# Patient Record
Sex: FEMALE | Race: WHITE | Employment: UNEMPLOYED | URBAN - METROPOLITAN AREA
[De-identification: names, ages, dates, MRNs, and addresses within clinical notes are randomized per-mention and may not be internally consistent; named-entity substitution may affect disease eponyms.]

---

## 2017-11-13 PROBLEM — M54.50 CHRONIC MIDLINE LOW BACK PAIN WITHOUT SCIATICA: Status: ACTIVE | Noted: 2017-11-13

## 2017-11-13 PROBLEM — G89.29 CHRONIC MIDLINE LOW BACK PAIN WITHOUT SCIATICA: Status: ACTIVE | Noted: 2017-11-13

## 2018-03-12 ENCOUNTER — HOSPITAL ENCOUNTER (OUTPATIENT)
Age: 28
Discharge: HOME | End: 2018-03-12
Payer: COMMERCIAL

## 2018-03-12 ENCOUNTER — OFFICE VISIT (OUTPATIENT)
Dept: FAMILY MEDICINE CLINIC | Age: 28
End: 2018-03-12
Payer: COMMERCIAL

## 2018-03-12 VITALS
HEIGHT: 69 IN | SYSTOLIC BLOOD PRESSURE: 104 MMHG | HEART RATE: 96 BPM | WEIGHT: 259 LBS | DIASTOLIC BLOOD PRESSURE: 70 MMHG | BODY MASS INDEX: 38.36 KG/M2 | TEMPERATURE: 98.5 F | OXYGEN SATURATION: 97 %

## 2018-03-12 DIAGNOSIS — G47.9 SLEEP DISTURBANCE: ICD-10-CM

## 2018-03-12 DIAGNOSIS — N39.0 URINARY TRACT INFECTION WITHOUT HEMATURIA, SITE UNSPECIFIED: ICD-10-CM

## 2018-03-12 DIAGNOSIS — R45.89 FLAT AFFECT: ICD-10-CM

## 2018-03-12 DIAGNOSIS — E66.09 CLASS 2 OBESITY DUE TO EXCESS CALORIES WITHOUT SERIOUS COMORBIDITY WITH BODY MASS INDEX (BMI) OF 38.0 TO 38.9 IN ADULT: ICD-10-CM

## 2018-03-12 DIAGNOSIS — R19.7 DIARRHEA, UNSPECIFIED TYPE: ICD-10-CM

## 2018-03-12 LAB
BILIRUBIN, POC: NEGATIVE
BLOOD URINE, POC: NORMAL
BUN BLDV-MCNC: 10 MG/DL (ref 6–20)
CLARITY, POC: NORMAL
COLOR, POC: YELLOW
CREAT SERPL-MCNC: 0.8 MG/DL (ref 0.5–1)
GFR AFRICAN AMERICAN: >60
GFR NON-AFRICAN AMERICAN: >60 ML/MIN/1.73
GLUCOSE URINE, POC: NEGATIVE
KETONES, POC: NEGATIVE
LEUKOCYTE EST, POC: NEGATIVE
NITRITE, POC: NEGATIVE
PH, POC: 6
PROTEIN, POC: NEGATIVE
SPECIFIC GRAVITY, POC: 1.02
UROBILINOGEN, POC: 0.2

## 2018-03-12 PROCEDURE — 84520 ASSAY OF UREA NITROGEN: CPT

## 2018-03-12 PROCEDURE — 36415 COLL VENOUS BLD VENIPUNCTURE: CPT

## 2018-03-12 PROCEDURE — 82565 ASSAY OF CREATININE: CPT

## 2018-03-12 PROCEDURE — 81002 URINALYSIS NONAUTO W/O SCOPE: CPT | Performed by: STUDENT IN AN ORGANIZED HEALTH CARE EDUCATION/TRAINING PROGRAM

## 2018-03-12 PROCEDURE — 99212 OFFICE O/P EST SF 10 MIN: CPT | Performed by: STUDENT IN AN ORGANIZED HEALTH CARE EDUCATION/TRAINING PROGRAM

## 2018-03-12 PROCEDURE — 1036F TOBACCO NON-USER: CPT | Performed by: STUDENT IN AN ORGANIZED HEALTH CARE EDUCATION/TRAINING PROGRAM

## 2018-03-12 PROCEDURE — G8417 CALC BMI ABV UP PARAM F/U: HCPCS | Performed by: STUDENT IN AN ORGANIZED HEALTH CARE EDUCATION/TRAINING PROGRAM

## 2018-03-12 PROCEDURE — G8427 DOCREV CUR MEDS BY ELIG CLIN: HCPCS | Performed by: STUDENT IN AN ORGANIZED HEALTH CARE EDUCATION/TRAINING PROGRAM

## 2018-03-12 PROCEDURE — G8484 FLU IMMUNIZE NO ADMIN: HCPCS | Performed by: STUDENT IN AN ORGANIZED HEALTH CARE EDUCATION/TRAINING PROGRAM

## 2018-03-12 PROCEDURE — 99213 OFFICE O/P EST LOW 20 MIN: CPT | Performed by: STUDENT IN AN ORGANIZED HEALTH CARE EDUCATION/TRAINING PROGRAM

## 2018-03-12 ASSESSMENT — PATIENT HEALTH QUESTIONNAIRE - PHQ9
2. FEELING DOWN, DEPRESSED OR HOPELESS: 0
SUM OF ALL RESPONSES TO PHQ QUESTIONS 1-9: 0
1. LITTLE INTEREST OR PLEASURE IN DOING THINGS: 0
SUM OF ALL RESPONSES TO PHQ9 QUESTIONS 1 & 2: 0

## 2018-03-12 NOTE — PROGRESS NOTES
follow-up. Diagnoses and all orders for this visit:    Class 2 obesity due to excess calories without serious comorbidity with body mass index (BMI) of 38.0 to 38.9 in adult  Patient with significant obesity, as well as weight gain over past year. Somewhat general and evasive in her answers about exercise and nutrition. Will refer for dietician to aid in food choices. Encouraged to continue to keep food diary.  -     Amb External Referral To Nutrition Services    Urinary tract infection without hematuria, site unspecified  Symptoms persist, patient's urinalysis was negative at last visit. Will repeat today, will treat if indicated. -     POCT Urinalysis no Micro    Flat affect  Sleep disturbance  All of patient's issues may have psychiatric component, will refer to psychologist for short-term counseling regarding her difficulty with sleep and her multiple medical complaints. -     Kristian Florence, PhD    Diarrhea, unspecified type  Will hold Milk of Magnesia, which may be contributing to diarrhea. Will perform rectal exam at visit in 2 weeks, and will consider referral for colonoscopy based on results. Call or go to ED immediately if symptoms worsen or persist.  Return in about 2 weeks (around 3/26/2018) for Exam following asthma, blood in stool. , or sooner if necessary. All questions answered. Anoop Cruz MD  Family Medicine Resident, PGY-2

## 2018-03-13 ASSESSMENT — ANXIETY QUESTIONNAIRES
5. BEING SO RESTLESS THAT IT IS HARD TO SIT STILL: 0-NOT AT ALL SURE
2. NOT BEING ABLE TO STOP OR CONTROL WORRYING: 0-NOT AT ALL SURE
3. WORRYING TOO MUCH ABOUT DIFFERENT THINGS: 0-NOT AT ALL SURE
4. TROUBLE RELAXING: 0-NOT AT ALL SURE
7. FEELING AFRAID AS IF SOMETHING AWFUL MIGHT HAPPEN: 0-NOT AT ALL SURE
6. BECOMING EASILY ANNOYED OR IRRITABLE: 0-NOT AT ALL SURE
1. FEELING NERVOUS, ANXIOUS, OR ON EDGE: 0-NOT AT ALL SURE
GAD7 TOTAL SCORE: 0

## 2018-03-14 ASSESSMENT — ENCOUNTER SYMPTOMS
DIARRHEA: 0
EYE ITCHING: 0
SHORTNESS OF BREATH: 0
COUGH: 1
EYE REDNESS: 0
NAUSEA: 0
RHINORRHEA: 1
CONSTIPATION: 1
BLOOD IN STOOL: 1
SINUS PRESSURE: 0
SORE THROAT: 1
EYE DISCHARGE: 0
VOMITING: 0
PHOTOPHOBIA: 0

## 2018-03-23 RX ORDER — BACLOFEN 20 MG/1
20 TABLET ORAL 4 TIMES DAILY
Qty: 120 TABLET | Refills: 3 | Status: SHIPPED | OUTPATIENT
Start: 2018-03-23 | End: 2018-07-12 | Stop reason: SDUPTHER

## 2018-03-30 DIAGNOSIS — G80.0 CONGENITAL SPASTIC QUADRIPARESIS (HCC): ICD-10-CM

## 2018-03-30 RX ORDER — GABAPENTIN 100 MG/1
100 CAPSULE ORAL 3 TIMES DAILY
Qty: 360 CAPSULE | Refills: 0 | Status: SHIPPED | OUTPATIENT
Start: 2018-03-30 | End: 2018-07-23 | Stop reason: SDUPTHER

## 2018-05-03 ENCOUNTER — ANESTHESIA EVENT (OUTPATIENT)
Dept: OPERATING ROOM | Age: 28
End: 2018-05-03
Payer: COMMERCIAL

## 2018-05-04 ENCOUNTER — HOSPITAL ENCOUNTER (OUTPATIENT)
Age: 28
Setting detail: OUTPATIENT SURGERY
Discharge: HOME | End: 2018-05-04
Attending: OTOLARYNGOLOGY | Admitting: OTOLARYNGOLOGY
Payer: COMMERCIAL

## 2018-05-04 ENCOUNTER — ANESTHESIA (OUTPATIENT)
Dept: OPERATING ROOM | Age: 28
End: 2018-05-04
Payer: COMMERCIAL

## 2018-05-04 VITALS
BODY MASS INDEX: 37.62 KG/M2 | TEMPERATURE: 96.3 F | HEIGHT: 69 IN | OXYGEN SATURATION: 97 % | SYSTOLIC BLOOD PRESSURE: 106 MMHG | DIASTOLIC BLOOD PRESSURE: 56 MMHG | HEART RATE: 76 BPM | RESPIRATION RATE: 16 BRPM | WEIGHT: 254 LBS

## 2018-05-04 VITALS — SYSTOLIC BLOOD PRESSURE: 105 MMHG | OXYGEN SATURATION: 100 % | DIASTOLIC BLOOD PRESSURE: 50 MMHG

## 2018-05-04 DIAGNOSIS — Z90.89 S/P TONSILLECTOMY: Primary | ICD-10-CM

## 2018-05-04 LAB — HCG(URINE) PREGNANCY TEST: NEGATIVE

## 2018-05-04 PROCEDURE — 3600000012 HC SURGERY LEVEL 2 ADDTL 15MIN: Performed by: OTOLARYNGOLOGY

## 2018-05-04 PROCEDURE — 81025 URINE PREGNANCY TEST: CPT

## 2018-05-04 PROCEDURE — 7100000000 HC PACU RECOVERY - FIRST 15 MIN: Performed by: OTOLARYNGOLOGY

## 2018-05-04 PROCEDURE — 3600000002 HC SURGERY LEVEL 2 BASE: Performed by: OTOLARYNGOLOGY

## 2018-05-04 PROCEDURE — 6360000002 HC RX W HCPCS: Performed by: NURSE ANESTHETIST, CERTIFIED REGISTERED

## 2018-05-04 PROCEDURE — 7100000011 HC PHASE II RECOVERY - ADDTL 15 MIN: Performed by: OTOLARYNGOLOGY

## 2018-05-04 PROCEDURE — 6370000000 HC RX 637 (ALT 250 FOR IP): Performed by: ANESTHESIOLOGY

## 2018-05-04 PROCEDURE — 7100000010 HC PHASE II RECOVERY - FIRST 15 MIN: Performed by: OTOLARYNGOLOGY

## 2018-05-04 PROCEDURE — 6360000002 HC RX W HCPCS: Performed by: ANESTHESIOLOGY

## 2018-05-04 PROCEDURE — 3700000000 HC ANESTHESIA ATTENDED CARE: Performed by: OTOLARYNGOLOGY

## 2018-05-04 PROCEDURE — 7100000001 HC PACU RECOVERY - ADDTL 15 MIN: Performed by: OTOLARYNGOLOGY

## 2018-05-04 PROCEDURE — 2500000003 HC RX 250 WO HCPCS: Performed by: NURSE ANESTHETIST, CERTIFIED REGISTERED

## 2018-05-04 PROCEDURE — 3700000001 HC ADD 15 MINUTES (ANESTHESIA): Performed by: OTOLARYNGOLOGY

## 2018-05-04 PROCEDURE — 2709999900 HC NON-CHARGEABLE SUPPLY: Performed by: OTOLARYNGOLOGY

## 2018-05-04 PROCEDURE — 88304 TISSUE EXAM BY PATHOLOGIST: CPT

## 2018-05-04 PROCEDURE — 2500000003 HC RX 250 WO HCPCS: Performed by: OTOLARYNGOLOGY

## 2018-05-04 PROCEDURE — 2580000003 HC RX 258: Performed by: NURSE ANESTHETIST, CERTIFIED REGISTERED

## 2018-05-04 PROCEDURE — 6370000000 HC RX 637 (ALT 250 FOR IP): Performed by: OTOLARYNGOLOGY

## 2018-05-04 RX ORDER — DEXAMETHASONE SODIUM PHOSPHATE 4 MG/ML
INJECTION, SOLUTION INTRA-ARTICULAR; INTRALESIONAL; INTRAMUSCULAR; INTRAVENOUS; SOFT TISSUE PRN
Status: DISCONTINUED | OUTPATIENT
Start: 2018-05-04 | End: 2018-05-04 | Stop reason: SDUPTHER

## 2018-05-04 RX ORDER — FENTANYL CITRATE 50 UG/ML
INJECTION, SOLUTION INTRAMUSCULAR; INTRAVENOUS PRN
Status: DISCONTINUED | OUTPATIENT
Start: 2018-05-04 | End: 2018-05-04 | Stop reason: SDUPTHER

## 2018-05-04 RX ORDER — SODIUM CHLORIDE 0.9 % (FLUSH) 0.9 %
10 SYRINGE (ML) INJECTION PRN
Status: DISCONTINUED | OUTPATIENT
Start: 2018-05-04 | End: 2018-05-04 | Stop reason: HOSPADM

## 2018-05-04 RX ORDER — LIDOCAINE HYDROCHLORIDE 20 MG/ML
INJECTION, SOLUTION EPIDURAL; INFILTRATION; INTRACAUDAL; PERINEURAL PRN
Status: DISCONTINUED | OUTPATIENT
Start: 2018-05-04 | End: 2018-05-04 | Stop reason: SDUPTHER

## 2018-05-04 RX ORDER — TRAMADOL HYDROCHLORIDE 50 MG/1
50 TABLET ORAL EVERY 6 HOURS PRN
Qty: 28 TABLET | Refills: 0 | Status: SHIPPED | OUTPATIENT
Start: 2018-05-04 | End: 2018-05-11

## 2018-05-04 RX ORDER — GLYCOPYRROLATE 0.2 MG/ML
INJECTION INTRAMUSCULAR; INTRAVENOUS PRN
Status: DISCONTINUED | OUTPATIENT
Start: 2018-05-04 | End: 2018-05-04 | Stop reason: SDUPTHER

## 2018-05-04 RX ORDER — FENTANYL CITRATE 50 UG/ML
25 INJECTION, SOLUTION INTRAMUSCULAR; INTRAVENOUS EVERY 5 MIN PRN
Status: DISCONTINUED | OUTPATIENT
Start: 2018-05-04 | End: 2018-05-04 | Stop reason: HOSPADM

## 2018-05-04 RX ORDER — ROCURONIUM BROMIDE 10 MG/ML
INJECTION, SOLUTION INTRAVENOUS PRN
Status: DISCONTINUED | OUTPATIENT
Start: 2018-05-04 | End: 2018-05-04 | Stop reason: SDUPTHER

## 2018-05-04 RX ORDER — SODIUM CHLORIDE 0.9 % (FLUSH) 0.9 %
10 SYRINGE (ML) INJECTION EVERY 12 HOURS SCHEDULED
Status: DISCONTINUED | OUTPATIENT
Start: 2018-05-04 | End: 2018-05-04 | Stop reason: HOSPADM

## 2018-05-04 RX ORDER — SODIUM CHLORIDE 9 MG/ML
INJECTION, SOLUTION INTRAVENOUS CONTINUOUS PRN
Status: DISCONTINUED | OUTPATIENT
Start: 2018-05-04 | End: 2018-05-04 | Stop reason: SDUPTHER

## 2018-05-04 RX ORDER — TRAMADOL HYDROCHLORIDE 50 MG/1
50 TABLET ORAL ONCE
Status: COMPLETED | OUTPATIENT
Start: 2018-05-04 | End: 2018-05-04

## 2018-05-04 RX ORDER — DIPHENHYDRAMINE HYDROCHLORIDE 50 MG/ML
12.5 INJECTION INTRAMUSCULAR; INTRAVENOUS
Status: DISCONTINUED | OUTPATIENT
Start: 2018-05-04 | End: 2018-05-04 | Stop reason: HOSPADM

## 2018-05-04 RX ORDER — SODIUM CHLORIDE, SODIUM LACTATE, POTASSIUM CHLORIDE, CALCIUM CHLORIDE 600; 310; 30; 20 MG/100ML; MG/100ML; MG/100ML; MG/100ML
INJECTION, SOLUTION INTRAVENOUS CONTINUOUS
Status: DISCONTINUED | OUTPATIENT
Start: 2018-05-04 | End: 2018-05-04 | Stop reason: HOSPADM

## 2018-05-04 RX ORDER — MEPERIDINE HYDROCHLORIDE 25 MG/ML
12.5 INJECTION INTRAMUSCULAR; INTRAVENOUS; SUBCUTANEOUS EVERY 5 MIN PRN
Status: DISCONTINUED | OUTPATIENT
Start: 2018-05-04 | End: 2018-05-04 | Stop reason: HOSPADM

## 2018-05-04 RX ORDER — TANNIC ACID
POWDER (GRAM) MISCELLANEOUS PRN
Status: DISCONTINUED | OUTPATIENT
Start: 2018-05-04 | End: 2018-05-04 | Stop reason: HOSPADM

## 2018-05-04 RX ORDER — MIDAZOLAM HYDROCHLORIDE 1 MG/ML
INJECTION INTRAMUSCULAR; INTRAVENOUS PRN
Status: DISCONTINUED | OUTPATIENT
Start: 2018-05-04 | End: 2018-05-04 | Stop reason: SDUPTHER

## 2018-05-04 RX ORDER — ONDANSETRON 4 MG/1
8 TABLET, ORALLY DISINTEGRATING ORAL EVERY 8 HOURS PRN
Qty: 30 TABLET | Refills: 1 | Status: SHIPPED | OUTPATIENT
Start: 2018-05-04 | End: 2018-08-20

## 2018-05-04 RX ORDER — CLINDAMYCIN PHOSPHATE 900 MG/50ML
900 INJECTION INTRAVENOUS
Status: COMPLETED | OUTPATIENT
Start: 2018-05-04 | End: 2018-05-04

## 2018-05-04 RX ORDER — NEOSTIGMINE METHYLSULFATE 1 MG/ML
INJECTION, SOLUTION INTRAVENOUS PRN
Status: DISCONTINUED | OUTPATIENT
Start: 2018-05-04 | End: 2018-05-04 | Stop reason: SDUPTHER

## 2018-05-04 RX ORDER — ONDANSETRON 2 MG/ML
INJECTION INTRAMUSCULAR; INTRAVENOUS PRN
Status: DISCONTINUED | OUTPATIENT
Start: 2018-05-04 | End: 2018-05-04 | Stop reason: SDUPTHER

## 2018-05-04 RX ORDER — PROPOFOL 10 MG/ML
INJECTION, EMULSION INTRAVENOUS PRN
Status: DISCONTINUED | OUTPATIENT
Start: 2018-05-04 | End: 2018-05-04 | Stop reason: SDUPTHER

## 2018-05-04 RX ORDER — FENTANYL CITRATE 50 UG/ML
50 INJECTION, SOLUTION INTRAMUSCULAR; INTRAVENOUS EVERY 5 MIN PRN
Status: DISCONTINUED | OUTPATIENT
Start: 2018-05-04 | End: 2018-05-04 | Stop reason: HOSPADM

## 2018-05-04 RX ADMIN — ROCURONIUM BROMIDE 10 MG: 10 SOLUTION INTRAVENOUS at 12:37

## 2018-05-04 RX ADMIN — FENTANYL CITRATE 50 MCG: 50 INJECTION, SOLUTION INTRAMUSCULAR; INTRAVENOUS at 13:04

## 2018-05-04 RX ADMIN — HYDROMORPHONE HYDROCHLORIDE 0.5 MG: 1 INJECTION, SOLUTION INTRAMUSCULAR; INTRAVENOUS; SUBCUTANEOUS at 13:27

## 2018-05-04 RX ADMIN — MIDAZOLAM HYDROCHLORIDE 2 MG: 1 INJECTION, SOLUTION INTRAMUSCULAR; INTRAVENOUS at 12:02

## 2018-05-04 RX ADMIN — LIDOCAINE HYDROCHLORIDE 100 MG: 20 INJECTION, SOLUTION EPIDURAL; INFILTRATION; INTRACAUDAL; PERINEURAL at 12:09

## 2018-05-04 RX ADMIN — CLINDAMYCIN PHOSPHATE 900 MG: 900 INJECTION INTRAVENOUS at 12:05

## 2018-05-04 RX ADMIN — ROCURONIUM BROMIDE 35 MG: 10 SOLUTION INTRAVENOUS at 12:09

## 2018-05-04 RX ADMIN — FENTANYL CITRATE 50 MCG: 50 INJECTION, SOLUTION INTRAMUSCULAR; INTRAVENOUS at 12:45

## 2018-05-04 RX ADMIN — Medication 3 MG: at 12:56

## 2018-05-04 RX ADMIN — TRAMADOL HYDROCHLORIDE 50 MG: 50 TABLET, FILM COATED ORAL at 14:19

## 2018-05-04 RX ADMIN — DEXAMETHASONE SODIUM PHOSPHATE 8 MG: 4 INJECTION, SOLUTION INTRA-ARTICULAR; INTRALESIONAL; INTRAMUSCULAR; INTRAVENOUS; SOFT TISSUE at 12:09

## 2018-05-04 RX ADMIN — FENTANYL CITRATE 50 MCG: 50 INJECTION, SOLUTION INTRAMUSCULAR; INTRAVENOUS at 12:09

## 2018-05-04 RX ADMIN — PROPOFOL 200 MG: 10 INJECTION, EMULSION INTRAVENOUS at 12:09

## 2018-05-04 RX ADMIN — ONDANSETRON 4 MG: 2 INJECTION, SOLUTION INTRAMUSCULAR; INTRAVENOUS at 12:56

## 2018-05-04 RX ADMIN — FENTANYL CITRATE 50 MCG: 50 INJECTION, SOLUTION INTRAMUSCULAR; INTRAVENOUS at 12:35

## 2018-05-04 RX ADMIN — Medication 0.4 MG: at 12:56

## 2018-05-04 RX ADMIN — Medication 0.2 MG: at 12:09

## 2018-05-04 RX ADMIN — SODIUM CHLORIDE: 9 INJECTION, SOLUTION INTRAVENOUS at 12:05

## 2018-05-04 RX ADMIN — FENTANYL CITRATE 50 MCG: 50 INJECTION, SOLUTION INTRAMUSCULAR; INTRAVENOUS at 13:12

## 2018-05-04 RX ADMIN — HYDROMORPHONE HYDROCHLORIDE 0.5 MG: 1 INJECTION, SOLUTION INTRAMUSCULAR; INTRAVENOUS; SUBCUTANEOUS at 13:38

## 2018-05-04 ASSESSMENT — PULMONARY FUNCTION TESTS
PIF_VALUE: 19
PIF_VALUE: 3
PIF_VALUE: 20
PIF_VALUE: 20
PIF_VALUE: 3
PIF_VALUE: 0
PIF_VALUE: 19
PIF_VALUE: 10
PIF_VALUE: 19
PIF_VALUE: 5
PIF_VALUE: 1
PIF_VALUE: 20
PIF_VALUE: 20
PIF_VALUE: 17
PIF_VALUE: 17
PIF_VALUE: 19
PIF_VALUE: 20
PIF_VALUE: 20
PIF_VALUE: 19
PIF_VALUE: 0
PIF_VALUE: 19
PIF_VALUE: 18
PIF_VALUE: 18
PIF_VALUE: 19
PIF_VALUE: 18
PIF_VALUE: 10
PIF_VALUE: 16
PIF_VALUE: 19
PIF_VALUE: 16
PIF_VALUE: 20
PIF_VALUE: 19
PIF_VALUE: 19
PIF_VALUE: 18
PIF_VALUE: 0
PIF_VALUE: 20
PIF_VALUE: 10
PIF_VALUE: 20
PIF_VALUE: 1
PIF_VALUE: 10
PIF_VALUE: 16
PIF_VALUE: 17
PIF_VALUE: 19
PIF_VALUE: 19
PIF_VALUE: 25
PIF_VALUE: 19
PIF_VALUE: 0
PIF_VALUE: 19
PIF_VALUE: 17
PIF_VALUE: 19
PIF_VALUE: 18
PIF_VALUE: 21
PIF_VALUE: 17
PIF_VALUE: 22
PIF_VALUE: 16
PIF_VALUE: 18
PIF_VALUE: 10

## 2018-05-04 ASSESSMENT — PAIN SCALES - GENERAL
PAINLEVEL_OUTOF10: 5
PAINLEVEL_OUTOF10: 9
PAINLEVEL_OUTOF10: 8
PAINLEVEL_OUTOF10: 0
PAINLEVEL_OUTOF10: 10
PAINLEVEL_OUTOF10: 9

## 2018-05-04 ASSESSMENT — PAIN DESCRIPTION - DESCRIPTORS
DESCRIPTORS: BURNING
DESCRIPTORS: BURNING

## 2018-05-04 ASSESSMENT — PAIN DESCRIPTION - PAIN TYPE
TYPE: SURGICAL PAIN
TYPE: SURGICAL PAIN

## 2018-05-04 ASSESSMENT — LIFESTYLE VARIABLES: SMOKING_STATUS: 1

## 2018-05-04 ASSESSMENT — PAIN DESCRIPTION - LOCATION
LOCATION: THROAT
LOCATION: THROAT

## 2018-05-04 ASSESSMENT — PAIN DESCRIPTION - FREQUENCY
FREQUENCY: CONTINUOUS
FREQUENCY: CONTINUOUS

## 2018-05-04 ASSESSMENT — PAIN DESCRIPTION - PROGRESSION: CLINICAL_PROGRESSION: GRADUALLY IMPROVING

## 2018-05-04 ASSESSMENT — PAIN - FUNCTIONAL ASSESSMENT: PAIN_FUNCTIONAL_ASSESSMENT: 0-10

## 2018-07-12 RX ORDER — BACLOFEN 20 MG/1
TABLET ORAL
Qty: 120 TABLET | Refills: 0 | Status: SHIPPED | OUTPATIENT
Start: 2018-07-12 | End: 2018-08-09 | Stop reason: SDUPTHER

## 2018-07-23 DIAGNOSIS — G80.0 CONGENITAL SPASTIC QUADRIPARESIS (HCC): ICD-10-CM

## 2018-07-24 RX ORDER — GABAPENTIN 100 MG/1
100 CAPSULE ORAL 3 TIMES DAILY
Qty: 360 CAPSULE | Refills: 0 | Status: SHIPPED | OUTPATIENT
Start: 2018-07-24 | End: 2018-08-20 | Stop reason: SDUPTHER

## 2018-08-13 RX ORDER — BACLOFEN 20 MG/1
TABLET ORAL
Qty: 120 TABLET | Refills: 0 | Status: SHIPPED | OUTPATIENT
Start: 2018-08-13 | End: 2018-08-20 | Stop reason: SDUPTHER

## 2018-08-17 DIAGNOSIS — E03.9 HYPOTHYROIDISM, UNSPECIFIED TYPE: ICD-10-CM

## 2018-08-17 RX ORDER — LEVOTHYROXINE SODIUM 0.07 MG/1
75 TABLET ORAL DAILY
Qty: 90 TABLET | Refills: 1 | Status: SHIPPED | OUTPATIENT
Start: 2018-08-17 | End: 2018-10-05 | Stop reason: SDUPTHER

## 2018-08-20 ENCOUNTER — OFFICE VISIT (OUTPATIENT)
Dept: NEUROLOGY | Age: 28
End: 2018-08-20
Payer: COMMERCIAL

## 2018-08-20 VITALS
WEIGHT: 239 LBS | SYSTOLIC BLOOD PRESSURE: 107 MMHG | BODY MASS INDEX: 35.4 KG/M2 | RESPIRATION RATE: 18 BRPM | OXYGEN SATURATION: 95 % | HEIGHT: 69 IN | DIASTOLIC BLOOD PRESSURE: 71 MMHG | HEART RATE: 84 BPM | TEMPERATURE: 97.2 F

## 2018-08-20 DIAGNOSIS — G80.0 CONGENITAL SPASTIC QUADRIPARESIS (HCC): ICD-10-CM

## 2018-08-20 PROCEDURE — G8417 CALC BMI ABV UP PARAM F/U: HCPCS | Performed by: PSYCHIATRY & NEUROLOGY

## 2018-08-20 PROCEDURE — 99215 OFFICE O/P EST HI 40 MIN: CPT | Performed by: PSYCHIATRY & NEUROLOGY

## 2018-08-20 PROCEDURE — 1036F TOBACCO NON-USER: CPT | Performed by: PSYCHIATRY & NEUROLOGY

## 2018-08-20 PROCEDURE — G8427 DOCREV CUR MEDS BY ELIG CLIN: HCPCS | Performed by: PSYCHIATRY & NEUROLOGY

## 2018-08-20 RX ORDER — GABAPENTIN 300 MG/1
300 CAPSULE ORAL 3 TIMES DAILY
Qty: 270 CAPSULE | Refills: 1 | Status: SHIPPED | OUTPATIENT
Start: 2018-08-20 | End: 2019-02-18 | Stop reason: SDUPTHER

## 2018-08-20 RX ORDER — BACLOFEN 20 MG/1
TABLET ORAL
Qty: 120 TABLET | Refills: 5 | Status: SHIPPED | OUTPATIENT
Start: 2018-08-20 | End: 2018-10-09 | Stop reason: SDUPTHER

## 2018-09-07 ENCOUNTER — OFFICE VISIT (OUTPATIENT)
Dept: FAMILY MEDICINE CLINIC | Age: 28
End: 2018-09-07
Payer: COMMERCIAL

## 2018-09-07 VITALS
HEART RATE: 75 BPM | WEIGHT: 239 LBS | RESPIRATION RATE: 12 BRPM | DIASTOLIC BLOOD PRESSURE: 77 MMHG | HEIGHT: 69 IN | SYSTOLIC BLOOD PRESSURE: 122 MMHG | TEMPERATURE: 98.1 F | BODY MASS INDEX: 35.4 KG/M2 | OXYGEN SATURATION: 96 %

## 2018-09-07 DIAGNOSIS — L25.1 CONTACT DERMATITIS DUE TO DRUGS IN CONTACT WITH SKIN, UNSPECIFIED CONTACT DERMATITIS TYPE: ICD-10-CM

## 2018-09-07 DIAGNOSIS — K62.89 PROCTALGIA: Primary | ICD-10-CM

## 2018-09-07 DIAGNOSIS — K59.00 CONSTIPATION, UNSPECIFIED CONSTIPATION TYPE: ICD-10-CM

## 2018-09-07 PROCEDURE — G8427 DOCREV CUR MEDS BY ELIG CLIN: HCPCS | Performed by: FAMILY MEDICINE

## 2018-09-07 PROCEDURE — 99213 OFFICE O/P EST LOW 20 MIN: CPT | Performed by: FAMILY MEDICINE

## 2018-09-07 PROCEDURE — 1036F TOBACCO NON-USER: CPT | Performed by: FAMILY MEDICINE

## 2018-09-07 PROCEDURE — G8417 CALC BMI ABV UP PARAM F/U: HCPCS | Performed by: FAMILY MEDICINE

## 2018-09-07 PROCEDURE — 99212 OFFICE O/P EST SF 10 MIN: CPT | Performed by: FAMILY MEDICINE

## 2018-09-07 RX ORDER — POLYETHYLENE GLYCOL 3350 17 G/17G
17 POWDER, FOR SOLUTION ORAL DAILY
Qty: 510 G | Refills: 0 | Status: SHIPPED | OUTPATIENT
Start: 2018-09-07 | End: 2018-10-03 | Stop reason: SDUPTHER

## 2018-09-10 ENCOUNTER — TELEPHONE (OUTPATIENT)
Dept: SURGERY | Age: 28
End: 2018-09-10

## 2018-09-10 NOTE — TELEPHONE ENCOUNTER
MA received a call from pt to set up a consult with one of our surgeons as a referral from Dr. Avelino Knapp for [facility]. Pt was formerly a Dr. Yanet Rodgers pt but she wishes to now have a female surgeon, MA offered sooner appointment with other surgeons but pt deferred, MA scheduled pt for 10/3/18 at 3:00 pm with Dr. Wilfrido Whittaker. Pt verbalized understanding of appointment time/date/copay reminder. MA also mailed appointment letter to pt with map/reminder.   Electronically signed by Varun Evans on 9/10/18 at 2:34 PM

## 2018-09-18 ENCOUNTER — OFFICE VISIT (OUTPATIENT)
Dept: FAMILY MEDICINE CLINIC | Age: 28
End: 2018-09-18
Payer: COMMERCIAL

## 2018-09-18 VITALS
SYSTOLIC BLOOD PRESSURE: 116 MMHG | BODY MASS INDEX: 35.99 KG/M2 | TEMPERATURE: 98.8 F | OXYGEN SATURATION: 97 % | HEIGHT: 69 IN | WEIGHT: 243 LBS | DIASTOLIC BLOOD PRESSURE: 72 MMHG | HEART RATE: 93 BPM

## 2018-09-18 DIAGNOSIS — B96.89 ACUTE BACTERIAL SINUSITIS: ICD-10-CM

## 2018-09-18 DIAGNOSIS — L70.0 ACNE VULGARIS: Primary | ICD-10-CM

## 2018-09-18 DIAGNOSIS — J01.90 ACUTE BACTERIAL SINUSITIS: ICD-10-CM

## 2018-09-18 PROCEDURE — G8427 DOCREV CUR MEDS BY ELIG CLIN: HCPCS | Performed by: FAMILY MEDICINE

## 2018-09-18 PROCEDURE — 99212 OFFICE O/P EST SF 10 MIN: CPT | Performed by: FAMILY MEDICINE

## 2018-09-18 PROCEDURE — G8417 CALC BMI ABV UP PARAM F/U: HCPCS | Performed by: FAMILY MEDICINE

## 2018-09-18 PROCEDURE — 99213 OFFICE O/P EST LOW 20 MIN: CPT | Performed by: FAMILY MEDICINE

## 2018-09-18 PROCEDURE — 1036F TOBACCO NON-USER: CPT | Performed by: FAMILY MEDICINE

## 2018-09-18 RX ORDER — DOXYCYCLINE HYCLATE 100 MG/1
100 CAPSULE ORAL 2 TIMES DAILY
Qty: 20 CAPSULE | Refills: 0 | Status: SHIPPED | OUTPATIENT
Start: 2018-09-18 | End: 2018-09-28

## 2018-09-18 RX ORDER — TRETINOIN 0.4 MG/G
GEL TOPICAL NIGHTLY
Qty: 1 TUBE | Refills: 3 | Status: SHIPPED | OUTPATIENT
Start: 2018-09-18 | End: 2018-10-23 | Stop reason: ALTCHOICE

## 2018-09-25 ENCOUNTER — TELEPHONE (OUTPATIENT)
Dept: NEUROLOGY | Age: 28
End: 2018-09-25
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
## 2018-10-03 ENCOUNTER — OFFICE VISIT (OUTPATIENT)
Dept: SURGERY | Age: 28
End: 2018-10-03
Payer: COMMERCIAL

## 2018-10-03 VITALS
OXYGEN SATURATION: 92 % | SYSTOLIC BLOOD PRESSURE: 114 MMHG | HEART RATE: 86 BPM | TEMPERATURE: 98.7 F | BODY MASS INDEX: 36.43 KG/M2 | WEIGHT: 246 LBS | DIASTOLIC BLOOD PRESSURE: 63 MMHG | RESPIRATION RATE: 18 BRPM | HEIGHT: 69 IN

## 2018-10-03 DIAGNOSIS — K62.89 PROCTALGIA: Primary | ICD-10-CM

## 2018-10-03 DIAGNOSIS — K62.5 BRBPR (BRIGHT RED BLOOD PER RECTUM): ICD-10-CM

## 2018-10-03 DIAGNOSIS — K59.00 CONSTIPATION, UNSPECIFIED CONSTIPATION TYPE: ICD-10-CM

## 2018-10-03 PROCEDURE — 99204 OFFICE O/P NEW MOD 45 MIN: CPT | Performed by: SURGERY

## 2018-10-03 PROCEDURE — 99202 OFFICE O/P NEW SF 15 MIN: CPT | Performed by: SURGERY

## 2018-10-03 RX ORDER — POLYETHYLENE GLYCOL 3350 17 G/17G
17 POWDER, FOR SOLUTION ORAL DAILY
Qty: 510 G | Refills: 3 | Status: SHIPPED | OUTPATIENT
Start: 2018-10-03 | End: 2018-10-19 | Stop reason: SDUPTHER

## 2018-10-03 ASSESSMENT — ENCOUNTER SYMPTOMS
EYES NEGATIVE: 1
RESPIRATORY NEGATIVE: 1

## 2018-10-03 NOTE — PATIENT INSTRUCTIONS
Instructions for Clear liquid diet  Definition  A clear liquid diet consists of clear liquids, such as water, broth and plain gelatin, that are easily digested and leave no undigested residue in your intestinal tract. Your doctor may prescribe a clear liquid diet before certain medical procedures or if you have certain digestive problems. Because a clear liquid diet can't provide you with adequate calories and nutrients, it shouldn't be continued for more than a few days. Purpose  A clear liquid diet is often used before tests, procedures or surgeries that require no food in your stomach or intestines, such as before colonoscopy. It may also be recommended as a short-term diet if you have certain digestive problems, such as nausea, vomiting or diarrhea, or after certain types of surgery. Diet details  A clear liquid diet helps maintain adequate hydration, provides some important electrolytes, such as sodium and potassium, and gives some energy at a time when a full diet isn't possible or recommended. The following foods are allowed in a clear liquid diet:    Plain water         Fruit juices without pulp, such as apple juice   Strained lemonade    Clear, fat-free broth (bouillon or consomme)   Clear sodas (NO COLA)   Plain gelatin (NO RED OR PURPLE)  Honey   Ice pops without bits of fruit or fruit pulp   Tea or coffee WITHOUT milk or cream  **NOTHING Red or Purple   **If you CAN see through it you can have it    Any foods not on the above list should be avoided. Also, for certain tests, such as colon exams, your doctor may ask you to avoid liquids or gelatin with red coloring.      A typical menu on the clear liquid diet may look like this:     Breakfast:  1 glass fruit juice  1 cup coffee or tea (without dairy products)  1 cup broth  1 bowl gelatin     Snack:  1 glass fruit juice  1 bowl gelatin     Lunch:  1 glass fruit juice  1 glass water  1 cup broth  1 bowl gelatin     Snack:  1 ice pop (without fruit clear liquid diet helps maintain adequate hydration, provides some important electrolytes, such as sodium and potassium, and gives some energy at a time when a full diet isn't possible or recommended. The following foods are allowed in a clear liquid diet:    Water    Fruit juices without pulp, such as apple juice    Strained lemonade    Clear, fat-free soup broth (Chicken)   Clear sodas (sprite,7 up, ginger ale, mountain dew, ect.)   Gatorade (no red & no purple)    Jello   Honey    Popsicles with no chunks of fruit or dairy   Tea or coffee without milk or cream    **NOTHING RED OR PURPLE**  **IF YOU CAN SEE THROUGH IT YOU CAN HAVE IT**        A TYPICAL MENU ON THE CLEAR LIQUID DIET MAY LOOK LIKE THIS:    Breakfast:  1 glass fruit juice  1 cup coffee or tea (without dairy products)  1 cup soup broth  1 bowl jello    Snack:  1 glass fruit juice  1 bowl jello    Lunch:  1 glass fruit juice  1 glass water  1 cup soup broth  1 bowl jello    Snack:  1 popsicle (without fruit pulp)  1 cup coffee or tea (without dairy products) or a clear soft drink     Dinner:  1 cup juice or water  1 cup soup broth  1 bowl jello  1 cup coffee or tea     Results  Although the clear liquid diet may not be very exciting, it does fulfill its purpose. It's designed to keep your stomach and intestines clear, limit strain to your digestive system, but keep your body hydrated as you prepare for or recover from a medical procedure. Risks  Because a clear liquid diet can't provide you with adequate calories and nutrients, it shouldn't be used for more than a few days. Only use the clear liquid diet as directed by your doctor. If your doctor prescribes a clear liquid diet before a medical test, be sure to follow the diet instructions exactly. If you don't follow the diet exactly, you risk an inaccurate test and may have to reschedule the procedure for another time.      The importance of proper hydration  A colonoscopy prep

## 2018-10-05 ENCOUNTER — TELEPHONE (OUTPATIENT)
Dept: SURGERY | Age: 28
End: 2018-10-05

## 2018-10-05 DIAGNOSIS — E03.9 HYPOTHYROIDISM, UNSPECIFIED TYPE: ICD-10-CM

## 2018-10-05 DIAGNOSIS — Z76.0 MEDICATION REFILL: ICD-10-CM

## 2018-10-05 DIAGNOSIS — J45.20 MILD INTERMITTENT ASTHMA WITHOUT COMPLICATION: ICD-10-CM

## 2018-10-05 DIAGNOSIS — F41.9 ANXIETY: ICD-10-CM

## 2018-10-05 RX ORDER — ESCITALOPRAM OXALATE 20 MG/1
20 TABLET ORAL DAILY
Qty: 30 TABLET | Refills: 3 | Status: SHIPPED | OUTPATIENT
Start: 2018-10-05 | End: 2019-01-14 | Stop reason: SDUPTHER

## 2018-10-05 RX ORDER — ALBUTEROL SULFATE 90 UG/1
2 AEROSOL, METERED RESPIRATORY (INHALATION) EVERY 6 HOURS PRN
Qty: 1 INHALER | Refills: 3 | Status: SHIPPED | OUTPATIENT
Start: 2018-10-05 | End: 2019-01-09

## 2018-10-05 RX ORDER — LEVOTHYROXINE SODIUM 0.07 MG/1
75 TABLET ORAL DAILY
Qty: 90 TABLET | Refills: 1 | Status: SHIPPED | OUTPATIENT
Start: 2018-10-05 | End: 2018-11-30 | Stop reason: SDUPTHER

## 2018-10-05 NOTE — TELEPHONE ENCOUNTER
From: Dominique Phoenix  Sent: 10/5/2018 2:47 PM EDT  Subject: Medication Renewal Request    Vero Peoples would like a refill of the following medications:     albuterol sulfate HFA (PROAIR HFA) 108 (90 Base) MCG/ACT inhaler Amanda Hansen MD]     escitalopram (LEXAPRO) 20 MG tablet Amanda Hansen MD]     levothyroxine (LEVOTHROID) 75 MCG tablet Amanda Hansen MD]    Preferred pharmacy: [facility] [address] [place] [address] [phone] - F [phone]    Comment:      Medication renewals requested in this message routed separately:     polyethylene glycol (MIRALAX) powder Carmenza Hare MD]

## 2018-10-09 RX ORDER — BACLOFEN 20 MG/1
TABLET ORAL
Qty: 120 TABLET | Refills: 5 | Status: SHIPPED | OUTPATIENT
Start: 2018-10-09 | End: 2018-10-09 | Stop reason: SDUPTHER

## 2018-10-16 ENCOUNTER — OFFICE VISIT (OUTPATIENT)
Dept: FAMILY MEDICINE CLINIC | Age: 28
End: 2018-10-16
Payer: COMMERCIAL

## 2018-10-16 VITALS
DIASTOLIC BLOOD PRESSURE: 73 MMHG | TEMPERATURE: 98.6 F | WEIGHT: 251.1 LBS | SYSTOLIC BLOOD PRESSURE: 105 MMHG | OXYGEN SATURATION: 98 % | HEIGHT: 69 IN | RESPIRATION RATE: 20 BRPM | BODY MASS INDEX: 37.19 KG/M2 | HEART RATE: 80 BPM

## 2018-10-16 DIAGNOSIS — B96.89 ACUTE BACTERIAL SINUSITIS: Primary | ICD-10-CM

## 2018-10-16 DIAGNOSIS — R04.2 BLOOD-TINGED SPUTUM: ICD-10-CM

## 2018-10-16 DIAGNOSIS — R09.81 CONGESTION OF NASAL SINUS: ICD-10-CM

## 2018-10-16 DIAGNOSIS — J01.90 ACUTE BACTERIAL SINUSITIS: Primary | ICD-10-CM

## 2018-10-16 PROCEDURE — 1036F TOBACCO NON-USER: CPT | Performed by: FAMILY MEDICINE

## 2018-10-16 PROCEDURE — 99212 OFFICE O/P EST SF 10 MIN: CPT | Performed by: FAMILY MEDICINE

## 2018-10-16 PROCEDURE — G8484 FLU IMMUNIZE NO ADMIN: HCPCS | Performed by: FAMILY MEDICINE

## 2018-10-16 PROCEDURE — G8417 CALC BMI ABV UP PARAM F/U: HCPCS | Performed by: FAMILY MEDICINE

## 2018-10-16 PROCEDURE — G8427 DOCREV CUR MEDS BY ELIG CLIN: HCPCS | Performed by: FAMILY MEDICINE

## 2018-10-16 PROCEDURE — 99213 OFFICE O/P EST LOW 20 MIN: CPT | Performed by: FAMILY MEDICINE

## 2018-10-16 RX ORDER — LEVOFLOXACIN 500 MG/1
500 TABLET, FILM COATED ORAL DAILY
Qty: 10 TABLET | Refills: 0 | Status: SHIPPED | OUTPATIENT
Start: 2018-10-16 | End: 2018-10-26

## 2018-10-16 RX ORDER — LEVOCETIRIZINE DIHYDROCHLORIDE 5 MG/1
5 TABLET, FILM COATED ORAL NIGHTLY
Qty: 30 TABLET | Refills: 5 | Status: SHIPPED | OUTPATIENT
Start: 2018-10-16 | End: 2019-04-08 | Stop reason: SDUPTHER

## 2018-10-16 NOTE — PROGRESS NOTES
Attending Physician Statement    S:   Chief Complaint   Patient presents with    Acne     head congestion x 2 weeks     Patient is a 29year old female here for follow up of sinusitis. Treated for acute bacterial sinusitis with doxycyline. Took full course and does not have any improvement. Gets hot and cold at time. Has sputum productiion. Sinus presure and headaches. Cough that is worse at night. Has not had any relief. Acne is improved since beingon benzyl peroxide and cream.     O: Blood pressure 105/73, pulse 80, temperature 98.6 °F (37 °C), temperature source Oral, resp. rate 20, height 5' 9\" (1.753 m), weight 251 lb 1.6 oz (113.9 kg), SpO2 98 %, not currently breastfeeding. Exam:   Heart - RRR   Lungs - clear   HEENT- bilateral erythema external canals of ears. TM normal. Tonsils are not swollen. Frontal and maxillary sinus tenderness. A: bacterial sinusitis with failure of initial therapy   P:  levaquin 500mg x 10 days - if no improvement will send for CT scan    Follow-up as ordered    Attending Attestation   I have discussed the case, including pertinent history and exam findings with the resident. I agree with the documented assessment and plan.

## 2018-10-16 NOTE — PROGRESS NOTES
[facility] Outpatient Resident Progress Note       S: HPI : Richard Smallwood  29 y.o. who presented for Acne (head congestion x 2 weeks)    Sinusitis: was going on for total 5 weeks now. Given and finished doxy from last visit. Never got better. Sinus pressure with HA. Congestion. Runny nose. Cough with dark yellow, sometimes blood tinged. Gets cold and hot. No fever. Some trouble breathing through the nose. Cough worse at night. Acne: started on creams from last visit. Improving. Happy with results. No issues with skin dryness or flaking. I reviewed the patient's past medications, allergies and past medical history during this visit    Social: Richard Smallwood  reports that she has quit smoking. Her smoking use included Cigarettes. She smoked 0.10 packs per day. She has never used smokeless tobacco. She reports that she does not drink alcohol or use drugs. ROS:  See pertinent ROS as listed in HPI    O: PE: Vitals : Blood pressure 105/73, pulse 80, temperature 98.6 °F (37 °C), temperature source Oral, resp. rate 20, height 5' 9\" (1.753 m), weight 251 lb 1.6 oz (113.9 kg), SpO2 98 %, not currently breastfeeding. CONSTITUTIONAL:  awake, alert, cooperative, non-distressed, appears stated age  [de-identified]: head atraumatic, sinuses tender on palpation, PERRLA, sclera normal; tympanic membrane normal; oral pharynx with moist mucus membranes; tonsils without erythema or exudates  NECK: supple, symmetrical,  bilateral cervical adenopathy   FACE:  fair complexity with papules throughout, some random open comedones; excoriated pustules in the T zone of the face  LUNGS:  No increased work of breathing, good air exchange, clear to auscultation bilaterally, no crackles or wheezing  CARDIOVASCULAR:  RRR, normal S1 and S2, and no murmur noted, no edema of the lower extermities   ABDOMEN:  SNTND, normal bowel sounds  SKIN:  Warm and dry    Last labs:    Last labs reviewed    A / P:   Diagnosis Orders   1.  Acute

## 2018-10-17 ENCOUNTER — HOSPITAL ENCOUNTER (OUTPATIENT)
Dept: GENERAL RADIOLOGY | Age: 28
Discharge: HOME | End: 2018-10-19
Payer: COMMERCIAL

## 2018-10-17 ENCOUNTER — HOSPITAL ENCOUNTER (OUTPATIENT)
Age: 28
Discharge: HOME | End: 2018-10-19
Payer: COMMERCIAL

## 2018-10-17 DIAGNOSIS — R04.2 BLOOD-TINGED SPUTUM: ICD-10-CM

## 2018-10-17 PROCEDURE — 71046 X-RAY EXAM CHEST 2 VIEWS: CPT

## 2018-10-19 DIAGNOSIS — K59.00 CONSTIPATION, UNSPECIFIED CONSTIPATION TYPE: ICD-10-CM

## 2018-10-19 RX ORDER — POLYETHYLENE GLYCOL 3350 17 G/17G
17 POWDER, FOR SOLUTION ORAL DAILY
Qty: 510 G | Refills: 5 | Status: SHIPPED | OUTPATIENT
Start: 2018-10-19 | End: 2019-04-15 | Stop reason: SDUPTHER

## 2018-10-23 NOTE — PROGRESS NOTES
[facility] PRE-ADMISSION TESTING GENERAL INSTRUCTIONS- [facility]-phone number:[phone]    GENERAL INSTRUCTIONS  [x] Antibacterial Soap shower Night before and/or AM of Surgery  [] Rip wipe instruction sheet and wipes given. [] Nothing by mouth after midnight, including gum, candy, mints, or water.   [] You may brush your teeth, gargle, but do NOT swallow water. []Hibiclens shower  the night before and the morning of surgery. Do not use             Hibiclens on your face or head. []No smoking, chewing tobacco, illegal drugs, or alcohol within 24 hours of your surgery. [] Jewelry, valuables or body piercing's should not be brought to the hospital. All body and/or tongue piercing's must be removed prior to arriving to hospital.  ALL hair pins must be removed. [] Do not wear makeup, lotions, powders, deodorant. Nail polish as directed by the nurse. [] Arrange transportation to and from the hospital.  Arrange for someone to be with you for the remainder of the day and for 24 hours after your procedure due to having had anesthesia. [x] Bring insurance card and photo ID.  [] Transfusion Bracelet: Please bring with you to hospital, day of surgery  [] Bring urine specimen day of surgery. Any small container is acceptable. [] Use inhalers the morning of surgery and bring with you to hospital.   []Bring copy of living will or healthcare power of  papers to be placed in your electronic record. [] CPAP/BI-PAP: Please bring your machine if you are to spend the night in the hospital.     ENDOSCOPY INSTRUCTIONS:   [x] Bowel prep instructions reviewed. [x] Nothing by mouth after midnight, including gum, candy, mints, or water. [x] You may brush your teeth, gargle, but do NOT swallow water. [x] Do not wear makeup, lotions, powders, deodorant. Nail polish as directed by the nurse.   [x] Arrange transportation to and from the hospital.  Arrange for someone to be with you for the

## 2018-10-29 ENCOUNTER — HOSPITAL ENCOUNTER (OUTPATIENT)
Age: 28
Setting detail: OUTPATIENT SURGERY
Discharge: HOME | End: 2018-10-29
Attending: SURGERY | Admitting: SURGERY
Payer: COMMERCIAL

## 2018-10-29 ENCOUNTER — ANESTHESIA (OUTPATIENT)
Dept: ENDOSCOPY | Age: 28
End: 2018-10-29
Payer: COMMERCIAL

## 2018-10-29 ENCOUNTER — ANESTHESIA EVENT (OUTPATIENT)
Dept: ENDOSCOPY | Age: 28
End: 2018-10-29
Payer: COMMERCIAL

## 2018-10-29 VITALS
RESPIRATION RATE: 14 BRPM | BODY MASS INDEX: 37.18 KG/M2 | WEIGHT: 251 LBS | HEIGHT: 69 IN | DIASTOLIC BLOOD PRESSURE: 75 MMHG | OXYGEN SATURATION: 98 % | HEART RATE: 89 BPM | TEMPERATURE: 97 F | SYSTOLIC BLOOD PRESSURE: 108 MMHG

## 2018-10-29 VITALS
OXYGEN SATURATION: 95 % | SYSTOLIC BLOOD PRESSURE: 94 MMHG | DIASTOLIC BLOOD PRESSURE: 63 MMHG | RESPIRATION RATE: 11 BRPM

## 2018-10-29 DIAGNOSIS — Z01.812 PRE-OPERATIVE LABORATORY EXAMINATION: Primary | ICD-10-CM

## 2018-10-29 DIAGNOSIS — Z76.0 MEDICATION REFILL: ICD-10-CM

## 2018-10-29 DIAGNOSIS — J45.20 MILD INTERMITTENT ASTHMA WITHOUT COMPLICATION: ICD-10-CM

## 2018-10-29 PROCEDURE — 7100000010 HC PHASE II RECOVERY - FIRST 15 MIN: Performed by: SURGERY

## 2018-10-29 PROCEDURE — 2580000003 HC RX 258: Performed by: SURGERY

## 2018-10-29 PROCEDURE — 2709999900 HC NON-CHARGEABLE SUPPLY: Performed by: SURGERY

## 2018-10-29 PROCEDURE — 3700000000 HC ANESTHESIA ATTENDED CARE: Performed by: SURGERY

## 2018-10-29 PROCEDURE — 3609010300 HC COLONOSCOPY W/BIOPSY SINGLE/MULTIPLE: Performed by: SURGERY

## 2018-10-29 PROCEDURE — 6360000002 HC RX W HCPCS: Performed by: NURSE ANESTHETIST, CERTIFIED REGISTERED

## 2018-10-29 PROCEDURE — 45380 COLONOSCOPY AND BIOPSY: CPT | Performed by: SURGERY

## 2018-10-29 PROCEDURE — 3700000001 HC ADD 15 MINUTES (ANESTHESIA): Performed by: SURGERY

## 2018-10-29 PROCEDURE — 7100000011 HC PHASE II RECOVERY - ADDTL 15 MIN: Performed by: SURGERY

## 2018-10-29 PROCEDURE — 88305 TISSUE EXAM BY PATHOLOGIST: CPT

## 2018-10-29 RX ORDER — MIDAZOLAM HYDROCHLORIDE 1 MG/ML
INJECTION INTRAMUSCULAR; INTRAVENOUS
Status: COMPLETED
Start: 2018-10-29 | End: 2018-10-29

## 2018-10-29 RX ORDER — SODIUM CHLORIDE 9 MG/ML
INJECTION, SOLUTION INTRAVENOUS CONTINUOUS
Status: DISCONTINUED | OUTPATIENT
Start: 2018-10-29 | End: 2018-10-29 | Stop reason: HOSPADM

## 2018-10-29 RX ORDER — PROPOFOL 10 MG/ML
INJECTION, EMULSION INTRAVENOUS PRN
Status: DISCONTINUED | OUTPATIENT
Start: 2018-10-29 | End: 2018-10-29 | Stop reason: SDUPTHER

## 2018-10-29 RX ORDER — MIDAZOLAM HYDROCHLORIDE 1 MG/ML
INJECTION INTRAMUSCULAR; INTRAVENOUS PRN
Status: DISCONTINUED | OUTPATIENT
Start: 2018-10-29 | End: 2018-10-29 | Stop reason: SDUPTHER

## 2018-10-29 RX ORDER — LIDOCAINE 50 MG/G
OINTMENT TOPICAL
Qty: 1 TUBE | Refills: 3 | Status: SHIPPED | OUTPATIENT
Start: 2018-10-29 | End: 2018-10-30 | Stop reason: SDUPTHER

## 2018-10-29 RX ORDER — FENTANYL CITRATE 50 UG/ML
INJECTION, SOLUTION INTRAMUSCULAR; INTRAVENOUS PRN
Status: DISCONTINUED | OUTPATIENT
Start: 2018-10-29 | End: 2018-10-29 | Stop reason: SDUPTHER

## 2018-10-29 RX ADMIN — FENTANYL CITRATE 25 MCG: 50 INJECTION, SOLUTION INTRAMUSCULAR; INTRAVENOUS at 13:24

## 2018-10-29 RX ADMIN — MIDAZOLAM HYDROCHLORIDE 1 MG: 1 INJECTION, SOLUTION INTRAMUSCULAR; INTRAVENOUS at 13:10

## 2018-10-29 RX ADMIN — PROPOFOL 520 MG: 10 INJECTION, EMULSION INTRAVENOUS at 13:16

## 2018-10-29 RX ADMIN — SODIUM CHLORIDE: 9 INJECTION, SOLUTION INTRAVENOUS at 13:10

## 2018-10-29 RX ADMIN — SODIUM CHLORIDE: 9 INJECTION, SOLUTION INTRAVENOUS at 13:05

## 2018-10-29 ASSESSMENT — PAIN SCALES - GENERAL
PAINLEVEL_OUTOF10: 0
PAINLEVEL_OUTOF10: 0

## 2018-10-29 NOTE — OP NOTE
fissure. THE PATIENT TOLERATED THE PROCEDURE WELL    PLAN:  1. Follow up with Too Billy MD in 3-4 weeks if no improvement with rectal medications, sitz baths and stool softeners.      Too Billy MD  Attending   General Surgery, Trauma, Critical Care  10/29/2018  2:07 PM

## 2018-10-29 NOTE — H&P
Plan:   Diagnostic Colonoscopy   Anoscopy   Refill Miralax      I discussed the risks, benefits, and alternatives to colonoscopy with possible biopsy/cauterization/polylpectomy with deep sedation with the patient including the risks of deep sedation (hypotension, hypoxia), bleeding, and perforation (<1%).   The patient understands the above and agrees to proceed.        Physician Signature: Jose Roberto Carrillo MD

## 2018-10-30 ENCOUNTER — TELEPHONE (OUTPATIENT)
Dept: SURGERY | Age: 28
End: 2018-10-30

## 2018-10-30 ENCOUNTER — OFFICE VISIT (OUTPATIENT)
Dept: FAMILY MEDICINE CLINIC | Age: 28
End: 2018-10-30
Payer: COMMERCIAL

## 2018-10-30 VITALS
TEMPERATURE: 98.8 F | HEART RATE: 93 BPM | DIASTOLIC BLOOD PRESSURE: 65 MMHG | SYSTOLIC BLOOD PRESSURE: 105 MMHG | HEIGHT: 69 IN | WEIGHT: 251 LBS | RESPIRATION RATE: 18 BRPM | OXYGEN SATURATION: 96 % | BODY MASS INDEX: 37.18 KG/M2

## 2018-10-30 DIAGNOSIS — B96.89 ACUTE BACTERIAL SINUSITIS: Primary | ICD-10-CM

## 2018-10-30 DIAGNOSIS — L70.0 ACNE VULGARIS: ICD-10-CM

## 2018-10-30 DIAGNOSIS — J01.90 ACUTE BACTERIAL SINUSITIS: Primary | ICD-10-CM

## 2018-10-30 DIAGNOSIS — R00.2 PALPITATIONS: ICD-10-CM

## 2018-10-30 DIAGNOSIS — Z23 NEED FOR VACCINATION: ICD-10-CM

## 2018-10-30 PROCEDURE — G8482 FLU IMMUNIZE ORDER/ADMIN: HCPCS | Performed by: FAMILY MEDICINE

## 2018-10-30 PROCEDURE — G8417 CALC BMI ABV UP PARAM F/U: HCPCS | Performed by: FAMILY MEDICINE

## 2018-10-30 PROCEDURE — G0008 ADMIN INFLUENZA VIRUS VAC: HCPCS

## 2018-10-30 PROCEDURE — 90686 IIV4 VACC NO PRSV 0.5 ML IM: CPT

## 2018-10-30 PROCEDURE — 1036F TOBACCO NON-USER: CPT | Performed by: FAMILY MEDICINE

## 2018-10-30 PROCEDURE — 6360000002 HC RX W HCPCS

## 2018-10-30 PROCEDURE — G8427 DOCREV CUR MEDS BY ELIG CLIN: HCPCS | Performed by: FAMILY MEDICINE

## 2018-10-30 PROCEDURE — 99213 OFFICE O/P EST LOW 20 MIN: CPT | Performed by: FAMILY MEDICINE

## 2018-10-30 PROCEDURE — 99212 OFFICE O/P EST SF 10 MIN: CPT | Performed by: FAMILY MEDICINE

## 2018-10-30 RX ORDER — LIDOCAINE 50 MG/G
OINTMENT TOPICAL
Qty: 1 TUBE | Refills: 3 | Status: SHIPPED | OUTPATIENT
Start: 2018-10-30 | End: 2018-12-14

## 2018-10-30 NOTE — PROGRESS NOTES
Vaccine Information Sheet, \"Influenza - Inactivated\"  given to Cindi Ruben, or parent/legal guardian of  Cindi Ruben and verbalized understanding. Patient responses:    Have you ever had a reaction to a flu vaccine? No  Are you able to eat eggs without adverse effects? Yes  Do you have any current illness? No  Have you ever had Guillian Hazard Syndrome? No    Flu vaccine given per order. Please see immunization tab.

## 2018-11-12 ENCOUNTER — TELEPHONE (OUTPATIENT)
Dept: SURGERY | Age: 28
End: 2018-11-12

## 2018-11-13 NOTE — PROGRESS NOTES
fissure and now new onset fecal incontinence will not start Nitro at a lower dose/plan botox or plan surgery. I explained considering the incontinence and Hx of urinary symptoms she may have pelvic floor dysfuction especially considering her Hx of muscle spasticity. Discussion with patient and Mother >15 minutes counseling regarding continued anal pain and referral to a colorectal surgeon.      All questions answered     Physician Signature: Electronically signed by Courtney Cohen MD

## 2018-11-14 ENCOUNTER — OFFICE VISIT (OUTPATIENT)
Dept: SURGERY | Age: 28
End: 2018-11-14
Payer: COMMERCIAL

## 2018-11-14 VITALS
OXYGEN SATURATION: 97 % | BODY MASS INDEX: 37.18 KG/M2 | WEIGHT: 251 LBS | HEART RATE: 92 BPM | TEMPERATURE: 98 F | RESPIRATION RATE: 14 BRPM | DIASTOLIC BLOOD PRESSURE: 58 MMHG | HEIGHT: 69 IN | SYSTOLIC BLOOD PRESSURE: 91 MMHG

## 2018-11-14 DIAGNOSIS — K62.89 ANAL PAIN: Chronic | ICD-10-CM

## 2018-11-14 DIAGNOSIS — K60.2 ANAL FISSURE: ICD-10-CM

## 2018-11-14 DIAGNOSIS — K62.89 PROCTITIS: Primary | ICD-10-CM

## 2018-11-14 DIAGNOSIS — R32 URINARY INCONTINENCE, UNSPECIFIED TYPE: ICD-10-CM

## 2018-11-14 DIAGNOSIS — R15.9 INCONTINENCE OF FECES, UNSPECIFIED FECAL INCONTINENCE TYPE: ICD-10-CM

## 2018-11-14 PROCEDURE — 99213 OFFICE O/P EST LOW 20 MIN: CPT | Performed by: SURGERY

## 2018-11-14 PROCEDURE — 99212 OFFICE O/P EST SF 10 MIN: CPT | Performed by: SURGERY

## 2018-11-15 DIAGNOSIS — G80.0 CONGENITAL SPASTIC QUADRIPARESIS (HCC): ICD-10-CM

## 2018-11-15 RX ORDER — GABAPENTIN 100 MG/1
CAPSULE ORAL
Qty: 360 CAPSULE | Refills: 0 | Status: SHIPPED | OUTPATIENT
Start: 2018-11-15 | End: 2018-12-14

## 2018-11-30 ENCOUNTER — HOSPITAL ENCOUNTER (OUTPATIENT)
Age: 28
Discharge: HOME | End: 2018-12-02
Payer: COMMERCIAL

## 2018-11-30 ENCOUNTER — OFFICE VISIT (OUTPATIENT)
Dept: FAMILY MEDICINE CLINIC | Age: 28
End: 2018-11-30
Payer: COMMERCIAL

## 2018-11-30 VITALS
SYSTOLIC BLOOD PRESSURE: 104 MMHG | TEMPERATURE: 98.4 F | HEART RATE: 84 BPM | WEIGHT: 251 LBS | DIASTOLIC BLOOD PRESSURE: 69 MMHG | OXYGEN SATURATION: 95 % | HEIGHT: 69 IN | BODY MASS INDEX: 37.18 KG/M2

## 2018-11-30 DIAGNOSIS — E03.9 HYPOTHYROIDISM, UNSPECIFIED TYPE: ICD-10-CM

## 2018-11-30 DIAGNOSIS — L70.0 ACNE VULGARIS: ICD-10-CM

## 2018-11-30 DIAGNOSIS — G43.909 MIGRAINE WITHOUT STATUS MIGRAINOSUS, NOT INTRACTABLE, UNSPECIFIED MIGRAINE TYPE: Primary | ICD-10-CM

## 2018-11-30 LAB — TSH SERPL DL<=0.05 MIU/L-ACNC: 2.64 UIU/ML (ref 0.27–4.2)

## 2018-11-30 PROCEDURE — 36415 COLL VENOUS BLD VENIPUNCTURE: CPT

## 2018-11-30 PROCEDURE — 36415 COLL VENOUS BLD VENIPUNCTURE: CPT | Performed by: FAMILY MEDICINE

## 2018-11-30 PROCEDURE — G8482 FLU IMMUNIZE ORDER/ADMIN: HCPCS | Performed by: FAMILY MEDICINE

## 2018-11-30 PROCEDURE — G8428 CUR MEDS NOT DOCUMENT: HCPCS | Performed by: FAMILY MEDICINE

## 2018-11-30 PROCEDURE — 84443 ASSAY THYROID STIM HORMONE: CPT

## 2018-11-30 PROCEDURE — 99212 OFFICE O/P EST SF 10 MIN: CPT | Performed by: FAMILY MEDICINE

## 2018-11-30 PROCEDURE — 1036F TOBACCO NON-USER: CPT | Performed by: FAMILY MEDICINE

## 2018-11-30 PROCEDURE — G8417 CALC BMI ABV UP PARAM F/U: HCPCS | Performed by: FAMILY MEDICINE

## 2018-11-30 PROCEDURE — 99213 OFFICE O/P EST LOW 20 MIN: CPT | Performed by: FAMILY MEDICINE

## 2018-11-30 RX ORDER — SODIUM CHLORIDE/ALOE VERA
GEL (GRAM) NASAL PRN
COMMUNITY
End: 2020-10-07

## 2018-11-30 RX ORDER — ACETAMINOPHEN, ASPIRIN AND CAFFEINE 250; 250; 65 MG/1; MG/1; MG/1
1 TABLET, FILM COATED ORAL EVERY 6 HOURS PRN
Qty: 90 TABLET | Refills: 3 | COMMUNITY
Start: 2018-11-30 | End: 2018-12-14

## 2018-11-30 RX ORDER — MOMETASONE FUROATE 1 MG/G
CREAM TOPICAL DAILY
COMMUNITY
End: 2019-11-15 | Stop reason: SDUPTHER

## 2018-11-30 RX ORDER — LEVOTHYROXINE SODIUM 0.07 MG/1
75 TABLET ORAL DAILY
Qty: 90 TABLET | Refills: 1 | Status: SHIPPED | OUTPATIENT
Start: 2018-11-30 | End: 2019-05-02 | Stop reason: SDUPTHER

## 2018-11-30 RX ORDER — TOPIRAMATE 25 MG/1
50 TABLET ORAL 2 TIMES DAILY
Qty: 60 TABLET | Refills: 0 | Status: SHIPPED | OUTPATIENT
Start: 2018-11-30 | End: 2018-12-14

## 2018-12-14 ENCOUNTER — OFFICE VISIT (OUTPATIENT)
Dept: FAMILY MEDICINE CLINIC | Age: 28
End: 2018-12-14
Payer: COMMERCIAL

## 2018-12-14 VITALS
RESPIRATION RATE: 18 BRPM | OXYGEN SATURATION: 96 % | WEIGHT: 248.3 LBS | HEIGHT: 69 IN | TEMPERATURE: 98.6 F | BODY MASS INDEX: 36.78 KG/M2 | SYSTOLIC BLOOD PRESSURE: 112 MMHG | DIASTOLIC BLOOD PRESSURE: 73 MMHG | HEART RATE: 86 BPM

## 2018-12-14 DIAGNOSIS — G43.909 MIGRAINE WITHOUT STATUS MIGRAINOSUS, NOT INTRACTABLE, UNSPECIFIED MIGRAINE TYPE: Primary | ICD-10-CM

## 2018-12-14 DIAGNOSIS — L70.0 ACNE VULGARIS: ICD-10-CM

## 2018-12-14 PROCEDURE — G8427 DOCREV CUR MEDS BY ELIG CLIN: HCPCS | Performed by: FAMILY MEDICINE

## 2018-12-14 PROCEDURE — 99212 OFFICE O/P EST SF 10 MIN: CPT | Performed by: FAMILY MEDICINE

## 2018-12-14 PROCEDURE — G8482 FLU IMMUNIZE ORDER/ADMIN: HCPCS | Performed by: FAMILY MEDICINE

## 2018-12-14 PROCEDURE — 99213 OFFICE O/P EST LOW 20 MIN: CPT | Performed by: FAMILY MEDICINE

## 2018-12-14 PROCEDURE — 1036F TOBACCO NON-USER: CPT | Performed by: FAMILY MEDICINE

## 2018-12-14 PROCEDURE — G8417 CALC BMI ABV UP PARAM F/U: HCPCS | Performed by: FAMILY MEDICINE

## 2018-12-14 RX ORDER — DIVALPROEX SODIUM 250 MG/1
250 TABLET, DELAYED RELEASE ORAL 2 TIMES DAILY
Qty: 60 TABLET | Refills: 0 | Status: SHIPPED | OUTPATIENT
Start: 2018-12-14 | End: 2018-12-17 | Stop reason: SDUPTHER
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
## 2018-12-17 DIAGNOSIS — G43.909 MIGRAINE WITHOUT STATUS MIGRAINOSUS, NOT INTRACTABLE, UNSPECIFIED MIGRAINE TYPE: ICD-10-CM

## 2018-12-17 RX ORDER — DIVALPROEX SODIUM 250 MG/1
250 TABLET, DELAYED RELEASE ORAL 2 TIMES DAILY
Qty: 180 TABLET | Refills: 0 | Status: SHIPPED | OUTPATIENT
Start: 2018-12-17 | End: 2019-04-02 | Stop reason: SDUPTHER

## 2018-12-18 ENCOUNTER — OFFICE VISIT (OUTPATIENT)
Dept: FAMILY MEDICINE CLINIC | Age: 28
End: 2018-12-18
Payer: COMMERCIAL

## 2018-12-18 ENCOUNTER — TELEPHONE (OUTPATIENT)
Dept: SURGERY | Age: 28
End: 2018-12-18

## 2018-12-18 VITALS
TEMPERATURE: 98 F | DIASTOLIC BLOOD PRESSURE: 62 MMHG | HEIGHT: 69 IN | WEIGHT: 249 LBS | SYSTOLIC BLOOD PRESSURE: 90 MMHG | RESPIRATION RATE: 16 BRPM | OXYGEN SATURATION: 98 % | HEART RATE: 79 BPM | BODY MASS INDEX: 36.88 KG/M2

## 2018-12-18 DIAGNOSIS — N30.90 CYSTITIS: ICD-10-CM

## 2018-12-18 DIAGNOSIS — M54.32 LEFT SCIATIC NERVE PAIN: ICD-10-CM

## 2018-12-18 DIAGNOSIS — M54.50 LUMBAR SPINE PAIN: Primary | ICD-10-CM

## 2018-12-18 PROCEDURE — G8427 DOCREV CUR MEDS BY ELIG CLIN: HCPCS | Performed by: NURSE PRACTITIONER

## 2018-12-18 PROCEDURE — 4004F PT TOBACCO SCREEN RCVD TLK: CPT | Performed by: NURSE PRACTITIONER

## 2018-12-18 PROCEDURE — 81002 URINALYSIS NONAUTO W/O SCOPE: CPT | Performed by: NURSE PRACTITIONER

## 2018-12-18 PROCEDURE — 99213 OFFICE O/P EST LOW 20 MIN: CPT | Performed by: NURSE PRACTITIONER

## 2018-12-18 PROCEDURE — G8417 CALC BMI ABV UP PARAM F/U: HCPCS | Performed by: NURSE PRACTITIONER

## 2018-12-18 PROCEDURE — G8482 FLU IMMUNIZE ORDER/ADMIN: HCPCS | Performed by: NURSE PRACTITIONER

## 2018-12-18 RX ORDER — NITROFURANTOIN 25; 75 MG/1; MG/1
100 CAPSULE ORAL 2 TIMES DAILY
Qty: 20 CAPSULE | Refills: 0 | Status: SHIPPED | OUTPATIENT
Start: 2018-12-18 | End: 2018-12-28

## 2018-12-18 RX ORDER — IBUPROFEN 800 MG/1
800 TABLET ORAL 2 TIMES DAILY PRN
Qty: 30 TABLET | Refills: 0 | Status: SHIPPED | OUTPATIENT
Start: 2018-12-18 | End: 2019-01-14 | Stop reason: ALTCHOICE

## 2018-12-18 ASSESSMENT — ENCOUNTER SYMPTOMS
COLOR CHANGE: 0
EYE PAIN: 0
EYE ITCHING: 0
DIARRHEA: 0
SHORTNESS OF BREATH: 0
PHOTOPHOBIA: 0
STRIDOR: 0
COUGH: 0
ABDOMINAL PAIN: 0
WHEEZING: 0
VOMITING: 0
BACK PAIN: 1
EYE REDNESS: 0
EYE DISCHARGE: 0
NAUSEA: 0

## 2018-12-18 NOTE — PATIENT INSTRUCTIONS
from front to back. When should you call for help? Call your doctor now or seek immediate medical care if:    · Symptoms such as fever, chills, nausea, or vomiting get worse or appear for the first time.     · You have new pain in your back just below your rib cage. This is called flank pain.     · There is new blood or pus in your urine.     · You have any problems with your antibiotic medicine.    Watch closely for changes in your health, and be sure to contact your doctor if:    · You are not getting better after taking an antibiotic for 2 days.     · Your symptoms go away but then come back. Where can you learn more? Go to https://[employer].MustHaveMenus. org and sign in to your [employer] account. Enter Z055 in the [employer] box to learn more about \"Urinary Tract Infection in Women: Care Instructions. \"     If you do not have an account, please click on the \"Sign Up Now\" link. Current as of: March 21, 2018  Content Version: 11.8  © 6986-5604 Healthwise, Incorporated. Care instructions adapted under license by [facility] ([facility]). If you have questions about a medical condition or this instruction, always ask your healthcare professional. [place] [address] any warranty or liability for your use of this information.

## 2018-12-18 NOTE — PROGRESS NOTES
Leandra Lai is a 29 y.o. female who presents today for   Chief Complaint   Patient presents with    Back Pain     lower back         HPI    Pt presents today with c/o lower back pain that started a few days ago. Pt describes the pain as achy/burning-like with radiation down LLE. Pt denies any recent/past back injuries, denies h/o sciatica. Denies bowel/bladder issues today but does have a h/o recurrent UTIs. Denies Kidney Infections. Rates pain 7/10, pt is afebrile and tolerating PO well      [address]:  Patient's past medical, surgical, social and/or family history reviewed, updated in chart, and are non-contributory (unless otherwise stated). Medications and allergies also reviewed and updated in chart. Review of Systems  Review of Systems   Constitutional: Positive for activity change. Negative for appetite change, chills, diaphoresis, fatigue and fever. Eyes: Negative for photophobia, pain, discharge, redness, itching and visual disturbance. Respiratory: Negative for cough, shortness of breath, wheezing and stridor. Cardiovascular: Negative for chest pain, palpitations and leg swelling. Gastrointestinal: Negative for abdominal pain, diarrhea, nausea and vomiting. Genitourinary: Negative for decreased urine volume, difficulty urinating, dysuria, enuresis, flank pain, frequency, hematuria and urgency. H/o recurrent UTI   Musculoskeletal: Positive for back pain. Negative for gait problem. Skin: Negative for color change, pallor and rash. Neurological: Negative for weakness and numbness. Physical Exam:    VS:  BP 90/62   Pulse 79   Temp 98 °F (36.7 °C) (Oral)   Resp 16   Ht 5' 9\" (1.753 m)   Wt 249 lb (112.9 kg)   SpO2 98%   BMI 36.77 kg/m²   LAST WEIGHT:  Wt Readings from Last 3 Encounters:   12/18/18 249 lb (112.9 kg)   12/14/18 248 lb 4.8 oz (112.6 kg)   11/30/18 251 lb (113.9 kg)     Physical Exam   Constitutional: She appears well-developed and well-nourished.  No

## 2019-01-09 ENCOUNTER — HOSPITAL ENCOUNTER (EMERGENCY)
Age: 29
Discharge: HOME | End: 2019-01-09
Payer: COMMERCIAL

## 2019-01-09 VITALS
HEART RATE: 101 BPM | TEMPERATURE: 96.7 F | OXYGEN SATURATION: 96 % | RESPIRATION RATE: 18 BRPM | DIASTOLIC BLOOD PRESSURE: 81 MMHG | HEIGHT: 69 IN | BODY MASS INDEX: 34.07 KG/M2 | WEIGHT: 230 LBS | SYSTOLIC BLOOD PRESSURE: 149 MMHG

## 2019-01-09 DIAGNOSIS — Z76.0 MEDICATION REFILL: ICD-10-CM

## 2019-01-09 DIAGNOSIS — J20.9 ACUTE BRONCHITIS, UNSPECIFIED ORGANISM: Primary | ICD-10-CM

## 2019-01-09 DIAGNOSIS — J45.20 MILD INTERMITTENT ASTHMA WITHOUT COMPLICATION: ICD-10-CM

## 2019-01-09 LAB
EKG ATRIAL RATE: 81 BPM
EKG P AXIS: 48 DEGREES
EKG P-R INTERVAL: 124 MS
EKG Q-T INTERVAL: 374 MS
EKG QRS DURATION: 84 MS
EKG QTC CALCULATION (BAZETT): 434 MS
EKG R AXIS: 53 DEGREES
EKG T AXIS: 15 DEGREES
EKG VENTRICULAR RATE: 81 BPM

## 2019-01-09 PROCEDURE — 99285 EMERGENCY DEPT VISIT HI MDM: CPT

## 2019-01-09 RX ORDER — BROMPHENIRAMINE MALEATE, PSEUDOEPHEDRINE HYDROCHLORIDE, AND DEXTROMETHORPHAN HYDROBROMIDE 2; 30; 10 MG/5ML; MG/5ML; MG/5ML
5 SYRUP ORAL 4 TIMES DAILY PRN
Qty: 200 ML | Refills: 0 | Status: SHIPPED | OUTPATIENT
Start: 2019-01-09 | End: 2019-03-25

## 2019-01-09 RX ORDER — ALBUTEROL SULFATE 90 UG/1
2 AEROSOL, METERED RESPIRATORY (INHALATION) EVERY 6 HOURS PRN
Qty: 1 INHALER | Refills: 0 | Status: SHIPPED | OUTPATIENT
Start: 2019-01-09 | End: 2019-03-25 | Stop reason: SDUPTHER

## 2019-01-09 RX ORDER — METHYLPREDNISOLONE 4 MG/1
TABLET ORAL
Qty: 21 TABLET | Status: SHIPPED | OUTPATIENT
Start: 2019-01-09 | End: 2019-01-14 | Stop reason: ALTCHOICE

## 2019-01-09 RX ORDER — AZITHROMYCIN 250 MG/1
TABLET, FILM COATED ORAL
Qty: 6 TABLET | Refills: 0 | Status: SHIPPED | OUTPATIENT
Start: 2019-01-09 | End: 2019-01-14

## 2019-01-09 ASSESSMENT — PAIN DESCRIPTION - LOCATION: LOCATION: CHEST

## 2019-01-09 ASSESSMENT — PAIN SCALES - GENERAL: PAINLEVEL_OUTOF10: 10

## 2019-01-09 ASSESSMENT — PAIN DESCRIPTION - PAIN TYPE: TYPE: ACUTE PAIN

## 2019-01-14 ENCOUNTER — TELEPHONE (OUTPATIENT)
Dept: FAMILY MEDICINE CLINIC | Age: 29
End: 2019-01-14

## 2019-01-14 ENCOUNTER — HOSPITAL ENCOUNTER (OUTPATIENT)
Age: 29
Discharge: HOME | End: 2019-01-16
Payer: COMMERCIAL

## 2019-01-14 ENCOUNTER — HOSPITAL ENCOUNTER (OUTPATIENT)
Dept: GENERAL RADIOLOGY | Age: 29
Discharge: HOME | End: 2019-01-16
Payer: COMMERCIAL

## 2019-01-14 ENCOUNTER — OFFICE VISIT (OUTPATIENT)
Dept: FAMILY MEDICINE CLINIC | Age: 29
End: 2019-01-14
Payer: COMMERCIAL

## 2019-01-14 VITALS
WEIGHT: 250 LBS | DIASTOLIC BLOOD PRESSURE: 70 MMHG | SYSTOLIC BLOOD PRESSURE: 98 MMHG | HEART RATE: 74 BPM | TEMPERATURE: 98.4 F | OXYGEN SATURATION: 98 % | RESPIRATION RATE: 14 BRPM | HEIGHT: 69 IN | BODY MASS INDEX: 37.03 KG/M2

## 2019-01-14 DIAGNOSIS — G43.709 CHRONIC MIGRAINE WITHOUT AURA WITHOUT STATUS MIGRAINOSUS, NOT INTRACTABLE: ICD-10-CM

## 2019-01-14 DIAGNOSIS — J45.20 MILD INTERMITTENT ASTHMA WITHOUT COMPLICATION: ICD-10-CM

## 2019-01-14 DIAGNOSIS — Z13.220 SCREENING FOR CHOLESTEROL LEVEL: ICD-10-CM

## 2019-01-14 DIAGNOSIS — R05.9 COUGH: ICD-10-CM

## 2019-01-14 DIAGNOSIS — F41.9 ANXIETY: ICD-10-CM

## 2019-01-14 DIAGNOSIS — Z76.0 MEDICATION REFILL: ICD-10-CM

## 2019-01-14 DIAGNOSIS — R04.2 BLOOD-TINGED SPUTUM: ICD-10-CM

## 2019-01-14 DIAGNOSIS — R05.9 COUGH: Primary | ICD-10-CM

## 2019-01-14 LAB
ALBUMIN SERPL-MCNC: 4.3 G/DL (ref 3.5–5.2)
ALP BLD-CCNC: 68 U/L (ref 35–104)
ALT SERPL-CCNC: 16 U/L (ref 0–32)
ANION GAP SERPL CALCULATED.3IONS-SCNC: 16 MMOL/L (ref 7–16)
AST SERPL-CCNC: 18 U/L (ref 0–31)
BASOPHILS ABSOLUTE: 0.04 E9/L (ref 0–0.2)
BASOPHILS RELATIVE PERCENT: 0.3 % (ref 0–2)
BILIRUB SERPL-MCNC: 0.6 MG/DL (ref 0–1.2)
BUN BLDV-MCNC: 17 MG/DL (ref 6–20)
CALCIUM SERPL-MCNC: 9.1 MG/DL (ref 8.6–10.2)
CHLORIDE BLD-SCNC: 101 MMOL/L (ref 98–107)
CHOLESTEROL, TOTAL: 197 MG/DL (ref 0–199)
CO2: 23 MMOL/L (ref 22–29)
CREAT SERPL-MCNC: 1 MG/DL (ref 0.5–1)
EOSINOPHILS ABSOLUTE: 0.08 E9/L (ref 0.05–0.5)
EOSINOPHILS RELATIVE PERCENT: 0.6 % (ref 0–6)
GFR AFRICAN AMERICAN: >60
GFR NON-AFRICAN AMERICAN: >60 ML/MIN/1.73
GLUCOSE BLD-MCNC: 81 MG/DL (ref 74–99)
HCT VFR BLD CALC: 45.6 % (ref 34–48)
HDLC SERPL-MCNC: 32 MG/DL
HEMOGLOBIN: 14.7 G/DL (ref 11.5–15.5)
IMMATURE GRANULOCYTES #: 0.1 E9/L
IMMATURE GRANULOCYTES %: 0.8 % (ref 0–5)
LDL CHOLESTEROL CALCULATED: 125 MG/DL (ref 0–99)
LYMPHOCYTES ABSOLUTE: 4.45 E9/L (ref 1.5–4)
LYMPHOCYTES RELATIVE PERCENT: 34.4 % (ref 20–42)
MCH RBC QN AUTO: 28.7 PG (ref 26–35)
MCHC RBC AUTO-ENTMCNC: 32.2 % (ref 32–34.5)
MCV RBC AUTO: 88.9 FL (ref 80–99.9)
MONOCYTES ABSOLUTE: 1.24 E9/L (ref 0.1–0.95)
MONOCYTES RELATIVE PERCENT: 9.6 % (ref 2–12)
NEUTROPHILS ABSOLUTE: 7.02 E9/L (ref 1.8–7.3)
NEUTROPHILS RELATIVE PERCENT: 54.3 % (ref 43–80)
PDW BLD-RTO: 13.5 FL (ref 11.5–15)
PLATELET # BLD: 292 E9/L (ref 130–450)
PMV BLD AUTO: 13.4 FL (ref 7–12)
POTASSIUM SERPL-SCNC: 4.8 MMOL/L (ref 3.5–5)
RBC # BLD: 5.13 E12/L (ref 3.5–5.5)
SODIUM BLD-SCNC: 140 MMOL/L (ref 132–146)
TOTAL PROTEIN: 7.5 G/DL (ref 6.4–8.3)
TRIGL SERPL-MCNC: 198 MG/DL (ref 0–149)
VLDLC SERPL CALC-MCNC: 40 MG/DL
WBC # BLD: 12.9 E9/L (ref 4.5–11.5)

## 2019-01-14 PROCEDURE — G8417 CALC BMI ABV UP PARAM F/U: HCPCS | Performed by: FAMILY MEDICINE

## 2019-01-14 PROCEDURE — 85025 COMPLETE CBC W/AUTO DIFF WBC: CPT

## 2019-01-14 PROCEDURE — G8482 FLU IMMUNIZE ORDER/ADMIN: HCPCS | Performed by: FAMILY MEDICINE

## 2019-01-14 PROCEDURE — 99212 OFFICE O/P EST SF 10 MIN: CPT | Performed by: FAMILY MEDICINE

## 2019-01-14 PROCEDURE — G8427 DOCREV CUR MEDS BY ELIG CLIN: HCPCS | Performed by: FAMILY MEDICINE

## 2019-01-14 PROCEDURE — 36415 COLL VENOUS BLD VENIPUNCTURE: CPT | Performed by: FAMILY MEDICINE

## 2019-01-14 PROCEDURE — 80053 COMPREHEN METABOLIC PANEL: CPT

## 2019-01-14 PROCEDURE — 99213 OFFICE O/P EST LOW 20 MIN: CPT | Performed by: FAMILY MEDICINE

## 2019-01-14 PROCEDURE — 1036F TOBACCO NON-USER: CPT | Performed by: FAMILY MEDICINE

## 2019-01-14 PROCEDURE — 36415 COLL VENOUS BLD VENIPUNCTURE: CPT

## 2019-01-14 PROCEDURE — 80061 LIPID PANEL: CPT

## 2019-01-14 PROCEDURE — 71046 X-RAY EXAM CHEST 2 VIEWS: CPT

## 2019-01-14 RX ORDER — BUDESONIDE AND FORMOTEROL FUMARATE DIHYDRATE 80; 4.5 UG/1; UG/1
2 AEROSOL RESPIRATORY (INHALATION) DAILY
Qty: 1 INHALER | Refills: 3 | Status: SHIPPED | OUTPATIENT
Start: 2019-01-14 | End: 2019-03-25 | Stop reason: SDUPTHER

## 2019-01-14 RX ORDER — BUDESONIDE AND FORMOTEROL FUMARATE DIHYDRATE 80; 4.5 UG/1; UG/1
2 AEROSOL RESPIRATORY (INHALATION) DAILY
Qty: 1 INHALER | Refills: 3 | Status: SHIPPED | OUTPATIENT
Start: 2019-01-14 | End: 2019-01-14 | Stop reason: SDUPTHER

## 2019-01-14 RX ORDER — LEVOFLOXACIN 500 MG/1
500 TABLET, FILM COATED ORAL DAILY
Qty: 10 TABLET | Refills: 0 | Status: SHIPPED | OUTPATIENT
Start: 2019-01-14 | End: 2019-01-24

## 2019-01-14 RX ORDER — ESCITALOPRAM OXALATE 20 MG/1
20 TABLET ORAL DAILY
Qty: 30 TABLET | Refills: 3 | Status: SHIPPED | OUTPATIENT
Start: 2019-01-14 | End: 2019-05-02 | Stop reason: SDUPTHER

## 2019-01-17 ENCOUNTER — TELEPHONE (OUTPATIENT)
Dept: FAMILY MEDICINE CLINIC | Age: 29
End: 2019-01-17

## 2019-01-17 DIAGNOSIS — E56.9 VITAMIN DEFICIENCY: Primary | ICD-10-CM

## 2019-02-18 ENCOUNTER — OFFICE VISIT (OUTPATIENT)
Dept: NEUROLOGY | Age: 29
End: 2019-02-18
Payer: COMMERCIAL

## 2019-02-18 VITALS
HEART RATE: 69 BPM | TEMPERATURE: 98.4 F | WEIGHT: 250 LBS | SYSTOLIC BLOOD PRESSURE: 104 MMHG | OXYGEN SATURATION: 97 % | BODY MASS INDEX: 37.03 KG/M2 | DIASTOLIC BLOOD PRESSURE: 63 MMHG | RESPIRATION RATE: 18 BRPM | HEIGHT: 69 IN

## 2019-02-18 DIAGNOSIS — G80.0 CONGENITAL SPASTIC QUADRIPARESIS (HCC): ICD-10-CM

## 2019-02-18 PROBLEM — K62.89 ANAL PAIN: Chronic | Status: RESOLVED | Noted: 2018-11-14 | Resolved: 2019-02-18

## 2019-02-18 PROBLEM — G89.29 CHRONIC MIDLINE LOW BACK PAIN WITHOUT SCIATICA: Status: RESOLVED | Noted: 2017-11-13 | Resolved: 2019-02-18

## 2019-02-18 PROBLEM — M54.50 CHRONIC MIDLINE LOW BACK PAIN WITHOUT SCIATICA: Status: RESOLVED | Noted: 2017-11-13 | Resolved: 2019-02-18

## 2019-02-18 PROCEDURE — G8482 FLU IMMUNIZE ORDER/ADMIN: HCPCS | Performed by: PSYCHIATRY & NEUROLOGY

## 2019-02-18 PROCEDURE — G8417 CALC BMI ABV UP PARAM F/U: HCPCS | Performed by: PSYCHIATRY & NEUROLOGY

## 2019-02-18 PROCEDURE — 99215 OFFICE O/P EST HI 40 MIN: CPT | Performed by: PSYCHIATRY & NEUROLOGY

## 2019-02-18 PROCEDURE — 1036F TOBACCO NON-USER: CPT | Performed by: PSYCHIATRY & NEUROLOGY

## 2019-02-18 PROCEDURE — G8428 CUR MEDS NOT DOCUMENT: HCPCS | Performed by: PSYCHIATRY & NEUROLOGY

## 2019-02-18 RX ORDER — DANTROLENE SODIUM 25 MG/1
25 CAPSULE ORAL NIGHTLY
Qty: 90 CAPSULE | Refills: 3 | Status: SHIPPED | OUTPATIENT
Start: 2019-02-18 | End: 2019-06-17 | Stop reason: SDUPTHER

## 2019-02-18 RX ORDER — BACLOFEN 20 MG/1
20 TABLET ORAL
COMMUNITY
End: 2019-02-18 | Stop reason: SDUPTHER

## 2019-02-18 RX ORDER — POLYETHYLENE GLYCOL 3350 17 G/17G
17 POWDER, FOR SOLUTION ORAL DAILY
COMMUNITY
End: 2019-04-04 | Stop reason: SDUPTHER

## 2019-02-18 RX ORDER — OMEGA-3 FATTY ACIDS/FISH OIL 300-1000MG
1 CAPSULE ORAL PRN
COMMUNITY
End: 2019-03-25 | Stop reason: SDUPTHER

## 2019-02-18 RX ORDER — BACLOFEN 20 MG/1
TABLET ORAL
Qty: 120 TABLET | Refills: 11 | Status: SHIPPED | OUTPATIENT
Start: 2019-02-18 | End: 2019-03-25 | Stop reason: SDUPTHER

## 2019-02-18 RX ORDER — GABAPENTIN 300 MG/1
300 CAPSULE ORAL 3 TIMES DAILY
Qty: 270 CAPSULE | Refills: 1 | Status: SHIPPED | OUTPATIENT
Start: 2019-02-18 | End: 2019-05-02 | Stop reason: SDUPTHER

## 2019-03-07 ENCOUNTER — TELEPHONE (OUTPATIENT)
Dept: FAMILY MEDICINE CLINIC | Age: 29
End: 2019-03-07

## 2019-03-11 NOTE — TELEPHONE ENCOUNTER
Flovent would be a duplicate of the symbicort. Please advise which of the inhalers the pt is taking so I can sign if needed. I am not sure if there was an issue obtaining the symbicort.

## 2019-03-25 ENCOUNTER — TELEPHONE (OUTPATIENT)
Dept: FAMILY MEDICINE CLINIC | Age: 29
End: 2019-03-25

## 2019-03-25 ENCOUNTER — OFFICE VISIT (OUTPATIENT)
Dept: FAMILY MEDICINE CLINIC | Age: 29
End: 2019-03-25
Payer: COMMERCIAL

## 2019-03-25 ENCOUNTER — HOSPITAL ENCOUNTER (OUTPATIENT)
Age: 29
End: 2019-03-25
Payer: COMMERCIAL

## 2019-03-25 ENCOUNTER — HOSPITAL ENCOUNTER (OUTPATIENT)
Dept: GENERAL RADIOLOGY | Age: 29
Discharge: HOME | End: 2019-03-27
Payer: COMMERCIAL

## 2019-03-25 VITALS
SYSTOLIC BLOOD PRESSURE: 96 MMHG | TEMPERATURE: 97.6 F | WEIGHT: 255 LBS | BODY MASS INDEX: 37.77 KG/M2 | HEIGHT: 69 IN | DIASTOLIC BLOOD PRESSURE: 70 MMHG | HEART RATE: 103 BPM | OXYGEN SATURATION: 98 %

## 2019-03-25 DIAGNOSIS — R35.0 URINARY FREQUENCY: ICD-10-CM

## 2019-03-25 DIAGNOSIS — M79.605 LEFT LEG PAIN: ICD-10-CM

## 2019-03-25 DIAGNOSIS — R22.41 MASS OF RIGHT LOWER EXTREMITY: ICD-10-CM

## 2019-03-25 DIAGNOSIS — N39.0 COMPLICATED UTI (URINARY TRACT INFECTION): Primary | ICD-10-CM

## 2019-03-25 DIAGNOSIS — Z76.0 MEDICATION REFILL: ICD-10-CM

## 2019-03-25 DIAGNOSIS — J45.20 MILD INTERMITTENT ASTHMA WITHOUT COMPLICATION: ICD-10-CM

## 2019-03-25 LAB
BILIRUBIN, POC: ABNORMAL
BLOOD URINE, POC: ABNORMAL
CLARITY, POC: CLEAR
COLOR, POC: YELLOW
GLUCOSE URINE, POC: ABNORMAL
KETONES, POC: ABNORMAL
LEUKOCYTE EST, POC: ABNORMAL
NITRITE, POC: ABNORMAL
PH, POC: 6
PROTEIN, POC: 30
SPECIFIC GRAVITY, POC: 1.03
UROBILINOGEN, POC: 0.2

## 2019-03-25 PROCEDURE — 99212 OFFICE O/P EST SF 10 MIN: CPT | Performed by: FAMILY MEDICINE

## 2019-03-25 PROCEDURE — 1036F TOBACCO NON-USER: CPT | Performed by: FAMILY MEDICINE

## 2019-03-25 PROCEDURE — 73590 X-RAY EXAM OF LOWER LEG: CPT

## 2019-03-25 PROCEDURE — 81002 URINALYSIS NONAUTO W/O SCOPE: CPT | Performed by: FAMILY MEDICINE

## 2019-03-25 PROCEDURE — G8417 CALC BMI ABV UP PARAM F/U: HCPCS | Performed by: FAMILY MEDICINE

## 2019-03-25 PROCEDURE — G8482 FLU IMMUNIZE ORDER/ADMIN: HCPCS | Performed by: FAMILY MEDICINE

## 2019-03-25 PROCEDURE — G8427 DOCREV CUR MEDS BY ELIG CLIN: HCPCS | Performed by: FAMILY MEDICINE

## 2019-03-25 PROCEDURE — 99213 OFFICE O/P EST LOW 20 MIN: CPT | Performed by: FAMILY MEDICINE

## 2019-03-25 RX ORDER — FLUTICASONE PROPIONATE 220 UG/1
AEROSOL, METERED RESPIRATORY (INHALATION)
Refills: 3 | COMMUNITY
Start: 2019-01-08 | End: 2019-12-09

## 2019-03-25 RX ORDER — BACLOFEN 20 MG/1
TABLET ORAL
Qty: 120 TABLET | Refills: 11 | Status: SHIPPED | OUTPATIENT
Start: 2019-03-25 | End: 2019-08-08 | Stop reason: SDUPTHER

## 2019-03-25 RX ORDER — ALBUTEROL SULFATE 90 UG/1
2 AEROSOL, METERED RESPIRATORY (INHALATION) EVERY 6 HOURS PRN
Qty: 1 INHALER | Refills: 0 | Status: SHIPPED | OUTPATIENT
Start: 2019-03-25 | End: 2019-05-02 | Stop reason: SDUPTHER

## 2019-03-25 RX ORDER — OMEGA-3 FATTY ACIDS/FISH OIL 300-1000MG
1 CAPSULE ORAL PRN
Qty: 120 CAPSULE | Refills: 0 | Status: SHIPPED | OUTPATIENT
Start: 2019-03-25 | End: 2019-04-02 | Stop reason: SDUPTHER

## 2019-03-25 RX ORDER — BUDESONIDE AND FORMOTEROL FUMARATE DIHYDRATE 80; 4.5 UG/1; UG/1
2 AEROSOL RESPIRATORY (INHALATION) DAILY
Qty: 1 INHALER | Refills: 3 | Status: SHIPPED | OUTPATIENT
Start: 2019-03-25 | End: 2019-08-16 | Stop reason: SDUPTHER

## 2019-03-25 RX ORDER — CIPROFLOXACIN 500 MG/1
500 TABLET, FILM COATED ORAL 2 TIMES DAILY
Qty: 20 TABLET | Refills: 0 | Status: SHIPPED | OUTPATIENT
Start: 2019-03-25 | End: 2019-04-01

## 2019-03-25 RX ORDER — NYSTATIN 100000 U/G
CREAM TOPICAL
Refills: 1 | COMMUNITY
Start: 2019-02-06 | End: 2019-12-09

## 2019-03-25 ASSESSMENT — PATIENT HEALTH QUESTIONNAIRE - PHQ9
SUM OF ALL RESPONSES TO PHQ QUESTIONS 1-9: 0
2. FEELING DOWN, DEPRESSED OR HOPELESS: 0
SUM OF ALL RESPONSES TO PHQ QUESTIONS 1-9: 0
SUM OF ALL RESPONSES TO PHQ9 QUESTIONS 1 & 2: 0
SUM OF ALL RESPONSES TO PHQ QUESTIONS 1-9: 0
1. LITTLE INTEREST OR PLEASURE IN DOING THINGS: 0
1. LITTLE INTEREST OR PLEASURE IN DOING THINGS: 0
SUM OF ALL RESPONSES TO PHQ QUESTIONS 1-9: 0

## 2019-03-25 NOTE — TELEPHONE ENCOUNTER
Pharmacy called again staying that Rx for cipro says to take for 7 days but ordered #20 BID. Change days or change number of pills?   Enma

## 2019-03-30 ENCOUNTER — HOSPITAL ENCOUNTER (OUTPATIENT)
Dept: ULTRASOUND IMAGING | Age: 29
Discharge: HOME | End: 2019-04-01
Payer: COMMERCIAL

## 2019-03-30 DIAGNOSIS — M79.605 LEFT LEG PAIN: ICD-10-CM

## 2019-03-30 PROCEDURE — 76999 ECHO EXAMINATION PROCEDURE: CPT

## 2019-04-02 ENCOUNTER — OFFICE VISIT (OUTPATIENT)
Dept: FAMILY MEDICINE CLINIC | Age: 29
End: 2019-04-02
Payer: COMMERCIAL

## 2019-04-02 VITALS
HEIGHT: 69 IN | TEMPERATURE: 98.1 F | BODY MASS INDEX: 38.21 KG/M2 | OXYGEN SATURATION: 98 % | WEIGHT: 258 LBS | RESPIRATION RATE: 18 BRPM | HEART RATE: 96 BPM | SYSTOLIC BLOOD PRESSURE: 115 MMHG | DIASTOLIC BLOOD PRESSURE: 77 MMHG

## 2019-04-02 DIAGNOSIS — M79.605 LEFT LEG PAIN: Primary | ICD-10-CM

## 2019-04-02 DIAGNOSIS — G43.909 MIGRAINE WITHOUT STATUS MIGRAINOSUS, NOT INTRACTABLE, UNSPECIFIED MIGRAINE TYPE: ICD-10-CM

## 2019-04-02 DIAGNOSIS — N39.0 COMPLICATED UTI (URINARY TRACT INFECTION): ICD-10-CM

## 2019-04-02 DIAGNOSIS — Z76.0 MEDICATION REFILL: ICD-10-CM

## 2019-04-02 PROCEDURE — 99213 OFFICE O/P EST LOW 20 MIN: CPT | Performed by: FAMILY MEDICINE

## 2019-04-02 PROCEDURE — G8427 DOCREV CUR MEDS BY ELIG CLIN: HCPCS | Performed by: FAMILY MEDICINE

## 2019-04-02 PROCEDURE — 99212 OFFICE O/P EST SF 10 MIN: CPT | Performed by: FAMILY MEDICINE

## 2019-04-02 PROCEDURE — G8417 CALC BMI ABV UP PARAM F/U: HCPCS | Performed by: FAMILY MEDICINE

## 2019-04-02 PROCEDURE — 1036F TOBACCO NON-USER: CPT | Performed by: FAMILY MEDICINE

## 2019-04-02 RX ORDER — DIVALPROEX SODIUM 250 MG/1
250 TABLET, DELAYED RELEASE ORAL 2 TIMES DAILY
Qty: 180 TABLET | Refills: 0 | Status: SHIPPED | OUTPATIENT
Start: 2019-04-02 | End: 2019-05-02 | Stop reason: SDUPTHER

## 2019-04-02 RX ORDER — OMEGA-3 FATTY ACIDS/FISH OIL 300-1000MG
1 CAPSULE ORAL PRN
Qty: 120 CAPSULE | Refills: 0 | Status: SHIPPED | OUTPATIENT
Start: 2019-04-02 | End: 2019-04-05 | Stop reason: ALTCHOICE

## 2019-04-02 ASSESSMENT — ENCOUNTER SYMPTOMS
SHORTNESS OF BREATH: 0
CHEST TIGHTNESS: 0
COLOR CHANGE: 0
COUGH: 0

## 2019-04-02 NOTE — PROGRESS NOTES
Tana Davey MD at [facility] OR       Allergies:    Latex; Acetaminophen; Aspirin-acetaminophen-caffeine; Dye [iodides]; Penicillins; Topamax [topiramate]; Tylenol with codeine #3 [acetaminophen-codeine]; Blueberry flavor; Fish-derived products;  Iodine; Lidocaine; and Vicodin [hydrocodone-acetaminophen]    Social History:   Social History     Socioeconomic History    Marital status: Single     Spouse name: Not on file    Number of children: Not on file    Years of education: 15    Highest education level: Not on file   Occupational History    Occupation: [employer]     Employer: [employer]: unable to work due to injury   Social Needs    Financial resource strain: Not on file    Food insecurity:     Worry: Not on file     Inability: Not on file   [employer] needs:     Medical: Not on file     Non-medical: Not on file   Tobacco Use    Smoking status: Former Smoker     Packs/day: 0.10     Types: Cigarettes    Smokeless tobacco: Never Used   Substance and Sexual Activity    Alcohol use: No     Alcohol/week: 0.0 oz    Drug use: No    Sexual activity: Never   Lifestyle    Physical activity:     Days per week: Not on file     Minutes per session: Not on file    Stress: Not on file   Relationships    Social connections:     Talks on phone: Not on file     Gets together: Not on file     Attends Temple service: Not on file     Active member of club or organization: Not on file     Attends meetings of clubs or organizations: Not on file     Relationship status: Not on file    Intimate partner violence:     Fear of current or ex partner: Not on file     Emotionally abused: Not on file     Physically abused: Not on file     Forced sexual activity: Not on file   Other Topics Concern    Not on file   Social History Narrative    Not on file        Family History:       Problem Relation Age of Onset    Early Death Maternal Grandfather     Cancer Maternal Grandfather     Asthma Brother  Heart Disease Maternal Aunt     Cancer Maternal Aunt     Heart Disease Maternal Uncle     Cancer Maternal Uncle     High Cholesterol Father     Diabetes Father     High Cholesterol Mother     Cancer Paternal Aunt     Cancer Paternal Uncle     Cancer Maternal Grandmother     Cancer Paternal Grandmother     Cancer Paternal Grandfather        Review of Systems:   Review of Systems   Constitutional: Negative for activity change, appetite change, chills, diaphoresis, fatigue, fever and unexpected weight change. Respiratory: Negative for cough, chest tightness and shortness of breath. Cardiovascular: Negative for chest pain, palpitations and leg swelling. Musculoskeletal: Positive for arthralgias and myalgias. Skin: Negative for color change, pallor, rash and wound. Physical Exam   Vitals: /77   Pulse 96   Temp 98.1 °F (36.7 °C)   Resp 18   Ht 5' 9\" (1.753 m)   Wt 258 lb (117 kg)   SpO2 98%   BMI 38.10 kg/m²   General Appearance: Well developed, awake, alert, oriented, no acute distress  HEENT: Normocephalic, atraumatic. EOM's intact, EACwithout erythema or swelling, no pallor oricterus. Neck: Supple, symmetrical, trachea midline. No JVD. Chest wall/Lung: Clear to auscultation bilaterally,  respirationsunlabored. No ronchi/wheezing/rales  Heart: Regular rate and rhythm, S1 and S2 normal, no murmur, rub or gallop. Abdomen: Soft, non-tender, bowelsounds normoactive, no masses, no organomegaly  Extremities:  Extremities normal, atraumatic, nocyanosis. No edema. Left gastrocnemius is ttp. No erythema or swelling. Entire left lateral ankle including malleolus is ttp,. FROM of both ankles. Strength and resistance in lower extremities 5/5   Skin: Skin color, texture, turgor normal, no rashes or lesions  Musculokeletal: ROM grossly normal in all joints of extremities, no obvious joint swelling. Lymph nodes: no lymph node enlargement appreciated  Neurologic:   Alert&Oriented. Normal gait and coordination  No focal neurological deficits appreaciated         Psychiatric: has a normal mood and affect. Behavior is normal.       Assessment and Plan       1. Left leg pain  -pain 2/2 to fluid filled pocket deep to subcutaneous tissue. Referral to ortho for possible drainage  - [address] Merline Quinteros MD, Orthopaedics, [address]    2. Migraine without status migrainosus, not intractable, unspecified migraine type  - divalproex (DEPAKOTE) 250 MG DR tablet; Take 1 tablet by mouth 2 times daily  Dispense: 180 tablet; Refill: 0        Return to Office: FU with PCP    Jg Alcantara MD    Medication List:    Current Outpatient Medications   Medication Sig Dispense Refill    divalproex (DEPAKOTE) 250 MG DR tablet Take 1 tablet by mouth 2 times daily 180 tablet 0    ibuprofen (ADVIL MIGRAINE) 200 MG CAPS Take 1 capsule by mouth as needed for Pain or Fever 120 capsule 0    nicotine (NICODERM CQ) 7 MG/24HR Place 1 patch onto the skin daily for 14 days 14 patch 0    FLOVENT  MCG/ACT inhaler INHALE 1 PUFF INTO THE LUNGS BID  3    nystatin (MYCOSTATIN) 918854 UNIT/GM cream KAPIL EXT AA  QD  1    budesonide-formoterol (SYMBICORT) 80-4.5 MCG/ACT AERO Inhale 2 puffs into the lungs daily 1 Inhaler 3    baclofen (LIORESAL) 20 MG tablet 1 tablet in am, 1 in evening and 2 tablets at hs 120 tablet 11    polyethylene glycol (MIRALAX) powder Take 17 g by mouth daily      gabapentin (NEURONTIN) 300 MG capsule Take 1 capsule by mouth 3 times daily for 182 days. . 270 capsule 1    dantrolene (DANTRIUM) 25 MG capsule Take 1 capsule by mouth nightly 90 capsule 3    Multiple Vitamin (MVI, CELEBRATE, CHEWABLE TABLET) Take 1 tablet by mouth daily 90 tablet 5    escitalopram (LEXAPRO) 20 MG tablet Take 1 tablet by mouth daily 30 tablet 3    Benzoyl Peroxide 2.5 % CREA Apply to face 2x per day 1 Tube 3    saline nasal gel (AYR) GEL by Nasal route as needed for Congestion      levothyroxine (LEVOTHROID) 75 MCG tablet Take 1 tablet by mouth daily 90 tablet 1    mometasone (ELOCON) 0.1 % cream Apply topically daily Apply topically daily.  levocetirizine (XYZAL) 5 MG tablet Take 1 tablet by mouth nightly 30 tablet 5    tamsulosin (FLOMAX) 0.4 MG capsule Take 0.4 mg by mouth daily      medroxyPROGESTERone (PROVERA) 10 MG tablet Take 10 mg by mouth daily      albuterol sulfate HFA (PROAIR HFA) 108 (90 Base) MCG/ACT inhaler Inhale 2 puffs into the lungs every 6 hours as needed for Wheezing 1 Inhaler 0     No current facility-administered medications for this visit.

## 2019-04-04 ENCOUNTER — TELEPHONE (OUTPATIENT)
Dept: ORTHOPEDIC SURGERY | Age: 29
End: 2019-04-04

## 2019-04-04 ENCOUNTER — OFFICE VISIT (OUTPATIENT)
Dept: FAMILY MEDICINE CLINIC | Age: 29
End: 2019-04-04
Payer: COMMERCIAL

## 2019-04-04 ENCOUNTER — HOSPITAL ENCOUNTER (OUTPATIENT)
Age: 29
Discharge: HOME | End: 2019-04-06
Payer: COMMERCIAL

## 2019-04-04 VITALS
BODY MASS INDEX: 38.09 KG/M2 | SYSTOLIC BLOOD PRESSURE: 103 MMHG | TEMPERATURE: 97.3 F | HEART RATE: 94 BPM | OXYGEN SATURATION: 98 % | WEIGHT: 257.2 LBS | DIASTOLIC BLOOD PRESSURE: 73 MMHG | RESPIRATION RATE: 18 BRPM | HEIGHT: 69 IN

## 2019-04-04 DIAGNOSIS — R30.0 DYSURIA: ICD-10-CM

## 2019-04-04 DIAGNOSIS — Z76.0 MEDICATION REFILL: ICD-10-CM

## 2019-04-04 DIAGNOSIS — R19.7 DIARRHEA, UNSPECIFIED TYPE: Primary | ICD-10-CM

## 2019-04-04 DIAGNOSIS — R42 DIZZINESS: ICD-10-CM

## 2019-04-04 LAB
BACTERIA: ABNORMAL /HPF
BILIRUBIN URINE: NEGATIVE
BLOOD, URINE: ABNORMAL
CLARITY: CLEAR
COLOR: YELLOW
GLUCOSE URINE: NEGATIVE MG/DL
KETONES, URINE: NEGATIVE MG/DL
LEUKOCYTE ESTERASE, URINE: ABNORMAL
NITRITE, URINE: NEGATIVE
PH UA: 6 (ref 5–9)
PROTEIN UA: NEGATIVE MG/DL
RBC UA: ABNORMAL /HPF (ref 0–2)
SPECIFIC GRAVITY UA: 1.01 (ref 1–1.03)
UROBILINOGEN, URINE: 0.2 E.U./DL
WBC UA: ABNORMAL /HPF (ref 0–5)

## 2019-04-04 PROCEDURE — G8417 CALC BMI ABV UP PARAM F/U: HCPCS | Performed by: FAMILY MEDICINE

## 2019-04-04 PROCEDURE — 99213 OFFICE O/P EST LOW 20 MIN: CPT | Performed by: FAMILY MEDICINE

## 2019-04-04 PROCEDURE — 1036F TOBACCO NON-USER: CPT | Performed by: FAMILY MEDICINE

## 2019-04-04 PROCEDURE — G8427 DOCREV CUR MEDS BY ELIG CLIN: HCPCS | Performed by: FAMILY MEDICINE

## 2019-04-04 PROCEDURE — 87088 URINE BACTERIA CULTURE: CPT

## 2019-04-04 PROCEDURE — 81003 URINALYSIS AUTO W/O SCOPE: CPT | Performed by: FAMILY MEDICINE

## 2019-04-04 PROCEDURE — 81001 URINALYSIS AUTO W/SCOPE: CPT

## 2019-04-04 PROCEDURE — 99212 OFFICE O/P EST SF 10 MIN: CPT | Performed by: FAMILY MEDICINE

## 2019-04-04 RX ORDER — POLYETHYLENE GLYCOL 3350 17 G/17G
17 POWDER, FOR SOLUTION ORAL DAILY
Qty: 1 BOTTLE | Refills: 0 | Status: SHIPPED | OUTPATIENT
Start: 2019-04-04 | End: 2019-10-02

## 2019-04-04 RX ORDER — FLUTICASONE PROPIONATE 50 MCG
1 SPRAY, SUSPENSION (ML) NASAL DAILY
Qty: 1 BOTTLE | Refills: 3 | Status: SHIPPED | OUTPATIENT
Start: 2019-04-04 | End: 2019-09-09 | Stop reason: SDUPTHER

## 2019-04-04 RX ORDER — FLUTICASONE PROPIONATE 50 MCG
1 SPRAY, SUSPENSION (ML) NASAL DAILY
Qty: 1 BOTTLE | Refills: 3 | Status: SHIPPED | OUTPATIENT
Start: 2019-04-04 | End: 2019-04-04 | Stop reason: SDUPTHER

## 2019-04-04 NOTE — PATIENT INSTRUCTIONS
Patient Education        Dehydration: Care Instructions  Your Care Instructions  Dehydration happens when your body loses too much fluid. This might happen when you do not drink enough water or you lose large amounts of fluids from your body because of diarrhea, vomiting, or sweating. Severe dehydration can be life-threatening. Water and minerals called electrolytes help put your body fluids back in balance. Learn the early signs of fluid loss, and drink more fluids to prevent dehydration. Follow-up care is a key part of your treatment and safety. Be sure to make and go to all appointments, and call your doctor if you are having problems. It's also a good idea to know your test results and keep a list of the medicines you take. How can you care for yourself at home? · To prevent dehydration, drink plenty of fluids, enough so that your urine is light yellow or clear like water. Choose water and other caffeine-free clear liquids until you feel better. If you have kidney, heart, or liver disease and have to limit fluids, talk with your doctor before you increase the amount of fluids you drink. · If you do not feel like eating or drinking, try taking small sips of water, sports drinks, or other rehydration drinks. · Get plenty of rest.  To prevent dehydration  · Add more fluids to your diet and daily routine, unless your doctor has told you not to. · During hot weather, drink more fluids. Drink even more fluids if you exercise a lot. Stay away from drinks with alcohol or caffeine. · Watch for the symptoms of dehydration. These include:  ? A dry, sticky mouth. ? Dark yellow urine, and not much of it. ? Dry and sunken eyes. ? Feeling very tired. · Learn what problems can lead to dehydration. These include:  ? Diarrhea, fever, and vomiting. ? Any illness with a fever, such as pneumonia or the flu. ?  Activities that cause heavy sweating, such as endurance races and heavy outdoor work in hot or humid

## 2019-04-04 NOTE — PROGRESS NOTES
the lower extermities   ABDOMEN:  Mild abdominal tenderness; back pain all over, not specific to flanks  SKIN:  Warm and dry    Last labs:    Last labs reviewed    A / P:   Diagnosis Orders   1. Diarrhea, unspecified type     2. Dysuria  URINALYSIS    URINE CULTURE    US RETROPERITONEAL LIMITED   3. Dizziness  fluticasone (FLONASE) 50 MCG/ACT nasal spray   4. Medication refill  nicotine (NICODERM CQ) 7 MG/24HR    polyethylene glycol (MIRALAX) powder     1. Dizziness - physical exam and pt reported symptoms not consistent with any 1 type of diagnosis. Treat conservatively at this time with increased hydration and flonase for chronic sinus pressure. 2. Dysuria - treated recently with cipro for complicated UTI  V pyelo, repeat UA today is normal. Will send for culture due to pt reported symptoms and monitor for now. Will also get kidney US for now. 3. Gastroenteritis - monitor for now. Increase fluids. If symptoms fail to improve or worsen, pt to return to office. 4. Other items not addressed this visit:   · Asthma - controlled, CPM.   · Migraine wo aura - controlled, continue depakote 250mg BID for prevention. Continue with motrin migraine PRN for abortive. RTO: Return in about 1 month (around 5/4/2019) for chronic conditions.     Electronically signed by Marcos Major MD on 4/4/2019 at 11:54 AM  This case was discussed with (s) Shana    Future Appointments   Date Time Provider Donna Page   5/2/2019 11:00 AM [facility]   6/18/2019  3:40 PM Natty Hartley MD [facility]

## 2019-04-05 ENCOUNTER — TELEPHONE (OUTPATIENT)
Dept: FAMILY MEDICINE CLINIC | Age: 29
End: 2019-04-05

## 2019-04-05 ENCOUNTER — TELEPHONE (OUTPATIENT)
Dept: ORTHOPEDIC SURGERY | Age: 29
End: 2019-04-05

## 2019-04-05 ENCOUNTER — APPOINTMENT (OUTPATIENT)
Dept: ULTRASOUND IMAGING | Age: 29
End: 2019-04-05
Payer: COMMERCIAL

## 2019-04-05 ENCOUNTER — HOSPITAL ENCOUNTER (EMERGENCY)
Age: 29
Discharge: HOME | End: 2019-04-05
Attending: EMERGENCY MEDICINE
Payer: COMMERCIAL

## 2019-04-05 VITALS
WEIGHT: 257 LBS | OXYGEN SATURATION: 98 % | HEART RATE: 84 BPM | SYSTOLIC BLOOD PRESSURE: 130 MMHG | BODY MASS INDEX: 38.95 KG/M2 | DIASTOLIC BLOOD PRESSURE: 70 MMHG | RESPIRATION RATE: 18 BRPM | TEMPERATURE: 97.5 F | HEIGHT: 68 IN

## 2019-04-05 DIAGNOSIS — M85.662 OTHER CYST OF BONE, LEFT LOWER LEG: Primary | ICD-10-CM

## 2019-04-05 DIAGNOSIS — M79.605 LEFT LEG PAIN: Primary | ICD-10-CM

## 2019-04-05 LAB
ANION GAP SERPL CALCULATED.3IONS-SCNC: 9 MMOL/L (ref 7–16)
BASOPHILS ABSOLUTE: 0.03 E9/L (ref 0–0.2)
BASOPHILS RELATIVE PERCENT: 0.4 % (ref 0–2)
BUN BLDV-MCNC: 15 MG/DL (ref 6–20)
CALCIUM SERPL-MCNC: 8.9 MG/DL (ref 8.6–10.2)
CHLORIDE BLD-SCNC: 108 MMOL/L (ref 98–107)
CO2: 25 MMOL/L (ref 22–29)
CREAT SERPL-MCNC: 1 MG/DL (ref 0.5–1)
EOSINOPHILS ABSOLUTE: 0.2 E9/L (ref 0.05–0.5)
EOSINOPHILS RELATIVE PERCENT: 2.6 % (ref 0–6)
GFR AFRICAN AMERICAN: >60
GFR NON-AFRICAN AMERICAN: >60 ML/MIN/1.73
GLUCOSE BLD-MCNC: 87 MG/DL (ref 74–99)
HCT VFR BLD CALC: 40.4 % (ref 34–48)
HEMOGLOBIN: 13.1 G/DL (ref 11.5–15.5)
IMMATURE GRANULOCYTES #: 0.02 E9/L
IMMATURE GRANULOCYTES %: 0.3 % (ref 0–5)
LACTIC ACID: 0.8 MMOL/L (ref 0.5–2.2)
LYMPHOCYTES ABSOLUTE: 2.73 E9/L (ref 1.5–4)
LYMPHOCYTES RELATIVE PERCENT: 34.8 % (ref 20–42)
MCH RBC QN AUTO: 29.6 PG (ref 26–35)
MCHC RBC AUTO-ENTMCNC: 32.4 % (ref 32–34.5)
MCV RBC AUTO: 91.2 FL (ref 80–99.9)
MONOCYTES ABSOLUTE: 0.7 E9/L (ref 0.1–0.95)
MONOCYTES RELATIVE PERCENT: 8.9 % (ref 2–12)
NEUTROPHILS ABSOLUTE: 4.16 E9/L (ref 1.8–7.3)
NEUTROPHILS RELATIVE PERCENT: 53 % (ref 43–80)
PDW BLD-RTO: 13.7 FL (ref 11.5–15)
PLATELET # BLD: 190 E9/L (ref 130–450)
PMV BLD AUTO: 12.9 FL (ref 7–12)
POTASSIUM SERPL-SCNC: 4.7 MMOL/L (ref 3.5–5)
RBC # BLD: 4.43 E12/L (ref 3.5–5.5)
SODIUM BLD-SCNC: 142 MMOL/L (ref 132–146)
WBC # BLD: 7.8 E9/L (ref 4.5–11.5)

## 2019-04-05 PROCEDURE — 85025 COMPLETE CBC W/AUTO DIFF WBC: CPT

## 2019-04-05 PROCEDURE — 93971 EXTREMITY STUDY: CPT

## 2019-04-05 PROCEDURE — 99284 EMERGENCY DEPT VISIT MOD MDM: CPT

## 2019-04-05 PROCEDURE — 80048 BASIC METABOLIC PNL TOTAL CA: CPT

## 2019-04-05 PROCEDURE — 36415 COLL VENOUS BLD VENIPUNCTURE: CPT

## 2019-04-05 PROCEDURE — 83605 ASSAY OF LACTIC ACID: CPT

## 2019-04-05 PROCEDURE — 87040 BLOOD CULTURE FOR BACTERIA: CPT

## 2019-04-05 RX ORDER — IBUPROFEN 600 MG/1
600 TABLET ORAL 4 TIMES DAILY PRN
Qty: 360 TABLET | Refills: 1 | Status: SHIPPED | OUTPATIENT
Start: 2019-04-05 | End: 2019-05-02

## 2019-04-05 NOTE — ED NOTES
FIRST PROVIDER CONTACT ASSESSMENT NOTE      Department of Emergency Medicine   4/5/19  6:35 PM    Chief Complaint: Leg Pain (started 1 week ago had U/S done earlier this week. )      History of Present Illness:    Leroy Siu is a 34 y.o. female who presents to the ED by private car for  left leg pain infection   Focused Screening Exam:  Constitutional:  Alert, appears stated age and is in no distress. Heart rrr   Lungs clear     *ALLERGIES*     Latex; Acetaminophen; Aspirin-acetaminophen-caffeine; Dye [iodides]; Penicillins; Topamax [topiramate]; Tylenol with codeine #3 [acetaminophen-codeine]; Blueberry flavor; Fish-derived products;  Iodine; Lidocaine; and Vicodin [hydrocodone-acetaminophen]     ED Triage Vitals   BP Temp Temp src Pulse Resp SpO2 Height Weight   04/05/19 1833 04/05/19 1833 -- 04/05/19 1826 04/05/19 1826 04/05/19 1826 04/05/19 1833 04/05/19 1833   132/68 97.5 °F (36.4 °C)  98 16 96 % 5' 8\" (1.727 m) 257 lb (116.6 kg)        Initial Plan of Care:  Initiate Treatment-Testing, Proceed toTreatment Area When Bed Available for ED Attending/MLP to Continue Care    -----------------[address] ASSESSMENT NOTE--------------  Electronically signed by Arther Mcburney, PA   DD: 4/5/19     Arther Mcburney, PA  04/05/19 1836

## 2019-04-05 NOTE — TELEPHONE ENCOUNTER
Dr. Betzy Smalls office called in stating that they spoke with Dr. Yasmine Rooney and he thinks patient would be better off seeing general surgery.

## 2019-04-06 ASSESSMENT — ENCOUNTER SYMPTOMS
SHORTNESS OF BREATH: 0
TROUBLE SWALLOWING: 0
EYE PAIN: 0
COUGH: 0
RHINORRHEA: 0
VOMITING: 0
ABDOMINAL PAIN: 0
EYE REDNESS: 0
NAUSEA: 0
ABDOMINAL DISTENTION: 0
DIARRHEA: 0
BACK PAIN: 0
BLOOD IN STOOL: 0
WHEEZING: 0
CHEST TIGHTNESS: 0
PHOTOPHOBIA: 0
SORE THROAT: 0
SINUS PRESSURE: 0
CONSTIPATION: 0

## 2019-04-06 NOTE — ED PROVIDER NOTES
Patient is a 15-year-old female who presented to ED for evaluation of left lower extremity pain. Onset of symptoms proximally 1 week prior to arrival. Patient denies trauma to the extremity. Patient denies previous DVT/PE, hemoptysis, recent immobilization, recent surgery, oral contraceptives, or loss of motor/sensory function. Patient describes the pain as a \"dull, tightness\". Pain is located to the lateral left lower extremity over the lateral thigh as well as the lateral ankle. Denies associated fever or chills. Patient denies increased warmth or redness to the area. Review of Systems   Constitutional: Negative for chills, diaphoresis, fatigue and fever. HENT: Negative for congestion, ear pain, rhinorrhea, sinus pressure, sneezing, sore throat and trouble swallowing. Eyes: Negative for photophobia, pain and redness. Respiratory: Negative for cough, chest tightness, shortness of breath and wheezing. Cardiovascular: Negative for chest pain and palpitations. Gastrointestinal: Negative for abdominal distention, abdominal pain, blood in stool, constipation, diarrhea, nausea and vomiting. Endocrine: Negative for polydipsia and polyuria. Genitourinary: Negative for difficulty urinating, dysuria, flank pain, hematuria and urgency. Musculoskeletal: Negative for arthralgias, back pain, myalgias and neck pain. Left lower extremity pain   Skin: Negative for rash and wound. Neurological: Negative for weakness, light-headedness, numbness and headaches. Psychiatric/Behavioral: Negative for agitation and confusion. The patient is not nervous/anxious and is not hyperactive. Physical Exam   Constitutional: She is oriented to person, place, and time. She appears well-developed and well-nourished. No distress. HENT:   Head: Normocephalic and atraumatic.    Right Ear: External ear normal.   Left Ear: External ear normal.   Mouth/Throat: Oropharynx is clear and moist. No oropharyngeal discharge, repeat exam unchanged with patient resting comfortably in bed with mother at bedside. No new complaints prior to discharge and patient is agreeable to plan.        --------------------------------------------- PAST HISTORY ---------------------------------------------  Past Medical History:  has a past medical history of Asthma, Back pain, Chronic midline low back pain without sciatica, Closed nondisplaced fracture of head of left radius, GERD (gastroesophageal reflux disease), History of snoring, Jaw fracture ([facility]), Muscle spasticity, Obesity (BMI 30-39.9), Seizures ([facility] [address] Thyroid disease, Tonsillitis, and Urinary incontinence. Past Surgical History:  has a past surgical history that includes eye surgery; fracture surgery; Colonoscopy; pr removal of tonsils,12+ y/o (N/A, 5/4/2018); and pr colonoscopy w/biopsy single/multiple (10/29/2018). Social History:  reports that she has quit smoking. Her smoking use included cigarettes. She smoked 0.10 packs per day. She has never used smokeless tobacco. She reports that she does not drink alcohol or use drugs. Family History: family history includes Asthma in her brother; Cancer in her maternal aunt, maternal grandfather, maternal grandmother, maternal uncle, paternal aunt, paternal grandfather, paternal grandmother, and paternal uncle; Diabetes in her father; Early Death in her maternal grandfather; Heart Disease in her maternal aunt and maternal uncle; High Cholesterol in her father and mother. The patients home medications have been reviewed. Allergies: Latex; Acetaminophen; Aspirin-acetaminophen-caffeine; Dye [iodides]; Penicillins; Topamax [topiramate]; Tylenol with codeine #3 [acetaminophen-codeine]; Blueberry flavor; Fish-derived products;  Iodine; Lidocaine; and Vicodin [hydrocodone-acetaminophen]    -------------------------------------------------- RESULTS -------------------------------------------------  Labs:  Results for orders placed Normal      ------------------------------------------ PROGRESS NOTES ------------------------------------------  10:17 PM  I have spoken with the patient and discussed todays results, in addition to providing specific details for the plan of care and counseling regarding the diagnosis and prognosis. Their questions are answered at this time and they are agreeable with the plan. I discussed at length with them reasons for immediate return here for re evaluation. They will followup with their primary care physician by calling their office on Monday.      --------------------------------- ADDITIONAL PROVIDER NOTES ---------------------------------  At this time the patient is without objective evidence of an acute process requiring hospitalization or inpatient management. They have remained hemodynamically stable throughout their entire ED visit and are stable for discharge with outpatient follow-up. The plan has been discussed in detail and they are aware of the specific conditions for emergent return, as well as the importance of follow-up. Discharge Medication List as of 4/5/2019 10:19 PM      START taking these medications    Details   ibuprofen (ADVIL;MOTRIN) 600 MG tablet Take 1 tablet by mouth 4 times daily as needed for Pain, Disp-360 tablet, R-1Print             Diagnosis:  1. Left leg pain        Disposition:  Patient's disposition: Discharge to home  Patient's condition is stable.        Jake Rodriguez,   Resident  04/06/19 [id number]

## 2019-04-07 LAB — URINE CULTURE, ROUTINE: NORMAL

## 2019-04-08 DIAGNOSIS — R09.81 CONGESTION OF NASAL SINUS: ICD-10-CM

## 2019-04-08 RX ORDER — LEVOCETIRIZINE DIHYDROCHLORIDE 5 MG/1
5 TABLET, FILM COATED ORAL NIGHTLY
Qty: 30 TABLET | Refills: 5 | Status: SHIPPED | OUTPATIENT
Start: 2019-04-08 | End: 2019-09-09 | Stop reason: SDUPTHER

## 2019-04-11 LAB — BLOOD CULTURE, ROUTINE: NORMAL

## 2019-04-15 ENCOUNTER — TELEPHONE (OUTPATIENT)
Dept: SURGERY | Age: 29
End: 2019-04-15

## 2019-04-15 DIAGNOSIS — K59.00 CONSTIPATION, UNSPECIFIED CONSTIPATION TYPE: ICD-10-CM

## 2019-04-15 RX ORDER — POLYETHYLENE GLYCOL 3350 17 G/17G
POWDER, FOR SOLUTION ORAL
Qty: 510 G | Refills: 0 | Status: SHIPPED | OUTPATIENT
Start: 2019-04-15 | End: 2019-06-26 | Stop reason: SDUPTHER

## 2019-04-15 NOTE — TELEPHONE ENCOUNTER
MA attempted to contact patient, spoke with mother, Dominguez Ferrara, to inform her that we received a refill order for the glycolax and that the order had been refilled. Informed the prescription is at [place]. Mother verbalized understanding.      Electronically signed by Kyra Hughes on 4/15/19 at 9:35 AM

## 2019-05-02 ENCOUNTER — HOSPITAL ENCOUNTER (OUTPATIENT)
Age: 29
Discharge: HOME | End: 2019-05-04
Payer: COMMERCIAL

## 2019-05-02 ENCOUNTER — OFFICE VISIT (OUTPATIENT)
Dept: FAMILY MEDICINE CLINIC | Age: 29
End: 2019-05-02
Payer: COMMERCIAL

## 2019-05-02 VITALS
HEART RATE: 86 BPM | DIASTOLIC BLOOD PRESSURE: 70 MMHG | HEIGHT: 68 IN | TEMPERATURE: 97.8 F | OXYGEN SATURATION: 99 % | WEIGHT: 258 LBS | SYSTOLIC BLOOD PRESSURE: 102 MMHG | RESPIRATION RATE: 18 BRPM | BODY MASS INDEX: 39.1 KG/M2

## 2019-05-02 DIAGNOSIS — R07.9 ACUTE CHEST PAIN: ICD-10-CM

## 2019-05-02 DIAGNOSIS — K62.5 RECTAL BLEEDING: ICD-10-CM

## 2019-05-02 DIAGNOSIS — J45.20 MILD INTERMITTENT ASTHMA WITHOUT COMPLICATION: ICD-10-CM

## 2019-05-02 DIAGNOSIS — G43.909 MIGRAINE WITHOUT STATUS MIGRAINOSUS, NOT INTRACTABLE, UNSPECIFIED MIGRAINE TYPE: ICD-10-CM

## 2019-05-02 DIAGNOSIS — N20.0 KIDNEY STONES: ICD-10-CM

## 2019-05-02 DIAGNOSIS — R19.7 DIARRHEA, UNSPECIFIED TYPE: ICD-10-CM

## 2019-05-02 DIAGNOSIS — E03.9 HYPOTHYROIDISM, UNSPECIFIED TYPE: ICD-10-CM

## 2019-05-02 DIAGNOSIS — R31.29 OTHER MICROSCOPIC HEMATURIA: ICD-10-CM

## 2019-05-02 DIAGNOSIS — Z76.0 MEDICATION REFILL: ICD-10-CM

## 2019-05-02 DIAGNOSIS — F41.9 ANXIETY: ICD-10-CM

## 2019-05-02 DIAGNOSIS — R10.9 ABDOMINAL PAIN, UNSPECIFIED ABDOMINAL LOCATION: Primary | ICD-10-CM

## 2019-05-02 DIAGNOSIS — G80.0 CONGENITAL SPASTIC QUADRIPARESIS (HCC): ICD-10-CM

## 2019-05-02 LAB
ANION GAP SERPL CALCULATED.3IONS-SCNC: 12 MMOL/L (ref 7–16)
BACTERIA: ABNORMAL /HPF
BILIRUBIN URINE: NEGATIVE
BILIRUBIN, POC: NEGATIVE
BLOOD URINE, POC: NORMAL
BLOOD, URINE: ABNORMAL
BUN BLDV-MCNC: 13 MG/DL (ref 6–20)
CALCIUM SERPL-MCNC: 9.5 MG/DL (ref 8.6–10.2)
CHLORIDE BLD-SCNC: 107 MMOL/L (ref 98–107)
CLARITY, POC: NORMAL
CLARITY: CLEAR
CO2: 26 MMOL/L (ref 22–29)
COLOR, POC: YELLOW
COLOR: YELLOW
CREAT SERPL-MCNC: 0.9 MG/DL (ref 0.5–1)
GFR AFRICAN AMERICAN: >60
GFR NON-AFRICAN AMERICAN: >60 ML/MIN/1.73
GLUCOSE BLD-MCNC: 82 MG/DL (ref 74–99)
GLUCOSE URINE, POC: NEGATIVE
GLUCOSE URINE: NEGATIVE MG/DL
KETONES, POC: NEGATIVE
KETONES, URINE: NEGATIVE MG/DL
LEUKOCYTE EST, POC: NEGATIVE
LEUKOCYTE ESTERASE, URINE: NEGATIVE
NITRITE, POC: NEGATIVE
NITRITE, URINE: NEGATIVE
PH UA: 5.5 (ref 5–9)
PH, POC: 5.5
POTASSIUM SERPL-SCNC: 4.7 MMOL/L (ref 3.5–5)
PROTEIN UA: NEGATIVE MG/DL
PROTEIN, POC: NEGATIVE
RBC UA: ABNORMAL /HPF (ref 0–2)
SODIUM BLD-SCNC: 145 MMOL/L (ref 132–146)
SPECIFIC GRAVITY UA: 1.02 (ref 1–1.03)
SPECIFIC GRAVITY, POC: 1.02
UROBILINOGEN, POC: 0.2
UROBILINOGEN, URINE: 0.2 E.U./DL
WBC UA: ABNORMAL /HPF (ref 0–5)

## 2019-05-02 PROCEDURE — 81003 URINALYSIS AUTO W/O SCOPE: CPT | Performed by: FAMILY MEDICINE

## 2019-05-02 PROCEDURE — G8427 DOCREV CUR MEDS BY ELIG CLIN: HCPCS | Performed by: FAMILY MEDICINE

## 2019-05-02 PROCEDURE — 80048 BASIC METABOLIC PNL TOTAL CA: CPT

## 2019-05-02 PROCEDURE — 81002 URINALYSIS NONAUTO W/O SCOPE: CPT | Performed by: FAMILY MEDICINE

## 2019-05-02 PROCEDURE — 99212 OFFICE O/P EST SF 10 MIN: CPT | Performed by: FAMILY MEDICINE

## 2019-05-02 PROCEDURE — 1036F TOBACCO NON-USER: CPT | Performed by: FAMILY MEDICINE

## 2019-05-02 PROCEDURE — 36415 COLL VENOUS BLD VENIPUNCTURE: CPT | Performed by: FAMILY MEDICINE

## 2019-05-02 PROCEDURE — G8417 CALC BMI ABV UP PARAM F/U: HCPCS | Performed by: FAMILY MEDICINE

## 2019-05-02 PROCEDURE — 81001 URINALYSIS AUTO W/SCOPE: CPT

## 2019-05-02 PROCEDURE — 36415 COLL VENOUS BLD VENIPUNCTURE: CPT

## 2019-05-02 PROCEDURE — 93010 ELECTROCARDIOGRAM REPORT: CPT | Performed by: FAMILY MEDICINE

## 2019-05-02 PROCEDURE — 99213 OFFICE O/P EST LOW 20 MIN: CPT | Performed by: FAMILY MEDICINE

## 2019-05-02 PROCEDURE — 93005 ELECTROCARDIOGRAM TRACING: CPT | Performed by: FAMILY MEDICINE

## 2019-05-02 RX ORDER — ALBUTEROL SULFATE 90 UG/1
2 AEROSOL, METERED RESPIRATORY (INHALATION) EVERY 6 HOURS PRN
Qty: 1 INHALER | Refills: 0 | Status: SHIPPED | OUTPATIENT
Start: 2019-05-02 | End: 2019-08-16 | Stop reason: SDUPTHER

## 2019-05-02 RX ORDER — IBUPROFEN 600 MG/1
600 TABLET ORAL 4 TIMES DAILY PRN
Qty: 360 TABLET | Refills: 1 | Status: CANCELLED | OUTPATIENT
Start: 2019-05-02

## 2019-05-02 RX ORDER — ESCITALOPRAM OXALATE 20 MG/1
20 TABLET ORAL DAILY
Qty: 30 TABLET | Refills: 3 | Status: SHIPPED | OUTPATIENT
Start: 2019-05-02 | End: 2019-09-09 | Stop reason: SDUPTHER

## 2019-05-02 RX ORDER — GABAPENTIN 300 MG/1
300 CAPSULE ORAL 3 TIMES DAILY
Qty: 270 CAPSULE | Refills: 1 | Status: SHIPPED | OUTPATIENT
Start: 2019-05-02 | End: 2019-09-09 | Stop reason: SDUPTHER

## 2019-05-02 RX ORDER — LEVOTHYROXINE SODIUM 0.07 MG/1
75 TABLET ORAL DAILY
Qty: 90 TABLET | Refills: 1 | Status: SHIPPED | OUTPATIENT
Start: 2019-05-02 | End: 2019-08-16 | Stop reason: SDUPTHER

## 2019-05-02 RX ORDER — DIVALPROEX SODIUM 250 MG/1
250 TABLET, DELAYED RELEASE ORAL 2 TIMES DAILY
Qty: 180 TABLET | Refills: 0 | Status: SHIPPED | OUTPATIENT
Start: 2019-05-02 | End: 2019-09-09 | Stop reason: SDUPTHER

## 2019-05-02 NOTE — PROGRESS NOTES
[facility] Outpatient Resident Progress Note       S: HPI : Justin Schroeder  34 y.o. who presented for GI Problem; Diarrhea (sometimes); and Fatigue    Stomach pain / diarrhea: 1 week ago. Also radiates to the back. No nausea, vomiting. Sometimes fever, chills, sweats subjective. Comes and goes. No hematuria. Not on period. No periods. No urinary frequency/incontinence. No dysuria. No hematuria. Every now and then gets burning when urinate. Does have diarrhea. After eat, has to go right away. Sometimes not after eating. Never called to get her renal US before. Doesn't get periods. Does have blood, sometimes bright red blood with the diarrhea. When came back to room mother was present and was able to give a more thorough hx. · Pt did have period recently, used to get bad cramps with them and is following with Obgyn for this, has been on provera for this. Most recently did have period and feels this could be source of abdominal pain. · Hx of stones, follows with urology; has apt scheduled and was told had never passed the stones before, still waiting  · Hx of rectal bleeding, proctitis; follows with gen surg and gets regular c-scopes. Pt denies dizziness or blacking out. This was asked of her upon rooming and EKG was obtained but when asked pt states this was an problem she had in the past.    I reviewed the patient's past medications, allergies and past medical history during this visit    Social: Justin Schroeder  reports that she has quit smoking. Her smoking use included cigarettes. She smoked 0.10 packs per day. She has never used smokeless tobacco. She reports that she does not drink alcohol or use drugs. ROS:  See pertinent ROS as listed in HPI    O: PE: Vitals : Blood pressure 102/70, pulse 86, temperature 97.8 °F (36.6 °C), temperature source Oral, resp. rate 18, height 5' 8\" (1.727 m), weight 258 lb (117 kg), SpO2 99 %, not currently breastfeeding.     CONSTITUTIONAL:  awake, alert,

## 2019-05-03 DIAGNOSIS — R82.71 BACTERIURIA: Primary | ICD-10-CM

## 2019-05-16 ENCOUNTER — HOSPITAL ENCOUNTER (OUTPATIENT)
Dept: CT IMAGING | Age: 29
Discharge: HOME | End: 2019-05-18
Payer: COMMERCIAL

## 2019-05-16 DIAGNOSIS — R10.9 ABDOMINAL PAIN, UNSPECIFIED ABDOMINAL LOCATION: ICD-10-CM

## 2019-05-16 PROCEDURE — 74176 CT ABD & PELVIS W/O CONTRAST: CPT

## 2019-05-20 DIAGNOSIS — Q61.5 MEDULLARY SPONGE KIDNEY: Primary | ICD-10-CM

## 2019-05-26 ENCOUNTER — HOSPITAL ENCOUNTER (OUTPATIENT)
Dept: ULTRASOUND IMAGING | Age: 29
Discharge: HOME | End: 2019-05-28
Payer: COMMERCIAL

## 2019-05-26 DIAGNOSIS — Q61.5 MEDULLARY SPONGE KIDNEY: ICD-10-CM

## 2019-05-26 PROCEDURE — 76770 US EXAM ABDO BACK WALL COMP: CPT

## 2019-05-30 DIAGNOSIS — L70.0 ACNE VULGARIS: ICD-10-CM

## 2019-06-17 ENCOUNTER — OFFICE VISIT (OUTPATIENT)
Dept: NEUROLOGY | Age: 29
End: 2019-06-17
Payer: COMMERCIAL

## 2019-06-17 VITALS
RESPIRATION RATE: 16 BRPM | OXYGEN SATURATION: 99 % | WEIGHT: 262.4 LBS | BODY MASS INDEX: 39.77 KG/M2 | SYSTOLIC BLOOD PRESSURE: 104 MMHG | DIASTOLIC BLOOD PRESSURE: 77 MMHG | HEART RATE: 65 BPM | HEIGHT: 68 IN

## 2019-06-17 DIAGNOSIS — G80.0 CONGENITAL SPASTIC QUADRIPARESIS (HCC): Primary | ICD-10-CM

## 2019-06-17 PROCEDURE — G8427 DOCREV CUR MEDS BY ELIG CLIN: HCPCS | Performed by: PSYCHIATRY & NEUROLOGY

## 2019-06-17 PROCEDURE — G8417 CALC BMI ABV UP PARAM F/U: HCPCS | Performed by: PSYCHIATRY & NEUROLOGY

## 2019-06-17 PROCEDURE — 1036F TOBACCO NON-USER: CPT | Performed by: PSYCHIATRY & NEUROLOGY

## 2019-06-17 PROCEDURE — 99214 OFFICE O/P EST MOD 30 MIN: CPT | Performed by: PSYCHIATRY & NEUROLOGY

## 2019-06-17 RX ORDER — DANTROLENE SODIUM 25 MG/1
25 CAPSULE ORAL 2 TIMES DAILY
Qty: 180 CAPSULE | Refills: 3 | Status: SHIPPED | OUTPATIENT
Start: 2019-06-17 | End: 2019-08-08 | Stop reason: SDUPTHER

## 2019-06-26 DIAGNOSIS — K59.00 CONSTIPATION, UNSPECIFIED CONSTIPATION TYPE: ICD-10-CM

## 2019-06-26 RX ORDER — POLYETHYLENE GLYCOL 3350 17 G/17G
17 POWDER, FOR SOLUTION ORAL DAILY
Qty: 510 G | Refills: 5 | Status: SHIPPED | OUTPATIENT
Start: 2019-06-26 | End: 2019-08-08 | Stop reason: SDUPTHER

## 2019-06-26 NOTE — TELEPHONE ENCOUNTER
MA spoke with mother, Vlad Fernandez, to inform her that [id number] prescription had been sent to the pharmacy and should be ready for pickup. Mother verbalized confirmation and understanding stating she would inform her.      Electronically signed by Chanelle Nogueira on 6/26/19 at 10:26 AM

## 2019-08-03 ENCOUNTER — HOSPITAL ENCOUNTER (EMERGENCY)
Age: 29
Discharge: HOME | End: 2019-08-03
Payer: COMMERCIAL

## 2019-08-03 ENCOUNTER — APPOINTMENT (OUTPATIENT)
Dept: GENERAL RADIOLOGY | Age: 29
End: 2019-08-03
Payer: COMMERCIAL

## 2019-08-03 VITALS
TEMPERATURE: 98 F | HEART RATE: 98 BPM | RESPIRATION RATE: 16 BRPM | HEIGHT: 68 IN | SYSTOLIC BLOOD PRESSURE: 127 MMHG | DIASTOLIC BLOOD PRESSURE: 81 MMHG | WEIGHT: 262 LBS | BODY MASS INDEX: 39.71 KG/M2 | OXYGEN SATURATION: 98 %

## 2019-08-03 DIAGNOSIS — R42 DIZZINESS: ICD-10-CM

## 2019-08-03 DIAGNOSIS — J40 BRONCHITIS: Primary | ICD-10-CM

## 2019-08-03 DIAGNOSIS — R05.9 COUGH: ICD-10-CM

## 2019-08-03 PROCEDURE — 71046 X-RAY EXAM CHEST 2 VIEWS: CPT

## 2019-08-03 PROCEDURE — 99283 EMERGENCY DEPT VISIT LOW MDM: CPT

## 2019-08-03 RX ORDER — BROMPHENIRAMINE MALEATE, PSEUDOEPHEDRINE HYDROCHLORIDE, AND DEXTROMETHORPHAN HYDROBROMIDE 2; 30; 10 MG/5ML; MG/5ML; MG/5ML
5 SYRUP ORAL 4 TIMES DAILY PRN
Qty: 240 ML | Refills: 0 | Status: SHIPPED | OUTPATIENT
Start: 2019-08-03 | End: 2019-11-08 | Stop reason: ALTCHOICE

## 2019-08-03 RX ORDER — METHYLPREDNISOLONE 4 MG/1
TABLET ORAL
Qty: 21 TABLET | Status: SHIPPED | OUTPATIENT
Start: 2019-08-03 | End: 2019-08-09

## 2019-08-03 RX ORDER — MECLIZINE HYDROCHLORIDE 25 MG/1
25 TABLET ORAL 3 TIMES DAILY PRN
Qty: 21 TABLET | Refills: 0 | Status: SHIPPED | OUTPATIENT
Start: 2019-08-03 | End: 2019-08-10

## 2019-08-03 RX ORDER — DOXYCYCLINE HYCLATE 100 MG
100 TABLET ORAL 2 TIMES DAILY
Qty: 20 TABLET | Refills: 0 | Status: SHIPPED | OUTPATIENT
Start: 2019-08-03 | End: 2019-08-13

## 2019-08-03 ASSESSMENT — PAIN DESCRIPTION - PROGRESSION: CLINICAL_PROGRESSION: GRADUALLY WORSENING

## 2019-08-03 ASSESSMENT — PAIN SCALES - GENERAL: PAINLEVEL_OUTOF10: 9

## 2019-08-03 ASSESSMENT — PAIN DESCRIPTION - DESCRIPTORS: DESCRIPTORS: ACHING

## 2019-08-03 ASSESSMENT — PAIN DESCRIPTION - LOCATION: LOCATION: CHEST

## 2019-08-03 ASSESSMENT — PAIN DESCRIPTION - FREQUENCY: FREQUENCY: INTERMITTENT

## 2019-08-03 ASSESSMENT — PAIN DESCRIPTION - PAIN TYPE: TYPE: ACUTE PAIN

## 2019-08-03 NOTE — ED PROVIDER NOTES
results found for this visit on 08/03/19. Imaging: All Radiology results interpreted by Radiologist unless otherwise noted. XR CHEST STANDARD (2 VW)   Final Result   No acute cardiopulmonary findings. ED Course / Medical Decision Making   Medications - No data to display     Consult(s):   None    Procedure(s):   none    Medical Decision Making:    Based on moderate  suspicion for pneumonia as per history/physical findings, imaging was done. Upper respiratory infection likely viral in etiology. Not hypoxic, nothing to suggest pneumonia. Patient is well appearing, non toxic and appropriate for outpatient management. Plan is for symptom management with PCP follow up. Plan of Care: Normal progression of disease discussed. All questions answered. Instruction provided in the use of fluids, vaporizer, acetaminophen, and other OTC medication for symptom control. Extra fluids  Analgesics as needed, dose reviewed. Follow up as needed should symptoms fail to improve. Counseling: The emergency provider has spoken with the patient and discussed todays results, in addition to providing specific details for the plan of care and counseling regarding the diagnosis and prognosis. Questions are answered at this time and they are agreeable with the plan. Assessment      1. Bronchitis    2. Cough    3.  Dizziness      Plan   Discharge to home  Patient condition is stable    New Medications     New Prescriptions    BROMPHENIRAMINE-PSEUDOEPHEDRINE-DM 2-30-10 MG/5ML SYRUP    Take 5 mLs by mouth 4 times daily as needed for Congestion or Cough    DOXYCYCLINE HYCLATE (VIBRA-TABS) 100 MG TABLET    Take 1 tablet by mouth 2 times daily for 10 days    MECLIZINE (ANTIVERT) 25 MG TABLET    Take 1 tablet by mouth 3 times daily as needed for Dizziness    METHYLPREDNISOLONE (MEDROL, JENNY,) 4 MG TABLET    Take as directed on package insert days 1-6     Electronically signed by JOSSE Fregoso - CAMILLE   DD: 8/3/19  **This report was transcribed using voice recognition software. Every effort was made to ensure accuracy; however, inadvertent computerized transcription errors may be present.   END OF ED PROVIDER NOTE     Negrita Munoz, JOSSE - CAMILLE  08/03/19 [id number]

## 2019-08-08 ENCOUNTER — TELEPHONE (OUTPATIENT)
Dept: SURGERY | Age: 29
End: 2019-08-08

## 2019-08-08 DIAGNOSIS — K59.00 CONSTIPATION, UNSPECIFIED CONSTIPATION TYPE: ICD-10-CM

## 2019-08-08 RX ORDER — DANTROLENE SODIUM 25 MG/1
25 CAPSULE ORAL 2 TIMES DAILY
Qty: 180 CAPSULE | Refills: 3 | Status: SHIPPED | OUTPATIENT
Start: 2019-08-08 | End: 2019-10-17 | Stop reason: SDUPTHER

## 2019-08-08 RX ORDER — BACLOFEN 20 MG/1
TABLET ORAL
Qty: 120 TABLET | Refills: 11 | Status: SHIPPED | OUTPATIENT
Start: 2019-08-08 | End: 2019-10-17 | Stop reason: SDUPTHER

## 2019-08-08 RX ORDER — POLYETHYLENE GLYCOL 3350 17 G/17G
17 POWDER, FOR SOLUTION ORAL DAILY
Qty: 510 G | Refills: 11 | Status: SHIPPED | OUTPATIENT
Start: 2019-08-08 | End: 2019-09-09 | Stop reason: ALTCHOICE

## 2019-08-08 NOTE — TELEPHONE ENCOUNTER
MA contacted patient's mother to inform her that we received a request for Miralax and that Dr Jazlyn Roth had refilled and it should be available at the pharmacy. Mother verbalized confirmation and understanding.      Electronically signed by Dahlia Tarango on 8/8/19 at 12:40 PM

## 2019-08-16 ENCOUNTER — OFFICE VISIT (OUTPATIENT)
Dept: FAMILY MEDICINE CLINIC | Age: 29
End: 2019-08-16
Payer: COMMERCIAL

## 2019-08-16 ENCOUNTER — HOSPITAL ENCOUNTER (OUTPATIENT)
Age: 29
Discharge: HOME | End: 2019-08-18
Payer: COMMERCIAL

## 2019-08-16 VITALS
SYSTOLIC BLOOD PRESSURE: 106 MMHG | RESPIRATION RATE: 16 BRPM | BODY MASS INDEX: 40.62 KG/M2 | WEIGHT: 268 LBS | TEMPERATURE: 98.3 F | HEART RATE: 81 BPM | DIASTOLIC BLOOD PRESSURE: 62 MMHG | OXYGEN SATURATION: 98 % | HEIGHT: 68 IN

## 2019-08-16 DIAGNOSIS — R10.9 ABDOMINAL DISCOMFORT: ICD-10-CM

## 2019-08-16 DIAGNOSIS — J45.20 MILD INTERMITTENT ASTHMA WITHOUT COMPLICATION: ICD-10-CM

## 2019-08-16 DIAGNOSIS — E03.9 HYPOTHYROIDISM, UNSPECIFIED TYPE: ICD-10-CM

## 2019-08-16 DIAGNOSIS — E03.9 HYPOTHYROIDISM, UNSPECIFIED TYPE: Primary | ICD-10-CM

## 2019-08-16 DIAGNOSIS — L70.0 ACNE VULGARIS: ICD-10-CM

## 2019-08-16 DIAGNOSIS — G43.909 MIGRAINE WITHOUT STATUS MIGRAINOSUS, NOT INTRACTABLE, UNSPECIFIED MIGRAINE TYPE: ICD-10-CM

## 2019-08-16 LAB — TSH SERPL DL<=0.05 MIU/L-ACNC: 1.64 UIU/ML (ref 0.27–4.2)

## 2019-08-16 PROCEDURE — 99213 OFFICE O/P EST LOW 20 MIN: CPT | Performed by: STUDENT IN AN ORGANIZED HEALTH CARE EDUCATION/TRAINING PROGRAM

## 2019-08-16 PROCEDURE — 99212 OFFICE O/P EST SF 10 MIN: CPT | Performed by: STUDENT IN AN ORGANIZED HEALTH CARE EDUCATION/TRAINING PROGRAM

## 2019-08-16 PROCEDURE — 36415 COLL VENOUS BLD VENIPUNCTURE: CPT | Performed by: FAMILY MEDICINE

## 2019-08-16 PROCEDURE — 84443 ASSAY THYROID STIM HORMONE: CPT

## 2019-08-16 RX ORDER — MECLIZINE HYDROCHLORIDE CHEWABLE TABLETS 25 MG/1
25 TABLET, CHEWABLE ORAL DAILY PRN
Qty: 30 TABLET | Refills: 0 | Status: SHIPPED | OUTPATIENT
Start: 2019-08-16 | End: 2019-09-09 | Stop reason: SDUPTHER

## 2019-08-16 RX ORDER — BUDESONIDE AND FORMOTEROL FUMARATE DIHYDRATE 80; 4.5 UG/1; UG/1
2 AEROSOL RESPIRATORY (INHALATION) DAILY
Qty: 1 INHALER | Refills: 3 | Status: SHIPPED | OUTPATIENT
Start: 2019-08-16 | End: 2019-10-17 | Stop reason: SDUPTHER

## 2019-08-16 RX ORDER — LEVOTHYROXINE SODIUM 0.07 MG/1
75 TABLET ORAL DAILY
Qty: 90 TABLET | Refills: 1 | Status: SHIPPED | OUTPATIENT
Start: 2019-08-16 | End: 2019-09-09 | Stop reason: SDUPTHER

## 2019-08-16 RX ORDER — ALBUTEROL SULFATE 90 UG/1
2 AEROSOL, METERED RESPIRATORY (INHALATION) EVERY 6 HOURS PRN
Qty: 1 INHALER | Refills: 0 | Status: SHIPPED | OUTPATIENT
Start: 2019-08-16 | End: 2019-09-09 | Stop reason: SDUPTHER

## 2019-08-16 NOTE — PROGRESS NOTES
dantrolene (DANTRIUM) 25 MG capsule Take 1 capsule by mouth 2 times daily  RANI Gomez   polyethylene glycol (GLYCOLAX) powder Take 17 g by mouth daily  Sarai Davison MD   brompheniramine-pseudoephedrine-DM 2-30-10 MG/5ML syrup Take 5 mLs by mouth 4 times daily as needed for Congestion or Cough  Al Moreno, APRN - CNP   divalproex (DEPAKOTE) 250 MG DR tablet Take 1 tablet by mouth 2 times daily  Asael Madrid MD   gabapentin (NEURONTIN) 300 MG capsule Take 1 capsule by mouth 3 times daily for 182 days. Asael Madrid MD   escitalopram (LEXAPRO) 20 MG tablet Take 1 tablet by mouth daily  Asael Madrid MD   nicotine (NICODERM CQ) 7 MG/24HR Place 1 patch onto the skin daily for 14 days  Asael Madrid MD   levocetirizine (XYZAL) 5 MG tablet Take 1 tablet by mouth nightly  Asael Madrid MD   fluticasone (FLONASE) 50 MCG/ACT nasal spray 1 spray by Nasal route daily  Asael Madrid MD   polyethylene glycol (MIRALAX) powder Take 17 g by mouth daily  Asael Madrid MD   FLOVENT  MCG/ACT inhaler INHALE 1 PUFF INTO THE LUNGS BID  Historical Provider, MD   nystatin (MYCOSTATIN) 659250 UNIT/GM cream KAPIL EXT AA  QD  Historical Provider, MD   Multiple Vitamin (MVI, CELEBRATE, CHEWABLE TABLET) Take 1 tablet by mouth daily  Asael Madrid MD   saline nasal gel (AYR) GEL by Nasal route as needed for Congestion  Historical Provider, MD   mometasone (ELOCON) 0.1 % cream Apply topically daily Apply topically daily.   Historical Provider, MD   tamsulosin (FLOMAX) 0.4 MG capsule Take 0.4 mg by mouth daily  Historical Provider, MD   medroxyPROGESTERone (PROVERA) 10 MG tablet Take 10 mg by mouth daily  Historical Provider, MD        Social History     Tobacco Use    Smoking status: Former Smoker     Packs/day: 0.10     Types: Cigarettes    Smokeless tobacco: Never Used   Substance Use Topics    Alcohol use: No     Alcohol/week: 0.0 standard drinks        Vitals:    08/16/19 9304 08/16/19 0952   BP: (!) 100/56 106/62   Site: Left Upper Arm Right Upper Arm   Position: Sitting Sitting   Cuff Size: Medium Adult Large Adult   Pulse: 81    Resp: 16    Temp: 98.3 °F (36.8 °C)    TempSrc: Oral    SpO2: 98%    Weight: 268 lb (121.6 kg)    Height: 5' 8\" (1.727 m)      Estimated body mass index is 40.75 kg/m² as calculated from the following:    Height as of this encounter: 5' 8\" (1.727 m). Weight as of this encounter: 268 lb (121.6 kg). Physical Exam   Constitutional: She is oriented to person, place, and time. She appears well-developed and well-nourished. No distress. HENT:   Head: Normocephalic and atraumatic. Right Ear: Tympanic membrane normal.   Left Ear: Tympanic membrane normal.   Nose: Nose normal.   Mouth/Throat: Oropharynx is clear and moist.   Eyes: Conjunctivae and EOM are normal. Right eye exhibits no discharge. Left eye exhibits no discharge. Neck: Normal range of motion. Neck supple. No thyromegaly present. Cardiovascular: Normal rate, regular rhythm, normal heart sounds and intact distal pulses. No murmur heard. Pulmonary/Chest: Effort normal and breath sounds normal. No respiratory distress. She has no wheezes. Abdominal: Soft. Bowel sounds are normal. She exhibits no distension, no fluid wave, no ascites and no mass. There is no tenderness. There is no rebound, no tenderness at McBurney's point and negative Lr's sign. Musculoskeletal: Normal range of motion. She exhibits no edema or tenderness. Lymphadenopathy:     She has no cervical adenopathy. Neurological: She is alert and oriented to person, place, and time. She has normal reflexes. No cranial nerve deficit. Skin: Skin is warm and dry. No rash noted. No erythema. Psychiatric: She has a normal mood and affect. Her behavior is normal. Judgment and thought content normal.       ASSESSMENT/PLAN:  1. Acne vulgaris  - Controlled, need for refill  - Benzoyl Peroxide 2.5 % CREA;  Apply to face 2x

## 2019-08-17 ASSESSMENT — ENCOUNTER SYMPTOMS
SORE THROAT: 0
CONSTIPATION: 0
DIARRHEA: 1
WHEEZING: 0
EYE ITCHING: 0
NAUSEA: 0
SHORTNESS OF BREATH: 0
COLOR CHANGE: 0
BACK PAIN: 0
ABDOMINAL DISTENTION: 0
RHINORRHEA: 0
ABDOMINAL PAIN: 1
COUGH: 0
EYE REDNESS: 0
BLOOD IN STOOL: 0
EYE DISCHARGE: 0
SINUS PRESSURE: 0

## 2019-09-09 ENCOUNTER — OFFICE VISIT (OUTPATIENT)
Dept: FAMILY MEDICINE CLINIC | Age: 29
End: 2019-09-09
Payer: COMMERCIAL

## 2019-09-09 ENCOUNTER — TELEPHONE (OUTPATIENT)
Dept: SURGERY | Age: 29
End: 2019-09-09

## 2019-09-09 ENCOUNTER — HOSPITAL ENCOUNTER (OUTPATIENT)
Age: 29
Discharge: HOME | End: 2019-09-09
Payer: COMMERCIAL

## 2019-09-09 VITALS
HEIGHT: 68 IN | DIASTOLIC BLOOD PRESSURE: 70 MMHG | OXYGEN SATURATION: 98 % | WEIGHT: 264 LBS | BODY MASS INDEX: 40.01 KG/M2 | HEART RATE: 88 BPM | TEMPERATURE: 98.7 F | SYSTOLIC BLOOD PRESSURE: 110 MMHG

## 2019-09-09 DIAGNOSIS — J45.20 MILD INTERMITTENT ASTHMA WITHOUT COMPLICATION: ICD-10-CM

## 2019-09-09 DIAGNOSIS — K62.5 BRBPR (BRIGHT RED BLOOD PER RECTUM): Primary | ICD-10-CM

## 2019-09-09 DIAGNOSIS — G43.909 MIGRAINE WITHOUT STATUS MIGRAINOSUS, NOT INTRACTABLE, UNSPECIFIED MIGRAINE TYPE: ICD-10-CM

## 2019-09-09 DIAGNOSIS — R42 DIZZINESS: ICD-10-CM

## 2019-09-09 DIAGNOSIS — K64.4 RESIDUAL HEMORRHOIDAL SKIN TAGS: ICD-10-CM

## 2019-09-09 DIAGNOSIS — R19.7 DIARRHEA, UNSPECIFIED TYPE: ICD-10-CM

## 2019-09-09 DIAGNOSIS — Z76.0 MEDICATION REFILL: ICD-10-CM

## 2019-09-09 DIAGNOSIS — E03.9 HYPOTHYROIDISM, UNSPECIFIED TYPE: ICD-10-CM

## 2019-09-09 DIAGNOSIS — E56.9 VITAMIN DEFICIENCY: ICD-10-CM

## 2019-09-09 DIAGNOSIS — F41.9 ANXIETY: ICD-10-CM

## 2019-09-09 DIAGNOSIS — R09.81 CONGESTION OF NASAL SINUS: ICD-10-CM

## 2019-09-09 DIAGNOSIS — K62.5 BRBPR (BRIGHT RED BLOOD PER RECTUM): ICD-10-CM

## 2019-09-09 DIAGNOSIS — G80.0 CONGENITAL SPASTIC QUADRIPARESIS (HCC): ICD-10-CM

## 2019-09-09 LAB
BASOPHILS ABSOLUTE: 0.03 E9/L (ref 0–0.2)
BASOPHILS RELATIVE PERCENT: 0.4 % (ref 0–2)
EOSINOPHILS ABSOLUTE: 0.13 E9/L (ref 0.05–0.5)
EOSINOPHILS RELATIVE PERCENT: 1.7 % (ref 0–6)
HCT VFR BLD CALC: 46.7 % (ref 34–48)
HEMOGLOBIN: 14.8 G/DL (ref 11.5–15.5)
IMMATURE GRANULOCYTES #: 0.02 E9/L
IMMATURE GRANULOCYTES %: 0.3 % (ref 0–5)
LYMPHOCYTES ABSOLUTE: 2.44 E9/L (ref 1.5–4)
LYMPHOCYTES RELATIVE PERCENT: 31.4 % (ref 20–42)
MCH RBC QN AUTO: 29.3 PG (ref 26–35)
MCHC RBC AUTO-ENTMCNC: 31.7 % (ref 32–34.5)
MCV RBC AUTO: 92.5 FL (ref 80–99.9)
MONOCYTES ABSOLUTE: 0.64 E9/L (ref 0.1–0.95)
MONOCYTES RELATIVE PERCENT: 8.2 % (ref 2–12)
NEUTROPHILS ABSOLUTE: 4.52 E9/L (ref 1.8–7.3)
NEUTROPHILS RELATIVE PERCENT: 58 % (ref 43–80)
PDW BLD-RTO: 13.5 FL (ref 11.5–15)
PLATELET # BLD: 177 E9/L (ref 130–450)
PMV BLD AUTO: 14.1 FL (ref 7–12)
RBC # BLD: 5.05 E12/L (ref 3.5–5.5)
WBC # BLD: 7.8 E9/L (ref 4.5–11.5)

## 2019-09-09 PROCEDURE — 99213 OFFICE O/P EST LOW 20 MIN: CPT | Performed by: STUDENT IN AN ORGANIZED HEALTH CARE EDUCATION/TRAINING PROGRAM

## 2019-09-09 PROCEDURE — G8417 CALC BMI ABV UP PARAM F/U: HCPCS | Performed by: STUDENT IN AN ORGANIZED HEALTH CARE EDUCATION/TRAINING PROGRAM

## 2019-09-09 PROCEDURE — 36415 COLL VENOUS BLD VENIPUNCTURE: CPT | Performed by: FAMILY MEDICINE

## 2019-09-09 PROCEDURE — 36415 COLL VENOUS BLD VENIPUNCTURE: CPT

## 2019-09-09 PROCEDURE — 85025 COMPLETE CBC W/AUTO DIFF WBC: CPT

## 2019-09-09 PROCEDURE — G8427 DOCREV CUR MEDS BY ELIG CLIN: HCPCS | Performed by: STUDENT IN AN ORGANIZED HEALTH CARE EDUCATION/TRAINING PROGRAM

## 2019-09-09 PROCEDURE — 99212 OFFICE O/P EST SF 10 MIN: CPT | Performed by: STUDENT IN AN ORGANIZED HEALTH CARE EDUCATION/TRAINING PROGRAM

## 2019-09-09 PROCEDURE — 1036F TOBACCO NON-USER: CPT | Performed by: STUDENT IN AN ORGANIZED HEALTH CARE EDUCATION/TRAINING PROGRAM

## 2019-09-09 PROCEDURE — 83516 IMMUNOASSAY NONANTIBODY: CPT

## 2019-09-09 RX ORDER — ESCITALOPRAM OXALATE 20 MG/1
20 TABLET ORAL DAILY
Qty: 30 TABLET | Refills: 3 | Status: SHIPPED | OUTPATIENT
Start: 2019-09-09 | End: 2019-10-17 | Stop reason: SDUPTHER

## 2019-09-09 RX ORDER — MECLIZINE HYDROCHLORIDE CHEWABLE TABLETS 25 MG/1
25 TABLET, CHEWABLE ORAL DAILY PRN
Qty: 30 TABLET | Refills: 0 | Status: SHIPPED | OUTPATIENT
Start: 2019-09-09 | End: 2019-10-17 | Stop reason: SDUPTHER

## 2019-09-09 RX ORDER — MEDROXYPROGESTERONE ACETATE 10 MG/1
10 TABLET ORAL DAILY
Qty: 30 TABLET | Refills: 5 | Status: SHIPPED | OUTPATIENT
Start: 2019-09-09 | End: 2020-01-17 | Stop reason: SDUPTHER

## 2019-09-09 RX ORDER — LEVOCETIRIZINE DIHYDROCHLORIDE 5 MG/1
5 TABLET, FILM COATED ORAL NIGHTLY
Qty: 30 TABLET | Refills: 5 | Status: SHIPPED | OUTPATIENT
Start: 2019-09-09 | End: 2019-10-17 | Stop reason: SDUPTHER

## 2019-09-09 RX ORDER — FLUTICASONE PROPIONATE 50 MCG
1 SPRAY, SUSPENSION (ML) NASAL DAILY
Qty: 1 BOTTLE | Refills: 3 | Status: SHIPPED | OUTPATIENT
Start: 2019-09-09 | End: 2019-10-17 | Stop reason: SDUPTHER

## 2019-09-09 RX ORDER — GABAPENTIN 300 MG/1
300 CAPSULE ORAL 3 TIMES DAILY
Qty: 270 CAPSULE | Refills: 1 | Status: SHIPPED | OUTPATIENT
Start: 2019-09-09 | End: 2019-10-17 | Stop reason: SDUPTHER

## 2019-09-09 RX ORDER — DIVALPROEX SODIUM 250 MG/1
250 TABLET, DELAYED RELEASE ORAL 2 TIMES DAILY
Qty: 180 TABLET | Refills: 0 | Status: SHIPPED | OUTPATIENT
Start: 2019-09-09 | End: 2019-10-17 | Stop reason: SDUPTHER

## 2019-09-09 RX ORDER — LEVOTHYROXINE SODIUM 0.07 MG/1
75 TABLET ORAL DAILY
Qty: 90 TABLET | Refills: 1 | Status: SHIPPED | OUTPATIENT
Start: 2019-09-09 | End: 2019-10-17 | Stop reason: SDUPTHER

## 2019-09-09 RX ORDER — ALBUTEROL SULFATE 90 UG/1
2 AEROSOL, METERED RESPIRATORY (INHALATION) EVERY 6 HOURS PRN
Qty: 1 INHALER | Refills: 0 | Status: SHIPPED | OUTPATIENT
Start: 2019-09-09 | End: 2019-10-17 | Stop reason: SDUPTHER

## 2019-09-09 ASSESSMENT — ENCOUNTER SYMPTOMS
COUGH: 0
NAUSEA: 0
BACK PAIN: 0
DIARRHEA: 1
ABDOMINAL PAIN: 1
VOMITING: 0
COLOR CHANGE: 0
BLOOD IN STOOL: 1
EYE REDNESS: 0
ABDOMINAL DISTENTION: 0
CONSTIPATION: 0
EYE DISCHARGE: 0
SORE THROAT: 0
WHEEZING: 0
SHORTNESS OF BREATH: 1
EYE ITCHING: 0
ANAL BLEEDING: 1
RHINORRHEA: 0

## 2019-09-09 NOTE — PROGRESS NOTES
S: 34 y.o. female here for diarrhea. Since last OV has been having loose BMs. Cut out dairy and avoided miralax which helped, but for the last 1.5 wks having watery stools whether she eats or not and sometimes w/ blood. Last cscope showed fissure and hyperplastic polyp. H/o cancer in family. Feels fatigued. Some abd pain w/ diarrhea, BLQs. No arthralgias. No oral ulcers. O: VS: /70 (Site: Right Upper Arm, Position: Sitting, Cuff Size: Large Adult)   Pulse 88   Temp 98.7 °F (37.1 °C) (Oral)   Ht 5' 8\" (1.727 m)   Wt 264 lb (119.7 kg)   SpO2 98%   BMI 40.14 kg/m²    General: NAD, alert and interacting appropriately. CV:  RRR, no gallops, rubs, or murmurs    Resp: CTAB   Abd:  Soft, nontender. Hemoccult neg, but not much stool burden. Hemorrhoids noted. Impression: diarrhea w/ BRBPR  Plan:   Cbc  Proctofoam  See Gen Surg again. Anti-ttg    Attending Physician Statement  I have discussed the case, including pertinent history and exam findings with the resident. I agree with the documented assessment and plan.
sulfate HFA (PROAIR HFA) 108 (90 Base) MCG/ACT inhaler; Inhale 2 puffs into the lungs every 6 hours as needed for Wheezing  Dispense: 1 Inhaler; Refill: 0  - escitalopram (LEXAPRO) 20 MG tablet; Take 1 tablet by mouth daily  Dispense: 30 tablet; Refill: 3    6. Migraine without status migrainosus, not intractable, unspecified migraine type  - controlled need for refill  - divalproex (DEPAKOTE) 250 MG DR tablet; Take 1 tablet by mouth 2 times daily  Dispense: 180 tablet; Refill: 0    7. Anxiety  - Controlled, Need for refills. - escitalopram (LEXAPRO) 20 MG tablet; Take 1 tablet by mouth daily  Dispense: 30 tablet; Refill: 3    8. Hypothyroidism, unspecified type  - Controlled, Need for refills. - levothyroxine (LEVOTHROID) 75 MCG tablet; Take 1 tablet by mouth daily  Dispense: 90 tablet; Refill: 1    9. Congenital spastic quadriparesis (HCC)  - Controlled, Need for refills. - gabapentin (NEURONTIN) 300 MG capsule; Take 1 capsule by mouth 3 times daily for 182 days. Dispense: 270 capsule; Refill: 1    10. Vitamin deficiency  - Multiple Vitamin (MVI, CELEBRATE, CHEWABLE TABLET); Take 1 tablet by mouth daily  Dispense: 90 tablet; Refill: 5      Return in about 3 months (around 12/9/2019) for follow up BRBPR. An electronic signature was used to authenticate this note.     --Mark Adler MD on 9/9/2019 at 8:10 PM

## 2019-09-10 ENCOUNTER — TELEPHONE (OUTPATIENT)
Dept: FAMILY MEDICINE CLINIC | Age: 29
End: 2019-09-10

## 2019-09-10 DIAGNOSIS — K64.4 RESIDUAL HEMORRHOIDAL SKIN TAGS: Primary | ICD-10-CM

## 2019-09-12 LAB
TISSUE TRANSGLUTAMINASE ANTIBODY: 1 U/ML (ref 0–5)
TISSUE TRANSGLUTAMINASE IGA: 0 U/ML (ref 0–3)

## 2019-09-16 NOTE — TELEPHONE ENCOUNTER
Trial of preparation H although proctofoam is preferable    Electronically signed by Randall Evans MD on 9/16/2019 at 3:08 PM

## 2019-10-02 ENCOUNTER — OFFICE VISIT (OUTPATIENT)
Dept: SURGERY | Age: 29
End: 2019-10-02
Payer: COMMERCIAL

## 2019-10-02 VITALS
OXYGEN SATURATION: 98 % | HEART RATE: 80 BPM | WEIGHT: 262 LBS | BODY MASS INDEX: 39.71 KG/M2 | TEMPERATURE: 98.6 F | SYSTOLIC BLOOD PRESSURE: 122 MMHG | HEIGHT: 68 IN | RESPIRATION RATE: 16 BRPM | DIASTOLIC BLOOD PRESSURE: 90 MMHG

## 2019-10-02 DIAGNOSIS — R19.7 DIARRHEA, UNSPECIFIED TYPE: ICD-10-CM

## 2019-10-02 DIAGNOSIS — K58.2 IRRITABLE BOWEL SYNDROME WITH BOTH CONSTIPATION AND DIARRHEA: Primary | ICD-10-CM

## 2019-10-02 PROCEDURE — 1036F TOBACCO NON-USER: CPT | Performed by: SURGERY

## 2019-10-02 PROCEDURE — G8427 DOCREV CUR MEDS BY ELIG CLIN: HCPCS | Performed by: SURGERY

## 2019-10-02 PROCEDURE — 99213 OFFICE O/P EST LOW 20 MIN: CPT | Performed by: SURGERY

## 2019-10-02 PROCEDURE — G8417 CALC BMI ABV UP PARAM F/U: HCPCS | Performed by: SURGERY

## 2019-10-02 PROCEDURE — 99212 OFFICE O/P EST SF 10 MIN: CPT | Performed by: SURGERY

## 2019-10-02 PROCEDURE — G8484 FLU IMMUNIZE NO ADMIN: HCPCS | Performed by: SURGERY

## 2019-10-02 RX ORDER — IBUPROFEN 600 MG/1
TABLET ORAL
Refills: 1 | COMMUNITY
Start: 2019-07-08 | End: 2019-11-15 | Stop reason: SDUPTHER

## 2019-10-02 RX ORDER — DICYCLOMINE HCL 20 MG
20 TABLET ORAL 4 TIMES DAILY PRN
Qty: 120 TABLET | Refills: 3 | Status: SHIPPED | OUTPATIENT
Start: 2019-10-02 | End: 2019-11-18 | Stop reason: SDUPTHER

## 2019-10-10 RX ORDER — BACLOFEN 20 MG/1
TABLET ORAL
Qty: 360 TABLET | Refills: 0 | Status: SHIPPED | OUTPATIENT
Start: 2019-10-10 | End: 2019-11-08 | Stop reason: SDUPTHER

## 2019-10-17 ENCOUNTER — OFFICE VISIT (OUTPATIENT)
Dept: FAMILY MEDICINE CLINIC | Age: 29
End: 2019-10-17
Payer: COMMERCIAL

## 2019-10-17 VITALS
OXYGEN SATURATION: 97 % | SYSTOLIC BLOOD PRESSURE: 100 MMHG | DIASTOLIC BLOOD PRESSURE: 80 MMHG | BODY MASS INDEX: 39.71 KG/M2 | HEIGHT: 68 IN | RESPIRATION RATE: 16 BRPM | WEIGHT: 262 LBS | TEMPERATURE: 98.3 F | HEART RATE: 91 BPM

## 2019-10-17 DIAGNOSIS — R42 DIZZINESS: ICD-10-CM

## 2019-10-17 DIAGNOSIS — F41.9 ANXIETY: ICD-10-CM

## 2019-10-17 DIAGNOSIS — R09.81 CONGESTION OF NASAL SINUS: ICD-10-CM

## 2019-10-17 DIAGNOSIS — G43.909 MIGRAINE WITHOUT STATUS MIGRAINOSUS, NOT INTRACTABLE, UNSPECIFIED MIGRAINE TYPE: ICD-10-CM

## 2019-10-17 DIAGNOSIS — G80.0 CONGENITAL SPASTIC QUADRIPARESIS (HCC): ICD-10-CM

## 2019-10-17 DIAGNOSIS — K58.2 IRRITABLE BOWEL SYNDROME WITH BOTH CONSTIPATION AND DIARRHEA: Primary | ICD-10-CM

## 2019-10-17 DIAGNOSIS — E03.9 HYPOTHYROIDISM, UNSPECIFIED TYPE: ICD-10-CM

## 2019-10-17 DIAGNOSIS — Z76.0 MEDICATION REFILL: ICD-10-CM

## 2019-10-17 DIAGNOSIS — J45.20 MILD INTERMITTENT ASTHMA WITHOUT COMPLICATION: ICD-10-CM

## 2019-10-17 PROCEDURE — 1036F TOBACCO NON-USER: CPT | Performed by: STUDENT IN AN ORGANIZED HEALTH CARE EDUCATION/TRAINING PROGRAM

## 2019-10-17 PROCEDURE — G8427 DOCREV CUR MEDS BY ELIG CLIN: HCPCS | Performed by: STUDENT IN AN ORGANIZED HEALTH CARE EDUCATION/TRAINING PROGRAM

## 2019-10-17 PROCEDURE — 99212 OFFICE O/P EST SF 10 MIN: CPT | Performed by: STUDENT IN AN ORGANIZED HEALTH CARE EDUCATION/TRAINING PROGRAM

## 2019-10-17 PROCEDURE — 99213 OFFICE O/P EST LOW 20 MIN: CPT | Performed by: STUDENT IN AN ORGANIZED HEALTH CARE EDUCATION/TRAINING PROGRAM

## 2019-10-17 PROCEDURE — G8484 FLU IMMUNIZE NO ADMIN: HCPCS | Performed by: STUDENT IN AN ORGANIZED HEALTH CARE EDUCATION/TRAINING PROGRAM

## 2019-10-17 PROCEDURE — G8417 CALC BMI ABV UP PARAM F/U: HCPCS | Performed by: STUDENT IN AN ORGANIZED HEALTH CARE EDUCATION/TRAINING PROGRAM

## 2019-10-17 RX ORDER — LEVOTHYROXINE SODIUM 0.07 MG/1
75 TABLET ORAL DAILY
Qty: 90 TABLET | Refills: 1 | Status: SHIPPED | OUTPATIENT
Start: 2019-10-17 | End: 2020-01-17 | Stop reason: SDUPTHER

## 2019-10-17 RX ORDER — BUDESONIDE AND FORMOTEROL FUMARATE DIHYDRATE 80; 4.5 UG/1; UG/1
2 AEROSOL RESPIRATORY (INHALATION) DAILY
Qty: 1 INHALER | Refills: 3 | Status: SHIPPED | OUTPATIENT
Start: 2019-10-17 | End: 2020-01-17 | Stop reason: SDUPTHER

## 2019-10-17 RX ORDER — BACLOFEN 20 MG/1
TABLET ORAL
Qty: 120 TABLET | Refills: 11 | Status: SHIPPED | OUTPATIENT
Start: 2019-10-17 | End: 2020-01-17 | Stop reason: SDUPTHER

## 2019-10-17 RX ORDER — DIVALPROEX SODIUM 250 MG/1
250 TABLET, DELAYED RELEASE ORAL 2 TIMES DAILY
Qty: 180 TABLET | Refills: 0 | Status: SHIPPED | OUTPATIENT
Start: 2019-10-17 | End: 2019-11-08 | Stop reason: SDUPTHER

## 2019-10-17 RX ORDER — FLUTICASONE PROPIONATE 50 MCG
1 SPRAY, SUSPENSION (ML) NASAL DAILY
Qty: 1 BOTTLE | Refills: 3 | Status: SHIPPED | OUTPATIENT
Start: 2019-10-17 | End: 2019-12-09

## 2019-10-17 RX ORDER — ESCITALOPRAM OXALATE 20 MG/1
20 TABLET ORAL DAILY
Qty: 30 TABLET | Refills: 3 | Status: SHIPPED | OUTPATIENT
Start: 2019-10-17 | End: 2020-01-17 | Stop reason: SDUPTHER

## 2019-10-17 RX ORDER — LEVOCETIRIZINE DIHYDROCHLORIDE 5 MG/1
5 TABLET, FILM COATED ORAL NIGHTLY
Qty: 30 TABLET | Refills: 5 | Status: SHIPPED | OUTPATIENT
Start: 2019-10-17 | End: 2020-01-17 | Stop reason: SDUPTHER

## 2019-10-17 RX ORDER — MECLIZINE HYDROCHLORIDE CHEWABLE TABLETS 25 MG/1
25 TABLET, CHEWABLE ORAL DAILY PRN
Qty: 30 TABLET | Refills: 0 | Status: SHIPPED | OUTPATIENT
Start: 2019-10-17 | End: 2019-11-18 | Stop reason: SDUPTHER

## 2019-10-17 RX ORDER — GABAPENTIN 300 MG/1
300 CAPSULE ORAL 3 TIMES DAILY
Qty: 270 CAPSULE | Refills: 1 | Status: SHIPPED | OUTPATIENT
Start: 2019-10-17 | End: 2019-11-18 | Stop reason: SDUPTHER

## 2019-10-17 RX ORDER — ALBUTEROL SULFATE 90 UG/1
2 AEROSOL, METERED RESPIRATORY (INHALATION) EVERY 6 HOURS PRN
Qty: 1 INHALER | Refills: 0 | Status: SHIPPED | OUTPATIENT
Start: 2019-10-17 | End: 2019-11-08 | Stop reason: SDUPTHER

## 2019-10-19 ASSESSMENT — ENCOUNTER SYMPTOMS
RHINORRHEA: 0
COUGH: 0
COLOR CHANGE: 0
NAUSEA: 0
SHORTNESS OF BREATH: 0
EYE DISCHARGE: 0
BACK PAIN: 0
SORE THROAT: 0
WHEEZING: 0
ABDOMINAL PAIN: 0
VOMITING: 0
DIARRHEA: 0
EYE REDNESS: 0
EYE ITCHING: 0
ABDOMINAL DISTENTION: 0
CONSTIPATION: 0

## 2019-10-22 RX ORDER — DANTROLENE SODIUM 25 MG/1
25 CAPSULE ORAL 2 TIMES DAILY
Qty: 180 CAPSULE | Refills: 3 | Status: SHIPPED | OUTPATIENT
Start: 2019-10-22 | End: 2020-01-17 | Stop reason: SDUPTHER

## 2019-11-08 ENCOUNTER — HOSPITAL ENCOUNTER (OUTPATIENT)
Age: 29
Discharge: HOME | End: 2019-11-10
Payer: COMMERCIAL

## 2019-11-08 ENCOUNTER — OFFICE VISIT (OUTPATIENT)
Dept: FAMILY MEDICINE CLINIC | Age: 29
End: 2019-11-08
Payer: COMMERCIAL

## 2019-11-08 VITALS
RESPIRATION RATE: 18 BRPM | DIASTOLIC BLOOD PRESSURE: 61 MMHG | WEIGHT: 263 LBS | HEIGHT: 68 IN | OXYGEN SATURATION: 97 % | HEART RATE: 91 BPM | TEMPERATURE: 98.3 F | BODY MASS INDEX: 39.86 KG/M2 | SYSTOLIC BLOOD PRESSURE: 109 MMHG

## 2019-11-08 DIAGNOSIS — R35.0 URINARY FREQUENCY: ICD-10-CM

## 2019-11-08 DIAGNOSIS — J45.20 MILD INTERMITTENT ASTHMA WITHOUT COMPLICATION: ICD-10-CM

## 2019-11-08 DIAGNOSIS — G43.909 MIGRAINE WITHOUT STATUS MIGRAINOSUS, NOT INTRACTABLE, UNSPECIFIED MIGRAINE TYPE: ICD-10-CM

## 2019-11-08 DIAGNOSIS — L84 CALLUS OF FOOT: Primary | ICD-10-CM

## 2019-11-08 DIAGNOSIS — M54.50 BILATERAL LOW BACK PAIN, UNSPECIFIED CHRONICITY, UNSPECIFIED WHETHER SCIATICA PRESENT: ICD-10-CM

## 2019-11-08 LAB
BACTERIA: ABNORMAL /HPF
BILIRUBIN URINE: NEGATIVE
BLOOD, URINE: NEGATIVE
CLARITY: CLEAR
COLOR: YELLOW
GLUCOSE URINE: NEGATIVE MG/DL
KETONES, URINE: NEGATIVE MG/DL
LEUKOCYTE ESTERASE, URINE: ABNORMAL
NITRITE, URINE: NEGATIVE
PH UA: 6 (ref 5–9)
PROTEIN UA: NEGATIVE MG/DL
RBC UA: ABNORMAL /HPF (ref 0–2)
SPECIFIC GRAVITY UA: 1.02 (ref 1–1.03)
UROBILINOGEN, URINE: 0.2 E.U./DL
WBC UA: ABNORMAL /HPF (ref 0–5)

## 2019-11-08 PROCEDURE — 99212 OFFICE O/P EST SF 10 MIN: CPT | Performed by: FAMILY MEDICINE

## 2019-11-08 PROCEDURE — G8417 CALC BMI ABV UP PARAM F/U: HCPCS | Performed by: FAMILY MEDICINE

## 2019-11-08 PROCEDURE — 1036F TOBACCO NON-USER: CPT | Performed by: FAMILY MEDICINE

## 2019-11-08 PROCEDURE — G8484 FLU IMMUNIZE NO ADMIN: HCPCS | Performed by: FAMILY MEDICINE

## 2019-11-08 PROCEDURE — 81001 URINALYSIS AUTO W/SCOPE: CPT

## 2019-11-08 PROCEDURE — G8427 DOCREV CUR MEDS BY ELIG CLIN: HCPCS | Performed by: FAMILY MEDICINE

## 2019-11-08 PROCEDURE — 87088 URINE BACTERIA CULTURE: CPT

## 2019-11-08 PROCEDURE — 99213 OFFICE O/P EST LOW 20 MIN: CPT | Performed by: FAMILY MEDICINE

## 2019-11-08 RX ORDER — ALBUTEROL SULFATE 90 UG/1
2 AEROSOL, METERED RESPIRATORY (INHALATION) EVERY 6 HOURS PRN
Qty: 1 INHALER | Refills: 2 | Status: SHIPPED
Start: 2019-11-08 | End: 2020-05-15 | Stop reason: SDUPTHER

## 2019-11-08 RX ORDER — DIVALPROEX SODIUM 250 MG/1
250 TABLET, DELAYED RELEASE ORAL 2 TIMES DAILY
Qty: 180 TABLET | Refills: 0 | Status: SHIPPED | OUTPATIENT
Start: 2019-11-08 | End: 2020-01-17 | Stop reason: SDUPTHER

## 2019-11-08 ASSESSMENT — ENCOUNTER SYMPTOMS
DIARRHEA: 0
EYE REDNESS: 0
ABDOMINAL PAIN: 0
COUGH: 0
CONSTIPATION: 0
BLOOD IN STOOL: 0
SORE THROAT: 0
VOMITING: 0
SHORTNESS OF BREATH: 0
NAUSEA: 0
EYE PAIN: 0

## 2019-11-10 LAB — URINE CULTURE, ROUTINE: NORMAL

## 2019-11-15 DIAGNOSIS — G80.0 CONGENITAL SPASTIC QUADRIPARESIS (HCC): ICD-10-CM

## 2019-11-15 DIAGNOSIS — Z76.0 MEDICATION REFILL: ICD-10-CM

## 2019-11-15 DIAGNOSIS — R42 DIZZINESS: ICD-10-CM

## 2019-11-18 RX ORDER — DICYCLOMINE HCL 20 MG
20 TABLET ORAL 4 TIMES DAILY PRN
Qty: 120 TABLET | Refills: 3 | Status: SHIPPED | OUTPATIENT
Start: 2019-11-18 | End: 2020-01-17 | Stop reason: SDUPTHER

## 2019-11-18 RX ORDER — GABAPENTIN 300 MG/1
300 CAPSULE ORAL 3 TIMES DAILY
Qty: 270 CAPSULE | Refills: 1 | Status: SHIPPED | OUTPATIENT
Start: 2019-11-18 | End: 2020-01-17 | Stop reason: SDUPTHER

## 2019-11-18 RX ORDER — MECLIZINE HYDROCHLORIDE CHEWABLE TABLETS 25 MG/1
25 TABLET, CHEWABLE ORAL DAILY PRN
Qty: 30 TABLET | Refills: 0 | Status: SHIPPED | OUTPATIENT
Start: 2019-11-18 | End: 2019-12-12 | Stop reason: SDUPTHER

## 2019-11-18 RX ORDER — IBUPROFEN 600 MG/1
TABLET ORAL
Qty: 120 TABLET | Refills: 1 | Status: SHIPPED | OUTPATIENT
Start: 2019-11-18 | End: 2020-01-17

## 2019-11-18 RX ORDER — MOMETASONE FUROATE 1 MG/G
CREAM TOPICAL DAILY
Qty: 45 G | Refills: 3 | Status: SHIPPED | OUTPATIENT
Start: 2019-11-18 | End: 2020-01-17 | Stop reason: SDUPTHER

## 2019-12-09 ENCOUNTER — OFFICE VISIT (OUTPATIENT)
Dept: NEUROLOGY | Age: 29
End: 2019-12-09
Payer: COMMERCIAL

## 2019-12-09 VITALS
TEMPERATURE: 98.2 F | OXYGEN SATURATION: 97 % | SYSTOLIC BLOOD PRESSURE: 102 MMHG | DIASTOLIC BLOOD PRESSURE: 68 MMHG | BODY MASS INDEX: 40.75 KG/M2 | RESPIRATION RATE: 12 BRPM | WEIGHT: 268 LBS | HEART RATE: 97 BPM

## 2019-12-09 DIAGNOSIS — M25.532 PAIN IN LEFT WRIST: ICD-10-CM

## 2019-12-09 DIAGNOSIS — G80.0 CONGENITAL SPASTIC QUADRIPARESIS (HCC): Primary | ICD-10-CM

## 2019-12-09 PROBLEM — R15.9 FECAL INCONTINENCE: Status: RESOLVED | Noted: 2018-11-14 | Resolved: 2019-12-09

## 2019-12-09 PROBLEM — R32 URINARY INCONTINENCE: Status: RESOLVED | Noted: 2018-11-14 | Resolved: 2019-12-09

## 2019-12-09 PROBLEM — R31.29 OTHER MICROSCOPIC HEMATURIA: Status: RESOLVED | Noted: 2019-05-02 | Resolved: 2019-12-09

## 2019-12-09 PROCEDURE — G8417 CALC BMI ABV UP PARAM F/U: HCPCS | Performed by: NURSE PRACTITIONER

## 2019-12-09 PROCEDURE — 99214 OFFICE O/P EST MOD 30 MIN: CPT | Performed by: NURSE PRACTITIONER

## 2019-12-09 PROCEDURE — G8427 DOCREV CUR MEDS BY ELIG CLIN: HCPCS | Performed by: NURSE PRACTITIONER

## 2019-12-09 PROCEDURE — G8484 FLU IMMUNIZE NO ADMIN: HCPCS | Performed by: NURSE PRACTITIONER

## 2019-12-09 PROCEDURE — 1036F TOBACCO NON-USER: CPT | Performed by: NURSE PRACTITIONER

## 2019-12-09 RX ORDER — POLYETHYLENE GLYCOL 3350 17 G/17G
17 POWDER, FOR SOLUTION ORAL PRN
Refills: 11 | COMMUNITY
Start: 2019-11-06 | End: 2020-07-21 | Stop reason: ALTCHOICE

## 2019-12-12 DIAGNOSIS — R42 DIZZINESS: ICD-10-CM

## 2019-12-13 RX ORDER — MECLIZINE HYDROCHLORIDE 25 MG/1
TABLET ORAL
Qty: 30 TABLET | Refills: 0 | Status: SHIPPED | OUTPATIENT
Start: 2019-12-13 | End: 2020-01-17 | Stop reason: SDUPTHER

## 2019-12-18 ENCOUNTER — HOSPITAL ENCOUNTER (EMERGENCY)
Age: 29
Discharge: HOME | End: 2019-12-18
Payer: COMMERCIAL

## 2019-12-18 ENCOUNTER — APPOINTMENT (OUTPATIENT)
Dept: GENERAL RADIOLOGY | Age: 29
End: 2019-12-18
Payer: COMMERCIAL

## 2019-12-18 VITALS
DIASTOLIC BLOOD PRESSURE: 83 MMHG | SYSTOLIC BLOOD PRESSURE: 132 MMHG | WEIGHT: 268 LBS | HEART RATE: 96 BPM | RESPIRATION RATE: 16 BRPM | TEMPERATURE: 97.9 F | HEIGHT: 68 IN | OXYGEN SATURATION: 97 % | BODY MASS INDEX: 40.62 KG/M2

## 2019-12-18 DIAGNOSIS — J45.901 MILD ASTHMA WITH ACUTE EXACERBATION, UNSPECIFIED WHETHER PERSISTENT: ICD-10-CM

## 2019-12-18 DIAGNOSIS — J06.9 ACUTE UPPER RESPIRATORY INFECTION: Primary | ICD-10-CM

## 2019-12-18 LAB
HCG, URINE, POC: NEGATIVE
INFLUENZA A BY PCR: NOT DETECTED
INFLUENZA B BY PCR: NOT DETECTED
Lab: NORMAL
NEGATIVE QC PASS/FAIL: NORMAL
POSITIVE QC PASS/FAIL: NORMAL

## 2019-12-18 PROCEDURE — 87502 INFLUENZA DNA AMP PROBE: CPT

## 2019-12-18 PROCEDURE — 99285 EMERGENCY DEPT VISIT HI MDM: CPT

## 2019-12-18 PROCEDURE — 6370000000 HC RX 637 (ALT 250 FOR IP): Performed by: NURSE PRACTITIONER

## 2019-12-18 PROCEDURE — 71046 X-RAY EXAM CHEST 2 VIEWS: CPT

## 2019-12-18 RX ORDER — METHYLPREDNISOLONE 4 MG/1
TABLET ORAL
Qty: 1 KIT | Refills: 0 | Status: SHIPPED | OUTPATIENT
Start: 2019-12-18 | End: 2019-12-24

## 2019-12-18 RX ORDER — GUAIFENESIN/DEXTROMETHORPHAN 100-10MG/5
5 SYRUP ORAL 3 TIMES DAILY PRN
Qty: 120 ML | Refills: 0 | Status: SHIPPED | OUTPATIENT
Start: 2019-12-18 | End: 2019-12-28

## 2019-12-18 RX ORDER — IPRATROPIUM BROMIDE AND ALBUTEROL SULFATE 2.5; .5 MG/3ML; MG/3ML
3 SOLUTION RESPIRATORY (INHALATION) ONCE
Status: COMPLETED | OUTPATIENT
Start: 2019-12-18 | End: 2019-12-18

## 2019-12-18 RX ADMIN — IPRATROPIUM BROMIDE AND ALBUTEROL SULFATE 3 AMPULE: .5; 3 SOLUTION RESPIRATORY (INHALATION) at 19:52

## 2020-01-17 ENCOUNTER — OFFICE VISIT (OUTPATIENT)
Dept: FAMILY MEDICINE CLINIC | Age: 30
End: 2020-01-17
Payer: COMMERCIAL

## 2020-01-17 VITALS
RESPIRATION RATE: 16 BRPM | WEIGHT: 264 LBS | HEART RATE: 98 BPM | OXYGEN SATURATION: 97 % | BODY MASS INDEX: 40.01 KG/M2 | TEMPERATURE: 98.4 F | DIASTOLIC BLOOD PRESSURE: 63 MMHG | HEIGHT: 68 IN | SYSTOLIC BLOOD PRESSURE: 101 MMHG

## 2020-01-17 PROCEDURE — 1036F TOBACCO NON-USER: CPT | Performed by: STUDENT IN AN ORGANIZED HEALTH CARE EDUCATION/TRAINING PROGRAM

## 2020-01-17 PROCEDURE — G8484 FLU IMMUNIZE NO ADMIN: HCPCS | Performed by: STUDENT IN AN ORGANIZED HEALTH CARE EDUCATION/TRAINING PROGRAM

## 2020-01-17 PROCEDURE — 99213 OFFICE O/P EST LOW 20 MIN: CPT | Performed by: STUDENT IN AN ORGANIZED HEALTH CARE EDUCATION/TRAINING PROGRAM

## 2020-01-17 PROCEDURE — 99212 OFFICE O/P EST SF 10 MIN: CPT | Performed by: STUDENT IN AN ORGANIZED HEALTH CARE EDUCATION/TRAINING PROGRAM

## 2020-01-17 PROCEDURE — G8417 CALC BMI ABV UP PARAM F/U: HCPCS | Performed by: STUDENT IN AN ORGANIZED HEALTH CARE EDUCATION/TRAINING PROGRAM

## 2020-01-17 PROCEDURE — G8427 DOCREV CUR MEDS BY ELIG CLIN: HCPCS | Performed by: STUDENT IN AN ORGANIZED HEALTH CARE EDUCATION/TRAINING PROGRAM

## 2020-01-17 RX ORDER — MECLIZINE HYDROCHLORIDE 25 MG/1
TABLET ORAL
Qty: 30 TABLET | Refills: 0 | Status: SHIPPED
Start: 2020-01-17 | End: 2020-02-25 | Stop reason: SDUPTHER

## 2020-01-17 RX ORDER — MOMETASONE FUROATE 1 MG/G
CREAM TOPICAL DAILY
Qty: 45 G | Refills: 3 | Status: ON HOLD
Start: 2020-01-17 | End: 2020-10-28 | Stop reason: SDUPTHER

## 2020-01-17 RX ORDER — LEVOTHYROXINE SODIUM 0.07 MG/1
75 TABLET ORAL DAILY
Qty: 90 TABLET | Refills: 1 | Status: SHIPPED
Start: 2020-01-17 | End: 2020-06-02 | Stop reason: SDUPTHER

## 2020-01-17 RX ORDER — DANTROLENE SODIUM 25 MG/1
25 CAPSULE ORAL 2 TIMES DAILY
Qty: 180 CAPSULE | Refills: 3 | Status: SHIPPED
Start: 2020-01-17 | End: 2020-10-27 | Stop reason: SDUPTHER

## 2020-01-17 RX ORDER — BACLOFEN 20 MG/1
TABLET ORAL
Qty: 120 TABLET | Refills: 11 | Status: SHIPPED
Start: 2020-01-17 | End: 2020-07-21 | Stop reason: SDUPTHER

## 2020-01-17 RX ORDER — MEDROXYPROGESTERONE ACETATE 10 MG/1
10 TABLET ORAL DAILY
Qty: 30 TABLET | Refills: 5 | Status: SHIPPED
Start: 2020-01-17 | End: 2020-09-03

## 2020-01-17 RX ORDER — BUDESONIDE AND FORMOTEROL FUMARATE DIHYDRATE 80; 4.5 UG/1; UG/1
2 AEROSOL RESPIRATORY (INHALATION) DAILY
Qty: 1 INHALER | Refills: 3 | Status: SHIPPED
Start: 2020-01-17 | End: 2020-06-02 | Stop reason: SDUPTHER

## 2020-01-17 RX ORDER — GABAPENTIN 300 MG/1
300 CAPSULE ORAL 3 TIMES DAILY
Qty: 270 CAPSULE | Refills: 2 | Status: SHIPPED
Start: 2020-01-17 | End: 2020-07-21 | Stop reason: SDUPTHER

## 2020-01-17 RX ORDER — DIVALPROEX SODIUM 250 MG/1
250 TABLET, DELAYED RELEASE ORAL 2 TIMES DAILY
Qty: 180 TABLET | Refills: 0 | Status: SHIPPED
Start: 2020-01-17 | End: 2020-06-02 | Stop reason: SDUPTHER

## 2020-01-17 RX ORDER — LEVOCETIRIZINE DIHYDROCHLORIDE 5 MG/1
5 TABLET, FILM COATED ORAL NIGHTLY
Qty: 30 TABLET | Refills: 5 | Status: SHIPPED
Start: 2020-01-17 | End: 2020-06-03 | Stop reason: SDUPTHER

## 2020-01-17 RX ORDER — DICYCLOMINE HCL 20 MG
20 TABLET ORAL 4 TIMES DAILY PRN
Qty: 120 TABLET | Refills: 3 | Status: SHIPPED
Start: 2020-01-17 | End: 2020-06-02 | Stop reason: SDUPTHER

## 2020-01-17 RX ORDER — IBUPROFEN 600 MG/1
TABLET ORAL
Qty: 120 TABLET | Refills: 1 | Status: CANCELLED | OUTPATIENT
Start: 2020-01-17

## 2020-01-17 RX ORDER — TAMSULOSIN HYDROCHLORIDE 0.4 MG/1
0.4 CAPSULE ORAL DAILY
Status: CANCELLED | OUTPATIENT
Start: 2020-01-17

## 2020-01-17 RX ORDER — ESCITALOPRAM OXALATE 20 MG/1
20 TABLET ORAL DAILY
Qty: 30 TABLET | Refills: 3 | Status: SHIPPED
Start: 2020-01-17 | End: 2020-04-27

## 2020-01-17 RX ORDER — POLYETHYLENE GLYCOL 3350 17 G/17G
17 POWDER, FOR SOLUTION ORAL PRN
Refills: 11 | Status: CANCELLED | OUTPATIENT
Start: 2020-01-17

## 2020-01-17 RX ORDER — PSEUDOEPHEDRINE HCL 60 MG/1
60 TABLET ORAL EVERY 6 HOURS PRN
Qty: 20 TABLET | Refills: 0 | Status: SHIPPED | OUTPATIENT
Start: 2020-01-17 | End: 2020-01-22

## 2020-01-17 ASSESSMENT — PATIENT HEALTH QUESTIONNAIRE - PHQ9
1. LITTLE INTEREST OR PLEASURE IN DOING THINGS: 0
SUM OF ALL RESPONSES TO PHQ9 QUESTIONS 1 & 2: 0
SUM OF ALL RESPONSES TO PHQ QUESTIONS 1-9: 0
2. FEELING DOWN, DEPRESSED OR HOPELESS: 0
SUM OF ALL RESPONSES TO PHQ QUESTIONS 1-9: 0

## 2020-01-17 NOTE — PROGRESS NOTES
40-year-old female with complicated medical history presents with approximate 18-month history of bilateral tinnitus. Patient states acute onset early last year of ringing to bilateral ears that has been constant, patient has seen multiple medical specialists since onset but has not brought it up as she did not think of it. Patient denies any pain to the ears, discharge, trauma. Patient denies fever or chills. Patient has not done any treatment. Patient states tinnitus is greater in right ear versus left with reported hearing loss in right ear versus left, however ability to hear is present in both ears. Patient does report using Q-tips in her ears however has had no bleeding. Patient states every day and was swallowing there is a popping followed by intense ringing in the ears. Patient also here for multiple medication refill. Additionally patient is currently on 7 mg NicoDerm patch with constant cravings and would like to escalate to 14 mg NicoDerm patch. Patient admits she is smoking on NicoDerm patch. Blood pressure 101/63, pulse 98, temperature 98.4 °F (36.9 °C), temperature source Oral, resp. rate 16, height 5' 8\" (1.727 m), weight 264 lb (119.7 kg), SpO2 97 %, not currently breastfeeding. HEENT generally normal however noted erythema to the right ear canal.  Bilateral TMs are normal.     Heart regular    Lungs clear    abd non-tender      No edema    Pulses intact     Assessment:  Tinnitus, smoking cessation. Plan:  Tinnitus-refer to audiology. Smoking cessation-increase nicotine patch to 14 mg x 30 days. Medication refill-as needed    Trial of pseudoephedrine    Attending Physician Statement  I have discussed the case, including pertinent history and exam findings with the resident. I agree with the documented assessment and plan.

## 2020-01-17 NOTE — PROGRESS NOTES
[facility]  Department of Family Medicine  Family Medicine Residency Program      Patient:  Nico Ramires 27 y.o. female     Date of Service: 1/17/20      Chief complaint:   Chief Complaint   Patient presents with    Nicotine Dependence     would like a change in her patches    Tinnitus     bilateral     History ofPresent Illness   The patient is a 27 y.o. female  presented to the clinic with complaints as above. Tinnitus: 15-year-old female with complicated medical history presents with approximate 18-month history of bilateral tinnitus. Patient states acute onset early last year of ringing to bilateral ears that has been constant, unchanging. Patient states every day and was swallowing there is a popping followed by intense ringing in the ears. Symptoms have not generally affected daily life but recently have become more \"annoying \". Patient states tinnitus is greater in right ear versus left with reported hearing loss in right ear versus left, however ability to hear is present in both ears. Patient denies any pain to the ears, discharge, trauma. Patient denies fever or chills. Patient has not done any treatment. Patient does report using Q-tips in her ears however has had no bleeding. She has seen multiple medical specialists since onset but has not brought it up as she did not think of it. Smoking cessation: Patient also here for multiple medication refill. Additionally patient is currently on 7 mg NicoDerm patch with constant cravings and would like to escalate to 14 mg NicoDerm patch.   Patient admits she is smoking on NicoDerm patch    Past Medical History:      Diagnosis Date    Asthma     controlled    Back pain     herniated disc in lumbar and cervical spine    Chronic midline low back pain without sciatica 11/13/2017    Closed nondisplaced fracture of head of left radius 12/30/2015    GERD (gastroesophageal reflux disease)     History of snoring     Jaw fracture ([address] from elevator accident    Muscle spasticity 08/2015    spastic quadriparesis    Obesity (BMI 30-39.9)     bmi 37.1  weight 251 #    Seizures ([address]     last episode 2016    Thyroid disease     Tonsillitis     Urinary incontinence        Past Surgical History:        Procedure Laterality Date    COLONOSCOPY      EYE SURGERY      probing of ductal system right eye     FRACTURE SURGERY      R ELBOW CRUSH INJURY W PLATE AND PINS PLACED    GA COLONOSCOPY W/BIOPSY SINGLE/MULTIPLE  10/29/2018    COLONOSCOPY WITH BIOPSY performed by Jovan Perez MD at [address] [place] [address] OF TONSILS,12+ Y/O N/A 5/4/2018    TONSILLECTOMY performed by Christa Iqbal MD at [address]       Medication List:    Current Outpatient Medications   Medication Sig Dispense Refill    meclizine (ANTIVERT) 25 MG tablet take 1 tablet by mouth daily if needed for dizziness 30 tablet 0    dicyclomine (BENTYL) 20 MG tablet Take 1 tablet by mouth 4 times daily as needed (fecal urgency) 120 tablet 3    gabapentin (NEURONTIN) 300 MG capsule Take 1 capsule by mouth 3 times daily for 182 days. 270 capsule 2    mometasone (ELOCON) 0.1 % cream Apply topically daily Apply topically daily.  45 g 3    divalproex (DEPAKOTE) 250 MG DR tablet Take 1 tablet by mouth 2 times daily 180 tablet 0    dantrolene (DANTRIUM) 25 MG capsule Take 1 capsule by mouth 2 times daily 180 capsule 3    escitalopram (LEXAPRO) 20 MG tablet Take 1 tablet by mouth daily 30 tablet 3    levocetirizine (XYZAL) 5 MG tablet Take 1 tablet by mouth nightly 30 tablet 5    levothyroxine (LEVOTHROID) 75 MCG tablet Take 1 tablet by mouth daily 90 tablet 1    budesonide-formoterol (SYMBICORT) 80-4.5 MCG/ACT AERO Inhale 2 puffs into the lungs daily 1 Inhaler 3    baclofen (LIORESAL) 20 MG tablet 1 tablet in am, 1 in evening and 2 tablets at hs 120 tablet 11    medroxyPROGESTERone (PROVERA) 10 MG tablet Take 1 tablet by mouth daily 30 tablet 5    Benzoyl Peroxide 2.5 % CREA Apply to face 2x per day 1 Tube 3    pseudoephedrine (SUDAFED) 60 MG tablet Take 1 tablet by mouth every 6 hours as needed for Congestion 20 tablet 0    polyethylene glycol (GLYCOLAX) powder 17 g as needed  11    nicotine (NICODERM CQ) 7 MG/24HR Place 1 patch onto the skin daily 28 patch 2    saline nasal gel (AYR) GEL by Nasal route as needed for Congestion      albuterol sulfate HFA (PROAIR HFA) 108 (90 Base) MCG/ACT inhaler Inhale 2 puffs into the lungs every 6 hours as needed for Wheezing 1 Inhaler 2     No current facility-administered medications for this visit. Allergies:    Latex; Acetaminophen; Aspirin-acetaminophen-caffeine; Dye [iodides]; Penicillins; Topamax [topiramate]; Tylenol with codeine #3 [acetaminophen-codeine]; Blueberry flavor; Fish-derived products;  Iodine; Lidocaine; and Vicodin [hydrocodone-acetaminophen]    Social History:   Social History     Socioeconomic History    Marital status: Single     Spouse name: Not on file    Number of children: Not on file    Years of education: 15    Highest education level: Not on file   Occupational History    Occupation: [employer]     Employer: [address] [place] [address]     Comment: unable to work due to injury   Social Needs    Financial resource strain: Not on file    Food insecurity:     Worry: Not on file     Inability: Not on file   Chargeback needs:     Medical: Not on file     Non-medical: Not on file   Tobacco Use    Smoking status: Former Smoker     Packs/day: 0.10     Types: Cigarettes    Smokeless tobacco: Never Used   Substance and Sexual Activity    Alcohol use: No     Alcohol/week: 0.0 standard drinks    Drug use: No    Sexual activity: Not Currently   Lifestyle    Physical activity:     Days per week: Not on file     Minutes per session: Not on file    Stress: Not on file   Relationships    Social connections:     Talks on phone: Not on file     Gets together: Not on file     Attends [employer]ism service: Not on file     Active member of club or organization: Not on file     Attends meetings of clubs or organizations: Not on file     Relationship status: Not on file    Intimate partner violence:     Fear of current or ex partner: Not on file     Emotionally abused: Not on file     Physically abused: Not on file     Forced sexual activity: Not on file   Other Topics Concern    Not on file   Social History Narrative    Not on file        Family History:       Problem Relation Age of Onset    Early Death Maternal Grandfather     Cancer Maternal Grandfather     Asthma Brother     Heart Disease Maternal Aunt     Cancer Maternal Aunt     Heart Disease Maternal Uncle     Cancer Maternal Uncle     High Cholesterol Father     Diabetes Father     High Cholesterol Mother     Cancer Paternal Aunt     Cancer Paternal Uncle     Cancer Maternal Grandmother     Cancer Paternal Grandmother     Cancer Paternal Grandfather        Review of Systems:   Review of Systems   Constitutional: Negative for chills and fever. HENT: Positive for hearing loss and tinnitus. Negative for congestion, ear discharge, ear pain, facial swelling, postnasal drip, trouble swallowing and voice change. Eyes: Negative for pain, discharge and redness. Respiratory: Negative for cough and shortness of breath. Cardiovascular: Negative for chest pain and palpitations. Gastrointestinal: Negative for abdominal pain. Genitourinary: Negative for dysuria. Skin: Negative for rash. Neurological: Negative for dizziness, syncope, weakness, numbness and headaches. Physical Exam   Vitals: /63 (Site: Right Upper Arm, Position: Sitting, Cuff Size: Medium Adult)   Pulse 98   Temp 98.4 °F (36.9 °C) (Oral)   Resp 16   Ht 5' 8\" (1.727 m)   Wt 264 lb (119.7 kg)   SpO2 97%   BMI 40.14 kg/m²   General Appearance: Well developed, awake, alert, oriented, no acute distress  HEENT: Normocephalic, atraumatic.  PERRL, EOM's intact or swelling. Bilateral TM clear. Right auditory canal mild erythema, left normal.   Neck: Supple, symmetrical  Chest wall/Lung: Clear to auscultation bilaterally,  respirations unlabored. No ronchi/wheezing/rales  Heart: Regular rate and rhythm,S1 and S2 normal, no murmur, rub or gallop. Abdomen: Soft, non-tender  Extremities:  Extremities normal, atraumatic, no edema. Skin: Skin color Normal  Neurologic:   Alert&Oriented. CN intact         Psychiatric: blunted affect    Assessment and Plan     1. Tinnitus of both ears  Patient was offered Flonase and declined stating it causes nosebleed. Will trial Sudafed for congestion. Patient given instructions. Also referred to audiology for hearing testing.  - pseudoephedrine (SUDAFED) 60 MG tablet; Take 1 tablet by mouth every 6 hours as needed for Congestion  Dispense: 20 tablet; Refill: 0  - [facility]    2. Medication refill  - meclizine (ANTIVERT) 25 MG tablet; take 1 tablet by mouth daily if needed for dizziness  Dispense: 30 tablet; Refill: 0  - dicyclomine (BENTYL) 20 MG tablet; Take 1 tablet by mouth 4 times daily as needed (fecal urgency)  Dispense: 120 tablet; Refill: 3  - gabapentin (NEURONTIN) 300 MG capsule; Take 1 capsule by mouth 3 times daily for 182 days. Dispense: 270 capsule; Refill: 2  - mometasone (ELOCON) 0.1 % cream; Apply topically daily Apply topically daily. Dispense: 45 g; Refill: 3  - divalproex (DEPAKOTE) 250 MG DR tablet; Take 1 tablet by mouth 2 times daily  Dispense: 180 tablet; Refill: 0  - dantrolene (DANTRIUM) 25 MG capsule; Take 1 capsule by mouth 2 times daily  Dispense: 180 capsule; Refill: 3  - escitalopram (LEXAPRO) 20 MG tablet; Take 1 tablet by mouth daily  Dispense: 30 tablet; Refill: 3  - levocetirizine (XYZAL) 5 MG tablet; Take 1 tablet by mouth nightly  Dispense: 30 tablet; Refill: 5  - levothyroxine (LEVOTHROID) 75 MCG tablet; Take 1 tablet by mouth daily  Dispense: 90 tablet;  Refill: 1  - budesonide-formoterol (SYMBICORT) 80-4.5 MCG/ACT AERO; Inhale 2 puffs into the lungs daily  Dispense: 1 Inhaler; Refill: 3  - baclofen (LIORESAL) 20 MG tablet; 1 tablet in am, 1 in evening and 2 tablets at hs  Dispense: 120 tablet; Refill: 11  - medroxyPROGESTERone (PROVERA) 10 MG tablet; Take 1 tablet by mouth daily  Dispense: 30 tablet; Refill: 5  - Benzoyl Peroxide 2.5 % CREA; Apply to face 2x per day  Dispense: 1 Tube; Refill: 3    Return to Office: Return in about 4 weeks (around 2/14/2020), or Tinnitus. Ramona Alaniz MD PGY 2  Discussed with Dr. Venkata Steiner regarding above diagnosis, including possible risks and complications,  especially if left uncontrolled. Counseled regarding the possible side effects, risks, benefits and alternatives to treatment; patient and/or guardian verbalizes understanding, agrees, feels comfortable with and wishes to proceed with abovetreatment plan. Call or go to ED immediately if symptoms worsen or persist. Advised patient to call with any new medication issues, and, asapplicable, read all Rx info from pharmacy to assure aware of all possible risks and side effects of medication before taking. Patient and/orguardian given opportunity to ask questions/raise concerns. The patient verbalized comfort and understanding of instructions.

## 2020-01-20 RX ORDER — NICOTINE 21 MG/24HR
PATCH, TRANSDERMAL 24 HOURS TRANSDERMAL
Qty: 30 PATCH | Refills: 3 | COMMUNITY
Start: 2020-01-20 | End: 2020-10-07

## 2020-01-20 ASSESSMENT — ENCOUNTER SYMPTOMS
EYE DISCHARGE: 0
ABDOMINAL PAIN: 0
FACIAL SWELLING: 0
SHORTNESS OF BREATH: 0
COUGH: 0
VOICE CHANGE: 0
EYE REDNESS: 0
TROUBLE SWALLOWING: 0
EYE PAIN: 0

## 2020-01-20 NOTE — TELEPHONE ENCOUNTER
Last Appointment:  10/17/2019  Future Appointments   Date Time Provider Donna Page   2/17/2020  3:40 PM MD [facility]      Patient stated she needed the Nicotine 14

## 2020-02-25 RX ORDER — MECLIZINE HYDROCHLORIDE 25 MG/1
TABLET ORAL
Qty: 30 TABLET | Refills: 0 | Status: SHIPPED
Start: 2020-02-25 | End: 2020-03-27

## 2020-03-06 ENCOUNTER — OFFICE VISIT (OUTPATIENT)
Dept: FAMILY MEDICINE CLINIC | Age: 30
End: 2020-03-06
Payer: COMMERCIAL

## 2020-03-06 VITALS
OXYGEN SATURATION: 95 % | WEIGHT: 268 LBS | HEART RATE: 90 BPM | SYSTOLIC BLOOD PRESSURE: 115 MMHG | BODY MASS INDEX: 40.62 KG/M2 | HEIGHT: 68 IN | DIASTOLIC BLOOD PRESSURE: 58 MMHG | TEMPERATURE: 98.6 F

## 2020-03-06 PROCEDURE — G8484 FLU IMMUNIZE NO ADMIN: HCPCS | Performed by: STUDENT IN AN ORGANIZED HEALTH CARE EDUCATION/TRAINING PROGRAM

## 2020-03-06 PROCEDURE — G8427 DOCREV CUR MEDS BY ELIG CLIN: HCPCS | Performed by: STUDENT IN AN ORGANIZED HEALTH CARE EDUCATION/TRAINING PROGRAM

## 2020-03-06 PROCEDURE — G8417 CALC BMI ABV UP PARAM F/U: HCPCS | Performed by: STUDENT IN AN ORGANIZED HEALTH CARE EDUCATION/TRAINING PROGRAM

## 2020-03-06 PROCEDURE — 99212 OFFICE O/P EST SF 10 MIN: CPT | Performed by: STUDENT IN AN ORGANIZED HEALTH CARE EDUCATION/TRAINING PROGRAM

## 2020-03-06 PROCEDURE — 99213 OFFICE O/P EST LOW 20 MIN: CPT | Performed by: STUDENT IN AN ORGANIZED HEALTH CARE EDUCATION/TRAINING PROGRAM

## 2020-03-06 PROCEDURE — 1036F TOBACCO NON-USER: CPT | Performed by: STUDENT IN AN ORGANIZED HEALTH CARE EDUCATION/TRAINING PROGRAM

## 2020-03-06 RX ORDER — TAMSULOSIN HYDROCHLORIDE 0.4 MG/1
CAPSULE ORAL
COMMUNITY
Start: 2020-01-21 | End: 2020-11-25

## 2020-03-06 RX ORDER — IBUPROFEN 600 MG/1
600 TABLET ORAL EVERY 6 HOURS PRN
COMMUNITY
End: 2020-03-27

## 2020-03-08 ASSESSMENT — ENCOUNTER SYMPTOMS
SHORTNESS OF BREATH: 0
WHEEZING: 0
BACK PAIN: 0
CHEST TIGHTNESS: 0
COUGH: 0

## 2020-03-08 NOTE — PROGRESS NOTES
S: 27 y.o. female here for chronic right wrist pain. Intermittent numbness in fingers. 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 No trauma. No improvement with ibuprofen or Tylenol. O: VS: BP (!) 115/58 (Site: Left Upper Arm, Position: Sitting, Cuff Size: Large Adult)   Pulse 90   Temp 98.6 °F (37 °C) (Oral)   Ht 5' 8\" (1.727 m)   Wt 268 lb (121.6 kg)   SpO2 95%   BMI 40.75 kg/m²    General: NAD, alert and interacting appropriately. Ext: Motor and sensation intact bilateral hands. Right hand with tenderness over carpal bones. Pain in proximal hand with Tinel's, but otherwise negative Tinel's. Negative Phalen's. Impression: Carpal tunnel versus arthritis versus other median nerve neuropathy. Plan:   Wrist splint. Exercises. Continue Tylenol or ibuprofen if desired. Attending Physician Statement  I have discussed the case, including pertinent history and exam findings with the resident. I agree with the documented assessment and plan.
Large Adult   Pulse: 90    Temp: 98.6 °F (37 °C)    TempSrc: Oral    SpO2: 95%    Weight: 268 lb (121.6 kg)    Height: 5' 8\" (1.727 m)      Estimated body mass index is 40.75 kg/m² as calculated from the following:    Height as of this encounter: 5' 8\" (1.727 m). Weight as of this encounter: 268 lb (121.6 kg). Physical Exam  Constitutional:       General: She is not in acute distress. Appearance: She is well-developed. Cardiovascular:      Rate and Rhythm: Normal rate and regular rhythm. Heart sounds: Normal heart sounds. No murmur. Pulmonary:      Effort: Pulmonary effort is normal. No respiratory distress. Breath sounds: Normal breath sounds. No wheezing. Musculoskeletal: Normal range of motion. Right wrist: She exhibits tenderness. She exhibits normal range of motion, no bony tenderness, no swelling, no effusion, no crepitus, no deformity and no laceration. Comments: Negative tinels and phalen's, tender to palpation over circumferential wrist on the right. No erythema or edema of mcps, pips or dips   Skin:     General: Skin is warm and dry. Findings: No erythema or rash. Neurological:      General: No focal deficit present. Mental Status: She is alert and oriented to person, place, and time. Cranial Nerves: No cranial nerve deficit. Sensory: No sensory deficit. Motor: No weakness. Deep Tendon Reflexes: Reflexes are normal and symmetric. ASSESSMENT/PLAN:  1. Pain in wrist, unspecified laterality  2. Carpal tunnel syndrome of right wrist  - scheduled nsaids  - avoid repetitive motions  - Splint wrist  - discussed alternatives in the future including EMG, PT, injections, steroids    No follow-ups on file. An electronic signature was used to authenticate this note.     --Cornelia Peralta MD on 3/8/2020 at 3:25 PM

## 2020-03-09 ENCOUNTER — TELEPHONE (OUTPATIENT)
Dept: FAMILY MEDICINE CLINIC | Age: 30
End: 2020-03-09

## 2020-03-09 NOTE — TELEPHONE ENCOUNTER
Jaylan Sheridan called in and is needing the scripts for her wrist splints to go to [facility], can you please write them out so that we can fax them over.   Thank you

## 2020-03-17 ENCOUNTER — E-VISIT (OUTPATIENT)
Dept: FAMILY MEDICINE CLINIC | Facility: CLINIC | Age: 30
End: 2020-03-17
Payer: COMMERCIAL

## 2020-03-17 PROCEDURE — 99421 OL DIG E/M SVC 5-10 MIN: CPT | Performed by: NURSE PRACTITIONER

## 2020-03-17 ASSESSMENT — LIFESTYLE VARIABLES
SMOKING_YEARS: 1
SMOKING_STATUS: YES

## 2020-03-27 RX ORDER — IBUPROFEN 600 MG/1
TABLET ORAL
Qty: 120 TABLET | Refills: 2 | Status: SHIPPED
Start: 2020-03-27 | End: 2020-07-21 | Stop reason: ALTCHOICE

## 2020-03-27 RX ORDER — MECLIZINE HYDROCHLORIDE 25 MG/1
TABLET ORAL
Qty: 30 TABLET | Refills: 0 | Status: SHIPPED
Start: 2020-03-27 | End: 2020-04-16

## 2020-04-16 RX ORDER — MECLIZINE HYDROCHLORIDE 25 MG/1
TABLET ORAL
Qty: 30 TABLET | Refills: 0 | Status: SHIPPED
Start: 2020-04-16 | End: 2020-04-27

## 2020-04-27 RX ORDER — MECLIZINE HYDROCHLORIDE 25 MG/1
TABLET ORAL
Qty: 30 TABLET | Refills: 0 | Status: SHIPPED
Start: 2020-04-27 | End: 2020-08-11

## 2020-04-27 RX ORDER — ESCITALOPRAM OXALATE 20 MG/1
TABLET ORAL
Qty: 120 TABLET | Refills: 3 | Status: SHIPPED
Start: 2020-04-27 | End: 2020-11-25 | Stop reason: SDUPTHER

## 2020-05-15 RX ORDER — ALBUTEROL SULFATE 90 UG/1
AEROSOL, METERED RESPIRATORY (INHALATION)
Qty: 18 G | Refills: 5 | Status: SHIPPED
Start: 2020-05-15 | End: 2020-07-21 | Stop reason: SDUPTHER

## 2020-06-03 RX ORDER — DIVALPROEX SODIUM 250 MG/1
250 TABLET, DELAYED RELEASE ORAL 2 TIMES DAILY
Qty: 180 TABLET | Refills: 0 | Status: SHIPPED
Start: 2020-06-03 | End: 2020-11-25 | Stop reason: SDUPTHER

## 2020-06-03 RX ORDER — LEVOCETIRIZINE DIHYDROCHLORIDE 5 MG/1
5 TABLET, FILM COATED ORAL NIGHTLY
Qty: 30 TABLET | Refills: 5 | Status: SHIPPED
Start: 2020-06-03 | End: 2020-11-25 | Stop reason: SDUPTHER

## 2020-06-03 RX ORDER — DICYCLOMINE HCL 20 MG
20 TABLET ORAL 4 TIMES DAILY PRN
Qty: 120 TABLET | Refills: 3 | Status: SHIPPED
Start: 2020-06-03 | End: 2020-10-27

## 2020-06-03 RX ORDER — LEVOTHYROXINE SODIUM 0.07 MG/1
75 TABLET ORAL DAILY
Qty: 90 TABLET | Refills: 1 | Status: SHIPPED
Start: 2020-06-03 | End: 2020-11-25 | Stop reason: SDUPTHER

## 2020-06-03 RX ORDER — BUDESONIDE AND FORMOTEROL FUMARATE DIHYDRATE 80; 4.5 UG/1; UG/1
2 AEROSOL RESPIRATORY (INHALATION) DAILY
Qty: 1 INHALER | Refills: 3 | Status: SHIPPED
Start: 2020-06-03 | End: 2020-07-21 | Stop reason: SDUPTHER

## 2020-06-03 RX ORDER — LEVOCETIRIZINE DIHYDROCHLORIDE 5 MG/1
5 TABLET, FILM COATED ORAL NIGHTLY
Qty: 30 TABLET | Refills: 5 | Status: SHIPPED
Start: 2020-06-03 | End: 2020-07-21 | Stop reason: SDUPTHER
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
## 2020-07-21 ENCOUNTER — OFFICE VISIT (OUTPATIENT)
Dept: FAMILY MEDICINE CLINIC | Age: 30
End: 2020-07-21
Payer: COMMERCIAL

## 2020-07-21 VITALS
TEMPERATURE: 98.6 F | SYSTOLIC BLOOD PRESSURE: 119 MMHG | OXYGEN SATURATION: 97 % | DIASTOLIC BLOOD PRESSURE: 87 MMHG | HEART RATE: 98 BPM | RESPIRATION RATE: 16 BRPM | HEIGHT: 68 IN | WEIGHT: 287 LBS | BODY MASS INDEX: 43.5 KG/M2

## 2020-07-21 PROBLEM — G56.00 CARPAL TUNNEL SYNDROME: Status: ACTIVE | Noted: 2020-07-21

## 2020-07-21 PROCEDURE — G8427 DOCREV CUR MEDS BY ELIG CLIN: HCPCS | Performed by: FAMILY MEDICINE

## 2020-07-21 PROCEDURE — G8417 CALC BMI ABV UP PARAM F/U: HCPCS | Performed by: FAMILY MEDICINE

## 2020-07-21 PROCEDURE — 99213 OFFICE O/P EST LOW 20 MIN: CPT | Performed by: STUDENT IN AN ORGANIZED HEALTH CARE EDUCATION/TRAINING PROGRAM

## 2020-07-21 PROCEDURE — 4130F TOPICAL PREP RX AOE: CPT | Performed by: FAMILY MEDICINE

## 2020-07-21 PROCEDURE — 2500000003 HC RX 250 WO HCPCS

## 2020-07-21 PROCEDURE — 90471 IMMUNIZATION ADMIN: CPT

## 2020-07-21 PROCEDURE — 99212 OFFICE O/P EST SF 10 MIN: CPT | Performed by: STUDENT IN AN ORGANIZED HEALTH CARE EDUCATION/TRAINING PROGRAM

## 2020-07-21 PROCEDURE — 4004F PT TOBACCO SCREEN RCVD TLK: CPT | Performed by: FAMILY MEDICINE

## 2020-07-21 PROCEDURE — 90716 VAR VACCINE LIVE SUBQ: CPT

## 2020-07-21 RX ORDER — CHLORHEXIDINE GLUCONATE 0.12 MG/ML
15 RINSE ORAL 2 TIMES DAILY
COMMUNITY
Start: 2020-06-03 | End: 2020-07-21 | Stop reason: SDUPTHER

## 2020-07-21 RX ORDER — BUDESONIDE AND FORMOTEROL FUMARATE DIHYDRATE 80; 4.5 UG/1; UG/1
2 AEROSOL RESPIRATORY (INHALATION) DAILY
Qty: 1 INHALER | Refills: 3 | Status: SHIPPED
Start: 2020-07-21 | End: 2020-11-25 | Stop reason: SDUPTHER

## 2020-07-21 RX ORDER — GABAPENTIN 300 MG/1
300 CAPSULE ORAL 3 TIMES DAILY
Qty: 270 CAPSULE | Refills: 2 | Status: SHIPPED
Start: 2020-07-21 | End: 2020-11-25 | Stop reason: SDUPTHER

## 2020-07-21 RX ORDER — NAPROXEN 250 MG/1
250 TABLET ORAL 2 TIMES DAILY PRN
Qty: 60 TABLET | Refills: 0 | Status: SHIPPED
Start: 2020-07-21 | End: 2020-10-07

## 2020-07-21 RX ORDER — CHLORHEXIDINE GLUCONATE 0.12 MG/ML
15 RINSE ORAL 2 TIMES DAILY
Qty: 900 ML | Refills: 0 | Status: SHIPPED | OUTPATIENT
Start: 2020-07-21 | End: 2020-08-20

## 2020-07-21 RX ORDER — BACLOFEN 20 MG/1
TABLET ORAL
Qty: 120 TABLET | Refills: 11 | Status: SHIPPED
Start: 2020-07-21 | End: 2020-10-27 | Stop reason: SDUPTHER

## 2020-07-21 RX ORDER — ALBUTEROL SULFATE 90 UG/1
AEROSOL, METERED RESPIRATORY (INHALATION)
Qty: 18 G | Refills: 5 | Status: SHIPPED
Start: 2020-07-21 | End: 2020-11-25 | Stop reason: SDUPTHER

## 2020-07-21 RX ORDER — OFLOXACIN 3 MG/ML
10 SOLUTION AURICULAR (OTIC) DAILY
Qty: 3.5 ML | Refills: 0 | Status: SHIPPED | OUTPATIENT
Start: 2020-07-21 | End: 2020-07-28

## 2020-07-21 ASSESSMENT — ENCOUNTER SYMPTOMS
COLOR CHANGE: 0
CONSTIPATION: 0
PHOTOPHOBIA: 0
BLOOD IN STOOL: 0
WHEEZING: 0
SHORTNESS OF BREATH: 0

## 2020-07-21 NOTE — PATIENT INSTRUCTIONS
Patient Education        Learning About Benefits From Quitting Smoking  How does quitting smoking make you healthier? If you're thinking about quitting smoking, you may have a few reasons to be smoke-free. Your health may be one of them. · When you quit smoking, you lower your risks for cancer, lung disease, heart attack, stroke, blood vessel disease, and blindness from macular degeneration. · When you're smoke-free, you get sick less often, and you heal faster. You are less likely to get colds, flu, bronchitis, and pneumonia. · As a nonsmoker, you may find that your mood is better and you are less stressed. When and how will you feel healthier? Quitting has real health benefits that start from day 1 of being smoke-free. And the longer you stay smoke-free, the healthier you get and the better you feel. The first hours  · After just 20 minutes, your blood pressure and heart rate go down. That means there's less stress on your heart and blood vessels. · Within 12 hours, the level of carbon monoxide in your blood drops back to normal. That makes room for more oxygen. With more oxygen in your body, you may notice that you have more energy than when you smoked. After 2 weeks  · Your lungs start to work better. · Your risk of heart attack starts to drop. After 1 month  · When your lungs are clear, you cough less and breathe deeper, so it's easier to be active. · Your sense of taste and smell return. That means you can enjoy food more than you have since you started smoking. Over the years  · Over the years, your risks of heart disease, heart attack, and stroke are lower. · After 10 years, your risk of dying from lung cancer is cut by about half. And your risk for many other types of cancer is lower too. How would quitting help others in your life? When you quit smoking, you improve the health of everyone who now breathes in your smoke.   · Their heart, lung, and cancer risks drop, much like yours.  · They are sick less. For babies and small children, living smoke-free means they're less likely to have ear infections, pneumonia, and bronchitis. · If you're a woman who is or will be pregnant someday, quitting smoking means a healthier . · Children who are close to you are less likely to become adult smokers. Where can you learn more? Go to https://LettuceThinnerpepicewIunika.[employer]. org and sign in to your [employer] account. Enter [phone] in the [employer] box to learn more about \"Learning About Benefits From Quitting Smoking. \"     If you do not have an account, please click on the \"Sign Up Now\" link. Current as of: 2020               Content Version: 12.5  © 7432-2185 Healthwise, Incorporated. Care instructions adapted under license by [facility] ([facility]). If you have questions about a medical condition or this instruction, always ask your healthcare professional. [place] [address] any warranty or liability for your use of this information.

## 2020-07-21 NOTE — PROGRESS NOTES
40-year-old woman with history of jaw fracture (2012) carpal tunnel syndrome presents with a 1-1/2-week history of left-sided facial pain. The pain starts behind her left ear and radiates to her forehead. Unable to describe the pain. Not associated with facial swelling or rash. Has had some hearing loss, and pain while chewing food. No fevers, chills, ear discharge, abdominal pain, nausea vomiting. Blood pressure 119/87, pulse 98, temperature 98.6 °F (37 °C), temperature source Oral, resp. rate 16, height 5' 8\" (1.727 m), weight 287 lb (130.2 kg), SpO2 97 %, not currently breastfeeding. Head normocephalic atraumatic Ears pearly white tympanic membranes visualized bilaterally. Left ear canal erythematous. Pain with manipulation of external left ear. Positive for tender preauricular left lymph nodes, positive for cervical adenopathy left. WNL     Heart regular    Lungs clear    abd non-tender      No edema    Pulses intact     7-day course of ofloxacin 10 drops in left ear-patient advised to follow-up on her symptoms on Friday, will add systemic treatment if needed. Other differentials include TMJ. Added naproxen for patient's carpal tunnel, that is well controlled    Attending Physician Statement  I have discussed the case, including pertinent history and exam findings with the resident. I agree with the documented assessment and plan.

## 2020-07-28 ENCOUNTER — TELEPHONE (OUTPATIENT)
Dept: FAMILY MEDICINE CLINIC | Age: 30
End: 2020-07-28

## 2020-07-28 NOTE — TELEPHONE ENCOUNTER
Marisabel Jett called in and is stating that her ear is still hurting, she states that she has been taking her medication as prescribed and it is not feeling any better.   Please advise

## 2020-07-29 ENCOUNTER — TELEPHONE (OUTPATIENT)
Dept: FAMILY MEDICINE CLINIC | Age: 30
End: 2020-07-29

## 2020-07-29 NOTE — TELEPHONE ENCOUNTER
Dr. Bulmaro Kc office called in stating that the doc is away and cannot see pt asap but pt is scheduled to see him on 8-10-20 and pt was prescribed drops in the mean time. The pt was informed of the appointment.

## 2020-08-11 RX ORDER — MECLIZINE HYDROCHLORIDE 25 MG/1
TABLET ORAL
Qty: 30 TABLET | Refills: 0 | Status: SHIPPED
Start: 2020-08-11 | End: 2020-09-30

## 2020-08-27 ENCOUNTER — TELEPHONE (OUTPATIENT)
Dept: ADMINISTRATIVE | Age: 30
End: 2020-08-27

## 2020-08-27 ENCOUNTER — HOSPITAL ENCOUNTER (EMERGENCY)
Age: 30
Discharge: HOME | End: 2020-08-27
Payer: COMMERCIAL

## 2020-08-27 ENCOUNTER — APPOINTMENT (OUTPATIENT)
Dept: GENERAL RADIOLOGY | Age: 30
End: 2020-08-27
Payer: COMMERCIAL

## 2020-08-27 VITALS
SYSTOLIC BLOOD PRESSURE: 125 MMHG | HEIGHT: 68 IN | HEART RATE: 94 BPM | WEIGHT: 260 LBS | RESPIRATION RATE: 17 BRPM | DIASTOLIC BLOOD PRESSURE: 74 MMHG | OXYGEN SATURATION: 97 % | BODY MASS INDEX: 39.4 KG/M2 | TEMPERATURE: 97.8 F

## 2020-08-27 PROCEDURE — 73630 X-RAY EXAM OF FOOT: CPT

## 2020-08-27 PROCEDURE — 73610 X-RAY EXAM OF ANKLE: CPT

## 2020-08-27 PROCEDURE — 99282 EMERGENCY DEPT VISIT SF MDM: CPT

## 2020-08-27 PROCEDURE — 99283 EMERGENCY DEPT VISIT LOW MDM: CPT

## 2020-08-27 PROCEDURE — 6370000000 HC RX 637 (ALT 250 FOR IP): Performed by: NURSE PRACTITIONER

## 2020-08-27 RX ORDER — IBUPROFEN 800 MG/1
800 TABLET ORAL ONCE
Status: COMPLETED | OUTPATIENT
Start: 2020-08-27 | End: 2020-08-27

## 2020-08-27 RX ORDER — IBUPROFEN 800 MG/1
800 TABLET ORAL EVERY 6 HOURS PRN
Qty: 21 TABLET | Refills: 0 | Status: SHIPPED | OUTPATIENT
Start: 2020-08-27 | End: 2020-09-03

## 2020-08-27 RX ADMIN — IBUPROFEN 800 MG: 800 TABLET, FILM COATED ORAL at 17:48

## 2020-08-27 ASSESSMENT — PAIN SCALES - GENERAL: PAINLEVEL_OUTOF10: 10
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
## 2020-08-31 ENCOUNTER — TELEPHONE (OUTPATIENT)
Dept: ADMINISTRATIVE | Age: 30
End: 2020-08-31

## 2020-08-31 NOTE — TELEPHONE ENCOUNTER
Call returned to patient. appt scheduled at this time. Directions provided to patient.  Encouraged to call with questions or concerns

## 2020-09-01 NOTE — TELEPHONE ENCOUNTER
Last Appointment:  1/17/2020  Future Appointments   Date Time Provider Donna Page   9/3/2020  2:40 MD Lilibeth Schuster [facility]   9/9/2020 12:00 PM Paz Doe MD  [facility]

## 2020-09-03 ENCOUNTER — OFFICE VISIT (OUTPATIENT)
Dept: FAMILY MEDICINE CLINIC | Age: 30
End: 2020-09-03
Payer: COMMERCIAL

## 2020-09-03 ENCOUNTER — HOSPITAL ENCOUNTER (OUTPATIENT)
Age: 30
Discharge: HOME | End: 2020-09-05
Payer: COMMERCIAL

## 2020-09-03 VITALS
HEART RATE: 88 BPM | BODY MASS INDEX: 43.19 KG/M2 | SYSTOLIC BLOOD PRESSURE: 110 MMHG | HEIGHT: 68 IN | WEIGHT: 285 LBS | RESPIRATION RATE: 16 BRPM | DIASTOLIC BLOOD PRESSURE: 70 MMHG | TEMPERATURE: 97.6 F | OXYGEN SATURATION: 98 %

## 2020-09-03 PROBLEM — S82.831A CLOSED AVULSION FRACTURE OF DISTAL END OF RIGHT FIBULA: Status: ACTIVE | Noted: 2020-09-03

## 2020-09-03 LAB
HCT VFR BLD CALC: 46.1 % (ref 34–48)
HEMOGLOBIN: 14.6 G/DL (ref 11.5–15.5)
MCH RBC QN AUTO: 29.3 PG (ref 26–35)
MCHC RBC AUTO-ENTMCNC: 31.7 % (ref 32–34.5)
MCV RBC AUTO: 92.6 FL (ref 80–99.9)
PDW BLD-RTO: 13.7 FL (ref 11.5–15)
PLATELET # BLD: 219 E9/L (ref 130–450)
PMV BLD AUTO: 13.8 FL (ref 7–12)
RBC # BLD: 4.98 E12/L (ref 3.5–5.5)
WBC # BLD: 9.3 E9/L (ref 4.5–11.5)

## 2020-09-03 PROCEDURE — 99213 OFFICE O/P EST LOW 20 MIN: CPT | Performed by: STUDENT IN AN ORGANIZED HEALTH CARE EDUCATION/TRAINING PROGRAM

## 2020-09-03 PROCEDURE — 85027 COMPLETE CBC AUTOMATED: CPT

## 2020-09-03 PROCEDURE — G8427 DOCREV CUR MEDS BY ELIG CLIN: HCPCS | Performed by: FAMILY MEDICINE

## 2020-09-03 PROCEDURE — 36415 COLL VENOUS BLD VENIPUNCTURE: CPT | Performed by: FAMILY MEDICINE

## 2020-09-03 PROCEDURE — G8417 CALC BMI ABV UP PARAM F/U: HCPCS | Performed by: FAMILY MEDICINE

## 2020-09-03 PROCEDURE — 4004F PT TOBACCO SCREEN RCVD TLK: CPT | Performed by: FAMILY MEDICINE

## 2020-09-03 PROCEDURE — 2500000003 HC RX 250 WO HCPCS

## 2020-09-03 PROCEDURE — 36415 COLL VENOUS BLD VENIPUNCTURE: CPT

## 2020-09-03 PROCEDURE — 90716 VAR VACCINE LIVE SUBQ: CPT

## 2020-09-03 PROCEDURE — 99212 OFFICE O/P EST SF 10 MIN: CPT | Performed by: FAMILY MEDICINE

## 2020-09-03 PROCEDURE — 90471 IMMUNIZATION ADMIN: CPT

## 2020-09-03 PROCEDURE — 80053 COMPREHEN METABOLIC PANEL: CPT

## 2020-09-03 RX ORDER — NAPROXEN 250 MG/1
250 TABLET ORAL 2 TIMES DAILY WITH MEALS
Qty: 60 TABLET | Refills: 0 | Status: SHIPPED
Start: 2020-09-03 | End: 2020-10-07

## 2020-09-03 RX ORDER — MEDROXYPROGESTERONE ACETATE 10 MG/1
TABLET ORAL
Qty: 90 TABLET | Refills: 0 | Status: SHIPPED
Start: 2020-09-03 | End: 2021-05-27 | Stop reason: SDUPTHER

## 2020-09-03 NOTE — PROGRESS NOTES
S: 27 y.o. female with   Chief Complaint   Patient presents with    Foot Injury     broken R foot last week was seen at the  er told to follow fup with pcp       28-year-old woman with obesity presents for a follow-up of her ER visit was seen in August for a closed avulsion fracture this distal end of her right fibula. She was to follow-up with Dr. Alondra Rooney day after this ER visit, claims she did not know. Since then has not taken ibuprofen, but has iced and elevated her right lower extremity, is ambulating with crutches that the ER gave her. She asks for a cast today. As her pain is worsening has not had loss of sensation or weakness in her lower extremity though. Has swelling. No fevers, chills, chest pain, shortness of breath. She also asks on help with losing weight today. O: VS:  height is 5' 8\" (1.727 m) and weight is 285 lb (129.3 kg). Her oral temperature is 97.6 °F (36.4 °C). Her blood pressure is 110/70 and her pulse is 88. Her respiration is 16 and oxygen saturation is 98%. AAO/NAD, appropriate affect for mood  CV:  RRR, no murmur  Resp: CTAB  Extremities- right lower extremity, Velcro splint in place, removed it saw swelling on right lower ankle and foot. No erythema,. No obvious deformity. Right ankle, right  to palpation. Sensation is intact. Dorsalis pedis pulse 2+ symmetrical bilaterally. Impression/Plan:   1) right closed avulsion fracture distal end of fibula- naproxen twice daily. Continue with ice. Elevation. Dr. Alondra Rooney has a follow-up appointment with this patient 9/9/2020. I informed her of this and that she should go. We have also contacted Ortho to see if they will accommodate her earlier. Health maintenance-second dose of varicella vaccine administered today. She says her Pap smear was done in 2020 by Dr. Brad Stephens will fax these records to us. RTO 2 weeks to discuss depression, weight loss.   At that visit I will also follow-up on her

## 2020-09-03 NOTE — PROGRESS NOTES
hours as needed for Pain 21 tablet 0    meclizine (ANTIVERT) 25 MG tablet take 1 tablet by mouth daily if needed for dizziness 30 tablet 0    albuterol sulfate  (90 Base) MCG/ACT inhaler inhale 2 puffs by mouth and INTO THE LUNGS every 6 hours if needed for wheezing 18 g 5    budesonide-formoterol (SYMBICORT) 80-4.5 MCG/ACT AERO Inhale 2 puffs into the lungs daily 1 Inhaler 3    baclofen (LIORESAL) 20 MG tablet 1 tablet in am, 1 in evening and 2 tablets at hs 120 tablet 11    gabapentin (NEURONTIN) 300 MG capsule Take 1 capsule by mouth 3 times daily for 182 days. 270 capsule 2    naproxen (NAPROSYN) 250 MG tablet Take 1 tablet by mouth 2 times daily as needed for Pain 60 tablet 0    dicyclomine (BENTYL) 20 MG tablet Take 1 tablet by mouth 4 times daily as needed (fecal urgency) 120 tablet 3    divalproex (DEPAKOTE) 250 MG DR tablet Take 1 tablet by mouth 2 times daily 180 tablet 0    levocetirizine (XYZAL) 5 MG tablet Take 1 tablet by mouth nightly 30 tablet 5    levothyroxine (LEVOTHROID) 75 MCG tablet Take 1 tablet by mouth daily 90 tablet 1    Benzoyl Peroxide 2.5 % CREA Apply to face 2x per day 1 Tube 3    escitalopram (LEXAPRO) 20 MG tablet take 1 tablet by mouth once daily 120 tablet 3    tamsulosin (FLOMAX) 0.4 MG capsule       nicotine (NICODERM CQ) 14 MG/24HR 1 patch daily applied directly to skin 30 patch 3    mometasone (ELOCON) 0.1 % cream Apply topically daily Apply topically daily. 45 g 3    dantrolene (DANTRIUM) 25 MG capsule Take 1 capsule by mouth 2 times daily 180 capsule 3    saline nasal gel (AYR) GEL by Nasal route as needed for Congestion       No current facility-administered medications on file prior to visit.         Allergies   Allergen Reactions    Latex Rash    Acetaminophen Shortness Of Breath    Aspirin-Acetaminophen-Caffeine Shortness Of Breath    Dye [Iodides] Shortness Of Breath    Penicillins Anaphylaxis    Topamax [Topiramate] Nausea And Vomiting, Other (See Comments) and Shortness Of Breath    Tylenol With Codeine #3 [Acetaminophen-Codeine] Shortness Of Breath    Blueberry Flavor      ALLERGIC TO BLUEBERRY    Fish-Derived Products      SEAFOOD, ESPECIALLY SHRIMP    Iodine      Seafood allergy    Lidocaine Diarrhea, Itching and Swelling    Vicodin [Hydrocodone-Acetaminophen]      Acts drunk         Family History   Problem Relation Age of Onset    Early Death Maternal Grandfather     Cancer Maternal Grandfather     Asthma Brother     Heart Disease Maternal Aunt     Cancer Maternal Aunt     Heart Disease Maternal Uncle     Cancer Maternal Uncle     High Cholesterol Father     Diabetes Father     High Cholesterol Mother     Cancer Paternal Aunt     Cancer Paternal Uncle     Cancer Maternal Grandmother     Cancer Paternal Grandmother     Cancer Paternal Grandfather        Past Surgical History:   Procedure Laterality Date    COLONOSCOPY      EYE SURGERY      probing of ductal system right eye     FRACTURE SURGERY      R ELBOW CRUSH INJURY W PLATE AND PINS PLACED    MO COLONOSCOPY W/BIOPSY SINGLE/MULTIPLE  10/29/2018    COLONOSCOPY WITH BIOPSY performed by sOiris Ellison MD at [facility]COPY    MO REMOVAL OF TONSILS,12+ Y/O N/A 5/4/2018    TONSILLECTOMY [facility] Leticia [facility]er [facility] at [facility] OR       Social History     Tobacco Use    Smoking status: Current Some Day Smoker     Packs/day: 1.00     Years: 1.00     Pack years: 1.00     Types: Cigarettes     Start date: 1919    Smokeless tobacco: Never Used   Substance Use Topics    Alcohol use: No     Alcohol/week: 0.0 standard drinks    Drug use: No       ROS:    Review of Systems   Constitutional: Negative for activity change and appetite change. HENT: Negative for congestion and sore throat. Eyes: Negative for pain and itching. Respiratory: Negative for chest tightness and shortness of breath. Cardiovascular: Positive for leg swelling.  Negative for chest pain.   Gastrointestinal: Negative for abdominal pain, diarrhea and nausea. Genitourinary: Negative for dysuria and urgency. Musculoskeletal: Positive for gait problem. Negative for joint swelling and neck stiffness. Neurological: Negative for light-headedness and headaches. Psychiatric/Behavioral: Negative for confusion. The patient is not nervous/anxious. Objective:    VS:  Blood pressure 110/70, pulse 88, temperature 97.6 °F (36.4 °C), temperature source Oral, resp. rate 16, height 5' 8\" (1.727 m), weight 285 lb (129.3 kg), SpO2 98 %, not currently breastfeeding. Physical Exam   Constitutional: She is oriented to person, place, and time. She appears well-developed and well-nourished. No distress. HENT:   Head: Normocephalic and atraumatic. Mouth/Throat: No oropharyngeal exudate. Eyes: EOM are normal.   Cardiovascular: Normal rate, regular rhythm, normal heart sounds and intact distal pulses. Pulmonary/Chest: Effort normal and breath sounds normal. She has no wheezes. She has no rales. Abdominal: Soft. Bowel sounds are normal. She exhibits no distension. There is no abdominal tenderness. Musculoskeletal:      Comments: + Swelling left lower extremity. Able to wiggle toes. DP 2+ intact. Strength 5 out of 5 no deformity erythema   Neurological: She is alert and oriented to person, place, and time. She displays normal reflexes. Skin: Skin is warm and dry. No rash noted. No erythema. Psychiatric: She has a normal mood and affect. Her behavior is normal.           Assessment:     Diagnosis Orders   1. Closed avulsion fracture of distal end of right fibula, initial encounter  naproxen (NAPROSYN) 250 MG tablet    Walking boot, Cool Trom Advance    External Referral To Podiatry   2.  Class 3 severe obesity without serious comorbidity with body mass index (BMI) of 40.0 to 44.9 in adult, unspecified obesity type [facility])  Montrell Boykin MD, Bariatrics, Surgical Weight Loss [facility]    COMPREHENSIVE METABOLIC PANEL   3. Need for varicella vaccine  Varicella vaccine subcutaneous         Plans:  As above. RTO weeks to discuss depression and weight loss. Please see Patient Instructions for further counseling and information given. Advised to please be adherent to the treatment plans discussed today, and please call with any questions or concerns, letting the office know of any reasons that the plans may not be followed. The risks of untreated conditions include worsening illness, injury, disability, and possibly, death. Please call if symptoms change in any way, worsen, or fail to completely resolve, as this could necessitate a change to treatment plans. Patient and/or caregiver expressed understanding. Indications and proper use of medication(s) reviewed. Potential side-effects and risks of medication(s) also explained. Patient and/or caregiver was instructed to call if any new symptoms develop prior to next visit. Health risk factors discussed and addressed.

## 2020-09-04 LAB
ALBUMIN SERPL-MCNC: 4.5 G/DL (ref 3.5–5.2)
ALP BLD-CCNC: 71 U/L (ref 35–104)
ALT SERPL-CCNC: 16 U/L (ref 0–32)
ANION GAP SERPL CALCULATED.3IONS-SCNC: 19 MMOL/L (ref 7–16)
AST SERPL-CCNC: 21 U/L (ref 0–31)
BILIRUB SERPL-MCNC: 0.2 MG/DL (ref 0–1.2)
BUN BLDV-MCNC: 10 MG/DL (ref 6–20)
CALCIUM SERPL-MCNC: 9.4 MG/DL (ref 8.6–10.2)
CHLORIDE BLD-SCNC: 105 MMOL/L (ref 98–107)
CO2: 20 MMOL/L (ref 22–29)
CREAT SERPL-MCNC: 0.8 MG/DL (ref 0.5–1)
GFR AFRICAN AMERICAN: >60
GFR NON-AFRICAN AMERICAN: >60 ML/MIN/1.73
GLUCOSE BLD-MCNC: 96 MG/DL (ref 74–99)
POTASSIUM SERPL-SCNC: 4.5 MMOL/L (ref 3.5–5)
SODIUM BLD-SCNC: 144 MMOL/L (ref 132–146)
TOTAL PROTEIN: 7.2 G/DL (ref 6.4–8.3)

## 2020-09-04 ASSESSMENT — ENCOUNTER SYMPTOMS
CHEST TIGHTNESS: 0
SHORTNESS OF BREATH: 0
DIARRHEA: 0
EYE ITCHING: 0
ABDOMINAL PAIN: 0
SORE THROAT: 0
EYE PAIN: 0
NAUSEA: 0

## 2020-09-09 ENCOUNTER — OFFICE VISIT (OUTPATIENT)
Dept: ORTHOPEDIC SURGERY | Age: 30
End: 2020-09-09
Payer: COMMERCIAL

## 2020-09-09 ENCOUNTER — HOSPITAL ENCOUNTER (OUTPATIENT)
Dept: GENERAL RADIOLOGY | Age: 30
Discharge: HOME | End: 2020-09-11
Payer: COMMERCIAL

## 2020-09-09 VITALS — BODY MASS INDEX: 43.19 KG/M2 | WEIGHT: 285 LBS | HEIGHT: 68 IN

## 2020-09-09 PROCEDURE — G8427 DOCREV CUR MEDS BY ELIG CLIN: HCPCS | Performed by: NURSE PRACTITIONER

## 2020-09-09 PROCEDURE — 99203 OFFICE O/P NEW LOW 30 MIN: CPT | Performed by: NURSE PRACTITIONER

## 2020-09-09 PROCEDURE — 99202 OFFICE O/P NEW SF 15 MIN: CPT | Performed by: NURSE PRACTITIONER

## 2020-09-09 PROCEDURE — 4004F PT TOBACCO SCREEN RCVD TLK: CPT | Performed by: NURSE PRACTITIONER

## 2020-09-09 PROCEDURE — G8417 CALC BMI ABV UP PARAM F/U: HCPCS | Performed by: NURSE PRACTITIONER

## 2020-09-09 PROCEDURE — 73610 X-RAY EXAM OF ANKLE: CPT
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
## 2020-09-09 NOTE — PATIENT INSTRUCTIONS
OK to be weight bearing as tolerated in the boot  Ice and elevation as needed for pain and swelling control  OTC analgesics as needed for pain control  OK to come out of the boot for gentle ROM of the right ankle. Follow up in 4 weeks with repeat x-rays of the right ankle and possible progression into a good supportive shoes.   Call sooner with any problems or concerns

## 2020-09-18 ENCOUNTER — TELEPHONE (OUTPATIENT)
Dept: NEUROLOGY | Age: 30
End: 2020-09-18

## 2020-09-18 NOTE — LETTER
[facility]  [address]  [address] [place] [address]  Phone: [phone]  Fax: [phone]          September 18, 2020    [address]  [address]      Dear Pam Walter: We have been unable to reach you by phone to schedule your follow up appointment with Rosey Pryor. Please contact us as soon as possible to avoid any potential delays in your care. If you have already scheduled your appointment, please disregard this letter. Thank you.     Sincerely,      [facility] ([facility]) Neurology Staff

## 2020-09-18 NOTE — TELEPHONE ENCOUNTER
LM for pt to contact office and schedule follow up appt with JOSSE Monsalve. Letter sent to home address.   Electronically signed by Holley Spencer on 9/18/20 at 12:21 PM EDT

## 2020-09-30 RX ORDER — MECLIZINE HYDROCHLORIDE 25 MG/1
TABLET ORAL
Qty: 30 TABLET | Refills: 5 | Status: SHIPPED
Start: 2020-09-30 | End: 2021-03-19

## 2020-09-30 NOTE — TELEPHONE ENCOUNTER
Last Appointment:  9/3/2020  Future Appointments   Date Time Provider Donna Page   10/7/2020  9:45 AM Ky Pastrana MD [facility]   10/9/2020  9:45 AM Nena Tejeda MD Surg Weight [facility]   10/27/2020  1:40 PM JOSSE Cooper - CNP [facility]

## 2020-10-07 ENCOUNTER — OFFICE VISIT (OUTPATIENT)
Dept: ORTHOPEDIC SURGERY | Age: 30
End: 2020-10-07
Payer: COMMERCIAL

## 2020-10-07 ENCOUNTER — HOSPITAL ENCOUNTER (OUTPATIENT)
Dept: GENERAL RADIOLOGY | Age: 30
Discharge: HOME | End: 2020-10-09
Payer: COMMERCIAL

## 2020-10-07 VITALS
HEIGHT: 68 IN | TEMPERATURE: 97.2 F | SYSTOLIC BLOOD PRESSURE: 119 MMHG | DIASTOLIC BLOOD PRESSURE: 83 MMHG | BODY MASS INDEX: 43.33 KG/M2 | HEART RATE: 97 BPM

## 2020-10-07 PROCEDURE — 99213 OFFICE O/P EST LOW 20 MIN: CPT | Performed by: PHYSICIAN ASSISTANT

## 2020-10-07 PROCEDURE — 73610 X-RAY EXAM OF ANKLE: CPT

## 2020-10-07 PROCEDURE — 99212 OFFICE O/P EST SF 10 MIN: CPT | Performed by: PHYSICIAN ASSISTANT
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
## 2020-10-09 ENCOUNTER — INITIAL CONSULT (OUTPATIENT)
Dept: BARIATRICS/WEIGHT MGMT | Age: 30
End: 2020-10-09
Payer: COMMERCIAL

## 2020-10-09 VITALS
HEART RATE: 76 BPM | WEIGHT: 292 LBS | BODY MASS INDEX: 44.25 KG/M2 | DIASTOLIC BLOOD PRESSURE: 63 MMHG | RESPIRATION RATE: 20 BRPM | HEIGHT: 68 IN | TEMPERATURE: 97.9 F | SYSTOLIC BLOOD PRESSURE: 115 MMHG

## 2020-10-09 PROCEDURE — 99204 OFFICE O/P NEW MOD 45 MIN: CPT | Performed by: SURGERY

## 2020-10-09 PROCEDURE — G8484 FLU IMMUNIZE NO ADMIN: HCPCS | Performed by: SURGERY

## 2020-10-09 PROCEDURE — G8427 DOCREV CUR MEDS BY ELIG CLIN: HCPCS | Performed by: SURGERY

## 2020-10-09 PROCEDURE — G8417 CALC BMI ABV UP PARAM F/U: HCPCS | Performed by: SURGERY

## 2020-10-09 PROCEDURE — 99201 HC NEW PT, E/M LEVEL 1: CPT

## 2020-10-09 PROCEDURE — 4004F PT TOBACCO SCREEN RCVD TLK: CPT | Performed by: SURGERY

## 2020-10-09 RX ORDER — ADHESIVE TAPE 1.5"X360"
1 TAPE, NON-MEDICATED TOPICAL DAILY PRN
COMMUNITY

## 2020-10-09 RX ORDER — CHLORHEXIDINE GLUCONATE 0.12 MG/ML
15 RINSE ORAL 2 TIMES DAILY
COMMUNITY
End: 2020-11-25 | Stop reason: SDUPTHER

## 2020-10-09 NOTE — PATIENT INSTRUCTIONS
What is the next step to proceed with weight loss surgery? Please be aware that any co-pays or deductibles may be requested prior to testing and / or procedures. You will need to schedule a psychological evaluation for weight loss surgery. Patients will be required to complete all psychological testing as required by the mental health provider. Patients must also follow all of the provider's recommendations before weight loss surgery can be scheduled. The evaluation must be done a standard way for weight loss surgery. We strongly recommend that you contact one of our preferred providers listed below to arrange this:      Oanh Hernandez, [facility]  [address] [facility], [address] [place] [address]    Dr. Akua Marin, PhD  [address]   [phone]    Dr. Natalia Jordan, PhD    [facility] [place], [place]    [phone]      You will also need to plan on attending a 2 hour nutrition class at the [facility] prior to your surgery. We will schedule this for you when we schedule your surgery. Please remember to have your labs drawn 10 days prior to your first scheduled dietary appointment. Please remember, that while we will submit your case to insurance for surgery authorization, it is your responsibility to know if your plan covers weight loss surgery and keep up-to-date with changes to your insurance coverage. We will do everything possible to help you get approved for weight loss surgery, but cannot guarantee an approval.     Please note that you will not be submitted to your insurance company until all pre-operative testing requirements are met.

## 2020-10-09 NOTE — PROGRESS NOTES
Carmen De Jesus  10/9/2020  [facility]    Initial Evaluation  Bariatric Surgery and EGD       Subjective:  CHIEF COMPLAINT: Morbid obesity, malnutrition, GERD, Degenerative Joint Disease (DJD), Depression and Binge Eating Disorder    HISTORY OF PRESENT ILLNESS: Carmen De Jesus is a morbidly-obese 27 y.o.  female, who weighs 292 lb (132.5 kg). She is 132 pounds over her ideal body weight. The severity is severe with Body mass index is 44.4 kg/m². She has multiple medical problems aggravated by her obesity. They have been overweight since age 15. She wishes to have bariatric surgery so that she can lose a large amount of weight to a goal of their ideal body weight based on height, build and sex, and keep the weight off. I have met with her in the [facility] where we discussed the surgery in great detail and went over the risks and benefits. She has watched our informational video so she understands all of the extensive risks involved. She states that she understands all of the risks and wishes to proceed with the evaluation. We have discussed the different surgical options in detail with use of diagrams and drawings to detail the procedures, risks and alternatives. We discussed the postoperative diet and proposed life long diet for weight management after surgery.      They are a smoker  They have significant exposure to second hand smoke    Past Medical History  Patient Active Problem List   Diagnosis    Proctitis    Closed nondisplaced fracture of head of left radius    Chronic migraine without aura without status migrainosus, not intractable    Mild intermittent asthma without complication    Kidney stones    Pain in left wrist    Carpal tunnel syndrome    Closed avulsion fracture of distal end of right fibula     Past Surgical History  Past Surgical History:   Procedure Laterality Date    COLONOSCOPY      EYE SURGERY      probing of ductal system right eye     FRACTURE SURGERY      R ELBOW CRUSH INJURY W PLATE AND PINS PLACED    ME COLONOSCOPY W/BIOPSY SINGLE/MULTIPLE  10/29/2018    COLONOSCOPY WITH BIOPSY performed by Godfrey Jordan MD at [address] [place] [address] OF TONSILS,12+ Y/O N/A 5/4/2018    TONSILLECTOMY performed by Christianne Solorio MD at [facility] OR      Allergies: Latex; Acetaminophen; Aspirin-acetaminophen-caffeine; Dye [iodides]; Penicillins; Topamax [topiramate]; Tylenol with codeine #3 [acetaminophen-codeine]; Blueberry flavor; Fish-derived products; Iodine; Lidocaine; and Vicodin [hydrocodone-acetaminophen]     Home Medications  Current Outpatient Medications   Medication Sig Dispense Refill    Saline GEL 1 spray by Nasal route daily as needed      chlorhexidine (PERIDEX) 0.12 % solution Take 15 mLs by mouth 2 times daily      meclizine (ANTIVERT) 25 MG tablet take 1 tablet by mouth once daily if needed for dizziness 30 tablet 5    medroxyPROGESTERone (PROVERA) 10 MG tablet take 1 tablet by mouth once daily 90 tablet 0    albuterol sulfate  (90 Base) MCG/ACT inhaler inhale 2 puffs by mouth and INTO THE LUNGS every 6 hours if needed for wheezing 18 g 5    budesonide-formoterol (SYMBICORT) 80-4.5 MCG/ACT AERO Inhale 2 puffs into the lungs daily 1 Inhaler 3    baclofen (LIORESAL) 20 MG tablet 1 tablet in am, 1 in evening and 2 tablets at hs 120 tablet 11    gabapentin (NEURONTIN) 300 MG capsule Take 1 capsule by mouth 3 times daily for 182 days.  270 capsule 2    dicyclomine (BENTYL) 20 MG tablet Take 1 tablet by mouth 4 times daily as needed (fecal urgency) 120 tablet 3    divalproex (DEPAKOTE) 250 MG DR tablet Take 1 tablet by mouth 2 times daily 180 tablet 0    levocetirizine (XYZAL) 5 MG tablet Take 1 tablet by mouth nightly 30 tablet 5    levothyroxine (LEVOTHROID) 75 MCG tablet Take 1 tablet by mouth daily 90 tablet 1    Benzoyl Peroxide 2.5 % CREA Apply to face 2x per day 1 Tube 3    escitalopram (LEXAPRO) 20 MG tablet take 1 tablet by mouth once daily (Patient taking differently: Take 20 mg by mouth daily ) 120 tablet 3    tamsulosin (FLOMAX) 0.4 MG capsule       mometasone (ELOCON) 0.1 % cream Apply topically daily Apply topically daily. 45 g 3    dantrolene (DANTRIUM) 25 MG capsule Take 1 capsule by mouth 2 times daily 180 capsule 3     No current facility-administered medications for this visit. Social History:   TOBACCO:   reports that she has been smoking cigarettes. She has a 1.00 pack-year smoking history. She uses smokeless tobacco.  All smokers must join the free smoking cessation program and stop smoking for 3 months before having any Bariatric surgery. ETOH:    reports no history of alcohol use. The patient has a family history that is negative for severe cardiovascular or respiratory issues, negative for reaction to anesthesia. Review of Systems    A complete 10 system review was performed and are otherwise negative unless mentioned in the above HPI. Specific negatives are listed below but may not include all those reviewed.     General ROS: negative obtundation, AMS  ENT ROS: negative rhinorrhea, epistaxis  Allergy and Immunology ROS: negative itchy/watery eyes or nasal congestion  Hematological and Lymphatic ROS: negative spontaneous bleeding or bruising  Endocrine ROS: negative  lethargy, mood swings, palpitations or polydipsia/polyuria  Respiratory ROS: negative sputum changes, stridor, tachypnea or wheezing  Cardiovascular ROS: negative for - loss of consciousness, murmur or orthopnea  Gastrointestinal ROS: negative for - hematochezia or hematemesis  Genito-Urinary ROS: negative for -  genital discharge or hematuria  Musculoskeletal ROS: negative for - focal weakness, gangrene  Psych/Neuro ROS: negative for - visual or auditory hallucinations, suicidal ideation      Physical Exam:   VITALS: /63 (Site: Left Upper Arm, Position: Sitting, Cuff Size: Large Adult)   Pulse 76   Temp 97.9 °F (36.6 °C) (Temporal)   Resp 20   Ht 5' 8\" (1.727 m)   Wt 292 lb (132.5 kg)   BMI 44.40 kg/m²   General appearance: AAO, NAD  Eyes: PERRL, EOMI, red conjunctiva  Lungs: equal chest rise bilateral, no wheeze, no rhonchi  Chest wall: atraumatic, no tenderness, no echymosis or abrasions  Heart: reg rate, no murmur  Abdomen: soft, nondistended, nontender, obese  Extremities: full ROM all 4 ext, no gross motor or sensory deficits  Pulses: 2+ distal  Skin: warm and dry  Neurologic: spontanous eye opening, purposeful, follows complex commands      Assessment:  Morbid obesity with inability to keep the weight off with diet and exercise. She is interested in Laparoscopic Joni-en- Y Gastric Bypass or Sleeve Gastrectomy. We discussed that our goal is to ameliorate the medical problems and not to obtain a specific body mass index. She understands the risks and benefits, and wishes to proceed with the evaluation. Plan:  Psych evaluation, medical and clearance, pap test, gallbladder ultrasound. These patients often have vitamin deficiencies so I will do screening labs for malnutrition. I will do an upper endoscopy to check for hiatal hernias, ulcers and H. Pylori while we wait for insurance approval for the surgery. I have spent over 45min in evaluation of the patient including greater than 50% of that time face to face discussing surgical options, medical and surgical history, plans for medical weight loss management and preoperative clearance for surgery. They did have family present for the discussion and visual aides and drawings were used to explain anatomy and surgical procedures. I have concerns regarding patient education level and ability to adhere to dietary requirements. I believe she has support at home with mother with regards to assistance. She is not employed.     Physician Signature: Electronically signed by Dr. Mark Tse MD    Send copy of H&P to PCP, Alphonse Perez MD

## 2020-10-22 ENCOUNTER — HOSPITAL ENCOUNTER (OUTPATIENT)
Age: 30
Discharge: HOME | End: 2020-10-24
Payer: COMMERCIAL

## 2020-10-22 PROCEDURE — U0003 INFECTIOUS AGENT DETECTION BY NUCLEIC ACID (DNA OR RNA); SEVERE ACUTE RESPIRATORY SYNDROME CORONAVIRUS 2 (SARS-COV-2) (CORONAVIRUS DISEASE [COVID-19]), AMPLIFIED PROBE TECHNIQUE, MAKING USE OF HIGH THROUGHPUT TECHNOLOGIES AS DESCRIBED BY CMS-2020-01-R: HCPCS

## 2020-10-23 LAB
SARS-COV-2: NOT DETECTED
SOURCE: NORMAL

## 2020-10-27 ENCOUNTER — OFFICE VISIT (OUTPATIENT)
Dept: NEUROLOGY | Age: 30
End: 2020-10-27
Payer: COMMERCIAL

## 2020-10-27 VITALS
OXYGEN SATURATION: 98 % | HEART RATE: 89 BPM | HEIGHT: 68 IN | DIASTOLIC BLOOD PRESSURE: 66 MMHG | RESPIRATION RATE: 12 BRPM | SYSTOLIC BLOOD PRESSURE: 96 MMHG | BODY MASS INDEX: 44.25 KG/M2 | WEIGHT: 292 LBS

## 2020-10-27 PROBLEM — G80.0 CONGENITAL SPASTIC QUADRIPARESIS (HCC): Status: ACTIVE | Noted: 2020-10-27

## 2020-10-27 PROBLEM — M25.532 PAIN IN LEFT WRIST: Status: RESOLVED | Noted: 2019-12-09 | Resolved: 2020-10-27

## 2020-10-27 PROCEDURE — G8427 DOCREV CUR MEDS BY ELIG CLIN: HCPCS | Performed by: NURSE PRACTITIONER

## 2020-10-27 PROCEDURE — 99213 OFFICE O/P EST LOW 20 MIN: CPT | Performed by: NURSE PRACTITIONER

## 2020-10-27 PROCEDURE — 4004F PT TOBACCO SCREEN RCVD TLK: CPT | Performed by: NURSE PRACTITIONER

## 2020-10-27 PROCEDURE — G8417 CALC BMI ABV UP PARAM F/U: HCPCS | Performed by: NURSE PRACTITIONER

## 2020-10-27 PROCEDURE — G8484 FLU IMMUNIZE NO ADMIN: HCPCS | Performed by: NURSE PRACTITIONER

## 2020-10-27 RX ORDER — DANTROLENE SODIUM 25 MG/1
25 CAPSULE ORAL 3 TIMES DAILY
Qty: 270 CAPSULE | Refills: 3 | Status: SHIPPED
Start: 2020-10-27 | End: 2021-04-27

## 2020-10-27 RX ORDER — BACLOFEN 20 MG/1
TABLET ORAL
Qty: 360 TABLET | Refills: 3 | Status: SHIPPED
Start: 2020-10-27 | End: 2021-10-19 | Stop reason: SDUPTHER

## 2020-10-27 NOTE — PATIENT INSTRUCTIONS
out, lower your elbows down and behind your back. You will feel your shoulder blades slide down and together, and at the same time you will feel a stretch across your chest and the front of your shoulders. 4. Hold for about 6 seconds, and then relax for up to 10 seconds. 5. Repeat 8 to 12 times. Strengthening: Hands on head   1. Move your head backward, forward, and side to side against gentle pressure from your hands, holding each position for about 6 seconds. 2. Repeat 8 to 12 times. Follow-up care is a key part of your treatment and safety. Be sure to make and go to all appointments, and call your doctor if you are having problems. It's also a good idea to know your test results and keep a list of the medicines you take. Where can you learn more? Go to https://[employer]. org and sign in to your [employer] account. Enter P975 in the [employer] box to learn more about \"Neck: Exercises. \"     If you do not have an account, please click on the \"Sign Up Now\" link. Current as of: March 2, 2020               Content Version: 12.6  © 3451-1001 [employer]. Care instructions adapted under license by [facility] ([facility]). If you have questions about a medical condition or this instruction, always ask your healthcare professional. [address] any warranty or liability for your use of this information.
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
## 2020-10-28 RX ORDER — BACLOFEN 20 MG/1
TABLET ORAL
Qty: 360 TABLET | Refills: 3 | OUTPATIENT
Start: 2020-10-28

## 2020-10-28 RX ORDER — DANTROLENE SODIUM 25 MG/1
25 CAPSULE ORAL 3 TIMES DAILY
Qty: 270 CAPSULE | Refills: 3 | OUTPATIENT
Start: 2020-10-28

## 2020-10-28 NOTE — PROGRESS NOTES
valuables (money, credit cards, checkbooks, etc.) Do not wear any makeup (including no eye makeup) or nail polish on your fingers or toes. 11. DO NOT wear any jewelry or piercings on day of surgery. All body piercing jewelry must be removed. 12. Shower the night before surgery with ___Antibacterial soap /PUMA WIPES________    13. TOTAL JOINT REPLACEMENT/HYSTERECTOMY PATIENTS ONLY---Remember to bring Blood Bank bracelet to the hospital on the day of surgery. 14. If you have a Living Will and Durable Power of  for Healthcare, please bring in a copy. 15. If appropriate bring crutches, inspirex, WALKER, CANE etc... 12. Notify your Surgeon if you develop any illness between now and surgery time, cough, cold, fever, sore throat, nausea, vomiting, etc.  Please notify your surgeon if you experience dizziness, shortness of breath or blurred vision between now & the time of your surgery. 17. If you have ___dentures, they will be removed before going to the OR; we will provide you a container. If you wear ___contact lenses or ___glasses, they will be removed; please bring a case for them. 18. To provide excellent care visitors will be limited to 2 in the room at any given time. 19. Please bring picture ID and insurance card. 20. Sleep apnea patients need to bring CPAP AND SETTINGS to hospital on day of surgery. 21. During flu season no children under the age of 15 are permitted in the hospital for the safety of all patients. 22. The day of your procedure report to the main [facility] information desk and have them direct you to the ultrasound dept to have the gallbladder us done. After that is completed report to the laboratory and have the labs drawn. After this is completed report to the main [facility] information desk and you will be directed where to go.                   Please call AMBULATORY CARE if you have any further questions.    [facility]     [address]

## 2020-10-28 NOTE — TELEPHONE ENCOUNTER
Last Appointment:  1/17/2020  Future Appointments   Date Time Provider Donna Page   10/29/2020  8:15 AM [facility]   11/6/2020 11:00 AM Catherine Woods MD Surg Weight [facility]   11/10/2020 11:00 AM Audra Shukla MS, RD, LD Surg [facility]   11/18/2020  1:00 PM Reed Merino MD [facility]   4/26/2021  1:40 PM Chintan Marie, APRN - CNP [facility]

## 2020-10-29 ENCOUNTER — ANESTHESIA (OUTPATIENT)
Dept: ENDOSCOPY | Age: 30
End: 2020-10-29
Payer: COMMERCIAL

## 2020-10-29 ENCOUNTER — HOSPITAL ENCOUNTER (OUTPATIENT)
Dept: ULTRASOUND IMAGING | Age: 30
Discharge: HOME | End: 2020-10-29
Payer: COMMERCIAL

## 2020-10-29 ENCOUNTER — ANESTHESIA EVENT (OUTPATIENT)
Dept: ENDOSCOPY | Age: 30
End: 2020-10-29
Payer: COMMERCIAL

## 2020-10-29 ENCOUNTER — HOSPITAL ENCOUNTER (OUTPATIENT)
Age: 30
Setting detail: OUTPATIENT SURGERY
Discharge: HOME | End: 2020-10-29
Attending: SURGERY | Admitting: SURGERY
Payer: COMMERCIAL

## 2020-10-29 VITALS
DIASTOLIC BLOOD PRESSURE: 86 MMHG | RESPIRATION RATE: 14 BRPM | OXYGEN SATURATION: 97 % | SYSTOLIC BLOOD PRESSURE: 111 MMHG

## 2020-10-29 VITALS
DIASTOLIC BLOOD PRESSURE: 86 MMHG | HEART RATE: 78 BPM | WEIGHT: 293 LBS | RESPIRATION RATE: 16 BRPM | BODY MASS INDEX: 44.41 KG/M2 | TEMPERATURE: 97 F | OXYGEN SATURATION: 100 % | HEIGHT: 68 IN | SYSTOLIC BLOOD PRESSURE: 140 MMHG

## 2020-10-29 LAB
ALBUMIN SERPL-MCNC: 4.6 G/DL (ref 3.5–5.2)
ALP BLD-CCNC: 76 U/L (ref 35–104)
ALT SERPL-CCNC: 18 U/L (ref 0–32)
ANION GAP SERPL CALCULATED.3IONS-SCNC: 9 MMOL/L (ref 7–16)
AST SERPL-CCNC: 17 U/L (ref 0–31)
BILIRUB SERPL-MCNC: 0.3 MG/DL (ref 0–1.2)
BUN BLDV-MCNC: 13 MG/DL (ref 6–20)
CALCIUM SERPL-MCNC: 9.3 MG/DL (ref 8.6–10.2)
CHLORIDE BLD-SCNC: 107 MMOL/L (ref 98–107)
CHOLESTEROL, TOTAL: 190 MG/DL (ref 0–199)
CO2: 25 MMOL/L (ref 22–29)
CREAT SERPL-MCNC: 0.8 MG/DL (ref 0.5–1)
FERRITIN: 112 NG/ML
FOLATE: 11.6 NG/ML (ref 4.8–24.2)
GFR AFRICAN AMERICAN: >60
GFR NON-AFRICAN AMERICAN: >60 ML/MIN/1.73
GLUCOSE BLD-MCNC: 90 MG/DL (ref 74–99)
HBA1C MFR BLD: 5 % (ref 4–5.6)
HCG(URINE) PREGNANCY TEST: NEGATIVE
HCT VFR BLD CALC: 45.2 % (ref 34–48)
HEMOGLOBIN: 14.8 G/DL (ref 11.5–15.5)
MCH RBC QN AUTO: 29.2 PG (ref 26–35)
MCHC RBC AUTO-ENTMCNC: 32.7 % (ref 32–34.5)
MCV RBC AUTO: 89.3 FL (ref 80–99.9)
PDW BLD-RTO: 13.6 FL (ref 11.5–15)
PLATELET # BLD: 209 E9/L (ref 130–450)
PMV BLD AUTO: 12.4 FL (ref 7–12)
POTASSIUM SERPL-SCNC: 4 MMOL/L (ref 3.5–5)
RBC # BLD: 5.06 E12/L (ref 3.5–5.5)
SODIUM BLD-SCNC: 141 MMOL/L (ref 132–146)
TOTAL PROTEIN: 7 G/DL (ref 6.4–8.3)
TRIGL SERPL-MCNC: 152 MG/DL (ref 0–149)
VITAMIN B-12: 207 PG/ML (ref 211–946)
VITAMIN D 25-HYDROXY: 16 NG/ML (ref 30–100)
WBC # BLD: 8.3 E9/L (ref 4.5–11.5)

## 2020-10-29 PROCEDURE — 84425 ASSAY OF VITAMIN B-1: CPT

## 2020-10-29 PROCEDURE — 82306 VITAMIN D 25 HYDROXY: CPT

## 2020-10-29 PROCEDURE — 84478 ASSAY OF TRIGLYCERIDES: CPT

## 2020-10-29 PROCEDURE — 7100000011 HC PHASE II RECOVERY - ADDTL 15 MIN: Performed by: SURGERY

## 2020-10-29 PROCEDURE — 82746 ASSAY OF FOLIC ACID SERUM: CPT

## 2020-10-29 PROCEDURE — 2580000003 HC RX 258: Performed by: SURGERY

## 2020-10-29 PROCEDURE — 80053 COMPREHEN METABOLIC PANEL: CPT

## 2020-10-29 PROCEDURE — 2709999900 HC NON-CHARGEABLE SUPPLY: Performed by: SURGERY

## 2020-10-29 PROCEDURE — 82607 VITAMIN B-12: CPT

## 2020-10-29 PROCEDURE — 88342 IMHCHEM/IMCYTCHM 1ST ANTB: CPT

## 2020-10-29 PROCEDURE — 84630 ASSAY OF ZINC: CPT

## 2020-10-29 PROCEDURE — 88305 TISSUE EXAM BY PATHOLOGIST: CPT

## 2020-10-29 PROCEDURE — 2500000003 HC RX 250 WO HCPCS: Performed by: NURSE ANESTHETIST, CERTIFIED REGISTERED

## 2020-10-29 PROCEDURE — 36415 COLL VENOUS BLD VENIPUNCTURE: CPT

## 2020-10-29 PROCEDURE — 7100000010 HC PHASE II RECOVERY - FIRST 15 MIN: Performed by: SURGERY

## 2020-10-29 PROCEDURE — 3609012400 HC EGD TRANSORAL BIOPSY SINGLE/MULTIPLE: Performed by: SURGERY

## 2020-10-29 PROCEDURE — 76705 ECHO EXAM OF ABDOMEN: CPT

## 2020-10-29 PROCEDURE — 3700000000 HC ANESTHESIA ATTENDED CARE: Performed by: SURGERY

## 2020-10-29 PROCEDURE — 82728 ASSAY OF FERRITIN: CPT

## 2020-10-29 PROCEDURE — 81025 URINE PREGNANCY TEST: CPT

## 2020-10-29 PROCEDURE — 43239 EGD BIOPSY SINGLE/MULTIPLE: CPT | Performed by: SURGERY

## 2020-10-29 PROCEDURE — 82465 ASSAY BLD/SERUM CHOLESTEROL: CPT

## 2020-10-29 PROCEDURE — 83036 HEMOGLOBIN GLYCOSYLATED A1C: CPT

## 2020-10-29 PROCEDURE — 85027 COMPLETE CBC AUTOMATED: CPT

## 2020-10-29 PROCEDURE — 6360000002 HC RX W HCPCS: Performed by: NURSE ANESTHETIST, CERTIFIED REGISTERED

## 2020-10-29 RX ORDER — MOMETASONE FUROATE 1 MG/G
CREAM TOPICAL DAILY
Qty: 45 G | Refills: 3 | Status: SHIPPED
Start: 2020-10-29 | Stop reason: SDUPTHER

## 2020-10-29 RX ORDER — PROPOFOL 10 MG/ML
INJECTION, EMULSION INTRAVENOUS PRN
Status: DISCONTINUED | OUTPATIENT
Start: 2020-10-29 | End: 2020-10-29 | Stop reason: SDUPTHER

## 2020-10-29 RX ORDER — SODIUM CHLORIDE 0.9 % (FLUSH) 0.9 %
10 SYRINGE (ML) INJECTION PRN
Status: DISCONTINUED | OUTPATIENT
Start: 2020-10-29 | End: 2020-10-29 | Stop reason: HOSPADM

## 2020-10-29 RX ORDER — SODIUM CHLORIDE 0.9 % (FLUSH) 0.9 %
10 SYRINGE (ML) INJECTION EVERY 12 HOURS SCHEDULED
Status: DISCONTINUED | OUTPATIENT
Start: 2020-10-29 | End: 2020-10-29 | Stop reason: HOSPADM

## 2020-10-29 RX ORDER — LIDOCAINE HYDROCHLORIDE 10 MG/ML
INJECTION, SOLUTION INFILTRATION; PERINEURAL PRN
Status: DISCONTINUED | OUTPATIENT
Start: 2020-10-29 | End: 2020-10-29 | Stop reason: SDUPTHER

## 2020-10-29 RX ORDER — SODIUM CHLORIDE 9 MG/ML
INJECTION, SOLUTION INTRAVENOUS CONTINUOUS
Status: DISCONTINUED | OUTPATIENT
Start: 2020-10-29 | End: 2020-10-29 | Stop reason: HOSPADM

## 2020-10-29 RX ORDER — MIDAZOLAM HYDROCHLORIDE 1 MG/ML
INJECTION INTRAMUSCULAR; INTRAVENOUS PRN
Status: DISCONTINUED | OUTPATIENT
Start: 2020-10-29 | End: 2020-10-29 | Stop reason: SDUPTHER

## 2020-10-29 RX ADMIN — SODIUM CHLORIDE: 9 INJECTION, SOLUTION INTRAVENOUS at 09:46

## 2020-10-29 RX ADMIN — SODIUM CHLORIDE: 9 INJECTION, SOLUTION INTRAVENOUS at 08:52

## 2020-10-29 RX ADMIN — MIDAZOLAM 2 MG: 1 INJECTION INTRAMUSCULAR; INTRAVENOUS at 09:43

## 2020-10-29 RX ADMIN — PROPOFOL 100 MG: 10 INJECTION, EMULSION INTRAVENOUS at 09:50

## 2020-10-29 RX ADMIN — LIDOCAINE HYDROCHLORIDE 50 MG: 10 INJECTION, SOLUTION INFILTRATION; PERINEURAL at 09:48

## 2020-10-29 ASSESSMENT — PAIN SCALES - GENERAL: PAINLEVEL_OUTOF10: 0

## 2020-10-29 ASSESSMENT — PAIN - FUNCTIONAL ASSESSMENT: PAIN_FUNCTIONAL_ASSESSMENT: 0-10

## 2020-10-29 NOTE — OP NOTE
[address] [place] [address] [facility]           Operative Report    DATE OF PROCEDURE: 10/29/2020    Anita Percy    SURGEON: Catherine Woods MD.     ASSISTANT: None    PREOPERATIVE DIAGNOSES:  GERD    POSTOPERATIVE DIAGNOSES:   (1) Small sliding Hiatal Hernia  (2) No Esophagitis  (3) No Gastritis    OPERATION: [id number] with antral biopsies    ANESTHESIA: LMAC    EBL: None    COMPLICATIONS: None. History and consent: This is a 27y.o. year old female who is having GERD. I have discussed with the patient the indication, alternatives, and the possible risks and/or complications of upper endoscopy and the conscious sedation anesthesia. The patient and/or family understands and agrees to proceed. Monitoring and Safety:    The patient was placed on a cardiac monitor and vital signs, pulse oximetry and level of consciousness were continuously evaluated throughout the procedure. The patient was closely monitored until recovery from the medications was complete and the patient had returned to baseline status. Anesthesia was present at all times during the procedure. OPERATIONS: The patient was placed on the table and sedated while blood pressure, pulse and pulse oximetry were continuously monitored by the anesthesia team. A bite block was placed prior to sedation and the patient was placed in the left lateral decubitus position. A lubricated scope was easily passed into the upper esophagus which looked normal. The distal esophagus looked normal. The scope was passed into the stomach and retroflexed. The gastroesophageal junction was at 38cm. There was 1cm hiatal hernia. The scope was passed down toward the pylorus. The antral mucosa looked normal. Biopsy was taken to check for H. pylori.  The scope was then passed through the pylorus into the duodenal bulb which looked normal, then around to the distal duodenum which looked normal, and the scope was then withdrawn. The patient tolerated the procedure well.        Diet: Low fat, low calorie    PLAN:  (1) Follow up in office to discuss pathology, imaging, labs      Further Procedures:  Surgical weight loss pending approval    Physician Signature: Electronically signed by Dr. Lorraine Shannon

## 2020-10-29 NOTE — H&P
right eye     FRACTURE SURGERY      R ELBOW CRUSH INJURY W PLATE AND PINS PLACED    MO COLONOSCOPY W/BIOPSY SINGLE/MULTIPLE  10/29/2018    COLONOSCOPY WITH BIOPSY performed by Guy Higgins MD at [address] Y/O N/A 5/4/2018    TONSILLECTOMY performed by Flaquito Kingston MD at [facility] OR      Allergies: Latex; Acetaminophen; Aspirin-acetaminophen-caffeine; Dye [iodides]; Penicillins; Topamax [topiramate]; Tylenol with codeine #3 [acetaminophen-codeine]; Blueberry flavor; Fish-derived products; Iodine; Lidocaine; and Vicodin [hydrocodone-acetaminophen]     Home Medications  Current Facility-Administered Medications   Medication Dose Route Frequency Provider Last Rate Last Dose    0.9 % sodium chloride infusion   Intravenous Continuous Balbir Klein  mL/hr at 10/29/20 0852      sodium chloride flush 0.9 % injection 10 mL  10 mL Intravenous 2 times per day Balbir Klein MD        sodium chloride flush 0.9 % injection 10 mL  10 mL Intravenous PRN Balbir Klein MD           Social History:   TOBACCO:   reports that she has been smoking cigarettes. She has a 1.00 pack-year smoking history. She uses smokeless tobacco.  All smokers must join the free smoking cessation program and stop smoking for 3 months before having any Bariatric surgery. ETOH:    reports no history of alcohol use. The patient has a family history that is negative for severe cardiovascular or respiratory issues, negative for reaction to anesthesia. Review of Systems    A complete 10 system review was performed and are otherwise negative unless mentioned in the above HPI. Specific negatives are listed below but may not include all those reviewed.     General ROS: negative obtundation, AMS  ENT ROS: negative rhinorrhea, epistaxis  Allergy and Immunology ROS: negative itchy/watery eyes or nasal congestion  Hematological and Lymphatic ROS: negative spontaneous bleeding or bruising  Endocrine ROS: negative  lethargy, mood swings, palpitations or polydipsia/polyuria  Respiratory ROS: negative sputum changes, stridor, tachypnea or wheezing  Cardiovascular ROS: negative for - loss of consciousness, murmur or orthopnea  Gastrointestinal ROS: negative for - hematochezia or hematemesis  Genito-Urinary ROS: negative for -  genital discharge or hematuria  Musculoskeletal ROS: negative for - focal weakness, gangrene  Psych/Neuro ROS: negative for - visual or auditory hallucinations, suicidal ideation      Physical Exam:   VITALS: /77   Pulse 70   Temp 97.7 °F (36.5 °C) (Temporal)   Resp 16   Ht 5' 8\" (1.727 m)   Wt 293 lb (132.9 kg)   SpO2 99%   BMI 44.55 kg/m²   General appearance: AAO, NAD  Eyes: PERRL, EOMI, red conjunctiva  Lungs: equal chest rise bilateral, no wheeze, no rhonchi  Chest wall: atraumatic, no tenderness, no echymosis or abrasions  Heart: reg rate, no murmur  Abdomen: soft, nondistended, nontender, obese  Extremities: full ROM all 4 ext, no gross motor or sensory deficits  Pulses: 2+ distal  Skin: warm and dry  Neurologic: spontanous eye opening, purposeful, follows complex commands      Assessment:  Morbid obesity with inability to keep the weight off with diet and exercise. She is interested in Laparoscopic Joni-en- Y Gastric Bypass or Sleeve Gastrectomy. We discussed that our goal is to ameliorate the medical problems and not to obtain a specific body mass index. She understands the risks and benefits, and wishes to proceed with the evaluation. Plan:  Psych evaluation, medical and clearance, pap test, gallbladder ultrasound. These patients often have vitamin deficiencies so I will do screening labs for malnutrition. I will do an upper endoscopy to check for hiatal hernias, ulcers and H. Pylori while we wait for insurance approval for the surgery.       I have spent over 45min in evaluation of the patient including greater than 50% of that time face to face discussing surgical options, medical and surgical history, plans for medical weight loss management and preoperative clearance for surgery. They did have family present for the discussion and visual aides and drawings were used to explain anatomy and surgical procedures. I have concerns regarding patient education level and ability to adhere to dietary requirements. I believe she has support at home with mother with regards to assistance. She is not employed.     Physician Signature: Electronically signed by Dr. Miles Offer copy of H&P to PCP, Reema Marrero MD

## 2020-10-29 NOTE — ANESTHESIA PRE PROCEDURE
Department of Anesthesiology  Preprocedure Note       Name:  Mae Crisostomo   Age:  27 y.o.  :  1990                                          MRN:  [id number]         Date:  10/29/2020      Surgeon: Carine Best):  Moiz Christiansen MD    Procedure: EGD ESOPHAGOGASTRODUODENOSCOPY (DO NOT CHANGE TIME-TRANSPORTATION) (N/A )    Medications prior to admission:   Prior to Admission medications    Medication Sig Start Date End Date Taking? Authorizing Provider   baclofen (LIORESAL) 20 MG tablet Take 1 tablet by mouth every morning AND 1 tablet Daily with lunch AND 2 tablets nightly. 10/27/20   JOSSE Fraser CNP   dantrolene (DANTRIUM) 25 MG capsule Take 1 capsule by mouth 3 times daily 10/27/20   JOSSE Fraser CNP   Saline GEL 1 spray by Nasal route daily as needed    Historical Provider, MD   chlorhexidine (PERIDEX) 0.12 % solution Take 15 mLs by mouth 2 times daily    Historical Provider, MD   meclizine (ANTIVERT) 25 MG tablet take 1 tablet by mouth once daily if needed for dizziness 20   Zechariah Avalos MD   medroxyPROGESTERone (PROVERA) 10 MG tablet take 1 tablet by mouth once daily 9/3/20   Zechariah Avalos MD   albuterol sulfate  (90 Base) MCG/ACT inhaler inhale 2 puffs by mouth and INTO THE LUNGS every 6 hours if needed for wheezing 20   Zechariah Avalos MD   budesonide-formoterol (SYMBICORT) 80-4.5 MCG/ACT AERO Inhale 2 puffs into the lungs daily 20   Zechariah Avalos MD   gabapentin (NEURONTIN) 300 MG capsule Take 1 capsule by mouth 3 times daily for 182 days.  20  Zechariah Avalos MD   divalproex (DEPAKOTE) 250 MG DR tablet Take 1 tablet by mouth 2 times daily 6/3/20   Zechariah Avalos MD   levocetirizine (XYZAL) 5 MG tablet Take 1 tablet by mouth nightly 6/3/20   Zechariah Avalos MD   levothyroxine (LEVOTHROID) 75 MCG tablet Take 1 tablet by mouth daily 6/3/20   Zechariah Avalos MD   Benzoyl Peroxide 2.5 % CREA Apply to face 2x per day 6/3/20   Nancy MIGUEL Marlena Spatz, MD   escitalopram (LEXAPRO) 20 MG tablet take 1 tablet by mouth once daily  Patient taking differently: Take 20 mg by mouth daily  4/27/20   Carmine Herr MD   tamsulosin [facility]) 0.4 MG capsule  1/21/20   Historical Provider, MD   mometasone (ELOCON) 0.1 % cream Apply topically daily Apply topically daily. 1/17/20   Dorothy Quintero MD       Current medications:    No current facility-administered medications for this visit. No current outpatient medications on file. Facility-Administered Medications Ordered in Other Visits   Medication Dose Route Frequency Provider Last Rate Last Dose    0.9 % sodium chloride infusion   Intravenous Continuous Jeana Alva  mL/hr at 10/29/20 0852      sodium chloride flush 0.9 % injection 10 mL  10 mL Intravenous 2 times per day Jeana Alva MD        sodium chloride flush 0.9 % injection 10 mL  10 mL Intravenous PRN Jeana Alva MD           Allergies:     Allergies   Allergen Reactions    Latex Rash    Acetaminophen Shortness Of Breath    Aspirin-Acetaminophen-Caffeine Shortness Of Breath    Dye [Iodides] Shortness Of Breath    Penicillins Anaphylaxis    Topamax [Topiramate] Nausea And Vomiting, Other (See Comments) and Shortness Of Breath    Tylenol With Codeine #3 [Acetaminophen-Codeine] Shortness Of Breath    Blueberry Flavor      ALLERGIC TO BLUEBERRY    Fish-Derived Products      SEAFOOD, ESPECIALLY SHRIMP    Iodine      Seafood allergy    Lidocaine Diarrhea, Itching and Swelling    Vicodin [Hydrocodone-Acetaminophen]      Acts drunk         Problem List:    Patient Active Problem List   Diagnosis Code    Closed nondisplaced fracture of head of left radius S52.125A    Chronic migraine without aura without status migrainosus, not intractable G43.709    Mild intermittent asthma without complication R01.40    Kidney stones N20.0    Carpal tunnel syndrome G56.00    Closed avulsion fracture of distal end of right fibula 292 lb (132.5 kg)   10/09/20 292 lb (132.5 kg)     There is no height or weight on file to calculate BMI.    CBC:   Lab Results   Component Value Date    WBC 8.3 10/29/2020    RBC 5.06 10/29/2020    HGB 14.8 10/29/2020    HCT 45.2 10/29/2020    MCV 89.3 10/29/2020    RDW 13.6 10/29/2020     10/29/2020       CMP:   Lab Results   Component Value Date     10/29/2020    K 4.0 10/29/2020     10/29/2020    CO2 25 10/29/2020    BUN 13 10/29/2020    CREATININE 0.8 10/29/2020    GFRAA >60 10/29/2020    LABGLOM >60 10/29/2020    GLUCOSE 90 10/29/2020    GLUCOSE 85 08/11/2011    PROT 7.0 10/29/2020    CALCIUM 9.3 10/29/2020    BILITOT 0.3 10/29/2020    ALKPHOS 76 10/29/2020    AST 17 10/29/2020    ALT 18 10/29/2020       POC Tests: No results for input(s): POCGLU, POCNA, POCK, POCCL, POCBUN, POCHEMO, POCHCT in the last 72 hours. Coags: No results found for: PROTIME, INR, APTT    HCG (If Applicable):   Lab Results   Component Value Date    PREGTESTUR NEGATIVE 10/29/2020        ABGs: No results found for: PHART, PO2ART, DAV8NNE, ITT4CGS, BEART, S2ZQBKFG     Type & Screen (If Applicable):  No results found for: LABABO, LABRH    EKG 1/13/18  Sinus  Tachycardia  -Short OH syndrome   Edy = 116  BORDERLINE RHYTHM    ECHO 10/17/16   Summary   Limited study   Normal left ventricle size and systolic function   Ejection fraction is visually estimated at 60%.   No wall motion abnormalities   Physiologic and/or trace mitral regurgitation is present. CXR 9/6/16    Impression       1.  No acute pleuroparenchymal disease.               Anesthesia Evaluation  Patient summary reviewed and Nursing notes reviewed  Airway: Mallampati: III  TM distance: >3 FB   Neck ROM: limited  Mouth opening: > = 3 FB Dental: normal exam         Pulmonary:normal exam  breath sounds clear to auscultation  (+) asthma (uses rescue few times/ week ):                            Cardiovascular:Negative CV ROS          ECG reviewed  Rhythm: regular  Rate: normal  Echocardiogram reviewed                  Neuro/Psych:   (+) seizures (focal ):, neuromuscular disease:, headaches:, psychiatric history:depression/anxiety  (anxiety )             ROS comment: Post- traumatic seizures   Chronic midline low back pain without sciatica   Herniated C-spine GI/Hepatic/Renal:   (+) GERD:,          ROS comment: Gastritis and duodenitis   Colon polyp  . Endo/Other:    (+) hypothyroidism::., .                 Abdominal:   (+) obese,         Vascular:                                        Anesthesia Plan      MAC     ASA 3       Induction: intravenous. Anesthetic plan and risks discussed with patient. Plan discussed with CRNA.     Attending anesthesiologist reviewed and agrees with [address] MD Rhina   10/29/2020

## 2020-11-02 LAB — VITAMIN B1 WHOLE BLOOD: 147 NMOL/L (ref 70–180)

## 2020-11-04 ENCOUNTER — TELEPHONE (OUTPATIENT)
Dept: BARIATRICS/WEIGHT MGMT | Age: 30
End: 2020-11-04

## 2020-11-04 LAB — ZINC: 74 UG/DL (ref 60–120)

## 2020-11-05 ENCOUNTER — TELEPHONE (OUTPATIENT)
Dept: BARIATRICS/WEIGHT MGMT | Age: 30
End: 2020-11-05

## 2020-11-06 ENCOUNTER — OFFICE VISIT (OUTPATIENT)
Dept: BARIATRICS/WEIGHT MGMT | Age: 30
End: 2020-11-06
Payer: COMMERCIAL

## 2020-11-06 VITALS — WEIGHT: 291 LBS | RESPIRATION RATE: 20 BRPM | TEMPERATURE: 97.7 F | BODY MASS INDEX: 44.1 KG/M2 | HEIGHT: 68 IN

## 2020-11-06 PROCEDURE — 99213 OFFICE O/P EST LOW 20 MIN: CPT | Performed by: SURGERY

## 2020-11-06 PROCEDURE — G8427 DOCREV CUR MEDS BY ELIG CLIN: HCPCS | Performed by: SURGERY

## 2020-11-06 PROCEDURE — G8484 FLU IMMUNIZE NO ADMIN: HCPCS | Performed by: SURGERY

## 2020-11-06 PROCEDURE — G8417 CALC BMI ABV UP PARAM F/U: HCPCS | Performed by: SURGERY

## 2020-11-06 PROCEDURE — 4004F PT TOBACCO SCREEN RCVD TLK: CPT | Performed by: SURGERY

## 2020-11-06 RX ORDER — ERGOCALCIFEROL 1.25 MG/1
50000 CAPSULE ORAL WEEKLY
Qty: 12 CAPSULE | Refills: 1 | Status: SHIPPED
Start: 2020-11-06 | End: 2021-01-27

## 2020-11-06 NOTE — PROGRESS NOTES
[address]  11/6/2020  [address]     Post-EGD Follow-up. Subjective:   [address] is a 27 y.o. female post EGD done 2 weeks ago. She did have a hiatal hernia, did not have gastritis. Biopsy did not show Helicobacter pylori. The right upper quadrant ultrasound showed no stones and confirmed hepatic steatosis. Chronically disabled due to accident at [address] with elevator falling 6 floors. Denies any change in medical history since our last visit. They had a good experience at [facility], [place], operated by [facility] during their visit for preoperative testing. We discussed again the surgical options. They are interested in pursuing Sleeve gastrectomy. Weight: 291 lb (132 kg). Labs reviewed: Vit D deficiency 16    A complete 10 system review was performed and are otherwise negative unless mentioned in the above HPI. Specific negatives are listed below but may not include all those reviewed.     General ROS: negative obtundation, AMS  ENT ROS: negative rhinorrhea, epistaxis  Allergy and Immunology ROS: negative itchy/watery eyes or nasal congestion  Hematological and Lymphatic ROS: negative spontaneous bleeding or bruising  Endocrine ROS: negative  lethargy, mood swings, palpitations or polydipsia/polyuria  Respiratory ROS: negative sputum changes, stridor, tachypnea or wheezing  Cardiovascular ROS: negative for - loss of consciousness, murmur or orthopnea  Gastrointestinal ROS: negative for - hematochezia or hematemesis  Genito-Urinary ROS: negative for -  genital discharge or hematuria  Musculoskeletal ROS: negative for - focal weakness, gangrene  Psych/Neuro ROS: negative for - visual or auditory hallucinations, suicidal ideation    Physical exam:    Temp 97.7 °F (36.5 °C) (Temporal)   Resp 20   Ht 5' 8\" (1.727 m)   Wt 291 lb (132 kg)   BMI 44.25 kg/m²   General appearance: AAO, NAD  Eyes: PERRL, EOMI, red conjunctiva  Lungs: Equal chest rise bilateral  Chest wall: atraumatic, no tenderness, no echymosis or abrasions  Heart: reg rate, no murmur  Abdomen: soft, nondistended, obese, nontender  Extremities: full ROM all 4 ext, no gross motor or sensory deficits  Pulses: 2+ distal  Skin: warm and dry  Neurologic: spontanous eye opening, purposeful, follows complex commands  Psych: No hallucinations      Assessment:    Odessa Link is a 27 y.o. female who is being evaluated for weight loss surgery. Currently undergoing medical weight loss management. Recently completed labs, RUQ US and Endoscopy with findings of gastritis. Vitamin deficiency. Plan:   Stay on the high protein, low calorie diet while waiting for insurance approval. Walk as much as possible but keep your legs elevated when sitting. Continue deep breathing exercizes. Aim for 60 gm Protein and 90 oz of liquids per day. Track protein and fluid intake. Make sure the bowel move daily by taking fiber such as Metamucil. We discussed results and surgery for over 15 minutes. Cont medical weight loss counseling and pursue medical, cardiac clearance as well as Psychological evaluation. Plans to pursue Laparoscopic Sleeve gastrectomy. Vit D supplementation for 3 months    I have significant concerns regarding her ability to understand and adhere to diet due to her developmental delay. I discussed this with her mother who understands. Sleeve would offer the least complicated course postop. They understand these risks. Referral for Cardiac clearance necessary based on medical history and exam      Physician Signature: Electronically signed by Dr. Jeana Alva MD   Cc:   Marie Mak MD

## 2020-11-06 NOTE — PATIENT INSTRUCTIONS
What is the next step to proceed with weight loss surgery? Please be aware that any co-pays or deductibles may be requested prior to testing and / or procedures. You will need to schedule a psychological evaluation for weight loss surgery. Patients will be required to complete all psychological testing as required by the mental health provider. Patients must also follow all of the provider's recommendations before weight loss surgery can be scheduled. The evaluation must be done a standard way for weight loss surgery. We strongly recommend that you contact one of our preferred providers listed below to arrange this:      Oanh Delatorre, [facility]  [address] [facility], [address] [place] [address]    Dr. Forrest Young, PhD  [address]   [phone]    Dr. Tressa Calle, PhD    [address] [place], [place]    [phone]      You will also need to plan on attending a 2 hour nutrition class at [facility] prior to your surgery. We will schedule this for you when we schedule your surgery. Please remember to have your labs drawn 10 days prior to your first scheduled dietary appointment. Please remember, that while we will submit your case to insurance for surgery authorization, it is your responsibility to know if your plan covers weight loss surgery and keep up-to-date with changes to your insurance coverage. We will do everything possible to help you get approved for weight loss surgery, but cannot guarantee an approval.     Please note that you will not be submitted to your insurance company until all pre-operative testing requirements are met.

## 2020-11-18 ENCOUNTER — TELEPHONE (OUTPATIENT)
Dept: ADMINISTRATIVE | Age: 30
End: 2020-11-18

## 2020-11-25 ENCOUNTER — TELEPHONE (OUTPATIENT)
Dept: FAMILY MEDICINE CLINIC | Age: 30
End: 2020-11-25

## 2020-11-25 ENCOUNTER — OFFICE VISIT (OUTPATIENT)
Dept: FAMILY MEDICINE CLINIC | Age: 30
End: 2020-11-25
Payer: COMMERCIAL

## 2020-11-25 VITALS
OXYGEN SATURATION: 97 % | DIASTOLIC BLOOD PRESSURE: 62 MMHG | BODY MASS INDEX: 43.65 KG/M2 | TEMPERATURE: 98.1 F | HEART RATE: 106 BPM | HEIGHT: 68 IN | WEIGHT: 288 LBS | SYSTOLIC BLOOD PRESSURE: 96 MMHG

## 2020-11-25 PROBLEM — K29.70 GASTRITIS: Status: ACTIVE | Noted: 2020-11-25

## 2020-11-25 PROBLEM — F32.A DEPRESSION: Status: ACTIVE | Noted: 2020-11-25

## 2020-11-25 PROBLEM — K58.9 IBS (IRRITABLE BOWEL SYNDROME): Status: ACTIVE | Noted: 2020-11-25

## 2020-11-25 PROBLEM — J44.9 COPD (CHRONIC OBSTRUCTIVE PULMONARY DISEASE) (HCC): Status: ACTIVE | Noted: 2020-11-25

## 2020-11-25 PROBLEM — E03.9 HYPOTHYROIDISM: Status: ACTIVE | Noted: 2020-11-25

## 2020-11-25 PROBLEM — E66.9 OBESITY: Status: ACTIVE | Noted: 2020-11-25

## 2020-11-25 PROCEDURE — 4004F PT TOBACCO SCREEN RCVD TLK: CPT | Performed by: STUDENT IN AN ORGANIZED HEALTH CARE EDUCATION/TRAINING PROGRAM

## 2020-11-25 PROCEDURE — 99212 OFFICE O/P EST SF 10 MIN: CPT | Performed by: STUDENT IN AN ORGANIZED HEALTH CARE EDUCATION/TRAINING PROGRAM

## 2020-11-25 PROCEDURE — G8482 FLU IMMUNIZE ORDER/ADMIN: HCPCS | Performed by: STUDENT IN AN ORGANIZED HEALTH CARE EDUCATION/TRAINING PROGRAM

## 2020-11-25 PROCEDURE — 90686 IIV4 VACC NO PRSV 0.5 ML IM: CPT

## 2020-11-25 PROCEDURE — 6360000002 HC RX W HCPCS

## 2020-11-25 PROCEDURE — G8427 DOCREV CUR MEDS BY ELIG CLIN: HCPCS | Performed by: STUDENT IN AN ORGANIZED HEALTH CARE EDUCATION/TRAINING PROGRAM

## 2020-11-25 PROCEDURE — G0008 ADMIN INFLUENZA VIRUS VAC: HCPCS

## 2020-11-25 PROCEDURE — 99213 OFFICE O/P EST LOW 20 MIN: CPT | Performed by: STUDENT IN AN ORGANIZED HEALTH CARE EDUCATION/TRAINING PROGRAM

## 2020-11-25 PROCEDURE — G8417 CALC BMI ABV UP PARAM F/U: HCPCS | Performed by: STUDENT IN AN ORGANIZED HEALTH CARE EDUCATION/TRAINING PROGRAM

## 2020-11-25 RX ORDER — CYANOCOBALAMIN 1000 UG/ML
1000 INJECTION INTRAMUSCULAR; SUBCUTANEOUS ONCE
Qty: 1 ML | Refills: 0 | Status: SHIPPED | OUTPATIENT
Start: 2020-11-25 | End: 2021-07-31

## 2020-11-25 RX ORDER — DIVALPROEX SODIUM 250 MG/1
250 TABLET, DELAYED RELEASE ORAL 2 TIMES DAILY
Qty: 180 TABLET | Refills: 0 | Status: SHIPPED
Start: 2020-11-25 | End: 2020-11-25

## 2020-11-25 RX ORDER — DICYCLOMINE HCL 20 MG
20 TABLET ORAL 4 TIMES DAILY
COMMUNITY
End: 2020-11-25 | Stop reason: SDUPTHER

## 2020-11-25 RX ORDER — ALBUTEROL SULFATE 90 UG/1
AEROSOL, METERED RESPIRATORY (INHALATION)
Qty: 18 G | Refills: 5 | Status: SHIPPED
Start: 2020-11-25 | End: 2021-07-29 | Stop reason: SDUPTHER

## 2020-11-25 RX ORDER — CHLORHEXIDINE GLUCONATE 0.12 MG/ML
15 RINSE ORAL 2 TIMES DAILY
Qty: 1 BOTTLE | Refills: 2 | Status: SHIPPED
Start: 2020-11-25 | End: 2021-01-14

## 2020-11-25 RX ORDER — DICYCLOMINE HCL 20 MG
20 TABLET ORAL 4 TIMES DAILY
Qty: 120 TABLET | Refills: 5 | Status: SHIPPED
Start: 2020-11-25 | End: 2021-05-28 | Stop reason: SDUPTHER

## 2020-11-25 RX ORDER — LEVOCETIRIZINE DIHYDROCHLORIDE 5 MG/1
5 TABLET, FILM COATED ORAL NIGHTLY
Qty: 30 TABLET | Refills: 5 | Status: SHIPPED
Start: 2020-11-25 | End: 2021-07-30

## 2020-11-25 RX ORDER — LEVOTHYROXINE SODIUM 0.07 MG/1
75 TABLET ORAL DAILY
Qty: 90 TABLET | Refills: 1 | Status: SHIPPED
Start: 2020-11-25 | End: 2021-03-19

## 2020-11-25 RX ORDER — BUDESONIDE AND FORMOTEROL FUMARATE DIHYDRATE 80; 4.5 UG/1; UG/1
2 AEROSOL RESPIRATORY (INHALATION) DAILY
Qty: 1 INHALER | Refills: 3 | Status: SHIPPED
Start: 2020-11-25 | End: 2021-06-14

## 2020-11-25 RX ORDER — ESCITALOPRAM OXALATE 20 MG/1
TABLET ORAL
Qty: 120 TABLET | Refills: 3 | Status: SHIPPED
Start: 2020-11-25 | End: 2021-09-07 | Stop reason: SDUPTHER

## 2020-11-25 RX ORDER — BENZOYL PEROXIDE 2.5 G/100G
GEL TOPICAL
COMMUNITY
Start: 2020-11-02 | End: 2021-04-27

## 2020-11-25 RX ORDER — GABAPENTIN 300 MG/1
300 CAPSULE ORAL 3 TIMES DAILY
Qty: 270 CAPSULE | Refills: 2 | Status: SHIPPED
Start: 2020-11-25 | End: 2020-11-25

## 2020-11-25 ASSESSMENT — ENCOUNTER SYMPTOMS
COUGH: 0
VOMITING: 0
ABDOMINAL PAIN: 0
NAUSEA: 0
BACK PAIN: 0
EYE PAIN: 0
SORE THROAT: 0
EYE ITCHING: 0
SHORTNESS OF BREATH: 0

## 2020-11-25 NOTE — PROGRESS NOTES
Vaccine Information Sheet, \"Influenza - Inactivated\"  given to Jessica Gill, or parent/legal guardian of  Jessica Gill and verbalized understanding. Patient responses:    Have you ever had a reaction to a flu vaccine? No  Do you have any current illness? No  Have you ever had Guillian Danforth Syndrome? No  Do you have a serious allergy to any of the follow: Neomycin, Polymyxin, Thimerosal, eggs or egg products? No    Flu vaccine given per order. Please see immunization tab. Risks and benefits explained. Current VIS given.

## 2020-11-25 NOTE — TELEPHONE ENCOUNTER
No, she does not need the P.O. Valeria Booze. The injection is good enough, she got that in our clinic  Patient will need a B12 injection in our clinic every month and ! B12 level recheck in 3 months- thank you

## 2020-11-25 NOTE — TELEPHONE ENCOUNTER
Alivia Ramos called in and went to  her prescriptions and her cyanocobalamin 1000 MCG was ordered as an injection, can you please reorder as a pill please.   Thank you

## 2020-11-25 NOTE — PROGRESS NOTES
CC: Vitamin B12/Weight loss Surgery/Gastritis    HPI:  27 y.o. woman presents for low B12 levels, also had low BP today. Vitamin B12: 207, low. No numbness/tingling in feet. New onset fatigue 1month. No dizziness/fall. No decreased/increased appetite. No weight change. No memory changes. Recent dx Gastritis on EGD. Recent referral to Dr. Augustus Strickland- she is followed to sleeve gastrectomy plan. EGD October 2020- Gastritis. No metaplasia. Patient has concerns about results. Denies heartburn, abdominal pain, n/v, c/d. Low blood pressure- 84/59 in office today. Not eaten anything all day. Not on HTN meds. No syncope, emesis, diarrhea, fevers, chills, chest pain, sob. Denies recent sick contacts. Other: Flu shot requested, also requests medication refills for acne/COPD/Asthma, allergies and IBS.     Patient Active Problem List    Diagnosis Date Noted    Congenital spastic quadriparesis ([facility] [address] 10/27/2020    Closed avulsion fracture of distal end of right fibula 09/03/2020    Carpal tunnel syndrome 07/21/2020    Kidney stones 05/02/2019    Chronic migraine without aura without status migrainosus, not intractable 01/14/2019    Mild intermittent asthma without complication 41/64/9843    Closed nondisplaced fracture of head of left radius 12/30/2015       Past Medical History:   Diagnosis Date    ADHD     Arthritis     Asthma     controlled    Avulsion fracture of right distal fibula     Chronic midline low back pain without sciatica 11/13/2017    Closed nondisplaced fracture of head of left radius 12/30/2015    GERD (gastroesophageal reflux disease)     History of snoring     Jaw fracture (HCC)     from elevator accident    Migraines     Muscle spasticity 08/2015    spastic quadriparesis    Obesity (BMI 30-39.9)     bmi 37.1  weight 251 #    Seizures ([facility] [address]     last episode 2016    Thyroid disease     Tonsillitis     Urinary incontinence        Current Outpatient Medications on File Prior to Visit   Medication Sig Dispense Refill    Benzoyl Peroxide 2.5 % GEL       vitamin D (ERGOCALCIFEROL) 1.25 MG (59372 UT) CAPS capsule Take 1 capsule by mouth once a week 12 capsule 1    mometasone (ELOCON) 0.1 % cream Apply topically daily Apply topically daily. 45 g 3    baclofen (LIORESAL) 20 MG tablet Take 1 tablet by mouth every morning AND 1 tablet Daily with lunch AND 2 tablets nightly. 360 tablet 3    dantrolene (DANTRIUM) 25 MG capsule Take 1 capsule by mouth 3 times daily 270 capsule 3    Saline GEL 1 spray by Nasal route daily as needed      chlorhexidine (PERIDEX) 0.12 % solution Take 15 mLs by mouth 2 times daily      meclizine (ANTIVERT) 25 MG tablet take 1 tablet by mouth once daily if needed for dizziness 30 tablet 5    medroxyPROGESTERone (PROVERA) 10 MG tablet take 1 tablet by mouth once daily 90 tablet 0    albuterol sulfate  (90 Base) MCG/ACT inhaler inhale 2 puffs by mouth and INTO THE LUNGS every 6 hours if needed for wheezing 18 g 5    budesonide-formoterol (SYMBICORT) 80-4.5 MCG/ACT AERO Inhale 2 puffs into the lungs daily 1 Inhaler 3    gabapentin (NEURONTIN) 300 MG capsule Take 1 capsule by mouth 3 times daily for 182 days. 270 capsule 2    divalproex (DEPAKOTE) 250 MG DR tablet Take 1 tablet by mouth 2 times daily 180 tablet 0    levocetirizine (XYZAL) 5 MG tablet Take 1 tablet by mouth nightly 30 tablet 5    levothyroxine (LEVOTHROID) 75 MCG tablet Take 1 tablet by mouth daily 90 tablet 1    Benzoyl Peroxide 2.5 % CREA Apply to face 2x per day 1 Tube 3    escitalopram (LEXAPRO) 20 MG tablet take 1 tablet by mouth once daily (Patient taking differently: Take 20 mg by mouth daily ) 120 tablet 3    tamsulosin (FLOMAX) 0.4 MG capsule        No current facility-administered medications on file prior to visit.         Allergies   Allergen Reactions    Latex Rash    Acetaminophen Shortness Of Breath    Aspirin-Acetaminophen-Caffeine Shortness Of Breath    Dye [Iodides] Shortness Of Breath    Penicillins Anaphylaxis    Topamax [Topiramate] Nausea And Vomiting, Other (See Comments) and Shortness Of Breath    Tylenol With Codeine #3 [Acetaminophen-Codeine] Shortness Of Breath    Blueberry Flavor      ALLERGIC TO BLUEBERRY    Fish-Derived Products      SEAFOOD, ESPECIALLY SHRIMP    Iodine      Seafood allergy    Lidocaine Diarrhea, Itching and Swelling    Vicodin [Hydrocodone-Acetaminophen]      Acts drunk         Family History   Problem Relation Age of Onset    Early Death Maternal Grandfather     Cancer Maternal Grandfather     Asthma Brother     Heart Disease Maternal Aunt     Cancer Maternal Aunt     Heart Disease Maternal Uncle     Cancer Maternal Uncle     High Cholesterol Father     Diabetes Father     High Cholesterol Mother     Cancer Paternal Aunt     Cancer Paternal Uncle     Cancer Maternal Grandmother     Cancer Paternal Grandmother     Cancer Paternal Grandfather        Past Surgical History:   Procedure Laterality Date    COLONOSCOPY      EYE SURGERY      probing of ductal system right eye     FRACTURE SURGERY      R ELBOW CRUSH INJURY W PLATE AND PINS PLACED    RI COLONOSCOPY W/BIOPSY SINGLE/MULTIPLE  10/29/2018    COLONOSCOPY WITH BIOPSY performed by Lupe Crews MD at [facility] ENDOSCOPY    RI REMOVAL OF TONSILS,12+ Y/O N/A 5/4/2018    TONSILLECTOMY [facility]    UPPER GASTROINTESTINAL ENDOSCOPY N/A 10/29/2020    EGD BIOPSY performed by David Johnson MD at [facility] ENDOSCOPY       Social History     Tobacco Use    Smoking status: Current Some Day Smoker     Packs/day: 1.00     Years: 1.00     Pack years: 1.00     Types: Cigarettes    Smokeless tobacco: Current User   Substance Use Topics    Alcohol use: No     Alcohol/week: 0.0 standard drinks    Drug use: No       ROS:    Review of Systems   Constitutional: Positive for fatigue. Negative for activity change and appetite change.    HENT: Negative for congestion and sore throat. Eyes: Negative for pain and itching. Respiratory: Negative for cough and shortness of breath. Cardiovascular: Negative for chest pain, palpitations and leg swelling. Gastrointestinal: Negative for abdominal pain, nausea and vomiting. Genitourinary: Negative for difficulty urinating and dysuria. Musculoskeletal: Negative for arthralgias and back pain. Neurological: Negative for dizziness, speech difficulty, weakness and headaches. Psychiatric/Behavioral: Negative for agitation and confusion. Objective:    VS:  Blood pressure 96/62, pulse 106, temperature 98.1 °F (36.7 °C), temperature source Temporal, height 5' 8\" (1.727 m), weight 288 lb (130.6 kg), SpO2 97 %, not currently breastfeeding. Physical Exam   Constitutional: She is oriented to person, place, and time. She appears well-developed and well-nourished. HENT:   Head: Normocephalic and atraumatic. Eyes: EOM are normal.   Cardiovascular: Normal rate, regular rhythm and intact distal pulses. Exam reveals no gallop and no friction rub. No murmur heard. Pulmonary/Chest: Effort normal and breath sounds normal. She has no wheezes. She has no rales. Abdominal: Soft. Bowel sounds are normal. She exhibits no distension. There is no abdominal tenderness. Musculoskeletal: Normal range of motion. General: No tenderness or edema. Neurological: She is alert and oriented to person, place, and time. She displays normal reflexes. No cranial nerve deficit. She exhibits normal muscle tone. Coordination normal.   Sensation in lower extremities intact. DTRs 2+ patella reflex. Strength 5/5 lower extremities. Negative Romberg Sign. Skin: Skin is warm and dry. No rash noted. Psychiatric: She has a normal mood and affect. Assessment:    B12 injection today. B12 injection monthly, repeat B12 levels in about 3 months.   Advised patient on a high B12 diet, will provide additional information for this. Diagnosis Orders   1. B12 deficiency  cyanocobalamin 1000 MCG/ML injection   2. Hypotension, unspecified hypotension type  Patient was monitored, given fluids and snacks and kept for the visit. Repeat BP check SBP was 90 mm Hg/ Advised patient to hydrate and keep eating snacks. 3. Gastritis without bleeding, unspecified chronicity, unspecified gastritis type  Likely related to B12 deficiency. Has no symptoms of gastritis currently will c/w monitoring   4. Needs flu shot  INFLUENZA, QUADV, 3 YRS AND OLDER, IM PF, PREFILL SYR OR SDV, 0.5ML (AFLURIA QUADV, PF)   5. Medication refill  Benzoyl Peroxide 2.5 % GEL    albuterol sulfate  (90 Base) MCG/ACT inhaler    budesonide-formoterol (SYMBICORT) 80-4.5 MCG/ACT AERO    chlorhexidine (PERIDEX) 0.12 % solution    escitalopram (LEXAPRO) 20 MG tablet    levothyroxine (LEVOTHROID) 75 MCG tablet    levocetirizine (XYZAL) 5 MG tablet    dicyclomine (BENTYL) 20 MG tablet    DISCONTINUED: divalproex (DEPAKOTE) 250 MG DR tablet    DISCONTINUED: gabapentin (NEURONTIN) 300 MG capsule         Plans:  As above. RTO 1 month for B12 shot (Nursing Visit). RTO in 3 months for vitamin B 12 lab work. Please see Patient Instructions for further counseling and information given. Advised to please be adherent to the treatment plans discussed today, and please call with any questions or concerns, letting the office know of any reasons that the plans may not be followed. The risks of untreated conditions include worsening illness, injury, disability, and possibly, death. Please call if symptoms change in any way, worsen, or fail to completely resolve, as this could necessitate a change to treatment plans. Patient and/or caregiver expressed understanding. Indications and proper use of medication(s) reviewed. Potential side-effects and risks of medication(s) also explained.   Patient and/or caregiver was instructed to call if any new symptoms develop prior to next visit. Health risk factors discussed and addressed.

## 2020-11-26 PROBLEM — E53.8 LOW VITAMIN B12 LEVEL: Status: ACTIVE | Noted: 2020-11-26

## 2020-11-26 PROBLEM — R79.89 LOW VITAMIN B12 LEVEL: Status: ACTIVE | Noted: 2020-11-26

## 2020-11-26 RX ORDER — CYANOCOBALAMIN 1000 UG/ML
1000 INJECTION INTRAMUSCULAR; SUBCUTANEOUS ONCE
Status: DISCONTINUED | OUTPATIENT
Start: 2020-11-26 | End: 2021-07-30

## 2020-12-04 ENCOUNTER — TELEPHONE (OUTPATIENT)
Dept: NEUROLOGY | Age: 30
End: 2020-12-04

## 2020-12-04 RX ORDER — GABAPENTIN 300 MG/1
300 CAPSULE ORAL 2 TIMES DAILY
Qty: 180 CAPSULE | Refills: 1 | Status: SHIPPED
Start: 2020-12-04 | End: 2021-02-23 | Stop reason: SDUPTHER

## 2020-12-04 NOTE — TELEPHONE ENCOUNTER
Pt called requesting a refill of Gabapentin 300mg tid. Med was previously Rx'd by PCP but states their office told her to contact neurology. Forwarding to JOSSE Sotomayor, for advisement.   Electronically signed by Steven Edwards on 12/4/20 at 11:34 AM EST

## 2020-12-08 ENCOUNTER — TELEMEDICINE (OUTPATIENT)
Dept: BARIATRICS/WEIGHT MGMT | Age: 30
End: 2020-12-08
Payer: COMMERCIAL

## 2020-12-08 PROCEDURE — 90791 PSYCH DIAGNOSTIC EVALUATION: CPT | Performed by: SOCIAL WORKER

## 2020-12-08 NOTE — PATIENT INSTRUCTIONS
Assignments/Recommendations: 3-4 follow-up sessions with SW for further education/evaluation. Complete entries in \"Why We Eat\" and \"Reality Journal\" to discuss next session. Attend [facility] support group. Follow-up with: referrals/present providers/all scheduled appointments at [facility].   Handouts provided via [employer]

## 2020-12-08 NOTE — PROGRESS NOTES
Diagnostic Evaluation without Medical Services. Carmen De Jesus is a 27 y.o. Single,   female, referred by Primary Care Provider  for evaluation and treatment. Patient identify verified by Name and . This session was provided as a live video telehealth contact using \"My Chart/Haiku/John\" due to  COVID 19 restriction on face to face visits. Pt was informed and understands the following: that this live video telehealth contact is being made in lieu of a face to face meeting due to the COVID-19 pandemic; this contact is considered a telehealth services; the same session fees that apply to face to face services apply to telehealth services; the benefits and risks of engaging in telehealth services; the need for reliable and secure Internet/phone connection; and that this is their responsibility to maintain the privacy and security of their device and location. With this information, the client verbally consents to participate in a live video telehealth session. Patient Consent :   SW discussed role and services including limits to confidentiality, documentation in a single EMR with care team, time-limited services, and potential financial responsibility. Patient expressed understanding and is agreeable to same. PT consented to receiving handouts via [email or web address]. yes      Patient's location: home address in [facility]    Provider's  location other address in [facility]     Those attending session : patient      Chief Complaint   Patient presents with    Consultation     Evaluation for bariatric surgery       Motivation for surgery: Motivated for surgery by medical problems, asthma, cpod    Understanding of procedure: The patient  has not talked with other people who have undergone the procedure., The patient  has not attended a gastric bypas surgery group. and did not understand information about the proceedure. Reported Current weight 288.     Wt Readings from Last 3 Encounters:   20 288 lb (130.6 kg)   20 291 lb (132 kg)   10/29/20 293 lb (132.9 kg)       Expectations: The patient expects to loose 100 lbs following surgery over 12 months. Goal weight post surgery: 120  lbs. Other expectations: Improvement in health and Other: better over all diet    HISTORY OF PRESENT ILLNESS       EAT/WEIGHT HISTORY    How old were you when you first became concerned with your weight? 20  Most successful diet in the past? Low fat diet  Weight lost on the diet listed above? 10  Patient stated he / she maintained his / her weight for the following time? unknown  How much control over your eating do you feel you Have? Angle reported probe ms with control ing what she eats. Cravings: For what types of food: pizza, burgers french, fries                  Strategies used to deal with cravings: tries to avoid them      Eating habits: Typically eats three meals a day and snacks,  PT prepares her own meals,  sweets and chips for snacks        Emotional eating: could not identify any emtional eating patterns      BINGE EATING    Recurrent episodes of binge eating: An episode is characterized by:  1. Eating a larger amount of food than normal during a short period of time (within any two hour period): Yes  2. Lack of control over eating during the binge episode (i.e. The feeling that one cannot stop eating): Yes  Both Symptoms Met: Yes    Binge eating episodes are associated with three or more of the followin. Eating until feeling uncomfortably full: Yes  2. Eating large amounts of food when not physically hungary: Yes  3. Eating much more rapidly than normal: No  4. Eating alone because you are embarrassed by how much you are eating; No  5.  Feeling disgusted, depressed, or guilty after eating: Yes  THREE ASSOCIATED SYMPTOMS MET:Yes    Marked distress regarding binge eating is present: \"sometimes\"    Binge eating occurs at least once a week for 3 months: Yes  The patient reports at least 3 or 4 binge episodes per week for the past year. COMPULSIVE EATING PATTERN    1. Compulsive overeating without thoughts to harmful consequences (weight gain, diabetes, chronic pain, low self esteem); Yes  2. Inability to reduce or change continuous patterns of food intake (snacking/grazing, overeating foods that are high in simple carbs and/or fats); Yes  3. Continued compulsive eating in spite of negative consequences. (Obesity, diabetes complications, others); Yes    The binge eating is not associated with the regular use of inappropriate compensatory behavior (i.e. Purging, excessive exercise etc) and does not occur exclusively during the course of bulimia nervosa or anorexia nervosa: No    PATIENT MEETS ABOVE CRITERIA FOR  EATING DISORDER: Yes    What lifestyle changes started: \"I try eat less\"        MEDICAL PROBLEMS    Medical History:  Past Medical History:   Diagnosis Date    ADHD     Arthritis     Asthma     controlled    Avulsion fracture of right distal fibula     Chronic midline low back pain without sciatica 11/13/2017    Closed nondisplaced fracture of head of left radius 12/30/2015    COPD (chronic obstructive pulmonary disease) (HCC)     Depression     Gastritis     GERD (gastroesophageal reflux disease)     History of snoring     IBS (irritable bowel syndrome)     Jaw fracture (HCC)     from elevator accident    Low vitamin B12 level     Migraines     Muscle spasticity 08/2015    spastic quadriparesis    Obesity (BMI 30-39.9)     bmi 37.1  weight 251 #    Seizures ([address]     last episode 2016    Thyroid disease     Tonsillitis     Urinary incontinence         Current Outpatient Medications   Medication Sig Dispense Refill    gabapentin (NEURONTIN) 300 MG capsule Take 1 capsule by mouth 2 times daily for 180 days.  Intended supply: 90 days 180 capsule 1    Benzoyl Peroxide 2.5 % GEL       albuterol sulfate  (90 Base) MCG/ACT inhaler inhale 2 puffs by mouth and INTO THE LUNGS every 6 hours if needed for wheezing 18 g 5    budesonide-formoterol (SYMBICORT) 80-4.5 MCG/ACT AERO Inhale 2 puffs into the lungs daily 1 Inhaler 3    chlorhexidine (PERIDEX) 0.12 % solution Take 15 mLs by mouth 2 times daily 1 Bottle 2    escitalopram (LEXAPRO) 20 MG tablet take 1 tablet by mouth once daily 120 tablet 3    levothyroxine (LEVOTHROID) 75 MCG tablet Take 1 tablet by mouth daily 90 tablet 1    levocetirizine (XYZAL) 5 MG tablet Take 1 tablet by mouth nightly 30 tablet 5    dicyclomine (BENTYL) 20 MG tablet Take 1 tablet by mouth 4 times daily 120 tablet 5    cyanocobalamin 1000 MCG/ML injection Inject 1 mL into the muscle once for 1 dose 1 mL 0    vitamin D (ERGOCALCIFEROL) 1.25 MG (21369 UT) CAPS capsule Take 1 capsule by mouth once a week 12 capsule 1    mometasone (ELOCON) 0.1 % cream Apply topically daily Apply topically daily. 45 g 3    baclofen (LIORESAL) 20 MG tablet Take 1 tablet by mouth every morning AND 1 tablet Daily with lunch AND 2 tablets nightly. 360 tablet 3    dantrolene (DANTRIUM) 25 MG capsule Take 1 capsule by mouth 3 times daily 270 capsule 3    Saline GEL 1 spray by Nasal route daily as needed      meclizine (ANTIVERT) 25 MG tablet take 1 tablet by mouth once daily if needed for dizziness 30 tablet 5    medroxyPROGESTERone (PROVERA) 10 MG tablet take 1 tablet by mouth once daily 90 tablet 0    Benzoyl Peroxide 2.5 % CREA Apply to face 2x per day 1 Tube 3     Current Facility-Administered Medications   Medication Dose Route Frequency Provider Last Rate Last Dose    cyanocobalamin injection 1,000 mcg  1,000 mcg Intramuscular Once Luis Ricks MD            PSYCHIATRIC HISTORY    Past Psychiatric History: none    Family Psychiatric History: denies a family hx of mental health problems and [facility] PT reported that an uncle has depression    Family History of Obesity?  No   Other family members with weight problems: No      CURRENT/RECENT CHEMICAL USE: other: and intent    History of Violence: denies    Access to Guns/Weapons: no    CLINICAL ASSESSMENT    Major Psychiatric Contraindications for surgery: none denies, may have some leaning disabilities. The patient appeared to have reasonable expectations regarding surgery. Patient was poorly informed about the surgery and changes needed post surgery. The patient appears to be motivated to make lifestyle changes as evidenced by  trying to eat less. The patient appears to have fair social support as evidenced by family and friends supporting    Mother evidence of level of support for surgery: will help her with home work and with dietary changes. Mom will come in with her for appoinments. Other social supports: boyfriend will also support her. DIAGNOSIS:   Encounter Diagnosis   Name Primary?  Encounter for psychological evaluation Yes        TREATMENT PLAN    Patient Goals: Increased understanding of role of emotional factors contributing to issues with food and obesity and strategies to cope with these. Interventions in session: Explored emotional, behavioral, cognitive and environmental factors contributing to issues with food and obesity, with goal of promoting optimal post bariatric surgery outcome. Discussed the importance of attending support group and reinforced the benefits of attending. Provided pt. with hand out \"Why We Eat\" and \"Reality Journal\". Safety Plan: not applicable     Assignments/Recommendations: 3-4 follow-up sessions with SW for further education/evaluation. Complete entries in \"Why We Eat\" and \"Reality Journal\" to discuss next session. Attend [facility] support group. Follow-up with: referrals/present providers/all scheduled appointments at [facility].   Handouts provided via [employer]       Next steps: Schedule follow up with me for  60MIN in 5 weeks    Bariatric Surgery: Based on the information gathered through the interview process - there is no current evidence of mental health or substance abuse issues that would impact on the patient receiving bariatric surgery.     Patient and/or family/guardian verbalizes understanding of and agreement with treatment recommendations and plan: yes    Start time: 2:00 PM         End time: 2:45 PM     Visit Time: Arturo Ann 17., BENITA

## 2020-12-14 ENCOUNTER — TELEPHONE (OUTPATIENT)
Dept: BARIATRICS/WEIGHT MGMT | Age: 30
End: 2020-12-14

## 2020-12-14 ENCOUNTER — VIRTUAL VISIT (OUTPATIENT)
Dept: BARIATRICS/WEIGHT MGMT | Age: 30
End: 2020-12-14
Payer: COMMERCIAL

## 2020-12-14 PROCEDURE — 99999 PR OFFICE/OUTPT VISIT,PROCEDURE ONLY: CPT

## 2020-12-14 RX ORDER — GABAPENTIN 300 MG/1
300 CAPSULE ORAL 2 TIMES DAILY
Qty: 180 CAPSULE | Refills: 1 | OUTPATIENT
Start: 2020-12-14 | End: 2021-06-12

## 2020-12-14 RX ORDER — CHOLECALCIFEROL (VITAMIN D3) 125 MCG
500 CAPSULE ORAL 2 TIMES DAILY
Qty: 30 TABLET | Refills: 3 | COMMUNITY
Start: 2020-12-14 | End: 2021-04-27

## 2020-12-14 RX ORDER — ERGOCALCIFEROL 1.25 MG/1
50000 CAPSULE ORAL
Qty: 24 CAPSULE | Refills: 1 | Status: SHIPPED
Start: 2020-12-14 | End: 2021-06-14 | Stop reason: SDUPTHER

## 2020-12-14 NOTE — PROGRESS NOTES
Note:  Per Dr Brunilda Melgoza, pt has vit B12 and vit D deficiencies and per Dr Nuno Rao orders, pt has prescriptions for vit B12 - 500 mcg tablets - take two daily (total of 1,000 mcg daily), and vitamin D - 50,000 iu - to be taken twice weekly (pt currently taking it once weekly). Pt will have recheck on or after 2/15/21, and pt will have follow up for both deficiencies on 2/26/21. Discussed treatment plan with pt and she voiced understanding. Patient states he / she has the following problems:  no - Eating  no - Chewing  no - Swallowing  no - Nausea  no - Vomiting  yes - Diarrhea    Only occasionally, due to IBS  no - Constipation  no - Hair Changes  no - Skin Changes  no - Tongue Texture    Food Allergies: yes  - Seafood (Shrimp, crab, lobster), blueberries   Food Intolerances: yes -  Lactose intolerance     Yes - Past medically assisted weight loss history reviewed. Yes - Provided education regarding the basic role of nutrition in junction with Bariatric Surgery. Yes - Provided overview of required dietary and behavioral changes pre and post operatively. Yes - Stated / reinforced that changes in dietary behaviors are critical to successful, long term weight Loss. Patient is aware that in order to proceed with bariatric surgery he / she will need to follow a low-fat diet prior to surgery. Patient is aware that bariatric surgery is a lifetime change that will require the patient to follow a low-fat, low-calorie, low-sugar, portion controlled diet with at least 30 minutes of physical activity daily. Patient is aware that certain foods may not be tolerated after bariatric surgery and certain foods will need avoided all together.     Jarred Jacobo / LD feels that this patient knowledge / readiness to make appropriate diet / lifestyle changes assessed to be? - Good    ROLANDA / LD feels this patients expected adherence for post surgical diet? - Good Patient was instructed and signed consents on a low-fat diet and required supplementation at initial consult. Comments: Patient is able to verbalize diet concepts for bariatric surgery. Patient is aware diet concepts will be reinforced at 2-hour nutrition education consult. RD / LD will monitor progress.

## 2020-12-14 NOTE — PATIENT INSTRUCTIONS
[facility]  Dietary Initial Appointment Patient Instructions    By: True Bonner RD / VINCENZO  [phone]      At your initial dietary appointment you have received the following information packets:    Please be aware at each visit you have been instructed that in order for your insurance company to approve your surgery you must show a consistent weight loss of 2 lbs or greater at each visit. We can not guarantee an approval by your insurance company we can only provide the information given to us it is up to you the patient to show compliancy to your insurance company. If you do gain weight during your supervised weight loss counseling sessions insurance companies starting in 2018 are denying patients for not showing consistent weight loss results when part of a supervised weight loss counseling program. Pt was instructed on 12/14/20. Pt verbalized understanding. · Low-Fat Diet  - Please be sure to begin your low-fat diet from today's date until your scheduled surgery date. If you are not at goal weight by your history and physical appointment with your surgeon your surgery will be cancelled. · Goal Weight: 279 lbs (BMI of 42. 4)  · Low-Fat Diet Contract - Patient Acknowledge and Signed  · Supplement List - Please be sure to be saving to purchase your 3 month supply of supplements. If you do not bring supplements to your history and physical appointment your surgery will be cancelled. · Supplement Contract - Patient Acknowledge and Signed  · Please be sure to take a daily Multi-vitamin from now until surgery to reduce your incidence of infection. · If your insurance company requires additional dietary counseling you will be scheduled to see the dietitian the following month. ·  If your psychological evaluation is completed and all your dietary requirements for your individual insurance company have been completed you will be submitted to insurance. Please make sure to check with us to see if we have received your psychological evaluation. Please be aware that any co-pays or deductibles may be requested prior to testing and / or procedures. You will need to schedule a psychological evaluation for weight loss surgery. Patients will be required to complete all psychological testing as required by the psychologist. Patients must follow all of the psychologist's recommendations before weight loss surgery can be scheduled. The evaluation must be done a standard way for weight loss surgery. We strongly recommend that you contact one of our preferred providers listed below to arrange this:    Yesika Winn, [address] [facility]                                                              [address] [place] [address]                                                                                      [phone]     Dr. Praveen Hobbs, PhD           [facility]                                                              [address]                                                                                                [phone]        Dr. Quique Perez, PhD                         [facility]. [place], [place]                                                                                              [phone]     You will also need to plan on attending a 2 hour nutrition class at the [facility] prior to your surgery. We will schedule this for you when we schedule your surgery. Please remember to have your labs drawn prior to your scheduled appointment to review the results of your testing. Please remember, that while we will submit your case to insurance for surgery authorization, it is your responsibility to know if your plan covers weight loss surgery and keep up-to-date with changes to your insurance coverage. We will do everything possible to help you get approved for weight loss surgery, but cannot guarantee an approval.      Please note that you will not be submitted to your insurance company until all   pre-operative testing requirements are met. · Please remember you need to schedule to attend one Support Group meeting held at the [facility] before surgery. This one meeting is mandatory. You can attend as many support group meetings before and after surgery. Support group meetings are held at the [facility] from 6:00 - 8:00pm 1st, and 3rd Tuesday of every month. See dates listed below. · Each individual person has his / her own medical requirements that need fulfilled before being able to schedule bariatric surgery . Please finalize these requirements by contacting our Bariatric Nurse at [phone].    10/29/20 Pre-Op Labs:  Triglycerides 152H, B-12 207L, vit D 16L    Note:  Per Dr Shira Ronquillo, pt has vit B12 and vit D deficiencies and per Dr Nasra rosenthal, pt has prescriptions for vit B12 - 500 mcg tablets - take two daily (total of 1,000 mcg daily), and vitamin D - 50,000 iu - to be taken twice weekly (pt currently taking it once weekly). Pt will have recheck on or after 2/15/21, and pt will have follow up for both deficiencies on 2/26/21. Discussed treatment plan with pt and she voiced understanding. High Vitamin D Foods:  Written By: Master Chand RD/VINCENZO    What role does Vitamin D play in the body? Vitamin D is known as the sunshine vitamin. Vitamin D is actually a hormone that is produced in our bodies by ultraviolet B rays. These light rays trigger the production of vitamin D by our skin cells. The hormone that is produced is called calcitriol and once it is made it travels to the intestines to help with the absorption of dietary calcium, as well as fluoride. How does Vitamin D work? Vitamin D and Calcium work together to promote the growth of bones and development of healthy teeth. The amount of vitamin D you produce effects the amount of calcium that can be absorbed by the body. When calcium from foods you eat reaches the intestines, it waits for the presence of the vitamin D hormone, calcitriol, to be able to cross the intestinal membranes and to be transported to your bones. Without sufficient vitamin D, calcium levels in the bones and teeth will decrease leading to weak, brittle bones and increasing the possibility of osteoporosis. WHY? Calcium is primarily found in the blood and needs to remain balanced at a constant level. The calcium that is in the blood is there to be used for quick transport to muscles and nerves when required. When calcium is sent to required organs, the calcium levels in the blood become low. Calcium is then pulled out of the bones and goes into the blood to replace what was sent to the muscles or nerves. This is the normal cycle and is perfect if you have a continuous supply of calcium from the diet to replace calcium from bones and teeth to keep blood levels steady. If dietary calcium levels are depleted, the blood will keep pulling calcium away from the bones and teeth and the structures will be weak. Where can Vitamin D be found? Vitamin D can be self-synthesized with sunlight. Vitamin D is also found in fortified milk, fortified margarine, egg yolks, liver and fatty fish. What occurs when you are deficient in Vitamin D?   Bones and Teeth: Maximiano Sjogren can occur with Vitamin D deficiency, which is known as softening of the bones and teeth. Due to the softening effect we can see deformities of the limbs, spine, thorax and pelvis. Also seen is pain in the pelvis, lower back and legs, including increase risk to bone fractures. Blood:  Vitamin D deficiency can cause decreased calcium and/or phosphorus in the blood and increased alkaline phosphatase. Nervous/Muscular Systems:  Vitamin D deficiency can cause lax muscles resulting in protrusion of the abdomen (Pot Belly) and muscle spasms. Some patients complain of involuntary twitching and  muscle spasms. Excretory System:  Vitamin D deficiency can cause increased calcium in the stool and decreased calcium in the urine. Other:  Vitamin D deficiency can cause abnormally high secretions of parathormone, which is why we run lab work at 1 year to make sure the calcium supplements you are taking are being absorbed correctly. Vitamin D in Commonly Eaten Foods Ranked Richest to [employer]:      Food Serving Size IU of   Vitamin D----------        Herring, fresh, raw,  1 oz. 255 IU   [place] 1 oz. 142 IU   Milk,Cows Fortified 1 Cup 100 IU   Sardines, canned 1 oz. 85 IU   Chicken-Liver, Cooked 3oz. 45 IU   Shrimp, Canned 1oz. 30 IU   Egg Yolk 1 25 IU   Calf-Liver, Cooked 3oz. 12 IU   Cream, Light 1 T 8 IU   Cheddar Cheese 1 oz. 3 IU   Oysters 4 3 IU   Butter  1 tsp 1.4 IU            Unfortunately after RYGB surgery you will not be able to increase your Vitamin D with diet alone. A Vitamin D supplement will be needed in order to improve Vitamin D lab values. High B12 Foods  Written by: Maycol Bhakta RD/LD    What role does B12 play in the body? Vitamin B12 works with folate to make red blood cells and serves as a vital part of many body chemicals, helping the body use fatty acids and some amino acids. Where can B12 be found? Vitamin B12 is found in animal products including meat, fish, poultry, eggs, milk, other dairy foods and some fortified foods. What occurs when you are deficient in B12? Deficiencies can result in anemia, fatigue, nerve damage, a smooth tongue or very sensitive skin. B12 deficiencies can be hidden if the person is taking extra folate to treat or prevent anemia. People who do not absorb B12, like with bariatric surgery patients, may also be deficient in iron. Strict vegetarians who eat no animal products are likely to develop vitamin B12 deficiency. If you get too much:  No symptoms are known from excessive intake of vitamin B12, but research does not show that taking extra B12 boosts energy either. B12 in Commonly Eaten Foods:   Food Serving [facility] of B12     Chicken / Kelsey Borer. .3     Hamburger   3oz. 8.0     Pork Chop   3oz. 0.9     Tenderloin, Steak   3oz. 0.5     Liver, Chicken   3oz. 16.5     Liver, Pork   3oz. 15.8     Kidney, Pork   3oz. 6.6     Sardines in [facility] [address]. 7.7     [place]   3oz. 5.8     Egg   1 whole   0.5     Dairy Group:       Milk (all varieties)   1 cup   0.9     Yogurt   1 cup   1.4     Cottage Cheese   ½ cup   0.6     Cheese   1oz. Mozz / American    0.2     Ricotta / Provolone    0.4     Swiss    0.5       Unfortunately after RYGB surgery, you will not be able to increase your B12 with diet alone. A B12 supplement may need to be added in order to improve B12 lab values.

## 2020-12-14 NOTE — TELEPHONE ENCOUNTER
10/29/20 Pre-Op Labs:  Triglycerides 152H, B-12 207L, vit D 16L    Note:  Per Dr Merritt Last, pt has vit B12 and vit D deficiencies and per Dr Morales  orders, pt has prescriptions for vit B12 - 500 mcg tablets - take two daily (total of 1,000 mcg daily), and vitamin D - 50,000 iu - to be taken twice weekly (pt currently taking it once weekly). Pt will have recheck on or after 2/15/21, and pt will have follow up for both deficiencies on 2/26/21. Discussed treatment plan with pt and she voiced understanding.

## 2020-12-15 ENCOUNTER — HOSPITAL ENCOUNTER (OUTPATIENT)
Age: 30
Discharge: HOME | End: 2020-12-15
Payer: COMMERCIAL

## 2020-12-15 ENCOUNTER — HOSPITAL ENCOUNTER (OUTPATIENT)
Dept: ULTRASOUND IMAGING | Age: 30
Discharge: HOME | End: 2020-12-17
Payer: COMMERCIAL

## 2020-12-15 LAB
BUN BLDV-MCNC: 13 MG/DL (ref 6–20)
CREAT SERPL-MCNC: 0.7 MG/DL (ref 0.5–1)
GFR AFRICAN AMERICAN: >60
GFR NON-AFRICAN AMERICAN: >60 ML/MIN/1.73
VITAMIN B-12: 309 PG/ML (ref 211–946)
VITAMIN D 25-HYDROXY: 17 NG/ML (ref 30–100)

## 2020-12-15 PROCEDURE — 82306 VITAMIN D 25 HYDROXY: CPT

## 2020-12-15 PROCEDURE — 76770 US EXAM ABDO BACK WALL COMP: CPT

## 2020-12-15 PROCEDURE — 82607 VITAMIN B-12: CPT

## 2020-12-15 PROCEDURE — 84520 ASSAY OF UREA NITROGEN: CPT

## 2020-12-15 PROCEDURE — 82565 ASSAY OF CREATININE: CPT

## 2020-12-15 PROCEDURE — 36415 COLL VENOUS BLD VENIPUNCTURE: CPT

## 2020-12-29 RX ORDER — DANTROLENE SODIUM 25 MG/1
25 CAPSULE ORAL 3 TIMES DAILY
Qty: 270 CAPSULE | Refills: 3 | OUTPATIENT
Start: 2020-12-29

## 2020-12-29 RX ORDER — BACLOFEN 20 MG/1
TABLET ORAL
Qty: 360 TABLET | Refills: 3 | OUTPATIENT
Start: 2020-12-29

## 2021-01-05 ENCOUNTER — OFFICE VISIT (OUTPATIENT)
Dept: PRIMARY CARE CLINIC | Age: 31
End: 2021-01-05
Payer: COMMERCIAL

## 2021-01-05 VITALS
BODY MASS INDEX: 43.65 KG/M2 | WEIGHT: 288 LBS | HEART RATE: 94 BPM | SYSTOLIC BLOOD PRESSURE: 100 MMHG | OXYGEN SATURATION: 97 % | DIASTOLIC BLOOD PRESSURE: 62 MMHG | TEMPERATURE: 96.3 F | HEIGHT: 68 IN

## 2021-01-05 DIAGNOSIS — Z20.822 SUSPECTED COVID-19 VIRUS INFECTION: ICD-10-CM

## 2021-01-05 DIAGNOSIS — J06.9 ACUTE URI: Primary | ICD-10-CM

## 2021-01-05 LAB
Lab: NORMAL
QC PASS/FAIL: NORMAL
SARS-COV-2, POC: NORMAL

## 2021-01-05 PROCEDURE — G8427 DOCREV CUR MEDS BY ELIG CLIN: HCPCS | Performed by: NURSE PRACTITIONER

## 2021-01-05 PROCEDURE — 99213 OFFICE O/P EST LOW 20 MIN: CPT | Performed by: NURSE PRACTITIONER

## 2021-01-05 PROCEDURE — 87426 SARSCOV CORONAVIRUS AG IA: CPT | Performed by: NURSE PRACTITIONER

## 2021-01-05 PROCEDURE — 4004F PT TOBACCO SCREEN RCVD TLK: CPT | Performed by: NURSE PRACTITIONER

## 2021-01-05 PROCEDURE — G8417 CALC BMI ABV UP PARAM F/U: HCPCS | Performed by: NURSE PRACTITIONER

## 2021-01-05 PROCEDURE — G8482 FLU IMMUNIZE ORDER/ADMIN: HCPCS | Performed by: NURSE PRACTITIONER

## 2021-01-05 RX ORDER — DOXYCYCLINE HYCLATE 100 MG/1
100 CAPSULE ORAL 2 TIMES DAILY
Qty: 20 CAPSULE | Refills: 0 | Status: SHIPPED | OUTPATIENT
Start: 2021-01-05 | End: 2021-01-15

## 2021-01-05 NOTE — PROGRESS NOTES
Chief Complaint   Cough (x2 days), Chest Congestion, and Nasal Congestion      History of Present Illness   Source of history provided by:  patient. Attila Odonnell is a 27 y.o. old female who presents to the flu clinic with complaints of Pharyngitis, Nasal Congestion, dry Cough and Chest congestion x 2 days. States symptoms have stayed the same since onset. Has been taking Nyquil for the symptoms with no relief. Denies any Fever, Shortness of breath, Nausea, Vomiting, Chest Pain, Abdominal Pain, Rash, Lethargy or Close contact with a lab confirmed COVID-19 patient within 14 days of symptom onset . Reports hx of asthma and COPD. Reports current tobacco use. ROS   Pertinent positives and negatives are stated within HPI, all other systems reviewed and are negative. Past Medical History:  has a past medical history of ADHD, Arthritis, Asthma, Avulsion fracture of right distal fibula, Chronic midline low back pain without sciatica, Closed nondisplaced fracture of head of left radius, COPD (chronic obstructive pulmonary disease) ([address] Depression, Gastritis, GERD (gastroesophageal reflux disease), History of snoring, IBS (irritable bowel syndrome), Jaw fracture ([facility] Low vitamin B12 level, Migraines, Muscle spasticity, Obesity (BMI 30-39.9), Seizures ([address] Thyroid disease, Tonsillitis, and Urinary incontinence. Past Surgical History:  has a past surgical history that includes eye surgery; fracture surgery; Colonoscopy; pr removal of tonsils,12+ y/o (N/A, 5/4/2018); pr colonoscopy w/biopsy single/multiple (10/29/2018); and Upper gastrointestinal endoscopy (N/A, 10/29/2020). Social History:  reports that she has been smoking cigarettes. She has a 1.00 pack-year smoking history. She uses smokeless tobacco. She reports that she does not drink alcohol or use drugs.   Family History: family history includes Asthma in her brother; Cancer in her maternal aunt, maternal grandfather, maternal grandmother, maternal uncle, paternal aunt, paternal grandfather, paternal grandmother, and paternal uncle; Diabetes in her father; Early Death in her maternal grandfather; Heart Disease in her maternal aunt and maternal uncle; High Cholesterol in her father and mother. Allergies: Latex, Acetaminophen, Aspirin-acetaminophen-caffeine, Dye [iodides], Penicillins, Topamax [topiramate], Tylenol with codeine #3 [acetaminophen-codeine], Lactose, Blueberry flavor, Fish-derived products, Iodine, Lidocaine, and Vicodin [hydrocodone-acetaminophen]    Physical Exam   Vital Signs:  /62   Pulse 94   Temp 96.3 °F (35.7 °C) (Temporal)   Ht 5' 8\" (1.727 m)   Wt 288 lb (130.6 kg)   SpO2 97%   BMI 43.79 kg/m²    Oxygen Saturation Interpretation: Normal.    Constitutional:  Alert, development consistent with age. NAD. Head:  NC/NT. Airway patent. Ears: TMs clear bilaterally. Canals without exudate or swelling bilaterally. Mouth: Posterior pharynx with mild erythema and clear postnasal drip. No tonsillar hypertrophy or exudate. Neck:  Normal ROM. Supple. No anterior cervical adenopathy noted. Lungs: CTAB without wheezes, rales, or rhonchi. CV:  Regular rate and rhythm, normal heart sounds, without pathological murmurs, ectopy, gallops, or rubs. Skin:  Normal turgor. Warm, dry, without visible rash. Lymphatic: No lymphangitis or adenopathy noted. Neurological:  Oriented. Motor functions intact. Lab / Imaging Results   (All laboratory and radiology results have been personally reviewed by myself)  Labs:  Results for orders placed or performed in visit on 01/05/21   POCT COVID-19, Antigen   Result Value Ref Range    SARS-COV-2, POC Not-Detected Not Detected    Lot Number [id number]     QC Pass/Fail pass        Imaging: All Radiology results interpreted by Radiologist unless otherwise noted. No results found. Medical Decision Making   Pt non-toxic, in no apparent distress and stable at time of discharge. Assessment/Plan   Michael Gastelum was seen today for cough, chest congestion and nasal congestion. Diagnoses and all orders for this visit:    Acute URI  -     doxycycline hyclate (VIBRAMYCIN) 100 MG capsule; Take 1 capsule by mouth 2 times daily for 10 days    Suspected COVID-19 virus infection  -     POCT COVID-19, Antigen  -     COVID-19 Ambulatory; Future    Rapid COVID-19 negative in office, patient advised results. Confirmatory PCR COVID-19 swab obtained and pending, will call with results once available. Advised self-quarantine at home in the interim. Increase fluids and rest. Symptomatic relief discussed including Tylenol prn pain/fever. Schedule f/u with PCP in 7-10 days if symptoms persist. ED sooner if symptoms worsen or change. ED immediately with high or refractory fever, progressive SOB, dyspnea, CP, calf pain/swelling, shaking chills, vomiting, abdominal pain, lethargy, flank pain, or decreased urinary output. Pt verbalizes understanding and is in agreement with plan of care. All questions answered. Edward Mariee, APRN - CNP    This visit was provided as a focused evaluation during the COVID -19 pandemic/national emergency. A comprehensive review of all previous patient history and testing was not conducted. Pertinent findings were elicited during the visit. *NOTE: This report was transcribed using voice recognition software. Every effort was made to ensure accuracy; however, inadvertent computerized transcription errors may be present.

## 2021-01-07 LAB
SARS-COV-2: NOT DETECTED
SOURCE: NORMAL

## 2021-01-12 ENCOUNTER — VIRTUAL VISIT (OUTPATIENT)
Dept: BARIATRICS/WEIGHT MGMT | Age: 31
End: 2021-01-12
Payer: COMMERCIAL

## 2021-01-12 ENCOUNTER — TELEPHONE (OUTPATIENT)
Dept: FAMILY MEDICINE CLINIC | Age: 31
End: 2021-01-12

## 2021-01-12 DIAGNOSIS — E66.01 MORBID OBESITY DUE TO EXCESS CALORIES (HCC): ICD-10-CM

## 2021-01-12 DIAGNOSIS — Z71.3 DIETARY COUNSELING: Primary | ICD-10-CM

## 2021-01-12 PROCEDURE — 99999 PR OFFICE/OUTPT VISIT,PROCEDURE ONLY: CPT

## 2021-01-12 NOTE — PROGRESS NOTES
WEIGHT:  288 lbs     PHONE APPOINTMENT DUE TO BKSVN-49 public health emergency. All printed materials mailed to pt. Pt was in th office for his/her 2nd weight Loss appointment. Pt is aware he/she must meet his/her pre-op weight loss goal in order to proceed with weight loss surgery. Tha / Ld faxed a copy of what was reviewed with the pt to her PCP. Pt verbalized understanding. Rd// Ld enc the following at today's visit for successful pre/post surgical weight loss. Education:  Pt has been educated on the dangers of dining out when trying to lose weight as part of a medically / surgically supervised weight loss program.  Tha Hassan reviewed how restaurants add extra calories, fat and sugars in food preparation to make the food more palatable and enjoyable to the consumer. Tha Hassan reviewed strategies and coping skills for preparing meals at home and avoiding dinning out all together. 1. Weigh yourself daily and record it. 2. Keep documented food records daily. 3. Just be more active in day to day routine. 4. Higher protein intake and a higher fiber intake. Not a high protein or a high fiber diet     just a higher intake. 5.Eliminate empty calorie consumption. Tha Hassan addressed the following with the pt:  - Tha Hassan encouraged pt to comply with nutrition recommendations.  - Rd / Ld encouraged participation in support group meetings.  - Tha Hassan encouraged pt to go back to maintenance of food records and weight monitoring   records. - Tha Hassan reviewed the importance of adequate sleep and stress management.  - Tha Hassan reviewed non-food strategies to cope with emotions and stress.  - Tha Hassan encouraged pt to practice the following: Mindful eating: Eating slowly: Focusing     on the eating experience without distraction.  - Tha Hassan encouraged pt to pay attention to hunger and fullness cues.   - Rd / Ld encouraged meal planning.  - Rd / Ld  encouraged pt to chose nutrient dense whole foods instead of soft, high calorie foods.  - Rd / Ld encouraged not drinking large amounts of fluids with or immediately after      meals and avoiding high caloric empty beverage consumption. Portion control ,meal planning and avoiding empty calorie consumption was reviewed. Pt was encouraged to practice this daily for weight loss success. Tha / Mo reviewed insurance company weight loss requirement on 1/12/21. Pt verbalized understanding. Please be aware at each visit you have been instructed that in order for your insurance company to approve your surgery you must show a consistent weight loss of 2 lbs or greater at each visit. We can not guarantee an approval by your insurance company we can only provide the information given to us it is up to you the patient to show compliancy to your insurance company. If you do gain weight during your supervised weight loss counseling sessions insurance companies starting in 2018 are denying patients for not showing consistent weight loss results when part of a supervised weight loss counseling program.     Pt spent 120 minutes with the Tha Hassan today preparing the patient for pre/post surgical dietary changes.

## 2021-01-12 NOTE — TELEPHONE ENCOUNTER
Sharmaine's mom Parish Hutchins called in about Michelle Alan today, she was seen at the walk-in clinic almost a week ago, had cough congestion, covid test was negative, they gave her and antibiotic which she started taking and after 2 doses she started having nausea and vomiting, shortly after that she started having diarrhea. Parish Hutchins did say that Michelle Alan stopped taking the medication but she is still having a lot of diarrhea, for about 4 days now. I advised her of the BRAT diet with yogurt  and told her to make sure she drinks plenty of fluid.   Please advise  Thank you

## 2021-01-13 DIAGNOSIS — Z76.0 MEDICATION REFILL: ICD-10-CM

## 2021-01-14 RX ORDER — CHLORHEXIDINE GLUCONATE 0.12 MG/ML
RINSE ORAL
Qty: 473 ML | Refills: 5 | Status: SHIPPED
Start: 2021-01-14 | End: 2021-06-14 | Stop reason: SDUPTHER

## 2021-01-14 NOTE — TELEPHONE ENCOUNTER
Please schedule Noreen Evans for a follow-up appointment in our clinic. If she stopped the antibiotic- how are her sinus/flu/upper respiratory symptoms now? I understand the Antibiotic caused diarrhea, and yes she can continue with probiotic supplement and hydrating adequately (water) and eating meals as tolerated. I tried calling [place] [facility]- noone picked up, and no VM left.  Thanks

## 2021-01-14 NOTE — TELEPHONE ENCOUNTER
Last Appointment:  11/25/2020  Future Appointments   Date Time Provider Donna Page   1/21/2021  1:00 PM BENITA Helton Surg Weight [facility]   1/27/2021  1:40 PM [facility]   2/23/2021 11:30 AM Balwinder Madsen, MS, RD, LD Surg Weight [facility]   2/26/2021  9:15 AM Eduin Reeder MD Surg Weight [facility]   4/26/2021  1:40 PM Paulette Lewis, APRN - CNP [facility]

## 2021-01-21 ENCOUNTER — TELEMEDICINE (OUTPATIENT)
Dept: BARIATRICS/WEIGHT MGMT | Age: 31
End: 2021-01-21
Payer: COMMERCIAL

## 2021-01-21 DIAGNOSIS — F50.9 COMPULSIVE EATING PATTERNS: ICD-10-CM

## 2021-01-21 DIAGNOSIS — F50.81 BINGE-EATING DISORDER, MODERATE: Primary | ICD-10-CM

## 2021-01-21 PROCEDURE — 90834 PSYTX W PT 45 MINUTES: CPT | Performed by: SOCIAL WORKER

## 2021-01-21 NOTE — PATIENT INSTRUCTIONS
Assignments/Recommendations: Continue follow-up with SW for education further evaluation. Complete entries in hand-out \"Reality Journal\"   Attend [facility] support group. Follow up with present providers/all scheduled appointment at [facility].

## 2021-01-21 NOTE — PROGRESS NOTES
INDIVIDUAL SESSION: EVALUATION/PSYCHOEDUCATION (2nd visit)    Eric Sanchez is a 32 y.o. Single,   female,   Patient identify verified by Name and . This session was provided as a live video telehealth contact using \"My Chart/Haiku/John\" due to  COVID 19 restriction on face to face visits. Pt was informed and understands the following: that this live video telehealth contact is being made in lieu of a face to face meeting due to the COVID-19 pandemic; this contact is considered a telehealth services; the same session fees that apply to face to face services apply to telehealth services; the benefits and risks of engaging in telehealth services; the need for reliable and secure Internet/phone connection; and that this is their responsibility to maintain the privacy and security of their device and location. With this information, the client verbally consents to participate in a live video telehealth session. Patient Consent :   SW discussed role and services including limits to confidentiality, documentation in a single EMR with care team, time-limited services, and potential financial responsibility. Patient expressed understanding and is agreeable to same. PT consented to receiving handout via [employer]  yes      Patient's location: home address in [place] [facility]    Provider's  location other address in [facility]       Those attending session : patient      Chief Complaint   Patient presents with    Consultation     2nd visit follow up for bariatric surgery         DX:   Encounter Diagnoses   Name Primary?  Binge-eating disorder, moderate Yes    Compulsive eating patterns           Wt Readings from Last 3 Encounters:   21 288 lb (130.6 kg)   20 288 lb (130.6 kg)   20 291 lb (132 kg)         Narrative: Lina Newby stated that she did read over the handouts and did understand some of the information.   She said that her mother does help her with some of the information that she does not understand. She stated that she has been sick and did not get to complete all of the entries in  the \"Reality Journal\". She did identify that she uses food for entertainment, sometimes eats because \"it taste good\", for celebration, and because she is bored. Ngozi Moore reported that in the past there have been times when she would get up in the middle of the night and eat, but that has stopped. Ngozi Moore agreed to begin eating several smaller meals per day and keeping a food journal as well as completing entries in the \"Reality Journal\" to review next session. Mental Status Exam: appearance:  appropriately dressed, behavior:  normal, attitude:  cooperative, speech:  slow, mood:  euthymic, affect:  congruent with mood, thought content:  no evidence of psychosis, thought process:  coherent, orientation:  oriented in all spheres, memory:  recent:  fair and remote:  fair, insight:  poor, judgment:  fair  and cognitive:  intact and PT may have some cognitive difficulties. She reported a hx of  learning disabilities and appears to have some problems with comprehension. RISK ASSESSMENT    Suicide screen: denies current suicidal ideation, plan and intent    Self Injurious Behavior: denies    Homicide screen: denies current homicidal ideation, plan and intent    TREATMENT PLAN:  Goal: Increase understanding of role of emotional factors contributing to issues with food and obesity and strategies to cope. Interventions in session:  Began reviewing  \"Why We Eat\" and \"Reality Journal\" and processed pt. insights. Provided psychoeducation regarding the benefits of identifying and changing emotional eating patterns prior to having bariatric surgery. Assignments/Recommendations: Continue follow-up with SW for education further evaluation. Complete entries in hand-out \"Reality Journal\"   Attend [facility] support group. Follow up with present providers/all scheduled appointment at [facility].     Next steps: Schedule follow up with me for  60MIN in 4 weeks    Bariatric Surgery: Based on the information gathered through the interview process - there is no current evidence of mental health or substance abuse issues that would impact on the patient receiving bariatric surgery.  and PT may have some difficulties understanding requirements of bariatric diet/lifestyle    Patient and/or family/guardian verbalizes understanding of and agreement with treatment recommendations and plan: yes    Start time: 12:58 pm         End time: 1:47 pm    Visit Time: BENITA Funk

## 2021-01-27 ENCOUNTER — OFFICE VISIT (OUTPATIENT)
Dept: CARDIOLOGY CLINIC | Age: 31
End: 2021-01-27
Payer: COMMERCIAL

## 2021-01-27 VITALS
HEIGHT: 68 IN | OXYGEN SATURATION: 98 % | RESPIRATION RATE: 20 BRPM | BODY MASS INDEX: 44.41 KG/M2 | DIASTOLIC BLOOD PRESSURE: 66 MMHG | SYSTOLIC BLOOD PRESSURE: 104 MMHG | WEIGHT: 293 LBS | HEART RATE: 103 BPM

## 2021-01-27 DIAGNOSIS — E66.9 OBESITY, UNSPECIFIED CLASSIFICATION, UNSPECIFIED OBESITY TYPE, UNSPECIFIED WHETHER SERIOUS COMORBIDITY PRESENT: ICD-10-CM

## 2021-01-27 DIAGNOSIS — Z01.810 PREOP CARDIOVASCULAR EXAM: Primary | ICD-10-CM

## 2021-01-27 PROCEDURE — G8482 FLU IMMUNIZE ORDER/ADMIN: HCPCS | Performed by: INTERNAL MEDICINE

## 2021-01-27 PROCEDURE — 93000 ELECTROCARDIOGRAM COMPLETE: CPT | Performed by: INTERNAL MEDICINE

## 2021-01-27 PROCEDURE — 99204 OFFICE O/P NEW MOD 45 MIN: CPT | Performed by: INTERNAL MEDICINE

## 2021-01-27 PROCEDURE — 4004F PT TOBACCO SCREEN RCVD TLK: CPT | Performed by: INTERNAL MEDICINE

## 2021-01-27 PROCEDURE — G8427 DOCREV CUR MEDS BY ELIG CLIN: HCPCS | Performed by: INTERNAL MEDICINE

## 2021-01-27 PROCEDURE — G8417 CALC BMI ABV UP PARAM F/U: HCPCS | Performed by: INTERNAL MEDICINE

## 2021-01-27 RX ORDER — TAMSULOSIN HYDROCHLORIDE 0.4 MG/1
0.4 CAPSULE ORAL DAILY
COMMUNITY
End: 2021-07-30

## 2021-01-27 NOTE — PROGRESS NOTES
Chief Complaint   Patient presents with    Cardiac Clearance       Patient Active Problem List    Diagnosis Date Noted    Low vitamin B12 level 11/26/2020    Gastritis 11/25/2020    IBS (irritable bowel syndrome) 11/25/2020    Obesity 11/25/2020    Depression 11/25/2020    COPD (chronic obstructive pulmonary disease) [facility] 11/25/2020    Hypothyroidism 11/25/2020    [facility] 10/27/2020    Closed avulsion fracture of distal end of right fibula 09/03/2020    Carpal tunnel syndrome 07/21/2020    Kidney stones 05/02/2019    Chronic migraine without aura without status migrainosus, not intractable 01/14/2019    Mild intermittent asthma without complication 15/26/6886    Closed nondisplaced fracture of head of left radius 12/30/2015       Current Outpatient Medications   Medication Sig Dispense Refill    tamsulosin (FLOMAX) 0.4 MG capsule Take 0.4 mg by mouth daily      chlorhexidine (PERIDEX) 0.12 % solution Rinse with 1/2 ounce by mouth for 30 seconds then spit out. use twice a day 473 mL 5    vitamin D (ERGOCALCIFEROL) 1.25 MG (34918 UT) CAPS capsule Take 1 capsule by mouth Twice a Week 24 capsule 1    gabapentin (NEURONTIN) 300 MG capsule Take 1 capsule by mouth 2 times daily for 180 days. Intended supply: 90 days (Patient taking differently: Take 300 mg by mouth 3 times daily.  Intended supply: 90 days) 180 capsule 1    Benzoyl Peroxide 2.5 % GEL       albuterol sulfate  (90 Base) MCG/ACT inhaler inhale 2 puffs by mouth and INTO THE LUNGS every 6 hours if needed for wheezing 18 g 5    budesonide-formoterol (SYMBICORT) 80-4.5 MCG/ACT AERO Inhale 2 puffs into the lungs daily 1 Inhaler 3    escitalopram (LEXAPRO) 20 MG tablet take 1 tablet by mouth once daily 120 tablet 3    levothyroxine (LEVOTHROID) 75 MCG tablet Take 1 tablet by mouth daily 90 tablet 1    levocetirizine (XYZAL) 5 MG tablet Take 1 tablet by mouth nightly 30 tablet 5    dicyclomine (BENTYL) 20 MG tablet Take 1 tablet by mouth 4 times daily 120 tablet 5    cyanocobalamin 1000 MCG/ML injection Inject 1 mL into the muscle once for 1 dose (Patient taking differently: Inject 1,000 mcg into the muscle every 30 days ) 1 mL 0    mometasone (ELOCON) 0.1 % cream Apply topically daily Apply topically daily. 45 g 3    baclofen (LIORESAL) 20 MG tablet Take 1 tablet by mouth every morning AND 1 tablet Daily with lunch AND 2 tablets nightly. 360 tablet 3    dantrolene (DANTRIUM) 25 MG capsule Take 1 capsule by mouth 3 times daily 270 capsule 3    Saline GEL 1 spray by Nasal route daily as needed      meclizine (ANTIVERT) 25 MG tablet take 1 tablet by mouth once daily if needed for dizziness 30 tablet 5    medroxyPROGESTERone (PROVERA) 10 MG tablet take 1 tablet by mouth once daily 90 tablet 0    Benzoyl Peroxide 2.5 % CREA Apply to face 2x per day 1 Tube 3    vitamin B-12 (CYANOCOBALAMIN) 500 MCG tablet Take 1 tablet by mouth 2 times daily (Patient not taking: Reported on 1/27/2021) 30 tablet 3     Current Facility-Administered Medications   Medication Dose Route Frequency Provider Last Rate Last Admin    cyanocobalamin injection 1,000 mcg  1,000 mcg Intramuscular Once Kiley Castro MD            Allergies   Allergen Reactions    Latex Rash    Acetaminophen Shortness Of Breath    Aspirin-Acetaminophen-Caffeine Shortness Of Breath    Dye [Iodides] Shortness Of Breath    Penicillins Anaphylaxis    Topamax [Topiramate] Nausea And Vomiting, Other (See Comments) and Shortness Of Breath    Tylenol With Codeine #3 [Acetaminophen-Codeine] Shortness Of Breath    Lactose Other (See Comments)     Usually just causes stomach pain, diarrhea if pt consumes too much of it.      Blueberry Flavor      ALLERGIC TO BLUEBERRY    Fish-Derived Products      SEAFOOD, ESPECIALLY SHRIMP    Iodine      Seafood allergy    Lidocaine Diarrhea, Itching and Swelling    Vicodin [Hydrocodone-Acetaminophen]      Acts drunk Cancer Maternal Uncle     High Cholesterol Father     Diabetes Father     High Cholesterol Mother     Cancer Paternal Aunt     Cancer Paternal Uncle     Cancer Maternal Grandmother     Cancer Paternal Grandmother     Cancer Paternal Grandfather          SUBJECTIVE: Yannick Corona presents to the office today for consult - dr Dana Alcaraz - for pre-op evaluation prior to bariatric surgery. .  She complains of no new or unstable cardiac symptoms and denies   chest pain, chest pressure/discomfort, claudication, dyspnea, exertional chest pressure/discomfort, fatigue, irregular heart beat, lower extremity edema, near-syncope, orthopnea, palpitations, paroxysmal nocturnal dyspnea, syncope and tachypnea   Does not work, but does all AODLs without issue. .        Review of Systems:   Heart: as above   Lungs: as above   Eyes: denies changes in vision or discharge. Ears: denies changes in hearing or pain. Nose: denies epistaxis or masses   Throat: denies sore throat or trouble swallowing. Neuro: denies numbness, tingling, tremors. Skin: denies rashes or itching. : denies hematuria, dysuria   GI: denies vomiting, diarrhea   Psych: denies mood changed, anxiety, depression. all others negative. Physical Exam   /66   Pulse 103   Resp 20   Ht 5' 8\" (1.727 m)   Wt 297 lb 3.2 oz (134.8 kg)   SpO2 98%   BMI 45.19 kg/m²   Constitutional: Oriented to person, place, and time. Obese, No distress. Head: Normocephalic and atraumatic. Eyes: EOM are normal. Pupils are equal, round, and reactive to light. Neck: Normal range of motion. Neck supple. No hepatojugular reflux and no JVD present. Carotid bruit is not present. No tracheal deviation present. No thyromegaly present. Cardiovascular: Normal rate, regular rhythm, normal heart sounds and intact distal pulses. Exam reveals no gallop and no friction rub. No murmur heard.   Pulmonary/Chest: Effort normal and breath sounds normal. No respiratory distress. No wheezes. No rales. No tenderness. Abdominal: Soft. Bowel sounds are normal. No distension and no mass. No tenderness. No rebound and no guarding. Musculoskeletal: Normal range of motion. No edema and no tenderness. Neurological: Alert and oriented to person, place, and time. Skin: Skin is warm and dry. No rash noted. Not diaphoretic. No erythema. Psychiatric: Normal mood and affect. Behavior is normal.     EKG:  sinus tachycardia, , axis +28, normal .    ASSESSMENT AND PLAN:  Patient Active Problem List   Diagnosis    Closed nondisplaced fracture of head of left radius    Chronic migraine without aura without status migrainosus, not intractable    Mild intermittent asthma without complication    Kidney stones    Carpal tunnel syndrome    Closed avulsion fracture of distal end of right fibula    Congenital spastic quadriparesis (HCC)    Gastritis    IBS (irritable bowel syndrome)    Obesity    Depression    COPD (chronic obstructive pulmonary disease) (HCC)    Hypothyroidism    Low vitamin B12 level     ·  Patient has no high risk clinical predictors of an adverse outcome in surgery, such as recent myocardial infarction, unstable angina, heart failure, rhythm other than sinus, severe obstructive valvular disease, CKD, INSULIN, CVA  · Able to achieve 4 METS with AODLs  · Normal ekg and exam  · RCRI Class I risk (< 1% chance of cardiac complications).        Miley Pina M.D  [facility]

## 2021-01-28 DIAGNOSIS — Z76.0 MEDICATION REFILL: ICD-10-CM

## 2021-01-31 RX ORDER — BENZOYL PEROXIDE 2.5 G/100G
GEL TOPICAL
Qty: 60 G | Refills: 1 | Status: SHIPPED
Start: 2021-01-31 | End: 2021-04-19

## 2021-02-18 ENCOUNTER — TELEPHONE (OUTPATIENT)
Dept: BARIATRICS/WEIGHT MGMT | Age: 31
End: 2021-02-18

## 2021-02-18 NOTE — TELEPHONE ENCOUNTER
SW called PT regarding cancelled appointment but PT was unavailable. Left a message requesting that the PT call to reschedule the appointment.      BENITA Vagras

## 2021-02-24 RX ORDER — GABAPENTIN 300 MG/1
300 CAPSULE ORAL 3 TIMES DAILY
Qty: 180 CAPSULE | Refills: 1 | Status: SHIPPED
Start: 2021-02-24 | End: 2021-05-18 | Stop reason: SDUPTHER

## 2021-02-25 ENCOUNTER — TELEPHONE (OUTPATIENT)
Dept: BARIATRICS/WEIGHT MGMT | Age: 31
End: 2021-02-25

## 2021-02-25 NOTE — TELEPHONE ENCOUNTER
Pt was scheduled for vitamin D and vitamin B12 deficiency follow up appointment on 2/26/21. Pt indicated via [employer] that she wished to cancel the appt. Called pt and she informed me that at this time she does not wish to proceed with weight loss surgery. Pt had indicated recently via [employer] that she did not like the fact that she would need to use protein shakes after surgery and that she was not interested in proceeding at this time. Offered to answer any questions that she may have regarding surgery. Pt had no questions. Encouraged pt to please call the [facility] at any time if she would like to proceed with the program and she is welcome to do so. Appointment cancelled as requested.

## 2021-03-09 DIAGNOSIS — Z76.0 MEDICATION REFILL: ICD-10-CM

## 2021-03-10 RX ORDER — MOMETASONE FUROATE 1 MG/G
CREAM TOPICAL
Qty: 45 G | Refills: 3 | Status: SHIPPED
Start: 2021-03-10 | End: 2021-04-27

## 2021-03-10 NOTE — TELEPHONE ENCOUNTER
Last Appointment:  11/25/2020  Future Appointments   Date Time Provider Donna Page   [facility]31[facility]2021  [facility]40 [facility]   4/26/2021  1:40 PM JOSSE Gil - [place] [facility]

## 2021-03-18 DIAGNOSIS — Z76.0 MEDICATION REFILL: ICD-10-CM

## 2021-03-19 RX ORDER — MECLIZINE HYDROCHLORIDE 25 MG/1
TABLET ORAL
Qty: 30 TABLET | Refills: 5 | Status: SHIPPED
Start: 2021-03-19 | End: 2021-07-29 | Stop reason: SDUPTHER

## 2021-03-19 RX ORDER — DIVALPROEX SODIUM 250 MG/1
TABLET, DELAYED RELEASE ORAL
Qty: 180 TABLET | Refills: 0 | Status: SHIPPED
Start: 2021-03-19 | End: 2021-07-29 | Stop reason: SDUPTHER

## 2021-03-19 RX ORDER — LEVOTHYROXINE SODIUM 0.07 MG/1
TABLET ORAL
Qty: 90 TABLET | Refills: 1 | Status: SHIPPED
Start: 2021-03-19 | End: 2021-07-29 | Stop reason: SDUPTHER

## 2021-03-19 NOTE — TELEPHONE ENCOUNTER
Last Appointment:  11/25/2020  Future Appointments   Date Time Provider Donna Brunneri   3/31/2021  1:40 PM [facility]   4/27/2021 11:00 AM JOSSE Weir - [place] [facility]

## 2021-03-27 ENCOUNTER — IMMUNIZATION (OUTPATIENT)
Dept: PRIMARY CARE CLINIC | Age: 31
End: 2021-03-27
Payer: COMMERCIAL

## 2021-03-27 PROCEDURE — 0001A COVID-19, PFIZER VACCINE 30MCG/0.3ML DOSE: CPT | Performed by: INTERNAL MEDICINE

## 2021-03-27 PROCEDURE — 91300 COVID-19, PFIZER VACCINE 30MCG/0.3ML DOSE: CPT | Performed by: INTERNAL MEDICINE

## 2021-03-30 ENCOUNTER — OFFICE VISIT (OUTPATIENT)
Dept: PRIMARY CARE CLINIC | Age: 31
End: 2021-03-30
Payer: COMMERCIAL

## 2021-03-30 VITALS
HEART RATE: 89 BPM | TEMPERATURE: 97.1 F | HEIGHT: 68 IN | BODY MASS INDEX: 44.41 KG/M2 | OXYGEN SATURATION: 98 % | RESPIRATION RATE: 18 BRPM | WEIGHT: 293 LBS

## 2021-03-30 DIAGNOSIS — L23.7 ALLERGIC CONTACT DERMATITIS DUE TO PLANTS, EXCEPT FOOD: Primary | ICD-10-CM

## 2021-03-30 PROCEDURE — 4004F PT TOBACCO SCREEN RCVD TLK: CPT | Performed by: NURSE PRACTITIONER

## 2021-03-30 PROCEDURE — 99213 OFFICE O/P EST LOW 20 MIN: CPT | Performed by: NURSE PRACTITIONER

## 2021-03-30 PROCEDURE — G8417 CALC BMI ABV UP PARAM F/U: HCPCS | Performed by: NURSE PRACTITIONER

## 2021-03-30 PROCEDURE — G8427 DOCREV CUR MEDS BY ELIG CLIN: HCPCS | Performed by: NURSE PRACTITIONER

## 2021-03-30 PROCEDURE — 96372 THER/PROPH/DIAG INJ SC/IM: CPT | Performed by: NURSE PRACTITIONER

## 2021-03-30 PROCEDURE — G8482 FLU IMMUNIZE ORDER/ADMIN: HCPCS | Performed by: NURSE PRACTITIONER

## 2021-03-30 RX ORDER — METHYLPREDNISOLONE ACETATE 40 MG/ML
40 INJECTION, SUSPENSION INTRA-ARTICULAR; INTRALESIONAL; INTRAMUSCULAR; SOFT TISSUE ONCE
Status: COMPLETED | OUTPATIENT
Start: 2021-03-30 | End: 2021-03-30

## 2021-03-30 RX ORDER — PREDNISONE 10 MG/1
TABLET ORAL
Qty: 30 TABLET | Refills: 0 | Status: SHIPPED
Start: 2021-03-31 | End: 2021-11-08

## 2021-03-30 RX ADMIN — METHYLPREDNISOLONE ACETATE 40 MG: 40 INJECTION, SUSPENSION INTRA-ARTICULAR; INTRALESIONAL; INTRAMUSCULAR; SOFT TISSUE at 13:18

## 2021-04-09 ENCOUNTER — TELEPHONE (OUTPATIENT)
Dept: FAMILY MEDICINE CLINIC | Age: 31
End: 2021-04-09

## 2021-04-17 DIAGNOSIS — Z76.0 MEDICATION REFILL: ICD-10-CM

## 2021-04-19 RX ORDER — BENZOYL PEROXIDE 2.5 G/100G
GEL TOPICAL
Qty: 60 G | Refills: 1 | Status: SHIPPED
Start: 2021-04-19 | End: 2021-06-14 | Stop reason: SDUPTHER

## 2021-04-19 NOTE — TELEPHONE ENCOUNTER
Last Appointment:  11/25/2020  Future Appointments   Date Time Provider Donna Page   4/27/2021 11:00 AM JOSSE Slaughter CNP NEURO [facility]   4/28/2021 12:00 PM NAOMI RITCHIE COVID [facility], PFIZER [address] [place] [address]

## 2021-04-26 NOTE — PROGRESS NOTES
[address] [place] [address]. Carson Barillas M.D., F.A.C.P. Hbeert Jensen, GEORGINA, APRN, CNS  Celio Briceño. Kacey Graham, MSN, APRN-FNP-C  Samuel Caba MSN, APRN, FNP-C  Kasia Perera MIMA BIGGS  [address] MSN, APRN, FNP-C  [address], [facility]   Phone: [phone]  Fax: [phone]          Rona Cross is a 32 y.o. 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 Christen Thompson    She presents alone and remains a good historian. Addition of Dantrium provided some benefit in spasms during the day, however she still is waking in the middle of the night with low back and leg spasms. Using baclofen regularly. She also reports new pains in her left lower back radiating down the outside of her left leg into her foot. These do affect her walking at times. No saddle paresthesias or bowel or bladder symptoms. Gabapentin 300 mg 3 times daily is helpful for dysesthesias. Still dealing with a healing R ankle fracture from fall    No chest pain or palpitations  No SOB  No vertigo, lightheadedness or loss of consciousness  No itching or bruising appreciated  No focal limb weakness  No speech or swallowing troubles    ROS otherwise negative      Current Outpatient Medications   Medication Sig Dispense Refill    Benzoyl Peroxide 2.5 % GEL APPLY TO FACE 2 TIMES PER DAY 60 g 1    meclizine (ANTIVERT) 25 MG tablet take 1 tablet by mouth once daily if needed for dizziness 30 tablet 5    levothyroxine (SYNTHROID) 75 MCG tablet take 1 tablet by mouth once daily 90 tablet 1    divalproex (DEPAKOTE) 250 MG DR tablet take 1 tablet by mouth twice a day 180 tablet 0    gabapentin (NEURONTIN) 300 MG capsule Take 1 capsule by mouth 3 times daily for 180 days.  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 . Will adjust Dantrolene. Dysesthesias nonproblematic on gabapentin    LLE pain and L low back pain: Concern for LS radiculopathy vs residual pain from previous fracture, and will get MRI of the lumbar spine. She does have chronic low back pains historically.   Thankfully no motor weakness, and only mild deficits in vibratory sensation at both ankles on exam.    Remote history of seizures: Without recurrence    Hx Migraines    Plan:     MRI lumbar spine WO    Increase Dantrium 25 mg BID and 50 mg HS    Continue baclofen 20 mg AM, 20 mg PM, 40 mg HS    Continue gabapentin 300 mg TID    Fall precautions reviewed    Return to office 6 months or sooner PRN    JOSSE Mendez--CNP  5:08 PM  4/26/2021

## 2021-04-27 ENCOUNTER — OFFICE VISIT (OUTPATIENT)
Dept: NEUROLOGY | Age: 31
End: 2021-04-27
Payer: COMMERCIAL

## 2021-04-27 VITALS
OXYGEN SATURATION: 97 % | RESPIRATION RATE: 12 BRPM | BODY MASS INDEX: 43.95 KG/M2 | WEIGHT: 290 LBS | DIASTOLIC BLOOD PRESSURE: 71 MMHG | HEART RATE: 73 BPM | TEMPERATURE: 97.6 F | SYSTOLIC BLOOD PRESSURE: 111 MMHG | HEIGHT: 68 IN

## 2021-04-27 DIAGNOSIS — G80.0 CONGENITAL SPASTIC QUADRIPARESIS (HCC): Primary | ICD-10-CM

## 2021-04-27 DIAGNOSIS — M54.50 LEFT LOW BACK PAIN, UNSPECIFIED CHRONICITY, UNSPECIFIED WHETHER SCIATICA PRESENT: ICD-10-CM

## 2021-04-27 DIAGNOSIS — M54.17 L-S RADICULOPATHY: ICD-10-CM

## 2021-04-27 PROBLEM — K29.70 GASTRITIS: Status: RESOLVED | Noted: 2020-11-25 | Resolved: 2021-04-27

## 2021-04-27 PROBLEM — E53.8 LOW VITAMIN B12 LEVEL: Status: RESOLVED | Noted: 2020-11-26 | Resolved: 2021-04-27

## 2021-04-27 PROBLEM — R79.89 LOW VITAMIN B12 LEVEL: Status: RESOLVED | Noted: 2020-11-26 | Resolved: 2021-04-27

## 2021-04-27 PROBLEM — S82.831A CLOSED AVULSION FRACTURE OF DISTAL END OF RIGHT FIBULA: Status: RESOLVED | Noted: 2020-09-03 | Resolved: 2021-04-27

## 2021-04-27 PROCEDURE — G8427 DOCREV CUR MEDS BY ELIG CLIN: HCPCS | Performed by: NURSE PRACTITIONER

## 2021-04-27 PROCEDURE — 4004F PT TOBACCO SCREEN RCVD TLK: CPT | Performed by: NURSE PRACTITIONER

## 2021-04-27 PROCEDURE — G8417 CALC BMI ABV UP PARAM F/U: HCPCS | Performed by: NURSE PRACTITIONER

## 2021-04-27 PROCEDURE — 99214 OFFICE O/P EST MOD 30 MIN: CPT | Performed by: NURSE PRACTITIONER

## 2021-04-27 RX ORDER — DANTROLENE SODIUM 25 MG/1
CAPSULE ORAL
Qty: 360 CAPSULE | Refills: 3 | Status: SHIPPED
Start: 2021-04-27 | End: 2021-09-07 | Stop reason: SDUPTHER

## 2021-04-27 NOTE — PATIENT INSTRUCTIONS
Patient Education        dantrolene (oral)  Pronunciation:  DALLAS troe peg  Brand:  Dantrium, Revonto, Ryanodex  What is the most important information I should know about dantrolene? You should not take dantrolene if you have active liver disease. Do not take dantrolene at a time when you need muscle tone for safe balance and movement during certain activities. Dantrolene can cause life-threatening liver damage, especially if you take high doses. Do not take this medicine in larger amounts or for longer than recommended. Call your doctor right away if you have signs of liver problems such as nausea, upper stomach pain, loss of appetite, dark urine, stacy-colored stools, or jaundice (yellowing of the skin or eyes). What is dantrolene? Dantrolene is a muscle relaxer that is used to treat muscle spasticity (stiffness and spasms) caused by conditions such as a spinal cord injury, stroke, cerebral palsy, or multiple sclerosis. Dantrolene is also used to treat or prevent muscle stiffness and spasms caused by malignant hyperthermia (a rapid rise in body temperature and severe muscle contractions) that can occur during surgery with certain types of anesthesia. Dantrolene may also be used for purposes not listed in this medication guide. What should I discuss with my healthcare provider before taking dantrolene? You should not use dantrolene if you are allergic to it, or if you have:  · active liver disease such as hepatitis or cirrhosis. Do not take dantrolene at a time when you need muscle tone for safe balance and movement during certain activities. In some situations, it may be dangerous for you to have reduced muscle tone. You may be more likely to develop liver problems while using dantrolene if you are a woman, if you are older than 35, or if you also take other medications. Ask your doctor about your individual risk.   Tell your doctor if you have ever had:  · liver disease;  · heart disease or a heart attack; or  · a breathing disorder such as COPD (chronic obstructive pulmonary disease). It is not known whether this medicine will harm an unborn baby. Tell your doctor if you are pregnant or plan to become pregnant. You should not breastfeed while using this medicine. Dantrolene should not be given to a child younger than 11years old. How should I take dantrolene? Your doctor will perform blood tests to make sure you do not have conditions that would prevent you from safely using dantrolene. Follow all directions on your prescription label and read all medication guides or instruction sheets. Your doctor may occasionally change your dose. Dantrolene can cause life-threatening liver damage, especially if you take high doses or take the medicine long-term. Use the medicine exactly as directed. If you take dantrolene within 3 or 4 hours before surgery, use only enough water needed to swallow the pill. While using dantrolene, you may need frequent blood tests to check your liver function. Call your doctor if your muscle symptoms do not improve, or if they get worse. If you need surgery, tell the surgeon ahead of time that you are using dantrolene. Store at room temperature away from moisture, heat, and light. Keep the bottle tightly closed when not in use. What happens if I miss a dose? Use the medicine as soon as you can, but skip the missed dose if it is almost time for your next dose. Do not use two doses at one time. What happens if I overdose? Seek emergency medical attention or call the Poison Help line at 1-942.412.1594. Overdose symptoms may include severe muscle weakness, vomiting, diarrhea, or fainting. What should I avoid while taking dantrolene? Avoid driving or hazardous activity until you know how this medicine will affect you. Your reactions could be impaired. Avoid exposure to sunlight or tanning beds. Dantrolene can make you sunburn more easily.  Wear protective clothing and use sunscreen (SPF 30 or higher) when you are outdoors. What are the possible side effects of dantrolene? Get emergency medical help if you have signs of an allergic reaction:  hives; difficult breathing; swelling of your face, lips, tongue, or throat. Call your doctor right away if you have signs of liver problems --nausea, upper stomach pain, itching, tired feeling, loss of appetite, dark urine, stacy-colored stools, or jaundice (yellowing of the skin or eyes). These side effects may be most likely to occur between 3 and 12 months of your treatment with dantrolene. Also call your doctor right away if you have:  · severe drowsiness;  · severe muscle weakness;  · weak or shallow breathing;  · severe or ongoing vomiting or diarrhea;  · problems with vision or speech;  · painful or difficult urination;  · a seizure; or  · a light-headed feeling, like you might pass out. Common side effects may include:  · dizziness, drowsiness;  · diarrhea;  · weakness; or  · feeling tired. This is not a complete list of side effects and others may occur. Call your doctor for medical advice about side effects. You may report side effects to FDA at 3-941-FDA-7702. What other drugs will affect dantrolene? Using dantrolene with other drugs that make you sleepy or slow your breathing can cause dangerous side effects or death. Ask your doctor before using opioid medication, a sleeping pill, a muscle relaxer, or medicine for anxiety or seizures. Tell your doctor about all your other medicines, especially:  · heart or blood pressure medication;  · hormone replacement therapy; or  · hormonal birth control (pills, injections, implants, skin patches, or vaginal rings). This list is not complete. Other drugs may affect dantrolene, including prescription and over-the-counter medicines, vitamins, and herbal products. Not all possible drug interactions are listed here. Where can I get more information?   Your pharmacist can provide more information about dantrolene. Remember, keep this and all other medicines out of the reach of children, never share your medicines with others, and use this medication only for the indication prescribed. Every effort has been made to ensure that the information provided by [address] is accurate, up-to-date, and complete, but no guarantee is made to that effect. Drug information contained herein may be time sensitive. [facility] information has been compiled for use by healthcare practitioners and consumers in the [place] and therefore [facility] does not warrant that uses outside of the [place] are appropriate, unless specifically indicated otherwise. [facility]'s drug information does not endorse drugs, diagnose patients or recommend therapy. [facility]'s drug information is an informational resource designed to assist licensed healthcare practitioners in caring for their patients and/or to serve consumers viewing this service as a supplement to, and not a substitute for, the expertise, skill, knowledge and judgment of healthcare practitioners. The absence of a warning for a given drug or drug combination in no way should be construed to indicate that the drug or drug combination is safe, effective or appropriate for any given patient. [facility] does not assume any responsibility for any aspect of healthcare administered with the aid of information [facility] provides. The information contained herein is not intended to cover all possible uses, directions, precautions, warnings, drug interactions, allergic reactions, or adverse effects. If you have questions about the drugs you are taking, check with your doctor, nurse or pharmacist.  Copyright 9240-0594 [address]: 7.01. Revision date: 12/9/2019. Care instructions adapted under license by [facility] ([facility]).  If you have questions about a medical condition or this instruction, always ask your healthcare professional. [employer] disclaims any warranty or liability for your use of this information.

## 2021-04-28 ENCOUNTER — IMMUNIZATION (OUTPATIENT)
Dept: PRIMARY CARE CLINIC | Age: 31
End: 2021-04-28
Payer: COMMERCIAL

## 2021-04-28 PROCEDURE — 91300 COVID-19, PFIZER VACCINE 30MCG/0.3ML DOSE: CPT | Performed by: NURSE PRACTITIONER

## 2021-04-28 PROCEDURE — 0002A COVID-19, PFIZER VACCINE 30MCG/0.3ML DOSE: CPT | Performed by: NURSE PRACTITIONER

## 2021-05-04 ENCOUNTER — TELEPHONE (OUTPATIENT)
Dept: NEUROLOGY | Age: 31
End: 2021-05-04

## 2021-05-04 NOTE — TELEPHONE ENCOUNTER
Patient called in and was informed of date, time, and location of MRI.  Informed her not to wear any jewelry or anything with metal.

## 2021-05-04 NOTE — TELEPHONE ENCOUNTER
Authorization obtained for MRI lumbar until 6/28/21 w/[facility] #[id number]. Pt scheduled for MRI lumbar at [address] [place] [address] on 5/13 at 12pm but is to arrive at 11:30am. Pt advised to enter through the main entrance, bring a photo ID, insurance card, list of current medications, not to wear any jewelry and avoid clothing with metal fragments (like yoga pants), buttons or zippers. YARITZA MARSHALL for pt to return call to discuss appt info.   Electronically signed by Dominique Manzo on 5/4/21 at 12:05 PM EDT

## 2021-05-05 ENCOUNTER — APPOINTMENT (OUTPATIENT)
Dept: CT IMAGING | Age: 31
End: 2021-05-05
Payer: COMMERCIAL

## 2021-05-05 ENCOUNTER — HOSPITAL ENCOUNTER (EMERGENCY)
Age: 31
Discharge: HOME | End: 2021-05-05
Payer: COMMERCIAL

## 2021-05-05 VITALS
DIASTOLIC BLOOD PRESSURE: 88 MMHG | RESPIRATION RATE: 18 BRPM | HEART RATE: 123 BPM | TEMPERATURE: 98.6 F | WEIGHT: 290 LBS | BODY MASS INDEX: 44.09 KG/M2 | OXYGEN SATURATION: 96 % | SYSTOLIC BLOOD PRESSURE: 126 MMHG

## 2021-05-05 DIAGNOSIS — R51.9 NONINTRACTABLE HEADACHE, UNSPECIFIED CHRONICITY PATTERN, UNSPECIFIED HEADACHE TYPE: ICD-10-CM

## 2021-05-05 DIAGNOSIS — N10 ACUTE PYELONEPHRITIS: Primary | ICD-10-CM

## 2021-05-05 LAB
ALBUMIN SERPL-MCNC: 4.3 G/DL (ref 3.5–5.2)
ALP BLD-CCNC: 82 U/L (ref 35–104)
ALT SERPL-CCNC: 18 U/L (ref 0–32)
ANION GAP SERPL CALCULATED.3IONS-SCNC: 10 MMOL/L (ref 7–16)
AST SERPL-CCNC: 15 U/L (ref 0–31)
BACTERIA: ABNORMAL /HPF
BILIRUB SERPL-MCNC: 0.4 MG/DL (ref 0–1.2)
BILIRUBIN URINE: NEGATIVE
BLOOD, URINE: ABNORMAL
BUN BLDV-MCNC: 8 MG/DL (ref 6–20)
CALCIUM SERPL-MCNC: 9.6 MG/DL (ref 8.6–10.2)
CHLORIDE BLD-SCNC: 104 MMOL/L (ref 98–107)
CLARITY: ABNORMAL
CO2: 26 MMOL/L (ref 22–29)
COLOR: YELLOW
CREAT SERPL-MCNC: 0.9 MG/DL (ref 0.5–1)
GFR AFRICAN AMERICAN: >60
GFR NON-AFRICAN AMERICAN: >60 ML/MIN/1.73
GLUCOSE BLD-MCNC: 98 MG/DL (ref 74–99)
GLUCOSE URINE: NEGATIVE MG/DL
HCG(URINE) PREGNANCY TEST: NEGATIVE
HCT VFR BLD CALC: 47.6 % (ref 34–48)
HEMOGLOBIN: 15.3 G/DL (ref 11.5–15.5)
KETONES, URINE: NEGATIVE MG/DL
LEUKOCYTE ESTERASE, URINE: ABNORMAL
MCH RBC QN AUTO: 29.4 PG (ref 26–35)
MCHC RBC AUTO-ENTMCNC: 32.1 % (ref 32–34.5)
MCV RBC AUTO: 91.5 FL (ref 80–99.9)
NITRITE, URINE: NEGATIVE
PDW BLD-RTO: 14.2 FL (ref 11.5–15)
PH UA: 6 (ref 5–9)
PLATELET # BLD: 203 E9/L (ref 130–450)
PMV BLD AUTO: 12.5 FL (ref 7–12)
POTASSIUM SERPL-SCNC: 4.5 MMOL/L (ref 3.5–5)
PROTEIN UA: 30 MG/DL
RBC # BLD: 5.2 E12/L (ref 3.5–5.5)
RBC UA: >20 /HPF (ref 0–2)
SODIUM BLD-SCNC: 140 MMOL/L (ref 132–146)
SPECIFIC GRAVITY UA: 1.01 (ref 1–1.03)
TOTAL PROTEIN: 8 G/DL (ref 6.4–8.3)
UROBILINOGEN, URINE: 0.2 E.U./DL
WBC # BLD: 17.2 E9/L (ref 4.5–11.5)
WBC UA: ABNORMAL /HPF (ref 0–5)

## 2021-05-05 PROCEDURE — 80053 COMPREHEN METABOLIC PANEL: CPT

## 2021-05-05 PROCEDURE — 2580000003 HC RX 258: Performed by: NURSE PRACTITIONER

## 2021-05-05 PROCEDURE — 85027 COMPLETE CBC AUTOMATED: CPT

## 2021-05-05 PROCEDURE — 6360000002 HC RX W HCPCS: Performed by: NURSE PRACTITIONER

## 2021-05-05 PROCEDURE — 81001 URINALYSIS AUTO W/SCOPE: CPT

## 2021-05-05 PROCEDURE — 87077 CULTURE AEROBIC IDENTIFY: CPT

## 2021-05-05 PROCEDURE — 96375 TX/PRO/DX INJ NEW DRUG ADDON: CPT

## 2021-05-05 PROCEDURE — 81025 URINE PREGNANCY TEST: CPT

## 2021-05-05 PROCEDURE — 74176 CT ABD & PELVIS W/O CONTRAST: CPT

## 2021-05-05 PROCEDURE — 6370000000 HC RX 637 (ALT 250 FOR IP): Performed by: NURSE PRACTITIONER

## 2021-05-05 PROCEDURE — 99284 EMERGENCY DEPT VISIT MOD MDM: CPT

## 2021-05-05 PROCEDURE — 87088 URINE BACTERIA CULTURE: CPT

## 2021-05-05 PROCEDURE — 87186 SC STD MICRODIL/AGAR DIL: CPT

## 2021-05-05 PROCEDURE — 96374 THER/PROPH/DIAG INJ IV PUSH: CPT

## 2021-05-05 RX ORDER — ONDANSETRON 4 MG/1
4 TABLET, ORALLY DISINTEGRATING ORAL EVERY 8 HOURS PRN
Qty: 24 TABLET | Refills: 0 | Status: SHIPPED | OUTPATIENT
Start: 2021-05-05 | End: 2021-07-31 | Stop reason: ALTCHOICE

## 2021-05-05 RX ORDER — 0.9 % SODIUM CHLORIDE 0.9 %
1000 INTRAVENOUS SOLUTION INTRAVENOUS ONCE
Status: COMPLETED | OUTPATIENT
Start: 2021-05-05 | End: 2021-05-05

## 2021-05-05 RX ORDER — METOCLOPRAMIDE HYDROCHLORIDE 5 MG/ML
10 INJECTION INTRAMUSCULAR; INTRAVENOUS ONCE
Status: COMPLETED | OUTPATIENT
Start: 2021-05-05 | End: 2021-05-05

## 2021-05-05 RX ORDER — DIPHENHYDRAMINE HYDROCHLORIDE 50 MG/ML
25 INJECTION INTRAMUSCULAR; INTRAVENOUS ONCE
Status: COMPLETED | OUTPATIENT
Start: 2021-05-05 | End: 2021-05-05

## 2021-05-05 RX ORDER — SULFAMETHOXAZOLE AND TRIMETHOPRIM 800; 160 MG/1; MG/1
1 TABLET ORAL ONCE
Status: COMPLETED | OUTPATIENT
Start: 2021-05-05 | End: 2021-05-05

## 2021-05-05 RX ORDER — SULFAMETHOXAZOLE AND TRIMETHOPRIM 800; 160 MG/1; MG/1
1 TABLET ORAL 2 TIMES DAILY
Qty: 20 TABLET | Refills: 0 | Status: SHIPPED | OUTPATIENT
Start: 2021-05-05 | End: 2021-05-15

## 2021-05-05 RX ADMIN — METOCLOPRAMIDE HYDROCHLORIDE 10 MG: 5 INJECTION INTRAMUSCULAR; INTRAVENOUS at 20:41

## 2021-05-05 RX ADMIN — SODIUM CHLORIDE 1000 ML: 9 INJECTION, SOLUTION INTRAVENOUS at 20:37

## 2021-05-05 RX ADMIN — DIPHENHYDRAMINE HYDROCHLORIDE 25 MG: 50 INJECTION, SOLUTION INTRAMUSCULAR; INTRAVENOUS at 20:38

## 2021-05-05 RX ADMIN — SULFAMETHOXAZOLE AND TRIMETHOPRIM 1 TABLET: 800; 160 TABLET ORAL at 20:37

## 2021-05-05 ASSESSMENT — PAIN SCALES - GENERAL: PAINLEVEL_OUTOF10: 10

## 2021-05-05 ASSESSMENT — PAIN DESCRIPTION - LOCATION: LOCATION: FLANK;BACK;HEAD

## 2021-05-05 ASSESSMENT — PAIN DESCRIPTION - DESCRIPTORS: DESCRIPTORS: ACHING

## 2021-05-05 ASSESSMENT — PAIN DESCRIPTION - ORIENTATION: ORIENTATION: LEFT

## 2021-05-05 NOTE — ED NOTES
Bed: 15  Expected date:   Expected time:   Means of arrival:   Comments:  triage     Cheryle Axe, RN  05/05/21 1954

## 2021-05-05 NOTE — ED NOTES
FIRST PROVIDER CONTACT ASSESSMENT NOTE      Department of Emergency Medicine   ED  First Provider Note   5/5/21  2:26 PM EDT    Chief Complaint: Flank Pain (started saturday, yesterday started getting bad, left flank, radiates to back,hurts when urininating, chills, no odor or blood, headache)      History of Present Illness:    Javier Gordon is a 32 y.o. female who presents to the emergency department for poss UTI vs kidney stone  Focused Screening Exam:  Constitutional:  Alert, appears stated age and is in no distress.     *ALLERGIES*     Latex, Acetaminophen, Aspirin-acetaminophen-caffeine, Dye [iodides], Penicillins, Topamax [topiramate], Tylenol with codeine #3 [acetaminophen-codeine], Lactose, Blueberry flavor, Fish-derived products, Iodine, Lidocaine, Nemesio flavor, and Vicodin [hydrocodone-acetaminophen]     ED Triage Vitals   BP Temp Temp Source Pulse Resp SpO2 Height Weight   05/05/21 1401 05/05/21 1339 05/05/21 1339 05/05/21 1339 05/05/21 1401 05/05/21 1339 -- 05/05/21 1401   126/88 98.6 °F (37 °C) Temporal 123 18 96 %  290 lb (131.5 kg)        Initial Plan of Care:  Initiate Treatment-Testing, Proceed toTreatment Area When Bed Available for ED Attending/MLP to Continue Care    -----------------[address] ASSESSMENT NOTE--------------  Electronically signed by RANI Guzman   DD: 5/5/21       RANI Oneill  05/05/21 [id number]

## 2021-05-06 NOTE — ED PROVIDER NOTES
Independent   HPI:  5/5/21, Time: 8:13 PM EDT         Nury Torres is a 32 y.o. female presenting to the ED for urinary frequency and burning with urination as well as flank pain all that started on Saturday. Patient reports symptoms started Saturday. She denies any actual Nausea, vomiting, diarrhea and she denies any abdominal pain with this. She denies any blood noticed in her urine. She has been able to eat and drink she was concerned that she may have a urinary tract infection. Patient also reports that today she developed a headache. States that she does have a history of migraines and is typical presentation for her she denies it being the worst headache of her life denies any thunderclap onset. She also denies any unilateral weakness, facial asymmetry or change noted in speech quality she not on any anticoagulant therapy and denies any recent falls or injury. Patient reports that she normally takes Depakote for her migraines and did that today but did not get any significant relief. Patient denies feeling feverish she denies any illness exposure. States she has been able to eat and drink. Denies any black or tarry stools. States symptoms are mild, describing them as aching and persistent. Denies her pain radiating anywhere else states is primarily to the right flank only. Patient otherwise denies chest pain denies shortness of breath.     Review of Systems:   A complete review of systems was performed and pertinent positives and negatives are stated within HPI, all other systems reviewed and are negative.          --------------------------------------------- PAST HISTORY ---------------------------------------------  Past Medical History:  has a past medical history of ADHD, Arthritis, Asthma, Avulsion fracture of right distal fibula, Chronic midline low back pain without sciatica, Closed nondisplaced fracture of head of left radius, COPD (chronic obstructive pulmonary disease) ([facility].), Negative mg/dL    Bilirubin Urine Negative Negative    Ketones, Urine Negative Negative mg/dL    Specific Gravity, UA 1.015 1.005 - 1.030    Blood, Urine LARGE (A) Negative    pH, UA 6.0 5.0 - 9.0    Protein, UA 30 (A) Negative mg/dL    Urobilinogen, Urine 0.2 <2.0 E.U./dL    Nitrite, Urine Negative Negative    Leukocyte Esterase, Urine SMALL (A) Negative   CBC   Result Value Ref Range    WBC 17.2 (H) 4.5 - 11.5 E9/L    RBC 5.20 3.50 - 5.50 E12/L    Hemoglobin 15.3 11.5 - 15.5 g/dL    Hematocrit 47.6 34.0 - 48.0 %    MCV 91.5 80.0 - 99.9 fL    MCH 29.4 26.0 - 35.0 pg    MCHC 32.1 32.0 - 34.5 %    RDW 14.2 11.5 - 15.0 fL    Platelets 434 413 - 980 E9/L    MPV 12.5 (H) 7.0 - 12.0 fL   Comprehensive metabolic panel   Result Value Ref Range    Sodium 140 132 - 146 mmol/L    Potassium 4.5 3.5 - 5.0 mmol/L    Chloride 104 98 - 107 mmol/L    CO2 26 22 - 29 mmol/L    Anion Gap 10 7 - 16 mmol/L    Glucose 98 74 - 99 mg/dL    BUN 8 6 - 20 mg/dL    CREATININE 0.9 0.5 - 1.0 mg/dL    GFR Non-African American >60 >=60 mL/min/1.73    GFR African American >60     Calcium 9.6 8.6 - 10.2 mg/dL    Total Protein 8.0 6.4 - 8.3 g/dL    Albumin 4.3 3.5 - 5.2 g/dL    Total Bilirubin 0.4 0.0 - 1.2 mg/dL    Alkaline Phosphatase 82 35 - 104 U/L    ALT 18 0 - 32 U/L    AST 15 0 - 31 U/L   Pregnancy, urine   Result Value Ref Range    HCG(Urine) Pregnancy Test NEGATIVE NEGATIVE   Microscopic Urinalysis   Result Value Ref Range    WBC, UA 10-20 (A) 0 - 5 /HPF    RBC, UA >20 0 - 2 /HPF    Bacteria, UA RARE (A) None Seen /HPF       RADIOLOGY:  Interpreted by Radiologist.  CT ABDOMEN PELVIS WO CONTRAST Additional Contrast? None   Final Result   1. Subtle bilateral perinephric fat stranding more significant on the right. Findings could suggest bilateral pyelonephritis. 2.  No renal or ureter calculus. 3.  Broad-based right paracentral disc herniation at T12/L1. MRI may be   helpful for further evaluation. ------------------------- NURSING NOTES AND VITALS REVIEWED ---------------------------   The nursing notes within the ED encounter and vital signs as below have been reviewed. /88   Pulse 123   Temp 98.6 °F (37 °C) (Temporal)   Resp 18   Wt 290 lb (131.5 kg)   LMP  (LMP Unknown)   SpO2 96%   BMI 44.09 kg/m²   Oxygen Saturation Interpretation: Normal      ---------------------------------------------------PHYSICAL EXAM--------------------------------------      Constitutional/General: Alert and oriented x3, mildly uncomfortable  Head: Normocephalic and atraumatic  Eyes: PERRL, EOMI  Mouth: Oropharynx clear, handling secretions, no trismus  Neck: Supple, full ROM,   Pulmonary: Lungs clear to auscultation bilaterally, no wheezes, rales, or rhonchi. Not in respiratory distress  Cardiovascular:  Regular rate and rhythm, no murmurs, gallops, or rubs. 2+ distal pulses  Abdomen: Soft, non tender, non distended, very slight CVA tenderness to the right side, no point tenderness to the abdomen or to the left CVA aspect. No unusual bulges or areas of pulsation. Extremities: Moves all extremities x 4. Warm and well perfused  Skin: warm and dry without rash  Neurologic: GCS 15, cranial nerves II through XII grossly intact no acute neurovascular deficit noted.   Speech clear and coherent strength strong and equal bilaterally, negative for meningeal irritation  Psych: Normal Affect      ------------------------------ ED COURSE/MEDICAL DECISION MAKING----------------------  Medications   0.9 % sodium chloride bolus (1,000 mLs Intravenous New Bag 5/5/21 2037)   metoclopramide (REGLAN) injection 10 mg (10 mg Intravenous Given 5/5/21 2041)   diphenhydrAMINE (BENADRYL) injection 25 mg (25 mg Intravenous Given 5/5/21 2038)   sulfamethoxazole-trimethoprim (BACTRIM DS;SEPTRA DS) 800-160 MG per tablet 1 tablet (1 tablet Oral Given 5/5/21 2037)         ED COURSE:       Medical Decision Making:   Plan be for labs will also obtain imaging, labs resulted urinalysis large amount of blood with small amount leukocytes and 10-20 WBCs. Will send off urine culture. Will start antibiotic treatment. Will provide patient with 1 Bactrim tablet. She did have some very slight right-sided CVA tenderness no CVA tenderness noted to the left side. Pregnancy test negative chemistry panel within normal limits, CBC with elevated white blood cell count 17 likely related to the acute pyelonephritis. CT abdomen pelvis resulted showing a subtle bilateral perinephritic fat stranding more significant on the right findings could suggest bilateral pyelonephritis. Also did show some broad-based right paracentral disc herniation at T12-L1. Patient was made aware of findings. She actually is resting comfortably states she is actually ready to go home I did make her aware that I will provide her with her antibiotic here as well as something for headache relief they are agreeable for this. Patient was medicated here. Patient reports she feels markedly improved she wants to be discharged home I did make her aware of laboratory values and the importance of returning back if she does not show any improvement. She will be sent home with Bactrim DS as well as Zofran. She was educated on supportive measures especially with good hydration with water and to medicate for fever reduction as well as when to return back to the emergency department. Patient overall nontoxic, neurovascular intact she is negative for meningeal irritation. Patient expressed understanding and safely discharged home    Counseling: The emergency provider has spoken with the patient and discussed todays results, in addition to providing specific details for the plan of care and counseling regarding the diagnosis and prognosis.   Questions are answered at this time and they are agreeable with the plan.      --------------------------------- IMPRESSION AND DISPOSITION

## 2021-05-07 LAB
ORGANISM: ABNORMAL
URINE CULTURE, ROUTINE: ABNORMAL

## 2021-05-13 ENCOUNTER — HOSPITAL ENCOUNTER (OUTPATIENT)
Dept: MRI IMAGING | Age: 31
Discharge: HOME | End: 2021-05-15
Payer: COMMERCIAL

## 2021-05-13 DIAGNOSIS — M54.17 L-S RADICULOPATHY: ICD-10-CM

## 2021-05-13 DIAGNOSIS — M54.50 LEFT LOW BACK PAIN, UNSPECIFIED CHRONICITY, UNSPECIFIED WHETHER SCIATICA PRESENT: ICD-10-CM

## 2021-05-13 PROCEDURE — 72148 MRI LUMBAR SPINE W/O DYE: CPT

## 2021-05-14 ENCOUNTER — TELEPHONE (OUTPATIENT)
Dept: NEUROLOGY | Age: 31
End: 2021-05-14

## 2021-05-14 DIAGNOSIS — M54.17 L-S RADICULOPATHY: Primary | ICD-10-CM

## 2021-05-14 DIAGNOSIS — M54.50 LEFT LOW BACK PAIN, UNSPECIFIED CHRONICITY, UNSPECIFIED WHETHER SCIATICA PRESENT: ICD-10-CM

## 2021-05-14 NOTE — TELEPHONE ENCOUNTER
MA attempted to reach pt but voicemail not set up.   Electronically signed by Sary Rodriguez on 5/14/21 at 2:40 PM EDT

## 2021-05-14 NOTE — TELEPHONE ENCOUNTER
----- Message from Cristela Dakins, APRN - CNP sent at 5/13/2021  4:08 PM EDT -----  Regarding: MRI lumbar  Her MRI of the lumbar spine showed narrowing in the regions of the spinal cord where the nerves exit, at multiple levels, considered to be mild. No major central canal stenosis. She also has a couple of bulging disks, a mild one at L1-L2 and moderate at T12-L1.   We can try PT and medications for now and also refer her to pain management for discussion of injections.    ----- Message -----  From: Codi Thomas Incoming Radiant Results From [facility]  Sent: 5/13/2021   1:26 PM EDT  To: Cristela Dakins, APRN - CNP

## 2021-05-18 RX ORDER — GABAPENTIN 300 MG/1
600 CAPSULE ORAL 3 TIMES DAILY
Qty: 540 CAPSULE | Refills: 0 | Status: SHIPPED
Start: 2021-05-18 | End: 2021-07-29 | Stop reason: SDUPTHER

## 2021-05-18 NOTE — TELEPHONE ENCOUNTER
YARITZA MARSHALL for pt to increase gabapentin to 600mg tid per Alessandro Clarke and to contact office w/any questions.   Electronically signed by Juan David Palmer on 5/18/21 at 3:19 PM EDT

## 2021-05-18 NOTE — TELEPHONE ENCOUNTER
MA informed pt of MRI lumbar results and recommendations per Micheal Delarosa and pt understood. Pt declines PT as it did not improve sx in the past. Pt agreeable w/referral to pain management. Pt would also like to try different meds and/or doses for sx. Pt currently tolerating gabapentin 300mg tid.   Electronically signed by Dominique Manzo on 5/18/21 at 2:25 PM EDT

## 2021-05-27 DIAGNOSIS — Z76.0 MEDICATION REFILL: ICD-10-CM

## 2021-05-27 RX ORDER — MEDROXYPROGESTERONE ACETATE 10 MG/1
TABLET ORAL
Qty: 90 TABLET | Refills: 0 | Status: SHIPPED
Start: 2021-05-27 | End: 2021-05-30

## 2021-05-27 NOTE — TELEPHONE ENCOUNTER
Last Appointment:  11/25/2020  Future Appointments   Date Time Provider Donna Page   6/24/2021  3:40 PM MD Aftab Odom [facility]   11/4/2021  2:00 PM JOSSE Ignacio - CAMILLE Cadena [facility]

## 2021-05-28 ENCOUNTER — OFFICE VISIT (OUTPATIENT)
Dept: FAMILY MEDICINE CLINIC | Age: 31
End: 2021-05-28
Payer: COMMERCIAL

## 2021-05-28 VITALS
DIASTOLIC BLOOD PRESSURE: 89 MMHG | HEART RATE: 85 BPM | BODY MASS INDEX: 44.41 KG/M2 | WEIGHT: 293 LBS | SYSTOLIC BLOOD PRESSURE: 126 MMHG | OXYGEN SATURATION: 96 % | TEMPERATURE: 98.3 F | HEIGHT: 68 IN

## 2021-05-28 DIAGNOSIS — N12 PYELONEPHRITIS: ICD-10-CM

## 2021-05-28 DIAGNOSIS — J45.20 MILD INTERMITTENT ASTHMA WITHOUT COMPLICATION: ICD-10-CM

## 2021-05-28 DIAGNOSIS — E03.9 HYPOTHYROIDISM, UNSPECIFIED TYPE: ICD-10-CM

## 2021-05-28 DIAGNOSIS — N92.1 MENORRHAGIA WITH IRREGULAR CYCLE: ICD-10-CM

## 2021-05-28 DIAGNOSIS — E55.9 VITAMIN D DEFICIENCY: ICD-10-CM

## 2021-05-28 DIAGNOSIS — R10.9 FLANK PAIN: ICD-10-CM

## 2021-05-28 DIAGNOSIS — R10.30 LOWER ABDOMINAL PAIN: ICD-10-CM

## 2021-05-28 DIAGNOSIS — Z86.018 HISTORY OF UTERINE FIBROID: ICD-10-CM

## 2021-05-28 DIAGNOSIS — E53.9 VITAMIN B DEFICIENCY: ICD-10-CM

## 2021-05-28 DIAGNOSIS — K58.9 IRRITABLE BOWEL SYNDROME, UNSPECIFIED TYPE: Primary | ICD-10-CM

## 2021-05-28 DIAGNOSIS — Z91.09 ENVIRONMENTAL ALLERGIES: ICD-10-CM

## 2021-05-28 LAB
BACTERIA: ABNORMAL /HPF
BASOPHILS ABSOLUTE: 0.02 E9/L (ref 0–0.2)
BASOPHILS RELATIVE PERCENT: 0.3 % (ref 0–2)
BILIRUBIN URINE: NEGATIVE
BLOOD, URINE: ABNORMAL
CLARITY: ABNORMAL
COLOR: ABNORMAL
EOSINOPHILS ABSOLUTE: 0.14 E9/L (ref 0.05–0.5)
EOSINOPHILS RELATIVE PERCENT: 1.8 % (ref 0–6)
FOLATE: >20 NG/ML (ref 4.8–24.2)
GLUCOSE URINE: NEGATIVE MG/DL
HCT VFR BLD CALC: 45.1 % (ref 34–48)
HEMOGLOBIN: 14.4 G/DL (ref 11.5–15.5)
IMMATURE GRANULOCYTES #: 0.03 E9/L
IMMATURE GRANULOCYTES %: 0.4 % (ref 0–5)
KETONES, URINE: NEGATIVE MG/DL
LEUKOCYTE ESTERASE, URINE: ABNORMAL
LYMPHOCYTES ABSOLUTE: 2.28 E9/L (ref 1.5–4)
LYMPHOCYTES RELATIVE PERCENT: 29.4 % (ref 20–42)
MCH RBC QN AUTO: 29.5 PG (ref 26–35)
MCHC RBC AUTO-ENTMCNC: 31.9 % (ref 32–34.5)
MCV RBC AUTO: 92.4 FL (ref 80–99.9)
MONOCYTES ABSOLUTE: 0.54 E9/L (ref 0.1–0.95)
MONOCYTES RELATIVE PERCENT: 7 % (ref 2–12)
NEUTROPHILS ABSOLUTE: 4.74 E9/L (ref 1.8–7.3)
NEUTROPHILS RELATIVE PERCENT: 61.1 % (ref 43–80)
NITRITE, URINE: NEGATIVE
PDW BLD-RTO: 15.2 FL (ref 11.5–15)
PH UA: 6.5 (ref 5–9)
PLATELET # BLD: 201 E9/L (ref 130–450)
PMV BLD AUTO: 13.4 FL (ref 7–12)
PROTEIN UA: ABNORMAL MG/DL
RBC # BLD: 4.88 E12/L (ref 3.5–5.5)
RBC UA: >20 /HPF (ref 0–2)
SPECIFIC GRAVITY UA: 1.01 (ref 1–1.03)
TSH SERPL DL<=0.05 MIU/L-ACNC: 2.56 UIU/ML (ref 0.27–4.2)
UROBILINOGEN, URINE: 0.2 E.U./DL
VITAMIN B-12: 395 PG/ML (ref 211–946)
VITAMIN D 25-HYDROXY: 36 NG/ML (ref 30–100)
WBC # BLD: 7.8 E9/L (ref 4.5–11.5)
WBC UA: ABNORMAL /HPF (ref 0–5)

## 2021-05-28 PROCEDURE — 99213 OFFICE O/P EST LOW 20 MIN: CPT | Performed by: STUDENT IN AN ORGANIZED HEALTH CARE EDUCATION/TRAINING PROGRAM

## 2021-05-28 PROCEDURE — G8427 DOCREV CUR MEDS BY ELIG CLIN: HCPCS | Performed by: STUDENT IN AN ORGANIZED HEALTH CARE EDUCATION/TRAINING PROGRAM

## 2021-05-28 PROCEDURE — 81025 URINE PREGNANCY TEST: CPT | Performed by: STUDENT IN AN ORGANIZED HEALTH CARE EDUCATION/TRAINING PROGRAM

## 2021-05-28 PROCEDURE — 99212 OFFICE O/P EST SF 10 MIN: CPT | Performed by: STUDENT IN AN ORGANIZED HEALTH CARE EDUCATION/TRAINING PROGRAM

## 2021-05-28 PROCEDURE — 36415 COLL VENOUS BLD VENIPUNCTURE: CPT | Performed by: STUDENT IN AN ORGANIZED HEALTH CARE EDUCATION/TRAINING PROGRAM

## 2021-05-28 PROCEDURE — 4004F PT TOBACCO SCREEN RCVD TLK: CPT | Performed by: STUDENT IN AN ORGANIZED HEALTH CARE EDUCATION/TRAINING PROGRAM

## 2021-05-28 PROCEDURE — G8417 CALC BMI ABV UP PARAM F/U: HCPCS | Performed by: STUDENT IN AN ORGANIZED HEALTH CARE EDUCATION/TRAINING PROGRAM

## 2021-05-28 RX ORDER — MOMETASONE FUROATE 1 MG/ML
SOLUTION TOPICAL
COMMUNITY
Start: 2021-04-30 | End: 2021-07-29 | Stop reason: SDUPTHER

## 2021-05-28 RX ORDER — DICYCLOMINE HCL 20 MG
20 TABLET ORAL 4 TIMES DAILY
Qty: 120 TABLET | Refills: 5 | Status: SHIPPED
Start: 2021-05-28 | End: 2021-07-29 | Stop reason: SDUPTHER

## 2021-05-28 RX ORDER — LORATADINE 10 MG/1
10 TABLET ORAL DAILY
Qty: 30 TABLET | Refills: 3 | Status: SHIPPED
Start: 2021-05-28 | End: 2021-07-29 | Stop reason: SDUPTHER

## 2021-05-28 SDOH — ECONOMIC STABILITY: FOOD INSECURITY: WITHIN THE PAST 12 MONTHS, YOU WORRIED THAT YOUR FOOD WOULD RUN OUT BEFORE YOU GOT MONEY TO BUY MORE.: NEVER TRUE

## 2021-05-28 SDOH — ECONOMIC STABILITY: FOOD INSECURITY: WITHIN THE PAST 12 MONTHS, THE FOOD YOU BOUGHT JUST DIDN'T LAST AND YOU DIDN'T HAVE MONEY TO GET MORE.: NEVER TRUE

## 2021-05-28 NOTE — PROGRESS NOTES
gynecologist for further work-up as needed; CBC today  Patient reports she has not had Pap in several years, will wait until bleeding is improved so that we can get accurate Pap, can be done here or at gynecologist office  Bilateral flank pain-recent pyelonephritis diagnosis, will get urinalysis today to reevaluate for possible residual infection  Hypothyroidism-last TSH 2 years ago, will repeat today  History of asthma-patient reports previously being on Singulair with good response results, restart Singulair  IBS-patient requests refill of Bentyl today  Environmental allergies-patient not doing well with Xyzal, stop Xyzal start Claritin-D and reassess in near future    Return to office in 1 month for reevaluation of menorrhagia and allergies    Attending Physician Statement  I have discussed the case, including pertinent history and exam findings with the resident. I agree with the documented assessment and plan.

## 2021-05-28 NOTE — PROGRESS NOTES
[facility] MEDICINE RESIDENCY PROGRAM  DATE OF VISIT : 2021    Patient : Marlena Thorne   Age : 32 y.o.  : 1990   MRN : [id number]   ______________________________________________________________________    Chief Complaint :   Chief Complaint   Patient presents with    Menorrhagia     longer than a month       HPI : Marlena Thorne is 32 y.o. female who presented to the clinic today for menorrhagia for the past 2 months. Patient was on Provera 10 mg daily, but ran out. She was seeing Dr. Juan Burnett. She has a history of uterine fibroids. She does report a history of breast and uterine cancer in the family, could not name any specific members of the family with these cancers. She reports that she has been bleeding every day and changing pads about once an hour, for the past 2 months. She also reports some lower abdominal pain. A couple weeks ago she was in the ED and was treated for bilateral pyelonephritis. She reports that she still has some flank pain bilaterally as well as mid low back pain. She reports improvement in dysuria. She denies fever and chills. Patient does report that she feels weaker, denies feeling more tired. She does have a history of hypothyroidism and last TSH was normal about 2 years ago. She denies any heat and cold intolerance, constipation or diarrhea. In reviewing her medications, patient states that she uses her albuterol rescue inhaler about once or twice per week. She reports that she was previously on Singulair for history of childhood asthma and that had helped a lot. She also reports that she is on Xyzal for allergies but is not working well. Patient also requests Vit D and B12 be checked due to history of deficiencies in both.     Past Medical History :  Past Medical History:   Diagnosis Date    ADHD     Arthritis     Asthma     controlled    Avulsion fracture of right distal fibula     Chronic midline low back pain without sciatica 11/13/2017    Closed nondisplaced fracture of head of left radius 12/30/2015    COPD (chronic obstructive pulmonary disease) (HCC)     Depression     Gastritis     GERD (gastroesophageal reflux disease)     History of snoring     IBS (irritable bowel syndrome)     Jaw fracture (HCC)     from elevator accident    Low vitamin B12 level     Migraines     Muscle spasticity 08/2015    spastic quadriparesis    Obesity (BMI 30-39.9)     bmi 37.1  weight 251 #    Seizures ([address]     last episode 2016    Thyroid disease     Tonsillitis     Urinary incontinence      Past Surgical History:   Procedure Laterality Date    COLONOSCOPY      EYE SURGERY      probing of ductal system right eye     FRACTURE SURGERY      R ELBOW CRUSH INJURY W PLATE AND PINS PLACED    LA COLONOSCOPY W/BIOPSY SINGLE/MULTIPLE  10/29/2018    COLONOSCOPY WITH BIOPSY performed by Davon Barry MD at [facility] OF TONSILS,12+ Y/O N/A 5/4/2018    TONSILLECTOMY performed by Meryl Garvey MD at [facility] N/A 10/29/2020    EGD BIOPSY performed by Lieutenant Surya MD at [address] [place] [address] : Allergies   Allergen Reactions    Latex Rash    Acetaminophen Shortness Of Breath    Aspirin-Acetaminophen-Caffeine Shortness Of Breath    Dye [Iodides] Shortness Of Breath    Penicillins Anaphylaxis    Topamax [Topiramate] Nausea And Vomiting, Other (See Comments) and Shortness Of Breath    Tylenol With Codeine #3 [Acetaminophen-Codeine] Shortness Of Breath    Lactose Other (See Comments)     Usually just causes stomach pain, diarrhea if pt consumes too much of it.      Blueberry Flavor      ALLERGIC TO BLUEBERRY    Fish-Derived Products      SEAFOOD, ESPECIALLY SHRIMP    Iodine      Seafood allergy    Lidocaine Diarrhea, Itching and Swelling    [place] Flavor Hives     Nemesio fruit    Vicodin [Hydrocodone-Acetaminophen]      Acts drunk         Medication List : Current Outpatient Medications   Medication Sig Dispense Refill    montelukast (SINGULAIR) 10 MG tablet Take 1 tablet by mouth daily 30 tablet 3    medroxyPROGESTERone (PROVERA) 10 MG tablet Take 1 tablet by mouth daily 30 tablet 3    loratadine (CLARITIN) 10 MG tablet Take 1 tablet by mouth daily 30 tablet 3    dicyclomine (BENTYL) 20 MG tablet Take 1 tablet by mouth 4 times daily 120 tablet 5    gabapentin (NEURONTIN) 300 MG capsule Take 2 capsules by mouth 3 times daily for 90 days. Intended supply: 90 days (Patient taking differently: Take 600 mg by mouth 4 times daily. Intended supply: 90 days) 540 capsule 0    ondansetron (ZOFRAN ODT) 4 MG disintegrating tablet Take 1 tablet by mouth every 8 hours as needed for Nausea or Vomiting 24 tablet 0    dantrolene (DANTRIUM) 25 MG capsule Take 1 capsule by mouth 2 times daily AND 2 capsules nightly. (Patient taking differently: Take 1 capsule by mouth 3 times daily) 360 capsule 3    Benzoyl Peroxide 2.5 % GEL APPLY TO FACE 2 TIMES PER DAY 60 g 1    meclizine (ANTIVERT) 25 MG tablet take 1 tablet by mouth once daily if needed for dizziness 30 tablet 5    levothyroxine (SYNTHROID) 75 MCG tablet take 1 tablet by mouth once daily 90 tablet 1    divalproex (DEPAKOTE) 250 MG DR tablet take 1 tablet by mouth twice a day 180 tablet 0    tamsulosin (FLOMAX) 0.4 MG capsule Take 0.4 mg by mouth daily      chlorhexidine (PERIDEX) 0.12 % solution Rinse with 1/2 ounce by mouth for 30 seconds then spit out.  use twice a day 473 mL 5    vitamin D (ERGOCALCIFEROL) 1.25 MG (84384 UT) CAPS capsule Take 1 capsule by mouth Twice a Week 24 capsule 1    albuterol sulfate  (90 Base) MCG/ACT inhaler inhale 2 puffs by mouth and INTO THE LUNGS every 6 hours if needed for wheezing 18 g 5    budesonide-formoterol (SYMBICORT) 80-4.5 MCG/ACT AERO Inhale 2 puffs into the lungs daily 1 Inhaler 3    escitalopram (LEXAPRO) 20 MG tablet take 1 tablet by mouth once daily 120 tablet 3    levocetirizine (XYZAL) 5 MG tablet Take 1 tablet by mouth nightly 30 tablet 5    baclofen (LIORESAL) 20 MG tablet Take 1 tablet by mouth every morning AND 1 tablet Daily with lunch AND 2 tablets nightly. 360 tablet 3    Saline GEL 1 spray by Nasal route daily as needed      mometasone (ELOCON) 0.1 % lotion apply 3 to 4 drop at bedtime for 10 days for itching      cyanocobalamin 1000 MCG/ML injection Inject 1 mL into the muscle once for 1 dose (Patient not taking: Reported on 5/28/2021) 1 mL 0     Current Facility-Administered Medications   Medication Dose Route Frequency Provider Last Rate Last Admin    cyanocobalamin injection 1,000 mcg  1,000 mcg Intramuscular Once Slime Potter MD            Review of Systems :  Review of Systems   Constitutional: Negative for chills, fatigue and fever. Gastrointestinal: Positive for abdominal pain (lower). Negative for constipation and diarrhea. Endocrine: Negative for cold intolerance and heat intolerance. Genitourinary: Positive for flank pain, menstrual problem (menorrhagia) and pelvic pain. Negative for dysuria. Hx uterine fibroids   Allergic/Immunologic: Positive for environmental allergies. Neurological: Positive for weakness. Psychiatric/Behavioral: Negative for dysphoric mood. The patient is not nervous/anxious. ______________________________________________________________________    Physical Exam :    Vitals: /89 (Site: Left Wrist, Position: Sitting, Cuff Size: Medium Adult)   Pulse 85   Temp 98.3 °F (36.8 °C) (Temporal)   Ht 5' 8\" (1.727 m)   Wt 297 lb (134.7 kg)   LMP  (LMP Unknown)   SpO2 96%   BMI 45.16 kg/m²   General Appearance: Well developed, awake, alert, oriented, and in NAD  HEENT: NCAT, MMM, no pallor or icterus. Neck: Symmetrical, trachea midline. Chest wall/Lung: CTAB, respirations unlabored. No ronchi/wheezing/rales   Heart: RRR, normal S1 and S2, no murmurs, rubs or gallops.    Abdomen: SND, lower abdominal and suprapubic tenderness with palpation. Palpation of bilateral flanks, mid lower back down spinous processes  Extremities: Extremities normal, atraumatic, no cyanosis, clubbing or edema. Skin: Skin color, texture, turgor normal, no rashes or lesions  Musculokeletal: ROM grossly normal in all joints of extremities, no obvious joint swelling. Neurologic: Alert&Oriented x3. No focal motor deficits detected. Psychiatric: Normal mood. Normal affect. Normal behavior. ______________________________________________________________________    Assessment & Plan :    Menorrhagia with irregular cycle/History of uterine fibroid  · Excessive bleeding for 2 months with history of uterine thought fibroids and out of Provera for 2 months, restart Provera, ultrasound to look for uterine fibroids or other abnormalities, urine pregnancy, referral to new gynecologist for further work-up as needed; CBC today  - CBC WITH AUTO DIFFERENTIAL; Future  - US ABDOMINAL AORTA LIMITED; Future    Flank pain/Pyelonephritis  · Recent pyelonephritis diagnosis, will get urinalysis today to reevaluate for possible residual infection  - URINALYSIS; Future  - Culture, Urine; Future    Lower abdominal pain  - POCT urine pregnancy    Irritable bowel syndrome, unspecified type  · Refill:  - dicyclomine (BENTYL) 20 MG tablet; Take 1 tablet by mouth 4 times daily  Dispense: 120 tablet; Refill: 5    Mild intermittent asthma without complication  · Patient reports previously being on Singulair with good response results, restart Singulair    Hypothyroidism, unspecified type  - TSH; Future    Environmental allergies  · Stop Xyzal, start claritin  - loratadine (CLARITIN) 10 MG tablet; Take 1 tablet by mouth daily  Dispense: 30 tablet; Refill: 3    Vitamin B deficiency  - VITAMIN B12 & FOLATE; Future    Vitamin D deficiency  - Vitamin D 25 Hydroxy;  Future      Additional plan and future considerations:       Return to Office: Return in about

## 2021-05-30 RX ORDER — MONTELUKAST SODIUM 10 MG/1
10 TABLET ORAL DAILY
Qty: 30 TABLET | Refills: 3 | Status: SHIPPED
Start: 2021-05-30 | End: 2021-07-29 | Stop reason: SDUPTHER

## 2021-05-30 RX ORDER — MEDROXYPROGESTERONE ACETATE 10 MG/1
10 TABLET ORAL DAILY
Qty: 30 TABLET | Refills: 3 | Status: SHIPPED
Start: 2021-05-30 | End: 2021-07-22 | Stop reason: SDUPTHER

## 2021-05-30 ASSESSMENT — ENCOUNTER SYMPTOMS
CONSTIPATION: 0
ABDOMINAL PAIN: 1
DIARRHEA: 0

## 2021-05-31 LAB — URINE CULTURE, ROUTINE: NORMAL

## 2021-06-14 ENCOUNTER — OFFICE VISIT (OUTPATIENT)
Dept: FAMILY MEDICINE CLINIC | Age: 31
End: 2021-06-14
Payer: COMMERCIAL

## 2021-06-14 VITALS
DIASTOLIC BLOOD PRESSURE: 72 MMHG | HEART RATE: 97 BPM | BODY MASS INDEX: 44.41 KG/M2 | WEIGHT: 293 LBS | SYSTOLIC BLOOD PRESSURE: 111 MMHG | HEIGHT: 68 IN | TEMPERATURE: 98.1 F | OXYGEN SATURATION: 97 %

## 2021-06-14 DIAGNOSIS — J30.2 SEASONAL ALLERGIES: Primary | ICD-10-CM

## 2021-06-14 DIAGNOSIS — Z76.0 MEDICATION REFILL: ICD-10-CM

## 2021-06-14 DIAGNOSIS — Z71.3 DIETARY COUNSELING: ICD-10-CM

## 2021-06-14 DIAGNOSIS — E66.01 MORBID OBESITY DUE TO EXCESS CALORIES (HCC): ICD-10-CM

## 2021-06-14 DIAGNOSIS — E55.9 VITAMIN D DEFICIENCY: ICD-10-CM

## 2021-06-14 DIAGNOSIS — Z71.3 WEIGHT LOSS COUNSELING, ENCOUNTER FOR: ICD-10-CM

## 2021-06-14 PROCEDURE — G8417 CALC BMI ABV UP PARAM F/U: HCPCS | Performed by: STUDENT IN AN ORGANIZED HEALTH CARE EDUCATION/TRAINING PROGRAM

## 2021-06-14 PROCEDURE — G8427 DOCREV CUR MEDS BY ELIG CLIN: HCPCS | Performed by: STUDENT IN AN ORGANIZED HEALTH CARE EDUCATION/TRAINING PROGRAM

## 2021-06-14 PROCEDURE — 99213 OFFICE O/P EST LOW 20 MIN: CPT | Performed by: STUDENT IN AN ORGANIZED HEALTH CARE EDUCATION/TRAINING PROGRAM

## 2021-06-14 PROCEDURE — 4004F PT TOBACCO SCREEN RCVD TLK: CPT | Performed by: STUDENT IN AN ORGANIZED HEALTH CARE EDUCATION/TRAINING PROGRAM

## 2021-06-14 PROCEDURE — 99212 OFFICE O/P EST SF 10 MIN: CPT | Performed by: STUDENT IN AN ORGANIZED HEALTH CARE EDUCATION/TRAINING PROGRAM

## 2021-06-14 RX ORDER — DILTIAZEM HYDROCHLORIDE 60 MG/1
TABLET, FILM COATED ORAL
Qty: 10.2 G | Refills: 1 | Status: SHIPPED
Start: 2021-06-14 | End: 2021-07-29 | Stop reason: SDUPTHER

## 2021-06-14 RX ORDER — ERGOCALCIFEROL 1.25 MG/1
50000 CAPSULE ORAL
Qty: 24 CAPSULE | Refills: 1 | Status: SHIPPED
Start: 2021-06-14 | End: 2021-09-07 | Stop reason: SDUPTHER

## 2021-06-14 RX ORDER — LORATADINE 10 MG/1
10 TABLET ORAL DAILY
Qty: 30 TABLET | Refills: 2 | Status: SHIPPED
Start: 2021-06-14 | End: 2021-07-29

## 2021-06-14 RX ORDER — FLUTICASONE PROPIONATE 50 MCG
2 SPRAY, SUSPENSION (ML) NASAL DAILY
Qty: 3 BOTTLE | Refills: 1 | Status: SHIPPED
Start: 2021-06-14 | End: 2021-07-30

## 2021-06-14 RX ORDER — BENZOYL PEROXIDE 2.5 G/100G
1 GEL TOPICAL 2 TIMES DAILY
Qty: 60 G | Refills: 1 | Status: SHIPPED
Start: 2021-06-14 | End: 2021-07-29 | Stop reason: SDUPTHER

## 2021-06-14 RX ORDER — CHLORHEXIDINE GLUCONATE 0.12 MG/ML
RINSE ORAL
Qty: 473 ML | Refills: 5 | Status: SHIPPED
Start: 2021-06-14 | End: 2021-07-31 | Stop reason: ALTCHOICE

## 2021-06-14 NOTE — PROGRESS NOTES
S: 32 y.o. female here for AUB for 2 months. Started provera, bleeding stopped 2 wks ago. Seasonal allergies. Fatigue. Some trouble falling asleep. Watches youtube videos to fall asleep. Obese. States she eats healthy, but not counting. O: VS: /72 (Site: Right Upper Arm, Position: Sitting, Cuff Size: Large Adult)   Pulse 97   Temp 98.1 °F (36.7 °C) (Oral)   Ht 5' 8\" (1.727 m)   Wt 297 lb (134.7 kg)   SpO2 97%   BMI 45.16 kg/m²    General: NAD, alert and interacting appropriately. CV:  RRR, no gallops, rubs, or murmurs    Resp: CTAB   Abd:  Soft, nontender   Ext:  No edema    Impression: AUB. Seasonal allergies. Poor sleep health. Obesity. Plan:   Cont provera, but take appropriately 10 days on  flonase and claritin. Counseling provided  Saw nutritionist in past, ok with going again. rtc 1-2 mo for pap    Attending Physician Statement  I have discussed the case, including pertinent history and exam findings with the resident. I agree with the documented assessment and plan.

## 2021-06-14 NOTE — PROGRESS NOTES
[facility]  FAMILY MEDICINE RESIDENCY PROGRAM  DATE OF VISIT : 2021    Patient : Pantera Ortega   Age : 32 y.o.  : 1990   MRN : [id number]   ______________________________________________________________________    Chief Complaint :   Chief Complaint   Patient presents with    Menorrhagia     2 week follow up    Allergic Rhinitis     Nutrition Counseling       HPI : Pantera Ortega is 32 y.o. female who presented to the clinic today for follow up of menorrhagia that had been going on for two months prior to last visit. Patient had been on Provera but had run out. Provera was restarted. Patient reports her bleeding stopped about 2 weeks ago. She denies spotting in between and abdominal pain. She reports she has some fatigue similar to previous visit. Labs last visit were negative for anemia and TSH was within normal limits. She does report trouble falling asleep sometimes, reports watching youtube before bed to relax. She states that reading books does not help her to relax. She denies anxiety and racing thoughts. Patient does also report mucous in her throat that is bothering her, as well as stuffy and runny nose. She reports seasonal allergies. Denies fever and chills. Patient also reports that she would like help to lose weight. She endorses a healthy diet including fruits and vegetables, and little junk food. She has not tried calorie counting. She is interested in referral to dietician. Patient due for pap, would like it done here.     Past Medical History :  Past Medical History:   Diagnosis Date    ADHD     Arthritis     Asthma     controlled    Avulsion fracture of right distal fibula     Chronic midline low back pain without sciatica 2017    Closed nondisplaced fracture of head of left radius 2015    COPD (chronic obstructive pulmonary disease) (HCC)     Depression     Gastritis     GERD (gastroesophageal reflux disease)     History of snoring     IBS (irritable bowel syndrome)     Jaw fracture (HCC)     from elevator accident    Low vitamin B12 level     Migraines     Muscle spasticity 08/2015    spastic quadriparesis    Obesity (BMI 30-39.9)     bmi 37.1  weight 251 #    Seizures ([address]     last episode 2016    Thyroid disease     Tonsillitis     Urinary incontinence      Past Surgical History:   Procedure Laterality Date    COLONOSCOPY      EYE SURGERY      probing of ductal system right eye     FRACTURE SURGERY      R ELBOW CRUSH INJURY W PLATE AND PINS PLACED    OR COLONOSCOPY W/BIOPSY SINGLE/MULTIPLE  10/29/2018    COLONOSCOPY WITH BIOPSY performed by Cely Muñoz MD at [address] Y/O N/A 5/4/2018    TONSILLECTOMY performed by Josie William MD at [address] N/A 10/29/2020    EGD BIOPSY performed by Dillan Collier MD at [address] : Allergies   Allergen Reactions    Latex Rash    Acetaminophen Shortness Of Breath    Aspirin-Acetaminophen-Caffeine Shortness Of Breath    Dye [Iodides] Shortness Of Breath    Penicillins Anaphylaxis    Topamax [Topiramate] Nausea And Vomiting, Other (See Comments) and Shortness Of Breath    Tylenol With Codeine #3 [Acetaminophen-Codeine] Shortness Of Breath    Lactose Other (See Comments)     Usually just causes stomach pain, diarrhea if pt consumes too much of it.      Blueberry Flavor      ALLERGIC TO BLUEBERRY    Fish-Derived Products      SEAFOOD, ESPECIALLY SHRIMP    Iodine      Seafood allergy    Lidocaine Diarrhea, Itching and Swelling    [place] Flavor Hives     Nemesio fruit    Vicodin [Hydrocodone-Acetaminophen]      Acts drunk         Medication List :    Current Outpatient Medications   Medication Sig Dispense Refill    Benzoyl Peroxide 2.5 % GEL Apply 1 cm topically 2 times daily 60 g 1    chlorhexidine (PERIDEX) 0.12 % solution Rinse with 1/2 ounce by mouth for 30 seconds then spit out. use twice a day 473 mL 5    vitamin D (ERGOCALCIFEROL) 1.25 MG (37137 UT) CAPS capsule Take 1 capsule by mouth Twice a Week 24 capsule 1    loratadine (CLARITIN) 10 MG tablet Take 1 tablet by mouth daily 30 tablet 2    fluticasone (FLONASE) 50 MCG/ACT nasal spray 2 sprays by Each Nostril route daily 3 Bottle 1    montelukast (SINGULAIR) 10 MG tablet Take 1 tablet by mouth daily 30 tablet 3    medroxyPROGESTERone (PROVERA) 10 MG tablet Take 1 tablet by mouth daily 30 tablet 3    mometasone (ELOCON) 0.1 % lotion apply 3 to 4 drop at bedtime for 10 days for itching      loratadine (CLARITIN) 10 MG tablet Take 1 tablet by mouth daily 30 tablet 3    dicyclomine (BENTYL) 20 MG tablet Take 1 tablet by mouth 4 times daily 120 tablet 5    gabapentin (NEURONTIN) 300 MG capsule Take 2 capsules by mouth 3 times daily for 90 days. Intended supply: 90 days (Patient taking differently: Take 600 mg by mouth 4 times daily. Intended supply: 90 days) 540 capsule 0    dantrolene (DANTRIUM) 25 MG capsule Take 1 capsule by mouth 2 times daily AND 2 capsules nightly. (Patient taking differently: Take 1 capsule by mouth 3 times daily) 360 capsule 3    meclizine (ANTIVERT) 25 MG tablet take 1 tablet by mouth once daily if needed for dizziness 30 tablet 5    levothyroxine (SYNTHROID) 75 MCG tablet take 1 tablet by mouth once daily 90 tablet 1    divalproex (DEPAKOTE) 250 MG DR tablet take 1 tablet by mouth twice a day 180 tablet 0    tamsulosin (FLOMAX) 0.4 MG capsule Take 0.4 mg by mouth daily      albuterol sulfate  (90 Base) MCG/ACT inhaler inhale 2 puffs by mouth and INTO THE LUNGS every 6 hours if needed for wheezing 18 g 5    escitalopram (LEXAPRO) 20 MG tablet take 1 tablet by mouth once daily 120 tablet 3    baclofen (LIORESAL) 20 MG tablet Take 1 tablet by mouth every morning AND 1 tablet Daily with lunch AND 2 tablets nightly.  360 tablet 3    Saline GEL 1 spray by Nasal route daily as needed      SYMBICORT 80-4.5 MCG/ACT AERO inhale 2 puffs by mouth daily 10.2 g 1    ondansetron (ZOFRAN ODT) 4 MG disintegrating tablet Take 1 tablet by mouth every 8 hours as needed for Nausea or Vomiting (Patient not taking: Reported on 6/14/2021) 24 tablet 0    levocetirizine (XYZAL) 5 MG tablet Take 1 tablet by mouth nightly (Patient not taking: Reported on 6/14/2021) 30 tablet 5    cyanocobalamin 1000 MCG/ML injection Inject 1 mL into the muscle once for 1 dose (Patient not taking: Reported on 5/28/2021) 1 mL 0     Current Facility-Administered Medications   Medication Dose Route Frequency Provider Last Rate Last Admin    cyanocobalamin injection 1,000 mcg  1,000 mcg Intramuscular Once Jennifer Destini, MD            Review of Systems :  Review of Systems   Constitutional: Positive for fatigue. Negative for chills, fever and unexpected weight change. HENT: Positive for congestion, postnasal drip and rhinorrhea. Gastrointestinal: Negative for abdominal pain. Genitourinary: Negative for menstrual problem, pelvic pain and vaginal bleeding. Allergic/Immunologic: Positive for environmental allergies. Psychiatric/Behavioral: Positive for sleep disturbance (difficulty falling asleep). The patient is not nervous/anxious. ______________________________________________________________________    Physical Exam :    Vitals: /72 (Site: Right Upper Arm, Position: Sitting, Cuff Size: Large Adult)   Pulse 97   Temp 98.1 °F (36.7 °C) (Oral)   Ht 5' 8\" (1.727 m)   Wt 297 lb (134.7 kg)   SpO2 97%   BMI 45.16 kg/m²   General Appearance: Well developed, awake, alert, oriented, and in NAD  HEENT: NCAT, MMM, no pallor or icterus. Neck: Symmetrical, trachea midline. Chest wall/Lung: CTAB, respirations unlabored. No ronchi/wheezing/rales   Heart: RRR, normal S1 and S2, no murmurs, rubs or gallops. Abdomen: SNTND  Extremities: Extremities normal, atraumatic, no cyanosis, clubbing or edema.   Skin: Skin color,

## 2021-06-15 ENCOUNTER — HOSPITAL ENCOUNTER (OUTPATIENT)
Dept: ULTRASOUND IMAGING | Age: 31
Discharge: HOME | End: 2021-06-17
Payer: COMMERCIAL

## 2021-06-15 DIAGNOSIS — N92.1 MENORRHAGIA WITH IRREGULAR CYCLE: ICD-10-CM

## 2021-06-15 DIAGNOSIS — Z86.018 HISTORY OF UTERINE FIBROID: ICD-10-CM

## 2021-06-15 DIAGNOSIS — N92.1 MENORRHAGIA WITH IRREGULAR CYCLE: Primary | ICD-10-CM

## 2021-06-15 ASSESSMENT — ENCOUNTER SYMPTOMS
ABDOMINAL PAIN: 0
RHINORRHEA: 1

## 2021-06-29 ENCOUNTER — HOSPITAL ENCOUNTER (OUTPATIENT)
Dept: ULTRASOUND IMAGING | Age: 31
Discharge: HOME | End: 2021-07-01
Payer: COMMERCIAL

## 2021-06-29 DIAGNOSIS — N92.1 MENORRHAGIA WITH IRREGULAR CYCLE: ICD-10-CM

## 2021-06-29 PROCEDURE — 76830 TRANSVAGINAL US NON-OB: CPT

## 2021-06-29 PROCEDURE — 76856 US EXAM PELVIC COMPLETE: CPT

## 2021-07-14 ENCOUNTER — OFFICE VISIT (OUTPATIENT)
Dept: PAIN MANAGEMENT | Age: 31
End: 2021-07-14
Payer: COMMERCIAL

## 2021-07-14 ENCOUNTER — PREP FOR PROCEDURE (OUTPATIENT)
Dept: PAIN MANAGEMENT | Age: 31
End: 2021-07-14

## 2021-07-14 VITALS
SYSTOLIC BLOOD PRESSURE: 110 MMHG | RESPIRATION RATE: 16 BRPM | HEART RATE: 86 BPM | WEIGHT: 293 LBS | DIASTOLIC BLOOD PRESSURE: 64 MMHG | BODY MASS INDEX: 44.41 KG/M2 | OXYGEN SATURATION: 98 % | TEMPERATURE: 97.8 F | HEIGHT: 68 IN

## 2021-07-14 DIAGNOSIS — M54.42 CHRONIC BILATERAL LOW BACK PAIN WITH BILATERAL SCIATICA: ICD-10-CM

## 2021-07-14 DIAGNOSIS — G89.4 CHRONIC PAIN SYNDROME: Primary | ICD-10-CM

## 2021-07-14 DIAGNOSIS — M54.41 CHRONIC BILATERAL LOW BACK PAIN WITH BILATERAL SCIATICA: ICD-10-CM

## 2021-07-14 DIAGNOSIS — M54.16 LUMBAR RADICULOPATHY: Primary | ICD-10-CM

## 2021-07-14 DIAGNOSIS — M51.36 DDD (DEGENERATIVE DISC DISEASE), LUMBAR: ICD-10-CM

## 2021-07-14 DIAGNOSIS — G89.29 CHRONIC BILATERAL LOW BACK PAIN WITH BILATERAL SCIATICA: ICD-10-CM

## 2021-07-14 DIAGNOSIS — M54.16 LUMBAR RADICULOPATHY: ICD-10-CM

## 2021-07-14 PROBLEM — M51.369 DDD (DEGENERATIVE DISC DISEASE), LUMBAR: Status: ACTIVE | Noted: 2021-07-14

## 2021-07-14 PROCEDURE — 99204 OFFICE O/P NEW MOD 45 MIN: CPT | Performed by: PAIN MEDICINE

## 2021-07-14 PROCEDURE — G8427 DOCREV CUR MEDS BY ELIG CLIN: HCPCS | Performed by: PAIN MEDICINE

## 2021-07-14 PROCEDURE — G8417 CALC BMI ABV UP PARAM F/U: HCPCS | Performed by: PAIN MEDICINE

## 2021-07-14 PROCEDURE — 4004F PT TOBACCO SCREEN RCVD TLK: CPT | Performed by: PAIN MEDICINE

## 2021-07-14 NOTE — PROGRESS NOTES
Do you currently have any of the following:    Fever: No  Headache:  No  Cough: No  Shortness of breath: No  Exposed to anyone with these symptoms: No         Clint Torsten presents to the [facility]14[facility]21. Jayant Rosado is complaining of pain back. The pain is constant. The pain is described as throbbing. Pain is rated on her best day at a 8, on her worst day at a 10, and on average at a 8 on the VAS scale. She took her last dose of Neurontin and Motrin . Any procedures since your last visit: No, with  % relief. Pacemaker or defibrillator: No managed by . She is  on NSAIDS and is not on anticoagulation medications to include none and is managed by . Medication Contract and Consent for Opioid Use Documents Filed      No documents found                /64   Pulse 86   Temp 97.8 °F (36.6 °C) (Infrared)   Resp 16   Ht 5' 8\" (1.727 m)   Wt 297 lb (134.7 kg)   SpO2 98%   BMI 45.16 kg/m²      No LMP recorded. (Menstrual status: Irregular periods).

## 2021-07-14 NOTE — PROGRESS NOTES
[id number] [facility]        [address]  ANDREA PEREZ [facility], [address] [place] [address]  Dept: [phone]        Patient:  HAYLEY Montenegro 1990    Date of Service:  21     Requesting Physician:  JOSSE Medina -*    Reason for Consult:      Patient presents with complaints of bilateral, lower back pain that started 10 years ago    HISTORY OF PRESENT ILLNESS:      Pain is constant and is described as throbbing. Pain does radiate to both lower extremities diffusely. She  does not have numbness, tingling, weakness of the both lower extremities and does not have bladder or bowel dysfunction. Alleviating factors include: nothing. Aggravating factors include:  movement. She has not been on anticoagulation medications to include ASA, NSAIDS, Plavix, heparin, LMW heparin and warfarin and has not been on herbal supplements. She is not diabetic. Imagin lumbar MRI -  EXAMINATION:   MRI OF THE LUMBAR SPINE WITHOUT CONTRAST, 2021 11:46 am       TECHNIQUE:   Multiplanar multisequence MRI of the lumbar spine was performed without the   administration of intravenous contrast.       COMPARISON:   None       HISTORY:   ORDERING SYSTEM PROVIDED HISTORY: L-S radiculopathy   TECHNOLOGIST PROVIDED HISTORY:   Reason for exam:->LS radiculopathy; L low back pain   What reading provider will be dictating this exam?->CRC       FINDINGS:   BONES/ALIGNMENT: There is normal alignment of the spine. The vertebral body   heights are maintained.  The bone marrow signal appears unremarkable.  No   acute fracture is appreciated.       SPINAL CORD: The conus terminates normally.       SOFT TISSUES: No paraspinal mass identified.       Moderate-sized right paracentral disc protrusion is identified at T12-L1.       L1-L2: Small right paracentral disc protrusion is appreciated resulting in   minimal right lateral recess stenosis.       L2-L3: There is no significant disc herniation, spinal canal stenosis or   neural foraminal narrowing.       L3-L4: Mild left foraminal stenosis identified due to encroachment by shallow   disc bulge and facet disease.       L4-L5: Moderate left foraminal stenosis and mild right foraminal stenosis are   identified due to encroachment by disc bulge and facet disease.       L5-S1: Mild right foraminal stenosis is identified secondary to mild facet   arthropathy.           Impression   Moderate left foraminal stenosis and mild right foraminal stenosis at L4-L5.       Mild right foraminal stenosis at L5-S1.       Mild left foraminal stenosis at L3-L4.       Small right paracentral disc protrusion at L1-L2.       Moderate-sized right paracentral disc protrusion at T12-L1. Previous treatments: Physical Therapy and medications.       Past Medical History:   Diagnosis Date    ADHD     Arthritis     Asthma     controlled    Avulsion fracture of right distal fibula     Chronic midline low back pain without sciatica 11/13/2017    Closed nondisplaced fracture of head of left radius 12/30/2015    COPD [facility]ch[facility] obstructive pulmonary disease) ([facility])     Depression     Gastritis     GERD (gastroesophageal reflux disease)     History of snoring     IBS (irritable bowel syndrome)     Jaw fracture ([facility])     from elevator accident    Low vitamin B12 level     Migraines     Muscle spasticity 08/2015    spastic quadriparesis    Obesity (BMI 30-39.9)     bmi 37.1  weight 251 #    Seizures ([address]     last episode 2016    Thyroid disease     Tonsillitis     Urinary incontinence        Past Surgical History:   Procedure Laterality Date    COLONOSCOPY      EYE SURGERY      probing of ductal system right eye     FRACTURE SURGERY      R ELBOW CRUSH INJURY W PLATE AND PINS PLACED    FL COLONOSCOPY W/BIOPSY SINGLE/MULTIPLE  10/29/2018    COLONOSCOPY WITH BIOPSY performed by Glo Berger MD at [address] [place] [address] OF TONSILS,12+ Y/O N/A 5/4/2018    TONSILLECTOMY performed by Diamond Green MD at [facility] N/A 10/29/2020    EGD BIOPSY performed by Caro Gracia MD at [place] [address]       Prior to Admission medications    Medication Sig Start Date End Date Taking? Authorizing Provider   SYMBICORT 80-4.5 MCG/ACT AERO inhale 2 puffs by mouth daily 6/14/21  Yes Freda Gomez MD   Benzoyl Peroxide 2.5 % GEL Apply 1 cm topically 2 times daily 6/14/21  Yes Geneva A Rech, DO   chlorhexidine (PERIDEX) 0.12 % solution Rinse with 1/2 ounce by mouth for 30 seconds then spit out. use twice a day 6/14/21  Yes Geneva A Rech, DO   vitamin D (ERGOCALCIFEROL) 1.25 MG (00893 UT) CAPS capsule Take 1 capsule by mouth Twice a Week 6/14/21  Yes Geneva A Rech, DO   montelukast (SINGULAIR) 10 MG tablet Take 1 tablet by mouth daily 5/30/21  Yes Geneva A Rech, DO   medroxyPROGESTERone (PROVERA) 10 MG tablet Take 1 tablet by mouth daily 5/30/21  Yes Geneva A Rech, DO   mometasone (ELOCON) 0.1 % lotion apply 3 to 4 drop at bedtime for 10 days for itching 4/30/21  Yes Historical Provider, MD   loratadine (CLARITIN) 10 MG tablet Take 1 tablet by mouth daily 5/28/21  Yes Geneva A Rech, DO   dicyclomine (BENTYL) 20 MG tablet Take 1 tablet by mouth 4 times daily 5/28/21  Yes Geneva A Rech, DO   gabapentin (NEURONTIN) 300 MG capsule Take 2 capsules by mouth 3 times daily for 90 days. Intended supply: 90 days  Patient taking differently: Take 600 mg by mouth 4 times daily. Intended supply: 90 days 5/18/21 8/16/21 Yes JOSSE Joe CNP   dantrolene (DANTRIUM) 25 MG capsule Take 1 capsule by mouth 2 times daily AND 2 capsules nightly.   Patient taking differently: Take 1 capsule by mouth 3 times daily 4/27/21  Yes JOSSE Joe CNP   meclizine (ANTIVERT) 25 MG tablet take 1 tablet by mouth once daily if needed for dizziness 3/19/21  Yes Freda Gomez MD   levothyroxine (SYNTHROID) 75 MCG tablet take 1 tablet by mouth once daily 3/19/21  Yes Freda Gomez MD divalproex (DEPAKOTE) 250 MG DR tablet take 1 tablet by mouth twice a day 3/19/21  Yes Samra Hartley MD   tamsulosin (FLOMAX) 0.4 MG capsule Take 0.4 mg by mouth daily   Yes Historical Provider, MD   albuterol sulfate  (90 Base) MCG/ACT inhaler inhale 2 puffs by mouth and INTO THE LUNGS every 6 hours if needed for wheezing 11/25/20  Yes Samra Hartley MD   escitalopram (LEXAPRO) 20 MG tablet take 1 tablet by mouth once daily 11/25/20  Yes Samra Hartley MD   levocetirizine (XYZAL) 5 MG tablet Take 1 tablet by mouth nightly 11/25/20  Yes Samra Hartley MD   baclofen (LIORESAL) 20 MG tablet Take 1 tablet by mouth every morning AND 1 tablet Daily with lunch AND 2 tablets nightly. 10/27/20  Yes JOSSE Ivy CNP   Saline GEL 1 spray by Nasal route daily as needed   Yes Historical Provider, MD   loratadine (CLARITIN) 10 MG tablet Take 1 tablet by mouth daily  Patient not taking: Reported on 7/14/2021 6/14/21   Georgekenyatta DAILY Rech, DO   fluticasone (FLONASE) 50 MCG/ACT nasal spray 2 sprays by Each Nostril route daily 6/14/21   Georgekenyatta Yuan, DO   ondansetron (ZOFRAN ODT) 4 MG disintegrating tablet Take 1 tablet by mouth every 8 hours as needed for Nausea or Vomiting  Patient not taking: Reported on 6/14/2021 5/5/21   JOSSE Nam CNP   cyanocobalamin 1000 MCG/ML injection Inject 1 mL into the muscle once for 1 dose  Patient not taking: Reported on 5/28/2021 11/25/20 4/27/21  Samra Hartley MD   mometasone (ELOCON) 0.1 % cream Apply topically daily Apply topically daily.  10/29/20   Samra Hartley MD       Allergies   Allergen Reactions    Latex Rash    Acetaminophen Shortness Of Breath    Aspirin-Acetaminophen-Caffeine Shortness Of Breath    Dye [Iodides] Shortness Of Breath    Penicillins Anaphylaxis    Topamax [Topiramate] Nausea And Vomiting, Other (See Comments) and Shortness Of Breath    Tylenol With Codeine #3 [Acetaminophen-Codeine] Shortness Of Breath    Lactose Other (See positive left  SLR:negative right, negative left, sitting     Extremities:    Tremors:None bilaterally upper and lower  Range of motion:pain with internal rotation of hips negative. Intact:Yes  Varicose veins:absent   Pulses:present Lt radial  Cyanosis:none  Edema:none x all 4 extremities    Neurological:    Sensory:normal to light touch BLE  Motor:                Right Quadriceps5/5          Left Quadriceps5/5           Right Gastrocnemius5/5    Left Gastrocnemius5/5  Right Ant Tibialis5/5  Left Ant Tibialis5/5    Reflexes:    Right Quadriceps reflex0-1+  Left Quadriceps reflex0-1+  Right Achilles reflex0-1+  Left Achilles reflex0-1+  Gait:normal station    Dermatology:    Skin:no rashes or lesions noted    Impression:    LBP and diffuse BLE pain  2021 lumbar MRI shows moderate right paracentral disc protrusion  Follows with neurology for congenital spastic quadriparesis with cognitive involvement    Plan:    Reviewed referral documents and imaging  Urine screen deferred  OARRS report reviewed   On gabapentin, baclofen, dantrium via neurology  T12-L1 TAMRA #1 - r/b and procedure discussed  Failed remote PT   Patient encouraged to stay active and to lose weight  Treatment plan discussed with the patient including medication and procedure side effects     CC:  Referring physician    AUTUMN Frausto.

## 2021-07-22 ENCOUNTER — OFFICE VISIT (OUTPATIENT)
Dept: OBGYN | Age: 31
End: 2021-07-22
Payer: COMMERCIAL

## 2021-07-22 VITALS
HEART RATE: 86 BPM | HEIGHT: 68 IN | WEIGHT: 293 LBS | SYSTOLIC BLOOD PRESSURE: 139 MMHG | BODY MASS INDEX: 44.41 KG/M2 | DIASTOLIC BLOOD PRESSURE: 64 MMHG

## 2021-07-22 DIAGNOSIS — Z86.018 HISTORY OF UTERINE FIBROID: ICD-10-CM

## 2021-07-22 DIAGNOSIS — Z12.4 SCREENING FOR CERVICAL CANCER: ICD-10-CM

## 2021-07-22 DIAGNOSIS — N92.1 MENORRHAGIA WITH IRREGULAR CYCLE: ICD-10-CM

## 2021-07-22 DIAGNOSIS — Z01.419 WOMEN'S ANNUAL ROUTINE GYNECOLOGICAL EXAMINATION: Primary | ICD-10-CM

## 2021-07-22 PROCEDURE — 99203 OFFICE O/P NEW LOW 30 MIN: CPT | Performed by: OBSTETRICS & GYNECOLOGY

## 2021-07-22 PROCEDURE — 99385 PREV VISIT NEW AGE 18-39: CPT | Performed by: OBSTETRICS & GYNECOLOGY

## 2021-07-22 RX ORDER — MEDROXYPROGESTERONE ACETATE 10 MG/1
10 TABLET ORAL DAILY
Qty: 30 TABLET | Refills: 11 | Status: SHIPPED | OUTPATIENT
Start: 2021-07-22

## 2021-07-22 NOTE — PROGRESS NOTES
[place]     Patient presents for annual exam. Patient with heavy/ irregular cycles. Patient takes provera daily to control cycles. Patient is aware it is not birth control. Counseled on switching to OCP but patient declines. She is sexually active.      Past Medical History:   Diagnosis Date    ADHD     Arthritis     Asthma     controlled    Avulsion fracture of right distal fibula     Chronic midline low back pain without sciatica 11/13/2017    Closed nondisplaced fracture of head of left radius 12/30/2015    COPD (chronic obstructive pulmonary disease) ([facility])     Depression     Gastritis     GERD (gastroesophageal reflux disease)     History of snoring     IBS (irritable bowel syndrome)     Jaw fracture ([facility])     from elevator accident    Low vitamin B12 level     Migraines     Muscle spasticity 08/2015    spastic quadriparesis    Obesity (BMI 30-39.9)     bmi 37.1  weight 251 #    Seizures ([address]     last episode 2016    Thyroid disease     Tonsillitis     Urinary incontinence         Past Surgical History:   Procedure Laterality Date    COLONOSCOPY      EYE SURGERY      probing of ductal system right eye     FRACTURE SURGERY      R ELBOW CRUSH INJURY W PLATE AND PINS PLACED    OR COLONOSCOPY W/BIOPSY SINGLE/MULTIPLE  10/29/2018    COLONOSCOPY WITH BIOPSY performed by Adam Leblanc MD at [address] [place] [address] OF TONSILS,12+ Y/O N/A 5/4/2018    TONSILLECTOMY [facility] by Sudha Bliss [facility] at [facility] OR    UPPER GASTROINTESTINAL ENDOSCOPY N/A 10/29/2020    EGD BIOPSY performed by Yazmin Mckeon MD at [facility] ENDOSCOPY        Family History   Problem Relation Age of Onset    Early Death Maternal Grandfather     Cancer Maternal Grandfather     Asthma Brother     Heart Disease Maternal Aunt     Cancer Maternal Aunt     Heart Disease Maternal Uncle     Cancer Maternal Uncle     High Cholesterol Father     Diabetes Father     High Cholesterol Mother     Cancer Paternal Aunt     Cancer Paternal Uncle     Cancer Maternal Grandmother     Cancer Paternal Grandmother     Cancer Paternal Grandfather           Current Outpatient Medications:     medroxyPROGESTERone (PROVERA) 10 MG tablet, Take 1 tablet by mouth daily, Disp: 30 tablet, Rfl: 11    SYMBICORT 80-4.5 MCG/ACT AERO, inhale 2 puffs by mouth daily, Disp: 10.2 g, Rfl: 1    Benzoyl Peroxide 2.5 % GEL, Apply 1 cm topically 2 times daily, Disp: 60 g, Rfl: 1    vitamin D (ERGOCALCIFEROL) 1.25 MG (16987 UT) CAPS capsule, Take 1 capsule by mouth Twice a Week, Disp: 24 capsule, Rfl: 1    montelukast (SINGULAIR) 10 MG tablet, Take 1 tablet by mouth daily, Disp: 30 tablet, Rfl: 3    loratadine (CLARITIN) 10 MG tablet, Take 1 tablet by mouth daily, Disp: 30 tablet, Rfl: 3    dicyclomine (BENTYL) 20 MG tablet, Take 1 tablet by mouth 4 times daily, Disp: 120 tablet, Rfl: 5    gabapentin (NEURONTIN) 300 MG capsule, Take 2 capsules by mouth 3 times daily for 90 days. Intended supply: 90 days (Patient taking differently: Take 600 mg by mouth 4 times daily. Intended supply: 90 days), Disp: 540 capsule, Rfl: 0    dantrolene (DANTRIUM) 25 MG capsule, Take 1 capsule by mouth 2 times daily AND 2 capsules nightly.  (Patient taking differently: Take 1 capsule by mouth 3 times daily), Disp: 360 capsule, Rfl: 3    meclizine (ANTIVERT) 25 MG tablet, take 1 tablet by mouth once daily if needed for dizziness, Disp: 30 tablet, Rfl: 5    levothyroxine (SYNTHROID) 75 MCG tablet, take 1 tablet by mouth once daily, Disp: 90 tablet, Rfl: 1    divalproex (DEPAKOTE) 250 MG DR tablet, take 1 tablet by mouth twice a day, Disp: 180 tablet, Rfl: 0    albuterol sulfate  (90 Base) MCG/ACT inhaler, inhale 2 puffs by mouth and INTO THE LUNGS every 6 hours if needed for wheezing, Disp: 18 g, Rfl: 5    escitalopram (LEXAPRO) 20 MG tablet, take 1 tablet by mouth once daily, Disp: 120 tablet, Rfl: 3    baclofen (LIORESAL) 20 MG tablet, Take 1 tablet by mouth every morning AND 1 tablet Daily with lunch AND 2 tablets nightly., Disp: 360 tablet, Rfl: 3    Saline GEL, 1 spray by Nasal route daily as needed, Disp: , Rfl:     chlorhexidine (PERIDEX) 0.12 % solution, Rinse with 1/2 ounce by mouth for 30 seconds then spit out. use twice a day, Disp: 473 mL, Rfl: 5    loratadine (CLARITIN) 10 MG tablet, Take 1 tablet by mouth daily (Patient not taking: Reported on 7/14/2021), Disp: 30 tablet, Rfl: 2    fluticasone (FLONASE) 50 MCG/ACT nasal spray, 2 sprays by Each Nostril route daily, Disp: 3 Bottle, Rfl: 1    mometasone (ELOCON) 0.1 % lotion, apply 3 to 4 drop at bedtime for 10 days for itching, Disp: , Rfl:     ondansetron (ZOFRAN ODT) 4 MG disintegrating tablet, Take 1 tablet by mouth every 8 hours as needed for Nausea or Vomiting (Patient not taking: Reported on 6/14/2021), Disp: 24 tablet, Rfl: 0    tamsulosin (FLOMAX) 0.4 MG capsule, Take 0.4 mg by mouth daily, Disp: , Rfl:     levocetirizine (XYZAL) 5 MG tablet, Take 1 tablet by mouth nightly, Disp: 30 tablet, Rfl: 5    cyanocobalamin 1000 MCG/ML injection, Inject 1 mL into the muscle once for 1 dose (Patient not taking: Reported on 5/28/2021), Disp: 1 mL, Rfl: 0     Allergies   Allergen Reactions    Latex Rash    Acetaminophen Shortness Of Breath    Aspirin-Acetaminophen-Caffeine Shortness Of Breath    Dye [Iodides] Shortness Of Breath    Penicillins Anaphylaxis    Topamax [Topiramate] Nausea And Vomiting, Other (See Comments) and Shortness Of Breath    Tylenol With Codeine #3 [Acetaminophen-Codeine] Shortness Of Breath    Lactose Other (See Comments)     Usually just causes stomach pain, diarrhea if pt consumes too much of it.      Blueberry Flavor      ALLERGIC TO BLUEBERRY    Fish-Derived Products      SEAFOOD, ESPECIALLY SHRIMP    Iodine      Seafood allergy    Lidocaine Diarrhea, Itching and Swelling    [place] Flavor Hives     [place] fruit    Vicodin [Hydrocodone-Acetaminophen]      Acts drunk          Social History     Tobacco History     Smoking Status  Current Every Day Smoker Smoking Start Date  1/1/2020 Smoking Frequency  0.5 packs/day for 1 years (0.5 pk yrs) Smoking Tobacco Type  Cigarettes    Smokeless Tobacco Use  Never Used    Tobacco Comment  Smokes 1 cig daily          Alcohol History     Alcohol Use Status  No          Drug Use     Drug Use Status  No          Sexual Activity     Sexually Active  Yes Partners  Male                 Vitals:    07/22/21 0831   BP: 139/64   Pulse: 86        Physical Exam:  General: pleasant, alert     Breasts: breasts appear normal, no suspicious masses, no skin or nipple changes or axillary nodes. Pelvic exam: normal external genitalia, vulva, vagina, cervix, uterus and adnexa. No uterine or adnexal masses or tenderness. Betsy Monaco was seen today for gynecologic exam.    Diagnoses and all orders for this visit:    Women's annual routine gynecological examination    Screening for cervical cancer  -     PAP SMEAR    Menorrhagia with irregular cycle  -     medroxyPROGESTERone (PROVERA) 10 MG tablet; Take 1 tablet by mouth daily    History of uterine fibroid  -     medroxyPROGESTERone (PROVERA) 10 MG tablet; Take 1 tablet by mouth daily      Advised to call with questions or concerns. Return in about 1 year (around 7/22/2022) for Annual, or as needed.      Hawk Resendiz MD

## 2021-07-22 NOTE — PROGRESS NOTES
Patient alert and pleasant with no complaints  Here today for annual GYN exam  Pelvic exam, pap smear obtained, labeled  and hand delivered to lab. Discharge instructions have been discussed with the patient. Patient advised to call our office with any questions or concerns. Voiced understanding.

## 2021-07-26 LAB
CHLAMYDIA BY THIN PREP: NEGATIVE
N. GONORRHOEAE DNA, THIN PREP: NEGATIVE
SOURCE: NORMAL

## 2021-07-29 ENCOUNTER — OFFICE VISIT (OUTPATIENT)
Dept: FAMILY MEDICINE CLINIC | Age: 31
End: 2021-07-29
Payer: COMMERCIAL

## 2021-07-29 VITALS
HEART RATE: 80 BPM | OXYGEN SATURATION: 97 % | WEIGHT: 293 LBS | SYSTOLIC BLOOD PRESSURE: 104 MMHG | HEIGHT: 68 IN | BODY MASS INDEX: 44.41 KG/M2 | RESPIRATION RATE: 16 BRPM | DIASTOLIC BLOOD PRESSURE: 66 MMHG | TEMPERATURE: 98.1 F

## 2021-07-29 DIAGNOSIS — J06.9 VIRAL UPPER RESPIRATORY TRACT INFECTION: ICD-10-CM

## 2021-07-29 DIAGNOSIS — R42 DIZZINESS: ICD-10-CM

## 2021-07-29 DIAGNOSIS — F32.A DEPRESSION, UNSPECIFIED DEPRESSION TYPE: ICD-10-CM

## 2021-07-29 DIAGNOSIS — E03.9 HYPOTHYROIDISM, UNSPECIFIED TYPE: ICD-10-CM

## 2021-07-29 DIAGNOSIS — Z91.09 ENVIRONMENTAL ALLERGIES: ICD-10-CM

## 2021-07-29 DIAGNOSIS — Z76.0 MEDICATION REFILL: ICD-10-CM

## 2021-07-29 DIAGNOSIS — J45.20 MILD INTERMITTENT ASTHMA WITHOUT COMPLICATION: ICD-10-CM

## 2021-07-29 DIAGNOSIS — K58.9 IRRITABLE BOWEL SYNDROME, UNSPECIFIED TYPE: ICD-10-CM

## 2021-07-29 DIAGNOSIS — G56.00 CARPAL TUNNEL SYNDROME, UNSPECIFIED LATERALITY: Primary | ICD-10-CM

## 2021-07-29 PROCEDURE — 4004F PT TOBACCO SCREEN RCVD TLK: CPT | Performed by: STUDENT IN AN ORGANIZED HEALTH CARE EDUCATION/TRAINING PROGRAM

## 2021-07-29 PROCEDURE — G8427 DOCREV CUR MEDS BY ELIG CLIN: HCPCS | Performed by: STUDENT IN AN ORGANIZED HEALTH CARE EDUCATION/TRAINING PROGRAM

## 2021-07-29 PROCEDURE — G8417 CALC BMI ABV UP PARAM F/U: HCPCS | Performed by: STUDENT IN AN ORGANIZED HEALTH CARE EDUCATION/TRAINING PROGRAM

## 2021-07-29 PROCEDURE — 99212 OFFICE O/P EST SF 10 MIN: CPT | Performed by: STUDENT IN AN ORGANIZED HEALTH CARE EDUCATION/TRAINING PROGRAM

## 2021-07-29 PROCEDURE — 99213 OFFICE O/P EST LOW 20 MIN: CPT | Performed by: STUDENT IN AN ORGANIZED HEALTH CARE EDUCATION/TRAINING PROGRAM

## 2021-07-29 RX ORDER — LORATADINE 10 MG/1
10 TABLET ORAL DAILY
Qty: 30 TABLET | Refills: 3 | Status: SHIPPED
Start: 2021-07-29 | End: 2021-10-19 | Stop reason: SDUPTHER

## 2021-07-29 RX ORDER — DICYCLOMINE HCL 20 MG
20 TABLET ORAL 4 TIMES DAILY
Qty: 120 TABLET | Refills: 5 | Status: SHIPPED
Start: 2021-07-29 | End: 2021-10-19 | Stop reason: SDUPTHER

## 2021-07-29 RX ORDER — DIVALPROEX SODIUM 250 MG/1
TABLET, DELAYED RELEASE ORAL
Qty: 180 TABLET | Refills: 0 | Status: SHIPPED
Start: 2021-07-29 | End: 2021-10-19 | Stop reason: SDUPTHER

## 2021-07-29 RX ORDER — LEVOTHYROXINE SODIUM 0.07 MG/1
TABLET ORAL
Qty: 90 TABLET | Refills: 1 | Status: SHIPPED
Start: 2021-07-29 | End: 2021-10-19 | Stop reason: SDUPTHER

## 2021-07-29 RX ORDER — MECLIZINE HYDROCHLORIDE 25 MG/1
25 TABLET ORAL 3 TIMES DAILY PRN
Qty: 30 TABLET | Refills: 2 | Status: SHIPPED
Start: 2021-07-29 | End: 2021-10-19 | Stop reason: SDUPTHER

## 2021-07-29 RX ORDER — BUDESONIDE AND FORMOTEROL FUMARATE DIHYDRATE 80; 4.5 UG/1; UG/1
AEROSOL RESPIRATORY (INHALATION)
Qty: 10.2 G | Refills: 1 | Status: SHIPPED
Start: 2021-07-29 | End: 2021-09-07 | Stop reason: SDUPTHER

## 2021-07-29 RX ORDER — ERGOCALCIFEROL 1.25 MG/1
50000 CAPSULE ORAL
Qty: 24 CAPSULE | Refills: 1 | Status: CANCELLED | OUTPATIENT
Start: 2021-07-29

## 2021-07-29 RX ORDER — GUAIFENESIN 100 MG/5ML
10 SYRUP ORAL EVERY 4 HOURS PRN
Qty: 1 BOTTLE | Refills: 2 | Status: ON HOLD
Start: 2021-07-29 | End: 2021-08-17

## 2021-07-29 RX ORDER — ALBUTEROL SULFATE 90 UG/1
AEROSOL, METERED RESPIRATORY (INHALATION)
Qty: 18 G | Refills: 5 | Status: SHIPPED
Start: 2021-07-29 | End: 2021-10-19 | Stop reason: SDUPTHER

## 2021-07-29 RX ORDER — LORATADINE 10 MG/1
10 TABLET ORAL DAILY
Qty: 30 TABLET | Refills: 2 | Status: CANCELLED | OUTPATIENT
Start: 2021-07-29

## 2021-07-29 RX ORDER — BENZOYL PEROXIDE 2.5 G/100G
1 GEL TOPICAL 2 TIMES DAILY
Qty: 60 G | Refills: 1 | Status: SHIPPED
Start: 2021-07-29 | End: 2021-09-07 | Stop reason: SDUPTHER

## 2021-07-29 RX ORDER — GABAPENTIN 300 MG/1
600 CAPSULE ORAL 3 TIMES DAILY
Qty: 540 CAPSULE | Refills: 0 | Status: SHIPPED
Start: 2021-07-29 | End: 2021-10-19 | Stop reason: SDUPTHER

## 2021-07-29 RX ORDER — MONTELUKAST SODIUM 10 MG/1
10 TABLET ORAL DAILY
Qty: 30 TABLET | Refills: 3 | Status: SHIPPED
Start: 2021-07-29 | End: 2021-10-19 | Stop reason: SDUPTHER

## 2021-07-29 RX ORDER — MOMETASONE FUROATE 1 MG/ML
SOLUTION TOPICAL
Qty: 1 BOTTLE | Refills: 2 | Status: SHIPPED
Start: 2021-07-29 | End: 2021-09-07 | Stop reason: SDUPTHER

## 2021-07-29 ASSESSMENT — ENCOUNTER SYMPTOMS
WHEEZING: 0
RHINORRHEA: 1
SINUS PRESSURE: 1
SHORTNESS OF BREATH: 0
CONSTIPATION: 0
COUGH: 1
DIARRHEA: 0
SORE THROAT: 1

## 2021-07-29 NOTE — PROGRESS NOTES
51-year-old female presents for medication refills as well as sore throat and stuffy nose x1 week. For medication refills, we are refilling levothyroxine for hypothyroidism. Patient denies diarrhea, constipation, heat or cold intolerance. For asthma we are refilling Symbicort and albuterol, and Singulair. She denies any shortness of breath but does report some coughing in relation to her current upper respiratory infection. Reports that she only needs her rescue inhaler a few times a week typically during exercise. She does also smoke but has cut down her smoking to 2 to 3 cigarettes/day, was asking about Chantix or something similar. Of note. Patient is on Depakote and Lexapro for mood. We are refilling Depakote today. She reports that mood is stable currently. We are refilling gabapentin for carpal tunnel and some neuropathy in her feet. Patient also does report dizziness a few times a week especially if she is outside in warm weather, will take meclizine for that. Requests refill today. Upper respiratory infection-patient reports that about a week ago she started having a sore throat followed by nasal congestion and some ear pain. Ear pain is bilateral.  She does report some continued pain with swallowing liquids and foods. Reports slightly productive cough. No fevers or chills. Slight frontal headache, stuffy nose. Was taking Mucinex which has helped relieve the sinus headache. Does report some fatigue. Blood pressure 104/66, pulse 80, temperature 98.1 °F (36.7 °C), temperature source Oral, resp. rate 16, height 5' 8\" (1.727 m), weight 295 lb (133.8 kg), SpO2 97 %, not currently breastfeeding. HEENT-bilateral redness of otic canals without any bulging or redness of tympanic membrane. No tenderness to palpation of sinuses. No cervical lymphadenopathy appreciated. Patient is about a Mallampati four, difficult to assess back of throat but no enlarged tonsils appreciated.      Heart

## 2021-07-29 NOTE — PROGRESS NOTES
[address] [facility] RESIDENCY PROGRAM  DATE OF VISIT : 2021    Patient : Clara Pelayo   Age : 32 y.o.  : 1990   MRN : [id number]   ______________________________________________________________________    Chief Complaint :   Chief Complaint   Patient presents with    Medication Refill    Pharyngitis    Congestion       HPI : Clara Pelayo is 32 y.o. female who presented to the clinic today for medication refills as well as sore throat and stuffy nose x1 week. For medication refills, we are refilling levothyroxine for hypothyroidism. Patient denies diarrhea, constipation, heat or cold intolerance. For asthma we are refilling Symbicort and albuterol, and Singulair. She denies any shortness of breath but does report some coughing in relation to her current upper respiratory infection. Reports that she only needs her rescue inhaler a few times a week typically during exercise. She does also smoke but has cut down her smoking to 2 to 3 cigarettes/day, was asking about Chantix or something similar. Of note. Patient is on Depakote and Lexapro for mood. We are refilling Depakote today. She reports that mood is stable currently. We are refilling gabapentin for carpal tunnel and some neuropathy in her feet. Patient also does report dizziness a few times a week especially if she is outside in warm weather, will take meclizine for that. Requests refill today. Upper respiratory infection-patient reports that about a week ago she started having a sore throat followed by nasal congestion and some ear pain. Ear pain is bilateral.  She does report some continued pain with swallowing liquids and foods. Reports slightly productive cough. No fevers or chills. Slight frontal headache, stuffy nose. Was taking Mucinex which has helped relieve the sinus headache. Does report some fatigue.     Past Medical History :  Past Medical History:   Diagnosis Date    ADHD     Arthritis  Asthma     controlled    Chronic midline low back pain without sciatica 11/13/2017    COPD (chronic obstructive pulmonary disease) (HCC)     Depression     Gastritis     GERD (gastroesophageal reflux disease)     IBS (irritable bowel syndrome)     Migraines     Seizures ([address]     last episode 2016    Thyroid disease      Past Surgical History:   Procedure Laterality Date    COLONOSCOPY      EYE SURGERY      probing of ductal system right eye     FRACTURE SURGERY      R ELBOW CRUSH INJURY W PLATE AND PINS PLACED    MO COLONOSCOPY W/BIOPSY SINGLE/MULTIPLE  10/29/2018    COLONOSCOPY WITH BIOPSY performed by Lázaro Cornejo MD at [address] Y/O N/A 5/4/2018    TONSILLECTOMY performed by Jimi Bailey MD at [address] N/A 10/29/2020    EGD BIOPSY performed by Arthur Upton MD at [address] : Allergies   Allergen Reactions    Latex Rash    Acetaminophen Shortness Of Breath    Aspirin-Acetaminophen-Caffeine Shortness Of Breath    Dye [Iodides] Shortness Of Breath    Penicillins Anaphylaxis    Topamax [Topiramate] Nausea And Vomiting, Other (See Comments) and Shortness Of Breath    Tylenol With Codeine #3 [Acetaminophen-Codeine] Shortness Of Breath    Lactose Other (See Comments)     Usually just causes stomach pain, diarrhea if pt consumes too much of it.      Blueberry Flavor      ALLERGIC TO BLUEBERRY    Fish-Derived Products      SEAFOOD, ESPECIALLY SHRIMP    Iodine      Seafood allergy    Lidocaine Diarrhea, Itching and Swelling    Dooling Flavor Hives     Nemesio fruit    Vicodin [Hydrocodone-Acetaminophen]      Acts drunk         Medication List :    Current Outpatient Medications   Medication Sig Dispense Refill    budesonide-formoterol (SYMBICORT) 80-4.5 MCG/ACT AERO inhale 2 puffs by mouth daily 10.2 g 1    Benzoyl Peroxide 2.5 % GEL Apply 1 cm topically 2 times daily 60 g 1    montelukast (SINGULAIR) 10 MG tablet Take 1 tablet by mouth daily 30 tablet 3    mometasone (ELOCON) 0.1 % lotion apply 3 to 4 drop at bedtime for 10 days for itching 1 Bottle 2    loratadine (CLARITIN) 10 MG tablet Take 1 tablet by mouth daily 30 tablet 3    dicyclomine (BENTYL) 20 MG tablet Take 1 tablet by mouth 4 times daily 120 tablet 5    gabapentin (NEURONTIN) 300 MG capsule Take 2 capsules by mouth 3 times daily for 90 days. Intended supply: 90 days 540 capsule 0    meclizine (ANTIVERT) 25 MG tablet Take 1 tablet by mouth 3 times daily as needed for Dizziness 30 tablet 2    levothyroxine (SYNTHROID) 75 MCG tablet take 1 tablet by mouth once daily 90 tablet 1    divalproex (DEPAKOTE) 250 MG DR tablet take 1 tablet by mouth twice a day 180 tablet 0    albuterol sulfate  (90 Base) MCG/ACT inhaler inhale 2 puffs by mouth and INTO THE LUNGS every 6 hours if needed for wheezing 18 g 5    guaiFENesin (ALTARUSSIN) 100 MG/5ML syrup Take 10 mLs by mouth every 4 hours as needed for Cough 1 Bottle 2    medroxyPROGESTERone (PROVERA) 10 MG tablet Take 1 tablet by mouth daily 30 tablet 11    vitamin D (ERGOCALCIFEROL) 1.25 MG (93127 UT) CAPS capsule Take 1 capsule by mouth Twice a Week 24 capsule 1    dantrolene (DANTRIUM) 25 MG capsule Take 1 capsule by mouth 2 times daily AND 2 capsules nightly. (Patient taking differently: Take 1 capsule by mouth 3 times daily) 360 capsule 3    escitalopram (LEXAPRO) 20 MG tablet take 1 tablet by mouth once daily 120 tablet 3    baclofen (LIORESAL) 20 MG tablet Take 1 tablet by mouth every morning AND 1 tablet Daily with lunch AND 2 tablets nightly. 360 tablet 3    Saline GEL 1 spray by Nasal route daily as needed      chlorhexidine (PERIDEX) 0.12 % solution Rinse with 1/2 ounce by mouth for 30 seconds then spit out.  use twice a day 473 mL 5    ondansetron (ZOFRAN ODT) 4 MG disintegrating tablet Take 1 tablet by mouth every 8 hours as needed for Nausea or Vomiting 24 tablet 0     No current facility-administered medications for this visit. Review of Systems :  Review of Systems   Constitutional: Positive for fatigue. Negative for appetite change, chills and fever. HENT: Positive for congestion, ear pain, rhinorrhea, sinus pressure and sore throat. Respiratory: Positive for cough. Negative for shortness of breath and wheezing. Gastrointestinal: Negative for constipation and diarrhea. Endocrine: Negative for cold intolerance and heat intolerance. Neurological: Negative for headaches. Psychiatric/Behavioral: Negative for agitation and dysphoric mood. ______________________________________________________________________    Physical Exam :    Vitals: /66 (Site: Right Upper Arm, Position: Sitting, Cuff Size: Medium Adult)   Pulse 80   Temp 98.1 °F (36.7 °C) (Oral)   Resp 16   Ht 5' 8\" (1.727 m)   Wt 295 lb (133.8 kg)   SpO2 97%   BMI 44.85 kg/m²   General Appearance: Well developed, awake, alert, oriented, and in NAD  HEENT: NCAT, MMM, no pallor or icterus. Neck: Symmetrical, trachea midline. Chest wall/Lung: CTAB, respirations unlabored. No ronchi/wheezing/rales   Heart: RRR, normal S1 and S2, no murmurs, rubs or gallops. Abdomen: SNTND  Extremities: Extremities normal, atraumatic, no cyanosis, clubbing or edema. Skin: Skin color, texture, turgor normal, no rashes or lesions  Musculokeletal: ROM grossly normal in all joints of extremities, no obvious joint swelling. Neurologic: Alert&Oriented x3. No focal motor deficits detected. Psychiatric: Normal mood. Normal affect. Normal behavior. ______________________________________________________________________    Assessment & Plan :    Medication refill  - Benzoyl Peroxide 2.5 % GEL;  Apply 1 cm topically 2 times daily  Dispense: 60 g; Refill: 1    Mild intermittent asthma without complication  · Refill:  - budesonide-formoterol (SYMBICORT) 80-4.5 MCG/ACT AERO; inhale 2 puffs

## 2021-07-30 NOTE — PROGRESS NOTES
Have you been tested for COVID  No           Have you been told you were positive for COVID No  Have you had any known exposure to someone that is positive for COVID No  Do you have a cough                   No              Do you have shortness of breath No                 Do you have a sore throat            No                Are you having chills                    No                Are you having muscle aches. No                    Please come to the hospital wearing a mask and have your significant other wear a mask as well. Both of you should check your temperature before leaving to come here,  if it is 100 or higher please call [phone] for instruction. [facility] PAIN MANAGEMENT  INSTRUCTIONS  . .......................................................................................................................................... [x] Parking the day of Surgery is located in the [facility].   Upon entering the door, make immediate right into the surgery reception room    [x]  Bring photo ID and insurance card     [x] You may have a light breakfast day of procedure    [x]  Wear loose comfortable clothing    [x]  Please follow instructions for medications as given per Dr's office     [x] Stop blood thinners as per Dr's office instructions    [x] You can expect a call the business day prior to procedure to notify you of your arrival time     [x] Please arrange for     []  Other instructions

## 2021-08-05 NOTE — PROGRESS NOTES
Patient is using an insurance company for a ride. Requested that her time not be changed. VM left with Rhona Harden in scheduling.

## 2021-08-17 ENCOUNTER — HOSPITAL ENCOUNTER (OUTPATIENT)
Dept: GENERAL RADIOLOGY | Age: 31
Setting detail: OUTPATIENT SURGERY
Discharge: HOME | End: 2021-08-19
Attending: PAIN MEDICINE
Payer: COMMERCIAL

## 2021-08-17 ENCOUNTER — HOSPITAL ENCOUNTER (OUTPATIENT)
Age: 31
Setting detail: OUTPATIENT SURGERY
Discharge: HOME | End: 2021-08-17
Attending: PAIN MEDICINE | Admitting: PAIN MEDICINE
Payer: COMMERCIAL

## 2021-08-17 VITALS
BODY MASS INDEX: 44.41 KG/M2 | HEART RATE: 68 BPM | SYSTOLIC BLOOD PRESSURE: 137 MMHG | WEIGHT: 293 LBS | HEIGHT: 68 IN | OXYGEN SATURATION: 98 % | TEMPERATURE: 97.4 F | RESPIRATION RATE: 14 BRPM | DIASTOLIC BLOOD PRESSURE: 87 MMHG

## 2021-08-17 DIAGNOSIS — R52 PAIN MANAGEMENT: ICD-10-CM

## 2021-08-17 LAB
HCG, URINE, POC: NEGATIVE
Lab: NORMAL
NEGATIVE QC PASS/FAIL: NORMAL
POSITIVE QC PASS/FAIL: NORMAL

## 2021-08-17 PROCEDURE — 62323 NJX INTERLAMINAR LMBR/SAC: CPT | Performed by: PAIN MEDICINE

## 2021-08-17 PROCEDURE — 2580000003 HC RX 258: Performed by: PAIN MEDICINE

## 2021-08-17 PROCEDURE — 6360000002 HC RX W HCPCS: Performed by: PAIN MEDICINE

## 2021-08-17 PROCEDURE — 2709999900 HC NON-CHARGEABLE SUPPLY: Performed by: PAIN MEDICINE

## 2021-08-17 PROCEDURE — 3209999900 FLUORO FOR SURGICAL PROCEDURES

## 2021-08-17 PROCEDURE — 7100000010 HC PHASE II RECOVERY - FIRST 15 MIN: Performed by: PAIN MEDICINE

## 2021-08-17 PROCEDURE — 6360000004 HC RX CONTRAST MEDICATION: Performed by: PAIN MEDICINE

## 2021-08-17 PROCEDURE — A9579 GAD-BASE MR CONTRAST NOS,1ML: HCPCS | Performed by: PAIN MEDICINE

## 2021-08-17 PROCEDURE — 3600000002 HC SURGERY LEVEL 2 BASE: Performed by: PAIN MEDICINE

## 2021-08-17 PROCEDURE — 7100000011 HC PHASE II RECOVERY - ADDTL 15 MIN: Performed by: PAIN MEDICINE

## 2021-08-17 RX ORDER — METHYLPREDNISOLONE ACETATE 40 MG/ML
INJECTION, SUSPENSION INTRA-ARTICULAR; INTRALESIONAL; INTRAMUSCULAR; SOFT TISSUE PRN
Status: DISCONTINUED | OUTPATIENT
Start: 2021-08-17 | End: 2021-08-17 | Stop reason: ALTCHOICE

## 2021-08-17 RX ORDER — SODIUM CHLORIDE 9 MG/ML
INJECTION INTRAVENOUS PRN
Status: DISCONTINUED | OUTPATIENT
Start: 2021-08-17 | End: 2021-08-17 | Stop reason: ALTCHOICE

## 2021-08-17 ASSESSMENT — PAIN SCALES - GENERAL
PAINLEVEL_OUTOF10: 2
PAINLEVEL_OUTOF10: 2
PAINLEVEL_OUTOF10: 3
PAINLEVEL_OUTOF10: 10

## 2021-08-17 ASSESSMENT — PAIN DESCRIPTION - ONSET: ONSET: ON-GOING

## 2021-08-17 ASSESSMENT — PAIN DESCRIPTION - LOCATION
LOCATION: BACK

## 2021-08-17 ASSESSMENT — PAIN DESCRIPTION - PAIN TYPE
TYPE: CHRONIC PAIN

## 2021-08-17 ASSESSMENT — PAIN DESCRIPTION - FREQUENCY: FREQUENCY: CONTINUOUS

## 2021-08-17 ASSESSMENT — PAIN DESCRIPTION - ORIENTATION: ORIENTATION: MID;LOWER

## 2021-08-17 ASSESSMENT — PAIN DESCRIPTION - DESCRIPTORS
DESCRIPTORS: ACHING;THROBBING
DESCRIPTORS: ACHING
DESCRIPTORS: DISCOMFORT
DESCRIPTORS: DISCOMFORT

## 2021-08-17 ASSESSMENT — PAIN DESCRIPTION - PROGRESSION: CLINICAL_PROGRESSION: NOT CHANGED

## 2021-08-17 NOTE — OP NOTE
2021    Patient: Alycia Davis  :  1990  Age:  32 y.o. Sex:  female     PRE-OPERATIVE DIAGNOSIS: Lumbar disc displacement, lumbar radiculopathy. POST-OPERATIVE DIAGNOSIS: Same. PROCEDURE: Fluoroscopic guided therapeutic lumbar epidural steroid injection at the T12-L1 level (# 1). SURGEON: AUTUMN Galeas. ANESTHESIA: local    ESTIMATED BLOOD LOSS: None.  __________________________________________________    BRIEF HISTORY:  Alycia Davis comes in today for first lumbar epidural injection at T12-L1 level. The potential complications of this procedure were discussed with her again today. She has elected to undergo the aforementioned procedure. Donald complete History & Physical examination were reviewed in depth, a copy of which is in the chart. DESCRIPTION OF PROCEDURE:    After confirming written and informed consent, a time-out was performed and Donald name and date of birth, the procedure to be performed as well as the plan for the location of the needle insertion were confirmed. The patient was brought into the procedure room and placed in the prone position on the fluoroscopy table. A pillow was placed under the patient's lower abdomen/upper pelvis to increase lumbar interlaminar space. Standard monitors were placed, and vital signs were observed throughout the procedure. The area of the lumbar spine was prepped with chloraprep and draped in a sterile manner. The T12-L1 interspace was identified and marked under AP fluoroscopy. The skin and subcutaneous tissues at the above level were anesthestized with a skin wheel of normal saline. With intermittent fluoroscopy, an # 18 gauge 3 1/2 tuohy epidural needle was inserted and directed toward the interlaminar space. The needle was slowly advanced using loss of resistance technique and 5 cc glass syringe until the tip of the epidural needle has passed through the ligamentum flavum and entered the epidural space.  AP and lateral fluoroscopic imaging was performed to verify that the epidural needle was properly placed. Negative aspiration of blood and CSF was confirmed. Two ml of Prohance was used for confirmation of even epidural spread under both live lateral and live AP fluoroscopy. After negative aspiration, a solution of 0.5 % Lidocaine 3 ml and 40 mg DepoMedrol was easily injected. The needle was gently removed intact . The patient's back was cleaned and a Band-Aid was placed over the needle insertion point. Disposition the patient tolerated the procedure well and there were no complications . Vital signs remained stable throughout the procedure. The patient was escorted to the recovery area where they remained until discharge and written discharge instructions for the procedure were given. Plan: Betsy Monaco will return to our pain management center as scheduled.      Savi Benitez DO

## 2021-08-17 NOTE — H&P
[facility]        [address] [facility] [facility]  Dept: [phone]    Procedure History & Physical      Damian Dong     HPI:    Patient  is here for LBP BLE pain for T12-L1 TAMRA  Labs/imaging studies reviewed   All question and concerns addressed including R/B/A associated with the procedure    Past Medical History:   Diagnosis Date    ADHD     Arthritis     Asthma     controlled    Chronic midline low back pain without sciatica 11/13/2017    COPD (chronic obstructive pulmonary disease) (HCC)     Depression     Gastritis     GERD (gastroesophageal reflux disease)     IBS (irritable bowel syndrome)     Migraines     Seizures ([facility] [address]     last episode 2016    Thyroid disease        Past Surgical History:   Procedure Laterality Date    COLONOSCOPY      EYE SURGERY      probing of ductal system right eye     FRACTURE SURGERY      R ELBOW CRUSH INJURY W PLATE AND PINS PLACED    [place] COLONOSCOPY W/BIOPSY SINGLE/MULTIPLE  10/29/2018    COLONOSCOPY WITH BIOPSY performed by Christian Doran MD at [address] [place] [address] Y/O N/A 5/4/2018    TONSILLECTOMY performed by Juanis Francisco MD at [facility]. N/A 10/29/2020    EGD BIOPSY performed by Viviana Garcia MD at [place] [address]       Prior to Admission medications    Medication Sig Start Date End Date Taking?  Authorizing Provider   budesonide-formoterol (SYMBICORT) 80-4.5 MCG/ACT AERO inhale 2 puffs by mouth daily 7/29/21  Yes Geneva A Rech, DO   Benzoyl Peroxide 2.5 % GEL Apply 1 cm topically 2 times daily 7/29/21  Yes Geneva A Rech, DO   montelukast (SINGULAIR) 10 MG tablet Take 1 tablet by mouth daily 7/29/21  Yes Geneva A Rech, DO   mometasone (ELOCON) 0.1 % lotion apply 3 to 4 drop at bedtime for 10 days for itching 7/29/21  Yes Geneva A Rech, DO   loratadine (CLARITIN) 10 MG tablet Take 1 tablet by mouth daily 7/29/21  Yes Geneva A Rech, DO   dicyclomine (BENTYL) 20 MG tablet Take 1 tablet by mouth 4 times daily 7/29/21  Yes Geneva Yuan, DO   gabapentin (NEURONTIN) 300 MG capsule Take 2 capsules by mouth 3 times daily for 90 days. Intended supply: 90 days 7/29/21 10/27/21 Yes Geneva Yuan, DO   meclizine (ANTIVERT) 25 MG tablet Take 1 tablet by mouth 3 times daily as needed for Dizziness 7/29/21  Yes Geneva A Rech, DO   levothyroxine (SYNTHROID) 75 MCG tablet take 1 tablet by mouth once daily 7/29/21  Yes Geneva Yuan, DO   divalproex (DEPAKOTE) 250 MG DR tablet take 1 tablet by mouth twice a day 7/29/21  Yes Geneva Yuan, DO   albuterol sulfate  (90 Base) MCG/ACT inhaler inhale 2 puffs by mouth and INTO THE LUNGS every 6 hours if needed for wheezing 7/29/21  Yes Geneva Yuan, DO   medroxyPROGESTERone (PROVERA) 10 MG tablet Take 1 tablet by mouth daily 7/22/21  Yes Stevie Roche MD   vitamin D (ERGOCALCIFEROL) 1.25 MG (36225 UT) CAPS capsule Take 1 capsule by mouth Twice a Week 6/14/21  Yes Geneva Yuan, DO   dantrolene (DANTRIUM) 25 MG capsule Take 1 capsule by mouth 2 times daily AND 2 capsules nightly. Patient taking differently: Take 1 capsule by mouth 3 times daily 4/27/21  Yes JOSSE Joe CNP   escitalopram (LEXAPRO) 20 MG tablet take 1 tablet by mouth once daily 11/25/20  Yes Freda Gomez MD   baclofen (LIORESAL) 20 MG tablet Take 1 tablet by mouth every morning AND 1 tablet Daily with lunch AND 2 tablets nightly. 10/27/20  Yes JOSSE Joe CNP   Saline GEL 1 spray by Nasal route daily as needed   Yes Historical Provider, MD   mometasone (ELOCON) 0.1 % cream Apply topically daily Apply topically daily.  10/29/20   Freda Gomez MD       Allergies   Allergen Reactions    Latex Rash    Aspirin-Acetaminophen-Caffeine Shortness Of Breath    Dye [Iodides] Shortness Of Breath    Penicillins Anaphylaxis    Topamax [Topiramate] Nausea And Vomiting, Other (See Comments) and Shortness Of Breath    Tylenol With Codeine #3 [Acetaminophen-Codeine] Shortness Of Breath    Lactose Other (See Comments)     Usually just causes stomach pain, diarrhea if pt consumes too much of it.  Blueberry Flavor      ALLERGIC TO BLUEBERRY    Fish-Derived Products      SEAFOOD, ESPECIALLY SHRIMP    Iodine      Seafood allergy    Lidocaine Diarrhea, Itching and Swelling    [place] Flavor Hives     Nemesio fruit    Vicodin [Hydrocodone-Acetaminophen]      Acts drunk         Social History     Socioeconomic History    Marital status: Single     Spouse name: Not on file    Number of children: Not on file    Years of education: 12    Highest education level: Not on file   Occupational History    Occupation: [employer]     Employer: [employer]     Comment: unable to work due to injury   Tobacco Use    Smoking status: Current Every Day Smoker     Packs/day: 0.50     Years: 1.00     Pack years: 0.50     Types: Cigarettes     Start date: 2020    Smokeless tobacco: Never Used    Tobacco comment: Smokes 1 cig daily   Vaping Use    Vaping Use: Never used   Substance and Sexual Activity    Alcohol use: No     Alcohol/week: 0.0 standard drinks    Drug use: No    Sexual activity: Not on file   Other Topics Concern    Not on file   Social History Narrative    1-2 cups coffee/week     Social Determinants of Health     Financial Resource Strain: Low Risk     Difficulty of Paying Living Expenses: Not hard at all   Food Insecurity: No Food Insecurity    Worried About Running Out of Food in the Last Year: Never true    Tete of Food in the Last Year: Never true   Transportation Needs:     Lack of Transportation (Medical):      Lack of Transportation (Non-Medical):    Physical Activity:     Days of Exercise per Week:     Minutes of Exercise per Session:    Stress:     Feeling of Stress :    Social Connections:     Frequency of Communication with Friends and Family:     Frequency of Social Gatherings with Friends and Family:     Attends Church Services:     Active Member of Clubs or Organizations:     Attends Club or Organization Meetings:     Marital Status:    Intimate Partner Violence:     Fear of Current or Ex-Partner:     Emotionally Abused:     Physically Abused:     Sexually Abused:        Family History   Problem Relation Age of Onset    Early Death Maternal Grandfather     Cancer Maternal Grandfather     Asthma Brother     Heart Disease Maternal Aunt     Cancer Maternal Aunt     Heart Disease Maternal Uncle     Cancer Maternal Uncle     High Cholesterol Father     Diabetes Father     High Cholesterol Mother     Cancer Paternal Aunt     Cancer Paternal Uncle     Cancer Maternal Grandmother     Cancer Paternal Grandmother     Cancer Paternal Grandfather          REVIEW OF SYSTEMS:    CONSTITUTIONAL:  negative for  fevers, chills, sweats and fatigue    RESPIRATORY:  negative for  dry cough, cough with sputum, dyspnea, wheezing and chest pain    CARDIOVASCULAR:  negative for chest pain, dyspnea, palpitations, syncope    GASTROINTESTINAL:  negative for nausea, vomiting, change in bowel habits, diarrhea, constipation and abdominal pain    MUSCULOSKELETAL: negative for muscle weakness    SKIN: negative for itching or rashes. BEHAVIOR/PSYCH:  negative for poor appetite, increased appetite, decreased sleep and poor concentration    All other systems negative      PHYSICAL EXAM:    VITALS:  /74   Pulse 75   Temp 97.1 °F (36.2 °C) (Temporal)   Resp 18   Ht 5' 8\" (1.727 m)   Wt 295 lb (133.8 kg)   LMP  (LMP Unknown)   SpO2 97%   BMI 44.85 kg/m²     CONSTITUTIONAL:  awake, alert, cooperative, no apparent distress, and appears stated age    EYES: PERRLA, EOMI    LUNGS:  No increased work of breathing, no audible wheezing    CARDIOVASCULAR:  regular rate and rhythm    ABDOMEN:  Soft non tender non distended     EXTREMITIES: no signs of clubbing or cyanosis.     MUSCULOSKELETAL: negative for flaccid muscle tone or spastic movements. SKIN: gross examination reveals no signs of rashes, or diaphoresis. NEURO: Cranial nerves II-XII grossly intact. No signs of agitated mood.        Assessment/Plan:    LBP BLE pain for T12-L1 TAMRA

## 2021-08-24 ENCOUNTER — CLINICAL DOCUMENTATION (OUTPATIENT)
Dept: NUTRITION | Age: 31
End: 2021-08-24

## 2021-08-24 NOTE — PROGRESS NOTES
STEROID INJECTION #1 performed by Nicole Hurst DO at [facility] CT COLONOSCOPY W/BIOPSY SINGLE/MULTIPLE  10/29/2018    COLONOSCOPY WITH BIOPSY performed by Jennifer Mccarthy MD at [address] [place] [address] Y/O N/A 5/4/2018    TONSILLECTOMY performed by Morris Villanueva MD at [place] [address] N/A 10/29/2020    EGD BIOPSY performed by Eileen Naranjo MD at [address] [place] [address] History   Problem Relation Age of Onset    Early Death Maternal Grandfather     Cancer Maternal Grandfather     Asthma Brother     Heart Disease Maternal Aunt     Cancer Maternal Aunt     Heart Disease Maternal Uncle     Cancer Maternal Uncle     High Cholesterol Father     Diabetes Father     High Cholesterol Mother     Cancer Paternal Aunt     Cancer Paternal Uncle     Cancer Maternal Grandmother     Cancer Paternal Grandmother     Cancer Paternal Grandfather         Medications:  Prior to Admission medications    Medication Sig Start Date End Date Taking? Authorizing Provider   budesonide-formoterol (SYMBICORT) 80-4.5 MCG/ACT AERO inhale 2 puffs by mouth daily 7/29/21   Elie Davidson, DO   Benzoyl Peroxide 2.5 % GEL Apply 1 cm topically 2 times daily 7/29/21   Darcella Mu A Rech, DO   montelukast (SINGULAIR) 10 MG tablet Take 1 tablet by mouth daily 7/29/21   Darcella Mu A Rech, DO   mometasone (ELOCON) 0.1 % lotion apply 3 to 4 drop at bedtime for 10 days for itching 7/29/21   Elie Davidson, DO   loratadine (CLARITIN) 10 MG tablet Take 1 tablet by mouth daily 7/29/21   Elie Davidson, DO   dicyclomine (BENTYL) 20 MG tablet Take 1 tablet by mouth 4 times daily 7/29/21   Darcella Mu A Rech, DO   gabapentin (NEURONTIN) 300 MG capsule Take 2 capsules by mouth 3 times daily for 90 days.  Intended supply: 90 days 7/29/21 10/27/21  Elie Davidson, DO   meclizine (ANTIVERT) 25 MG tablet Take 1 tablet by mouth 3 times daily as needed for Dizziness 7/29/21   Elie Davidson, DO   levothyroxine (SYNTHROID) 75 MCG tablet take 1 tablet by mouth once daily 7/29/21   Lord Caitlin DAILY Rech, DO   divalproex (DEPAKOTE) 250 MG DR tablet take 1 tablet by mouth twice a day 7/29/21   Lord Tucker A Rech, DO   albuterol sulfate  (90 Base) MCG/ACT inhaler inhale 2 puffs by mouth and INTO THE LUNGS every 6 hours if needed for wheezing 7/29/21   Demarcus Garcia, DO   medroxyPROGESTERone (PROVERA) 10 MG tablet Take 1 tablet by mouth daily 7/22/21   Ilene Pallas, MD   vitamin D (ERGOCALCIFEROL) 1.25 MG (97706 UT) CAPS capsule Take 1 capsule by mouth Twice a Week 6/14/21   Geneva Yuan, DO   dantrolene (DANTRIUM) 25 MG capsule Take 1 capsule by mouth 2 times daily AND 2 capsules nightly. Patient taking differently: Take 1 capsule by mouth 3 times daily 4/27/21   JOSSE Medley CNP   escitalopram (LEXAPRO) 20 MG tablet take 1 tablet by mouth once daily 11/25/20   Ricky Owens MD   mometasone (ELOCON) 0.1 % cream Apply topically daily Apply topically daily. 10/29/20   Ricky Owens MD   baclofen (LIORESAL) 20 MG tablet Take 1 tablet by mouth every morning AND 1 tablet Daily with lunch AND 2 tablets nightly.  10/27/20   JOSSE Medley CNP   Saline GEL 1 spray by Nasal route daily as needed    Historical Provider, MD       Supplements, OTC:  Multi-vitamin     Labs:  Lab Results   Component Value Date     05/05/2021    K 4.5 05/05/2021     05/05/2021    CO2 26 05/05/2021    BUN 8 05/05/2021    CREATININE 0.9 05/05/2021    GLUCOSE 98 05/05/2021    CALCIUM 9.6 05/05/2021    PROT 8.0 05/05/2021    LABALBU 4.3 05/05/2021    BILITOT 0.4 05/05/2021    ALKPHOS 82 05/05/2021    AST 15 05/05/2021    ALT 18 05/05/2021    LABGLOM >60 05/05/2021    GFRAA >60 05/05/2021     Lab Results   Component Value Date    CHOL 190 10/29/2020     Lab Results   Component Value Date    TRIG 152 (H) 10/29/2020     Lab Results   Component Value Date    HDL 32 01/14/2019     Lab Results   Component Value Date    LDLCALC 125 (H)

## 2021-08-24 NOTE — LETTER
[address]  [address] [place] [address] [address]  Phone: [phone]  Fax: [phone]    August 24, 2021    Dr. Bettie Klein   Fax: [phone]    Patient: Samm Fraga   YOB: 1990   Date of Visit: 8/24/2021       Dear Dr. Bettie Klein,     Thank you for referring Sami Pena to me for nutrition counseling. Attached is my note. If you have questions, please do not hesitate to call me. I look forward to following this patient along with you. Sincerely,    Electronically signed by Symone Hamilton, ROLANDA, LD on 8/24/21 at 9:15 AM EDT                                                            Initial Nutrition Assessment Note    Date: 8/24/2021  Patient Name: Samm Fraga  Referring Clinician: Dr. Bettie Klein   8:45am - 9:15am     Reason for Visit: Obesity Class III     Current Typical Eating Pattern:  Breakfast: Stated only eats breakfast on weekends - egg whites w/ water or OJ. Coffee: cream & sugar  Lunch: [employer] greens w/ baked chicken w/ water   Dinner: Jessica Kobs (mustard) w/ fries w/ water  Snacks: Peanuts, chips  Water/d: ~96oz  Fast food/wk: 2x/wk (Ex: [employer] - rice, chicken bowl, sour cream, guac, cheese & lettuce or Pa's 10pc nugget w/ medium mcfarlane)   Takeout style/restaurent food/wk: 1x/month (Ex: Grilled chicken salad w/ ranch dressing)     Notes:  Pt not forthcoming w/ nutrition hx. Had to ask the same question repeatedly      Allergies and Food Sensitives:   NKA     Dietary Limitations; Time/Preparation Issues:  None per pt     Sleep patterns:   Pt stated unsure - pt stated feels rested     Stress/Environment Issues that may affect PO intake:  ? No change     Work:  ? Unemployed     [facility] History:  ? Stayed consistent per pt     Family Support:  ? Yes - her mom. Lives w/ her. Her mom does grocery shopping. Current Exercise Pattern:   ? Yes: Walks 2x/wk: 20 minutes     Sex: female    Age: 32 y.o.     Height: 68 inches    CBW: 292 lbs    Past Medical History:   Diagnosis Date    ADHD     Arthritis     Asthma     controlled    Chronic midline low back pain without sciatica 11/13/2017    COPD (chronic obstructive pulmonary disease) (HCC)     Depression     Gastritis     GERD (gastroesophageal reflux disease)     IBS (irritable bowel syndrome)     Migraines     Seizures ([facility] [address]     last episode 2016    Thyroid disease        Past Surgical History:   Procedure Laterality Date    COLONOSCOPY      EYE SURGERY      probing of ductal system right eye     FRACTURE SURGERY      R ELBOW CRUSH INJURY W PLATE AND PINS PLACED    PAIN MANAGEMENT PROCEDURE N/A 8/17/2021    T12-L1 EPIDURAL STEROID INJECTION #1 performed by [facility],  at [address] [facility] W/BIOPSY SINGLE/MULTIPLE  10/29/2018    COLONOSCOPY WITH BIOPSY performed by Riky Car MD at [address] [place] [address] OF TONSILS,12+ Y/O N/A 5/4/2018    TONSILLECTOMY performed by Cherry Adames MD at [place] [address] ENDOSCOPY N/A 10/29/2020    EGD BIOPSY performed by Eliot Underwood MD at [address] [place] [address] History   Problem Relation Age of Onset    Early Death Maternal Grandfather     Cancer Maternal Grandfather     Asthma Brother     Heart Disease Maternal Aunt     Cancer Maternal Aunt     Heart Disease Maternal Uncle     Cancer Maternal Uncle     High Cholesterol Father     Diabetes Father     High Cholesterol Mother     Cancer Paternal Aunt     Cancer Paternal Uncle     Cancer Maternal Grandmother     Cancer Paternal Grandmother     Cancer Paternal Grandfather         Medications:  Prior to Admission medications    Medication Sig Start Date End Date Taking?  Authorizing Provider   budesonide-formoterol (SYMBICORT) 80-4.5 MCG/ACT AERO inhale 2 puffs by mouth daily 7/29/21   Lakshmi Villasenor, DO   Benzoyl Peroxide 2.5 % GEL Apply 1 cm topically 2 times daily 7/29/21   Maryan Yuan,    montelukast (SINGULAIR) 10 MG tablet Take 1 tablet by mouth daily 7/29/21   Pepper Yuan, DO   mometasone (ELOCON) 0.1 % lotion apply 3 to 4 drop at bedtime for 10 days for itching 7/29/21   Argenis Llanos, DO   loratadine (CLARITIN) 10 MG tablet Take 1 tablet by mouth daily 7/29/21   Argenis Llanos, DO   dicyclomine (BENTYL) 20 MG tablet Take 1 tablet by mouth 4 times daily 7/29/21   Pepper Yuan, DO   gabapentin (NEURONTIN) 300 MG capsule Take 2 capsules by mouth 3 times daily for 90 days. Intended supply: 90 days 7/29/21 10/27/21  Argenis Llanos, DO   meclizine (ANTIVERT) 25 MG tablet Take 1 tablet by mouth 3 times daily as needed for Dizziness 7/29/21   Argenis Llanos, DO   levothyroxine (SYNTHROID) 75 MCG tablet take 1 tablet by mouth once daily 7/29/21   Pepper Yuan, DO   divalproex (DEPAKOTE) 250 MG DR tablet take 1 tablet by mouth twice a day 7/29/21   Pepper Yuan, DO   albuterol sulfate  (90 Base) MCG/ACT inhaler inhale 2 puffs by mouth and INTO THE LUNGS every 6 hours if needed for wheezing 7/29/21   Argenis Llanos, DO   medroxyPROGESTERone (PROVERA) 10 MG tablet Take 1 tablet by mouth daily 7/22/21   Rodrigue Dhaliwal MD   vitamin D (ERGOCALCIFEROL) 1.25 MG (99446 UT) CAPS capsule Take 1 capsule by mouth Twice a Week 6/14/21   Geneva Yuan, DO   dantrolene (DANTRIUM) 25 MG capsule Take 1 capsule by mouth 2 times daily AND 2 capsules nightly. Patient taking differently: Take 1 capsule by mouth 3 times daily 4/27/21   JOSSE Grajeda CNP   escitalopram (LEXAPRO) 20 MG tablet take 1 tablet by mouth once daily 11/25/20   Reema Marrero MD   mometasone (ELOCON) 0.1 % cream Apply topically daily Apply topically daily. 10/29/20   Reema Marrero MD   baclofen (LIORESAL) 20 MG tablet Take 1 tablet by mouth every morning AND 1 tablet Daily with lunch AND 2 tablets nightly.  10/27/20   Nayana Rose, JOSSE - CNP   Saline GEL 1 spray by Nasal route daily as needed    Historical Provider, MD       Supplements, OTC:  Multi-vitamin Labs:  Lab Results   Component Value Date     05/05/2021    K 4.5 05/05/2021     05/05/2021    CO2 26 05/05/2021    BUN 8 05/05/2021    CREATININE 0.9 05/05/2021    GLUCOSE 98 05/05/2021    CALCIUM 9.6 05/05/2021    PROT 8.0 05/05/2021    LABALBU 4.3 05/05/2021    BILITOT 0.4 05/05/2021    ALKPHOS 82 05/05/2021    AST 15 05/05/2021    ALT 18 05/05/2021    LABGLOM >60 05/05/2021    GFRAA >60 05/05/2021     Lab Results   Component Value Date    CHOL 190 10/29/2020     Lab Results   Component Value Date    TRIG 152 (H) 10/29/2020     Lab Results   Component Value Date    HDL 32 01/14/2019     Lab Results   Component Value Date    LDLCALC 125 (H) 01/14/2019     Lab Results   Component Value Date    LABVLDL 40 01/14/2019     No results found for: CHOLHDLRATIO    Past Nutrition Hx:  No change per pt     Past Exercise Routine:  2x/wk, 20 min     Plan:  Gentle nutrition  How to create a well balanced plate   Label reading     Motivational Scale:  10     Motivators for lifestyle change:  1. Overall health     Barriers:  1.  None per pt     Follow-up plan:  October 7th, 2 pm     Electronically signed by: Chance Liao RD, VINCENZO on 8/24/21 at 9:15 AM EDT

## 2021-08-25 ENCOUNTER — OFFICE VISIT (OUTPATIENT)
Dept: PAIN MANAGEMENT | Age: 31
End: 2021-08-25
Payer: COMMERCIAL

## 2021-08-25 ENCOUNTER — PREP FOR PROCEDURE (OUTPATIENT)
Dept: PAIN MANAGEMENT | Age: 31
End: 2021-08-25

## 2021-08-25 VITALS
WEIGHT: 293 LBS | RESPIRATION RATE: 16 BRPM | BODY MASS INDEX: 44.41 KG/M2 | OXYGEN SATURATION: 98 % | TEMPERATURE: 97.2 F | HEIGHT: 68 IN | HEART RATE: 88 BPM | SYSTOLIC BLOOD PRESSURE: 118 MMHG | DIASTOLIC BLOOD PRESSURE: 72 MMHG

## 2021-08-25 DIAGNOSIS — M54.42 CHRONIC BILATERAL LOW BACK PAIN WITH BILATERAL SCIATICA: ICD-10-CM

## 2021-08-25 DIAGNOSIS — M54.41 CHRONIC BILATERAL LOW BACK PAIN WITH BILATERAL SCIATICA: ICD-10-CM

## 2021-08-25 DIAGNOSIS — G89.4 CHRONIC PAIN SYNDROME: Primary | ICD-10-CM

## 2021-08-25 DIAGNOSIS — G89.29 CHRONIC BILATERAL LOW BACK PAIN WITH BILATERAL SCIATICA: ICD-10-CM

## 2021-08-25 DIAGNOSIS — M51.36 DDD (DEGENERATIVE DISC DISEASE), LUMBAR: ICD-10-CM

## 2021-08-25 DIAGNOSIS — M54.16 LUMBAR RADICULOPATHY: ICD-10-CM

## 2021-08-25 DIAGNOSIS — M54.16 LUMBAR RADICULOPATHY: Primary | ICD-10-CM

## 2021-08-25 PROCEDURE — G8427 DOCREV CUR MEDS BY ELIG CLIN: HCPCS | Performed by: PAIN MEDICINE

## 2021-08-25 PROCEDURE — 99213 OFFICE O/P EST LOW 20 MIN: CPT | Performed by: PAIN MEDICINE

## 2021-08-25 PROCEDURE — 4004F PT TOBACCO SCREEN RCVD TLK: CPT | Performed by: PAIN MEDICINE

## 2021-08-25 PROCEDURE — G8417 CALC BMI ABV UP PARAM F/U: HCPCS | Performed by: PAIN MEDICINE

## 2021-08-25 NOTE — PROGRESS NOTES
[facility]        [address]  [place] [facility]  Dept: [phone]        Follow up Note      Shanelle Erp     Date of Visit:  21     CC:  Patient presents for follow up   Chief Complaint   Patient presents with    Follow-up     post procedure        HPI:    Pain is better. Change in quality of symptoms:no. Medication side effects:not applicable . Recent diagnostic testing:none. Recent interventional procedures:T12-L1 TAMRA with 70% relief. She has not been on anticoagulation medications to include ASA, NSAIDS, Plavix, heparin, LMW heparin and warfarin and has not been on herbal supplements. She is not diabetic.     Imagin lumbar MRI -  EXAMINATION:   MRI OF THE LUMBAR SPINE WITHOUT CONTRAST, 2021 11:46 am       TECHNIQUE:   Multiplanar multisequence MRI of the lumbar spine was performed without the   administration of intravenous contrast.       COMPARISON:   None       HISTORY:   ORDERING SYSTEM PROVIDED HISTORY: L-S radiculopathy   TECHNOLOGIST PROVIDED HISTORY:   Reason for exam:->LS radiculopathy; L low back pain   What reading provider will be dictating this exam?->CRC       FINDINGS:   BONES/ALIGNMENT: There is normal alignment of the spine. The vertebral body   heights are maintained.  The bone marrow signal appears unremarkable.  No   acute fracture is appreciated.       SPINAL CORD: The conus terminates normally.       SOFT TISSUES: No paraspinal mass identified.       Moderate-sized right paracentral disc protrusion is identified at T12-L1.       L1-L2: Small right paracentral disc protrusion is appreciated resulting in   minimal right lateral recess stenosis.       L2-L3: There is no significant disc herniation, spinal canal stenosis or   neural foraminal narrowing.       L3-L4: Mild left foraminal stenosis identified due to encroachment by shallow   disc bulge and facet disease.       L4-L5: Moderate left foraminal stenosis and mild right foraminal stenosis are   identified due to encroachment by disc bulge and facet disease.       L5-S1: Mild right foraminal stenosis is identified secondary to mild facet   arthropathy.           Impression   Moderate left foraminal stenosis and mild right foraminal stenosis at L4-L5.       Mild right foraminal stenosis at L5-S1.       Mild left foraminal stenosis at L3-L4.       Small right paracentral disc protrusion at L1-L2.       Moderate-sized right paracentral disc protrusion at T12-L1. Potential Aberrant Drug-Related Behavior:      Urine Drug Screening:    OARRS report:    Past Medical History:   Diagnosis Date    ADHD     Arthritis     Asthma     controlled    Chronic midline low back pain without sciatica 11/13/2017    COPD (chronic obstructive pulmonary disease) (HCC)     Depression     Gastritis     GERD (gastroesophageal reflux disease)     IBS (irritable bowel syndrome)     Migraines     Seizures ([facility] [address]     last episode 2016    Thyroid disease        Past Surgical History:   Procedure Laterality Date    COLONOSCOPY      EYE SURGERY      probing of ductal system right eye     FRACTURE SURGERY      R ELBOW CRUSH INJURY W PLATE AND PINS PLACED    PAIN MANAGEMENT PROCEDURE N/A 8/17/2021    T12-L1 EPIDURAL STEROID INJECTION #1 performed by Palmira Carmichael DO at [facility] W/BIOPSY SINGLE/MULTIPLE  10/29/2018    COLONOSCOPY WITH BIOPSY performed by Amarilys Pedraza MD at [address] [place] [address] Drive TONSILS,12+ Y/O N/A 5/4/2018    TONSILLECTOMY performed by Shavon Talbert MD at [address] [place] [address] N/A 10/29/2020    EGD BIOPSY performed by Kathy Riley MD at [place] [address]       Prior to Admission medications    Medication Sig Start Date End Date Taking?  Authorizing Provider   budesonide-formoterol (SYMBICORT) 80-4.5 MCG/ACT AERO inhale 2 puffs by mouth daily 7/29/21  Yes Geneva Yuan DO   Benzoyl Peroxide 2.5 % GEL Apply 1 cm topically 2 times daily 7/29/21  Yes Geneva A Rech, DO   montelukast (SINGULAIR) 10 MG tablet Take 1 tablet by mouth daily 7/29/21  Yes Geneva A Rech, DO   mometasone (ELOCON) 0.1 % lotion apply 3 to 4 drop at bedtime for 10 days for itching 7/29/21  Yes Geneva A Rech, DO   loratadine (CLARITIN) 10 MG tablet Take 1 tablet by mouth daily 7/29/21  Yes Geneva A Rech, DO   dicyclomine (BENTYL) 20 MG tablet Take 1 tablet by mouth 4 times daily 7/29/21  Yes Geneva A Rech, DO   gabapentin (NEURONTIN) 300 MG capsule Take 2 capsules by mouth 3 times daily for 90 days. Intended supply: 90 days 7/29/21 10/27/21 Yes Geneva A Lissy, DO   meclizine (ANTIVERT) 25 MG tablet Take 1 tablet by mouth 3 times daily as needed for Dizziness 7/29/21  Yes Geneva A Rech, DO   levothyroxine (SYNTHROID) 75 MCG tablet take 1 tablet by mouth once daily 7/29/21  Yes Geneva A Rech, DO   divalproex (DEPAKOTE) 250 MG DR tablet take 1 tablet by mouth twice a day 7/29/21  Yes Geneva A Lissy, DO   albuterol sulfate  (90 Base) MCG/ACT inhaler inhale 2 puffs by mouth and INTO THE LUNGS every 6 hours if needed for wheezing 7/29/21  Yes Geneva A Lissy, DO   medroxyPROGESTERone (PROVERA) 10 MG tablet Take 1 tablet by mouth daily 7/22/21  Yes Roosevelt Dial MD   vitamin D (ERGOCALCIFEROL) 1.25 MG (79864 UT) CAPS capsule Take 1 capsule by mouth Twice a Week 6/14/21  Yes Geneva Yuan, DO   dantrolene (DANTRIUM) 25 MG capsule Take 1 capsule by mouth 2 times daily AND 2 capsules nightly. Patient taking differently: Take 1 capsule by mouth 3 times daily 4/27/21  Yes JOSSE Triana CNP   escitalopram (LEXAPRO) 20 MG tablet take 1 tablet by mouth once daily 11/25/20  Yes Cassidy Darnell MD   baclofen (LIORESAL) 20 MG tablet Take 1 tablet by mouth every morning AND 1 tablet Daily with lunch AND 2 tablets nightly.  10/27/20  Yes JOSSE Triana CNP   Saline GEL 1 spray by Nasal route daily as needed   Yes Historical Provider, MD mometasone (ELOCON) 0.1 % cream Apply topically daily Apply topically daily. 10/29/20   Alex Dominguez MD       Allergies   Allergen Reactions    Latex Rash    Aspirin-Acetaminophen-Caffeine Shortness Of Breath    Dye [Iodides] Shortness Of Breath    Penicillins Anaphylaxis    Topamax [Topiramate] Nausea And Vomiting, Other (See Comments) and Shortness Of Breath    Tylenol With Codeine #3 [Acetaminophen-Codeine] Shortness Of Breath    Lactose Other (See Comments)     Usually just causes stomach pain, diarrhea if pt consumes too much of it.      Blueberry Flavor      ALLERGIC TO BLUEBERRY    Fish-Derived Products      SEAFOOD, ESPECIALLY SHRIMP    Iodine      Seafood allergy    Lidocaine Diarrhea, Itching and Swelling    [place] Flavor Hives     N[place]es[place] fruit    Vicodin [Hydrocodone-Acetaminophen]      Acts drunk         Social History     Socioeconomic History    Marital status: Single     Spouse name: Not on file    Number of children: Not on file    Years of education: 12    Highest education level: Not on file   Occupational History    Occupation: [employer]     Employer: [address] [place] [address]     Comment: unable to work due to injury   Tobacco Use    Smoking status: Current Every Day Smoker     Packs/day: 0.50     Years: 1.00     Pack years: 0.50     Types: Cigarettes     Start date: 2020    Smokeless tobacco: Never Used    Tobacco comment: Smokes 1 cig daily   Vaping Use    Vaping Use: Never used   Substance and Sexual Activity    Alcohol use: No     Alcohol/week: 0.0 standard drinks    Drug use: No    Sexual activity: Not on file   Other Topics Concern    Not on file   Social History Narrative    1-2 cups coffee/week     Social Determinants of Health     Financial Resource Strain: Low Risk     Difficulty of Paying Living Expenses: Not hard at all   Food Insecurity: No Food Insecurity    Worried About Running Out of Food in the Last Year: Never true    [address] in the Last Year: Never true   Transportation Needs:     Lack of Transportation (Medical):  Lack of Transportation (Non-Medical):    Physical Activity:     Days of Exercise per Week:     Minutes of Exercise per Session:    Stress:     Feeling of Stress :    Social Connections:     Frequency of Communication with Friends and Family:     Frequency of Social Gatherings with Friends and Family:     Attends Anabaptism Services:     Active Member of Clubs or Organizations:     Attends Club or Organization Meetings:     Marital Status:    Intimate Partner Violence:     Fear of Current or Ex-Partner:     Emotionally Abused:     Physically Abused:     Sexually Abused:        Family History   Problem Relation Age of Onset    Early Death Maternal Grandfather     Cancer Maternal Grandfather     Asthma Brother     Heart Disease Maternal Aunt     Cancer Maternal Aunt     Heart Disease Maternal Uncle     Cancer Maternal Uncle     High Cholesterol Father     Diabetes Father     High Cholesterol Mother     Cancer Paternal Aunt     Cancer Paternal Uncle     Cancer Maternal Grandmother     Cancer Paternal Grandmother     Cancer Paternal Grandfather        REVIEW OF SYSTEMS:     Laurie Logan denies fever/chills, chest pain, shortness of breath, new bowel or bladder complaints. All other review of systems was negative.     PHYSICAL EXAMINATION:      /72   Pulse 88   Temp 97.2 °F (36.2 °C) (Infrared)   Resp 16   Ht 5' 8\" (1.727 m)   Wt 295 lb (133.8 kg)   LMP  (LMP Unknown)   SpO2 98%   BMI 44.85 kg/m²     General:       General appearance:pleasant and well-hydrated, in no distress and A & O x3  Build:Obese  Function:Rises from a seated position easily.     HEENT:     Head:normocephalic, atraumatic  Pupils:regular, round, equal  Sclera: icterus absent     Lungs:     Breathing:normal breathing pattern     Abdomen:     Shape:non-distended  Tenderness:none  Guarding:none     Lumbar spine:     Spine inspection:normal   CVA tenderness:No   Palpation:tenderness paravertebral muscles, left, right positive - improved. Range of motion:abnormal mildly in lateral bending, flexion, extension rotation bilateral and is painful.     Musculoskeletal:     Trigger points in Paraveteral:absent bilaterally  SI joint tenderness:positive right, positive left              DIONNA test:negative right, negative             left  Piriformis tenderness:positive right, positive left  Trochanteric bursa tenderness:positive right, positive left  SLR:negative right, negative left, sitting      Extremities:     Tremors:None bilaterally upper and lower  Range of motion:pain with internal rotation of hips negative. Intact:Yes  Varicose veins:absent   Pulses:present Lt radial  Cyanosis:none  Edema:none x all 4 extremities     Neurological:     Sensory:normal to light touch BLE  Motor:                Right Quadriceps5/5          Left Quadriceps5/5           Right Gastrocnemius5/5    Left Gastrocnemius5/5  Right Ant Tibialis5/5  Left Ant Tibialis5/5     Reflexes:    Right Quadriceps reflex0-1+  Left Quadriceps reflex0-1+  Right Achilles reflex0-1+  Left Achilles reflex0-1+  Gait:normal station     Dermatology:     Skin:no rashes or lesions noted     Impression:     LBP and diffuse BLE pain  2021 lumbar MRI shows moderate right paracentral disc protrusion  Follows with neurology for congenital spastic quadriparesis with cognitive involvement     Plan:     Urine screen deferred  OARRS report reviewed              On gabapentin, baclofen, dantrium via neurology  T12-L1 TAMRA #1 with 70% relief, pt would like to repeat- r/b and procedure discussed  Failed remote PT   Patient encouraged to stay active and to lose weight  Treatment plan discussed with the patient including medication and procedure side effects     Cc:  Referring physician    AUTUMN Kaur.

## 2021-08-25 NOTE — PROGRESS NOTES
Do you currently have any of the following:    Fever: No  Headache:  No  Cough: No  Shortness of breath: No  Exposed to anyone with these symptoms: No         Jose Anca presents to the [facility] on 8/25/2021. Jerilyn Chase is complaining of pain lower back  The pain is constant. The pain is described as aching, throbbing, shooting and stabbing. Pain is rated on her best day at a 8, on her worst day at a 10, and on average at a 6 on the VAS scale. She took her last dose of Neurontin today    Any procedures since your last visit    Pacemaker or defibrillator: No managed by . She is not on NSAIDS and is not on anticoagulation medications to include none and is managed by    Medication Contract and Consent for Opioid Use Documents Filed      No documents found                /72   Pulse 88   Temp 97.2 °F (36.2 °C) (Infrared)   Resp 16   Ht 5' 8\" (1.727 m)   Wt 295 lb (133.8 kg)   LMP  (LMP Unknown)   SpO2 98%   BMI 44.85 kg/m²      No LMP recorded (lmp unknown). (Menstrual status: Irregular periods).

## 2021-09-07 ENCOUNTER — OFFICE VISIT (OUTPATIENT)
Dept: FAMILY MEDICINE CLINIC | Age: 31
End: 2021-09-07
Payer: COMMERCIAL

## 2021-09-07 VITALS
TEMPERATURE: 98.6 F | OXYGEN SATURATION: 95 % | HEIGHT: 68 IN | WEIGHT: 291 LBS | BODY MASS INDEX: 44.1 KG/M2 | SYSTOLIC BLOOD PRESSURE: 101 MMHG | DIASTOLIC BLOOD PRESSURE: 59 MMHG | HEART RATE: 89 BPM

## 2021-09-07 DIAGNOSIS — J45.20 MILD INTERMITTENT ASTHMA WITHOUT COMPLICATION: ICD-10-CM

## 2021-09-07 DIAGNOSIS — L70.9 ACNE, UNSPECIFIED ACNE TYPE: ICD-10-CM

## 2021-09-07 DIAGNOSIS — R30.0 DYSURIA: ICD-10-CM

## 2021-09-07 DIAGNOSIS — Z91.09 ENVIRONMENTAL ALLERGIES: ICD-10-CM

## 2021-09-07 DIAGNOSIS — L29.9 EAR ITCH: ICD-10-CM

## 2021-09-07 DIAGNOSIS — E55.9 VITAMIN D DEFICIENCY: ICD-10-CM

## 2021-09-07 DIAGNOSIS — G80.0 CONGENITAL SPASTIC QUADRIPARESIS (HCC): ICD-10-CM

## 2021-09-07 DIAGNOSIS — N30.00 ACUTE CYSTITIS WITHOUT HEMATURIA: ICD-10-CM

## 2021-09-07 DIAGNOSIS — M79.605 LEFT LEG PAIN: Primary | ICD-10-CM

## 2021-09-07 DIAGNOSIS — F32.A DEPRESSION, UNSPECIFIED DEPRESSION TYPE: ICD-10-CM

## 2021-09-07 LAB
BILIRUBIN, POC: NEGATIVE
BLOOD URINE, POC: NORMAL
CLARITY, POC: CLEAR
COLOR, POC: YELLOW
CONTROL: NORMAL
GLUCOSE URINE, POC: NEGATIVE
KETONES, POC: NEGATIVE
LEUKOCYTE EST, POC: NEGATIVE
NITRITE, POC: NEGATIVE
PH, POC: 5.5
PREGNANCY TEST URINE, POC: NORMAL
PROTEIN, POC: NEGATIVE
SPECIFIC GRAVITY, POC: 1.02
UROBILINOGEN, POC: 0.2

## 2021-09-07 PROCEDURE — G8427 DOCREV CUR MEDS BY ELIG CLIN: HCPCS | Performed by: STUDENT IN AN ORGANIZED HEALTH CARE EDUCATION/TRAINING PROGRAM

## 2021-09-07 PROCEDURE — 81025 URINE PREGNANCY TEST: CPT | Performed by: STUDENT IN AN ORGANIZED HEALTH CARE EDUCATION/TRAINING PROGRAM

## 2021-09-07 PROCEDURE — 81002 URINALYSIS NONAUTO W/O SCOPE: CPT | Performed by: STUDENT IN AN ORGANIZED HEALTH CARE EDUCATION/TRAINING PROGRAM

## 2021-09-07 PROCEDURE — 99213 OFFICE O/P EST LOW 20 MIN: CPT | Performed by: STUDENT IN AN ORGANIZED HEALTH CARE EDUCATION/TRAINING PROGRAM

## 2021-09-07 PROCEDURE — 4004F PT TOBACCO SCREEN RCVD TLK: CPT | Performed by: STUDENT IN AN ORGANIZED HEALTH CARE EDUCATION/TRAINING PROGRAM

## 2021-09-07 PROCEDURE — 99212 OFFICE O/P EST SF 10 MIN: CPT | Performed by: STUDENT IN AN ORGANIZED HEALTH CARE EDUCATION/TRAINING PROGRAM

## 2021-09-07 PROCEDURE — G8417 CALC BMI ABV UP PARAM F/U: HCPCS | Performed by: STUDENT IN AN ORGANIZED HEALTH CARE EDUCATION/TRAINING PROGRAM

## 2021-09-07 RX ORDER — ERGOCALCIFEROL 1.25 MG/1
50000 CAPSULE ORAL
Qty: 24 CAPSULE | Refills: 1 | Status: SHIPPED
Start: 2021-09-09 | End: 2022-04-11

## 2021-09-07 RX ORDER — DIPHENHYDRAMINE HCL 25 MG
25 CAPSULE ORAL NIGHTLY PRN
Qty: 30 CAPSULE | Refills: 3 | Status: SHIPPED | OUTPATIENT
Start: 2021-09-07 | End: 2021-09-17

## 2021-09-07 RX ORDER — SULFAMETHOXAZOLE AND TRIMETHOPRIM 800; 160 MG/1; MG/1
1 TABLET ORAL 2 TIMES DAILY
Qty: 14 TABLET | Refills: 0 | Status: SHIPPED | OUTPATIENT
Start: 2021-09-07 | End: 2021-09-14

## 2021-09-07 RX ORDER — ESCITALOPRAM OXALATE 20 MG/1
TABLET ORAL
Qty: 120 TABLET | Refills: 3 | Status: SHIPPED
Start: 2021-09-07 | End: 2021-10-19 | Stop reason: SDUPTHER

## 2021-09-07 RX ORDER — MOMETASONE FUROATE 1 MG/ML
SOLUTION TOPICAL
Qty: 1 EACH | Refills: 2 | Status: SHIPPED
Start: 2021-09-07 | End: 2022-01-21

## 2021-09-07 RX ORDER — BENZOYL PEROXIDE 2.5 G/100G
1 GEL TOPICAL 2 TIMES DAILY
Qty: 60 G | Refills: 1 | Status: SHIPPED
Start: 2021-09-07 | End: 2021-10-19 | Stop reason: SDUPTHER

## 2021-09-07 RX ORDER — DANTROLENE SODIUM 25 MG/1
CAPSULE ORAL
Qty: 360 CAPSULE | Refills: 3 | Status: SHIPPED
Start: 2021-09-07 | End: 2021-10-19 | Stop reason: SDUPTHER

## 2021-09-07 RX ORDER — BUDESONIDE AND FORMOTEROL FUMARATE DIHYDRATE 80; 4.5 UG/1; UG/1
AEROSOL RESPIRATORY (INHALATION)
Qty: 10.2 G | Refills: 1 | Status: SHIPPED
Start: 2021-09-07 | End: 2021-10-19 | Stop reason: SDUPTHER

## 2021-09-07 NOTE — PROGRESS NOTES
[facility]  FAMILY MEDICINE RESIDENCY PROGRAM  DATE OF VISIT : 2021    Patient : Staci John   Age : 32 y.o.  : 1990   MRN : [id number]   ______________________________________________________________________    Chief Complaint :   Chief Complaint   Patient presents with    Leg Pain     left shin, post fall    Dysuria    Medication Refill       HPI : Staci John is 32 y.o. female who presented to the clinic today for concern of L shin pain x 2 weeks, dysuria, and for medication refills. Patient fell 2 weeks ago and had immediate swelling of her L shin with pain. She denies fever and chills. It is still swollen, but bruising has mostly faded. Elevation does not help, ice does. No ibuprofen or tylenol. Patient reports burning with urination along with bilateral pain along mid back, flanks. Previously had pyelonephritis some months ago. Needs refill of symbicort. No SOB, uses rescue inhaler when cutting grass a couple times per week. Refill of dantrolene for muscle spasms related to congenital spastic quadriparesis, for which it does help. Refill lexapro for depression, mood stable. A few other refills needed today as well.     Past Medical History :  Past Medical History:   Diagnosis Date    ADHD     Arthritis     Asthma     controlled    Chronic midline low back pain without sciatica 2017    COPD (chronic obstructive pulmonary disease) ([facility])     Depression     Gastritis     GERD (gastroesophageal reflux disease)     IBS (irritable bowel syndrome)     Migraines     Seizures ([facility] [address]     last episode 2016    Thyroid disease      Past Surgical History:   Procedure Laterality Date    COLONOSCOPY      EYE SURGERY      probing of ductal system right eye     FRACTURE SURGERY      R ELBOW CRUSH INJURY W PLATE AND PINS PLACED    PAIN MANAGEMENT PROCEDURE N/A 2021    T12-L1 EPIDURAL STEROID INJECTION #1 performed by Jyoti Cash DO at [address] [facility] COLONOSCOPY W/BIOPSY SINGLE/MULTIPLE  10/29/2018    COLONOSCOPY WITH BIOPSY performed by Maile Bagley MD at [address] Y/O N/A 5/4/2018    TONSILLECTOMY performed by Jose Mercado MD at [address] N/A 10/29/2020    EGD BIOPSY performed by Misha Medeiros MD at [address] [place] [address] : Allergies   Allergen Reactions    Latex Rash    Aspirin-Acetaminophen-Caffeine Shortness Of Breath    Dye [Iodides] Shortness Of Breath    Penicillins Anaphylaxis    Topamax [Topiramate] Nausea And Vomiting, Other (See Comments) and Shortness Of Breath    Tylenol With Codeine #3 [Acetaminophen-Codeine] Shortness Of Breath    Lactose Other (See Comments)     Usually just causes stomach pain, diarrhea if pt consumes too much of it.  Blueberry Flavor      ALLERGIC TO BLUEBERRY    Fish-Derived Products      SEAFOOD, ESPECIALLY SHRIMP    Iodine      Seafood allergy    Lidocaine Diarrhea, Itching and Swelling    Nemesio Flavor Hives     [place] fruit    Vicodin [Hydrocodone-Acetaminophen]      Acts drunk         Medication List :    Current Outpatient Medications   Medication Sig Dispense Refill    budesonide-formoterol (SYMBICORT) 80-4.5 MCG/ACT AERO inhale 2 puffs by mouth daily 10.2 g 1    Benzoyl Peroxide 2.5 % GEL Apply 1 cm topically 2 times daily 60 g 1    mometasone (ELOCON) 0.1 % lotion apply 3 to 4 drop at bedtime for 10 days for itching 1 each 2    vitamin D (ERGOCALCIFEROL) 1.25 MG (78477 UT) CAPS capsule Take 1 capsule by mouth Twice a Week 24 capsule 1    dantrolene (DANTRIUM) 25 MG capsule Take 1 capsule by mouth 2 times daily AND 2 capsules nightly.  360 capsule 3    escitalopram (LEXAPRO) 20 MG tablet take 1 tablet by mouth once daily 120 tablet 3    diphenhydrAMINE (BENADRYL ALLERGY) 25 MG capsule Take 1 capsule by mouth nightly as needed for Itching or Allergies 30 capsule 3    sulfamethoxazole-trimethoprim (BACTRIM DS;SEPTRA DS) 800-160 MG per tablet Take 1 tablet by mouth 2 times daily for 7 days 14 tablet 0    montelukast (SINGULAIR) 10 MG tablet Take 1 tablet by mouth daily 30 tablet 3    loratadine (CLARITIN) 10 MG tablet Take 1 tablet by mouth daily 30 tablet 3    dicyclomine (BENTYL) 20 MG tablet Take 1 tablet by mouth 4 times daily 120 tablet 5    gabapentin (NEURONTIN) 300 MG capsule Take 2 capsules by mouth 3 times daily for 90 days. Intended supply: 90 days 540 capsule 0    meclizine (ANTIVERT) 25 MG tablet Take 1 tablet by mouth 3 times daily as needed for Dizziness 30 tablet 2    levothyroxine (SYNTHROID) 75 MCG tablet take 1 tablet by mouth once daily 90 tablet 1    divalproex (DEPAKOTE) 250 MG DR tablet take 1 tablet by mouth twice a day 180 tablet 0    albuterol sulfate  (90 Base) MCG/ACT inhaler inhale 2 puffs by mouth and INTO THE LUNGS every 6 hours if needed for wheezing 18 g 5    medroxyPROGESTERone (PROVERA) 10 MG tablet Take 1 tablet by mouth daily 30 tablet 11    baclofen (LIORESAL) 20 MG tablet Take 1 tablet by mouth every morning AND 1 tablet Daily with lunch AND 2 tablets nightly. 360 tablet 3    Saline GEL 1 spray by Nasal route daily as needed       No current facility-administered medications for this visit. Review of Systems :  Review of Systems   Constitutional: Negative for chills and fever. Respiratory: Negative for shortness of breath. Genitourinary: Positive for dysuria and flank pain. Musculoskeletal: Positive for back pain. Skin: Positive for color change (bruising). Left leg pain, swelling   Psychiatric/Behavioral: Positive for dysphoric mood (stable).      ______________________________________________________________________    Physical Exam :    Vitals: BP (!) 101/59 (Site: Left Upper Arm, Position: Sitting, Cuff Size: Large Adult)   Pulse 89   Temp 98.6 °F (37 °C) (Temporal)   Ht 5' 8\" (1.727 m)   Wt 291 lb (132 kg)   LMP  (LMP Unknown)   SpO2 95%   BMI 44.25 kg/m²   General Appearance: Well developed, awake, alert, oriented, and in NAD  HEENT: NCAT, MMM, no pallor or icterus. Neck: Symmetrical, trachea midline. Chest wall/Lung: CTAB, respirations unlabored. No ronchi/wheezing/rales   Heart: RRR, normal S1 and S2, no murmurs, rubs or gallops. Abdomen: SNTND; mild Mane's positive B/L, mild suprapubic tenderness  Extremities: L upper shin swelling and tenderness to palpation  Skin: Bruise on L LE near swelling on upper shin  Musculokeletal: ROM grossly normal in all joints of extremities, no obvious joint swelling. Neurologic: Alert&Oriented x3. No focal motor deficits detected. Psychiatric: Normal mood. Normal affect. Normal behavior. ______________________________________________________________________    Assessment & Plan :    1. Left leg pain  · Rule out occult fracture  - XR TIBIA FIBULA LEFT (2 VIEWS); Future    2. Dysuria  Acute cystitis without hematuria  - POCT Urinalysis no Micro  - POCT urine pregnancy  · Similar to previous episode, emperic treatment  - sulfamethoxazole-trimethoprim (BACTRIM DS;SEPTRA DS) 800-160 MG per tablet; Take 1 tablet by mouth 2 times daily for 7 days  Dispense: 14 tablet; Refill: 0    3. Mild intermittent asthma without complication  · Refill:  - budesonide-formoterol (SYMBICORT) 80-4.5 MCG/ACT AERO; inhale 2 puffs by mouth daily  Dispense: 10.2 g; Refill: 1    4. Vitamin D deficiency  · Refill:  - vitamin D (ERGOCALCIFEROL) 1.25 MG (71825 UT) CAPS capsule; Take 1 capsule by mouth Twice a Week  Dispense: 24 capsule; Refill: 1    5. Ear itch  · Refill:  - mometasone (ELOCON) 0.1 % lotion; apply 3 to 4 drop at bedtime for 10 days for itching  Dispense: 1 each; Refill: 2    6. Congenital spastic quadriparesis (HCC)  · Refill:  - dantrolene (DANTRIUM) 25 MG capsule; Take 1 capsule by mouth 2 times daily AND 2 capsules nightly. Dispense: 360 capsule; Refill: 3    7.  Acne, unspecified acne type  · Refill:  - Benzoyl Peroxide 2.5 % GEL; Apply 1 cm topically 2 times daily  Dispense: 60 g; Refill: 1    8. Depression, unspecified depression type  · Well controlled  · Refill:  - escitalopram (LEXAPRO) 20 MG tablet; take 1 tablet by mouth once daily  Dispense: 120 tablet; Refill: 3    9. Environmental allergies  · Refill:  - diphenhydrAMINE (BENADRYL ALLERGY) 25 MG capsule; Take 1 capsule by mouth nightly as needed for Itching or Allergies  Dispense: 30 capsule; Refill: 3      Additional plan and future considerations:       Return to Office: Return in about 4 weeks (around 10/5/2021).     Kory Giraldo DO   Case discussed with Dr. Fior Horton

## 2021-09-07 NOTE — PROGRESS NOTES
S: 32 y.o. female here with  1-med rf  2-dysuria with flank pain   3-fell 2 weeks ago and hit left shin and swelling has not gone down    O: VS: BP (!) 101/59 (Site: Left Upper Arm, Position: Sitting, Cuff Size: Large Adult)   Pulse 89   Temp 98.6 °F (37 °C) (Temporal)   Ht 5' 8\" (1.727 m)   Wt 291 lb (132 kg)   LMP  (LMP Unknown)   SpO2 95%   BMI 44.25 kg/m²    General: NAD   CV:  RRR, no gallops, rubs, or murmurs   Resp: CTAB no R/R/W   Abd:  Soft, mild suprapubic ttp, no masses    Ext:  no C/C/E   + b/l cva ttp   Left leg mild edema and ttp over the tibia  Impression/Plan:   1. Dysuria-ua/hcg, will treat with bactrim ds bid x 7 days, ucx? 2. Left shin pain-r/o occult fracture with xrays  3. Depression-stable, refill lexapro  rto in 1 month    Attending Physician Statement  I have discussed the case, including pertinent history and exam findings with the resident. I agree with the documented assessment and plan.         Giovanni Herzog MD

## 2021-09-09 ENCOUNTER — HOSPITAL ENCOUNTER (OUTPATIENT)
Dept: GENERAL RADIOLOGY | Age: 31
Discharge: HOME | End: 2021-09-11
Payer: COMMERCIAL

## 2021-09-09 ENCOUNTER — HOSPITAL ENCOUNTER (OUTPATIENT)
Age: 31
Discharge: HOME | End: 2021-09-11
Payer: COMMERCIAL

## 2021-09-09 DIAGNOSIS — M79.605 LEFT LEG PAIN: ICD-10-CM

## 2021-09-09 PROCEDURE — 73590 X-RAY EXAM OF LOWER LEG: CPT

## 2021-09-10 LAB — URINE CULTURE, ROUTINE: NORMAL

## 2021-09-12 ASSESSMENT — ENCOUNTER SYMPTOMS
SHORTNESS OF BREATH: 0
COLOR CHANGE: 1
BACK PAIN: 1

## 2021-09-15 ENCOUNTER — TELEPHONE (OUTPATIENT)
Dept: FAMILY MEDICINE CLINIC | Age: 31
End: 2021-09-15

## 2021-10-07 ENCOUNTER — CLINICAL DOCUMENTATION (OUTPATIENT)
Dept: NUTRITION | Age: 31
End: 2021-10-07

## 2021-10-07 NOTE — PROGRESS NOTES
Nutrition Follow Up Note    Date: 10/7/2021  Patient: Cyndi Ordoñez  Referring Clinician: Dr. Montilla Other  Patient's Last Session: 8/24/2021  Reason for Visit:   Obesity Class III     Current eating pattern:   Breakfast: Only eats breakfast on weekends - Egg whites w/ hash brown ila & turkey michaud w/ OJ, coffee w/ cream & sugar  Lunch: Grilled chicken salad - lettuce, chicken, cheese, black olives w/ avocado oil based dressing or sandwich - turkey with cheese & lettuce on onion bread w/ chips   Dinner: Baked chicken w/ broccoli & mashed potatoes w/ gravy   Snacks: Veggie crackers, chips, peanuts (unsalted), apples w/ PB   Water/d: 96oz   Pop/month: 1-2 cans per month   Fast food/month:1-2x (Ex: 10 pc chicken nuggets w/ medium mcfarlane & medium high-C)  Restaurant style food/month: 1x/month (Ex: Grilled chicken salad w/ ranch dressing & high-C)    Current exercise routine:   Yes- walks; 2x/wk, 20 minutes   Cuts grass w/ pushmower & weed wacks; 1x/wk - 45-60 min    Last visit weight: 292#  CBW: 287#   Height: 68\"    Handouts given:   Gentle nutrition  How to create balanced meals & snacks  Label reading     Notes:   Pt was a bit more forthcoming w/ info this session. However, I created/printed off education for her and put it in her folder and let her know a few times she's free to follow along for better comprehension but pt sat & listened instead. Updated plan:   Assess po intake & progress w/ goals     Motivation level over course of our last session: 10   Motivation level at the end of the session: 10     Smart Goals:   1. Increase fruit and vegetables to 2 servings of each daily   2.  Increase walking to 3 times a week for 20 minutes     Follow up plan:   November 18th at 2:00pm     Electronically signed by: Yelena Villarreal RD, [place] on 10/7/21 at 1:57 PM EDT

## 2021-10-07 NOTE — LETTER
[address]  [address] [facility], [facility] [address]  Phone: [phone]  Fax: [phone]    October 7, 2021    Dr. Damon Wise  Fax: [phone]    Patient: Marjan Prasad   YOB: 1990   Date of Visit: 10/7/2021       Dear Dr. Damon Wise,     Thank you for referring Neri Barnhart to me for nutrition counseling. Attached is my note. If you have questions, please do not hesitate to call me. I look forward to following this patient along with you. Sincerely,    Electronically signed by Suzy Alexander RD, LD on 10/7/21 at 2:24 PM EDT                                                            Nutrition Follow Up Note    Date: 10/7/2021  Patient: Marjan Prasad  Referring Clinician: Dr. Damon Wise  Patient's Last Session: 8/24/2021  Reason for Visit:   Obesity Class III     Current eating pattern:   Breakfast: Only eats breakfast on weekends - Egg whites w/ hash brown ila & turkey michaud w/ OJ, coffee w/ cream & sugar  Lunch: Grilled chicken salad - lettuce, chicken, cheese, black olives w/ avocado oil based dressing or sandwich - turkey with cheese & lettuce on onion bread w/ chips   Dinner: Baked chicken w/ broccoli & mashed potatoes w/ gravy   Snacks: Veggie crackers, chips, peanuts (unsalted), apples w/ PB   Water/d: 96oz   Pop/month: 1-2 cans per month   Fast food/month:1-2x (Ex: 10 pc chicken nuggets w/ medium mcfarlane & medium high-C)  Restaurant style food/month: 1x/month (Ex: Grilled chicken salad w/ ranch dressing & high-C)    Current exercise routine:   Yes- walks; 2x/wk, 20 minutes   Cuts grass w/ pushmower & weed wacks; 1x/wk - 45-60 min    Last visit weight: 292#  CBW: 287#   Height: 68\"    Handouts given:   Gentle nutrition  How to create balanced meals & snacks  Label reading     Notes:   Pt was a bit more forthcoming w/ info this session.  However, I created/printed off education for her and put it in her folder and let her know a few times she's free to follow along for better comprehension but pt sat & listened instead. Updated plan:   Assess po intake & progress w/ goals     Motivation level over course of our last session: 10   Motivation level at the end of the session: 10     Smart Goals:   1. Increase fruit and vegetables to 2 servings of each daily   2.  Increase walking to 3 times a week for 20 minutes     Follow up plan:   November 18th at 2:00pm     Electronically signed by: Kristian Phillips RD, [place] on 10/7/21 at 1:57 PM EDT

## 2021-10-19 ENCOUNTER — OFFICE VISIT (OUTPATIENT)
Dept: FAMILY MEDICINE CLINIC | Age: 31
End: 2021-10-19
Payer: COMMERCIAL

## 2021-10-19 VITALS
BODY MASS INDEX: 43.8 KG/M2 | RESPIRATION RATE: 18 BRPM | HEIGHT: 68 IN | DIASTOLIC BLOOD PRESSURE: 60 MMHG | SYSTOLIC BLOOD PRESSURE: 116 MMHG | TEMPERATURE: 98.5 F | HEART RATE: 80 BPM | OXYGEN SATURATION: 97 % | WEIGHT: 289 LBS

## 2021-10-19 DIAGNOSIS — G89.29 CHRONIC NONINTRACTABLE HEADACHE, UNSPECIFIED HEADACHE TYPE: ICD-10-CM

## 2021-10-19 DIAGNOSIS — R42 DIZZINESS: ICD-10-CM

## 2021-10-19 DIAGNOSIS — M54.50 ACUTE BILATERAL LOW BACK PAIN WITHOUT SCIATICA: ICD-10-CM

## 2021-10-19 DIAGNOSIS — R12 HEART BURN: ICD-10-CM

## 2021-10-19 DIAGNOSIS — Z23 ENCOUNTER FOR VACCINATION: ICD-10-CM

## 2021-10-19 DIAGNOSIS — F32.A DEPRESSION, UNSPECIFIED DEPRESSION TYPE: ICD-10-CM

## 2021-10-19 DIAGNOSIS — E03.9 HYPOTHYROIDISM, UNSPECIFIED TYPE: ICD-10-CM

## 2021-10-19 DIAGNOSIS — Z91.09 ENVIRONMENTAL ALLERGIES: ICD-10-CM

## 2021-10-19 DIAGNOSIS — R51.9 CHRONIC NONINTRACTABLE HEADACHE, UNSPECIFIED HEADACHE TYPE: ICD-10-CM

## 2021-10-19 DIAGNOSIS — J45.20 MILD INTERMITTENT ASTHMA WITHOUT COMPLICATION: ICD-10-CM

## 2021-10-19 DIAGNOSIS — R07.9 CHEST PAIN, UNSPECIFIED TYPE: Primary | ICD-10-CM

## 2021-10-19 DIAGNOSIS — G80.0 CONGENITAL SPASTIC QUADRIPARESIS (HCC): ICD-10-CM

## 2021-10-19 DIAGNOSIS — K58.9 IRRITABLE BOWEL SYNDROME, UNSPECIFIED TYPE: ICD-10-CM

## 2021-10-19 DIAGNOSIS — G56.00 CARPAL TUNNEL SYNDROME, UNSPECIFIED LATERALITY: ICD-10-CM

## 2021-10-19 DIAGNOSIS — L70.9 ACNE, UNSPECIFIED ACNE TYPE: ICD-10-CM

## 2021-10-19 PROCEDURE — G0008 ADMIN INFLUENZA VIRUS VAC: HCPCS

## 2021-10-19 PROCEDURE — G8417 CALC BMI ABV UP PARAM F/U: HCPCS | Performed by: STUDENT IN AN ORGANIZED HEALTH CARE EDUCATION/TRAINING PROGRAM

## 2021-10-19 PROCEDURE — 93005 ELECTROCARDIOGRAM TRACING: CPT | Performed by: STUDENT IN AN ORGANIZED HEALTH CARE EDUCATION/TRAINING PROGRAM

## 2021-10-19 PROCEDURE — 6360000002 HC RX W HCPCS

## 2021-10-19 PROCEDURE — G8482 FLU IMMUNIZE ORDER/ADMIN: HCPCS | Performed by: STUDENT IN AN ORGANIZED HEALTH CARE EDUCATION/TRAINING PROGRAM

## 2021-10-19 PROCEDURE — 4004F PT TOBACCO SCREEN RCVD TLK: CPT | Performed by: STUDENT IN AN ORGANIZED HEALTH CARE EDUCATION/TRAINING PROGRAM

## 2021-10-19 PROCEDURE — 90686 IIV4 VACC NO PRSV 0.5 ML IM: CPT

## 2021-10-19 PROCEDURE — 93010 ELECTROCARDIOGRAM REPORT: CPT | Performed by: STUDENT IN AN ORGANIZED HEALTH CARE EDUCATION/TRAINING PROGRAM

## 2021-10-19 PROCEDURE — 99213 OFFICE O/P EST LOW 20 MIN: CPT | Performed by: STUDENT IN AN ORGANIZED HEALTH CARE EDUCATION/TRAINING PROGRAM

## 2021-10-19 PROCEDURE — 99212 OFFICE O/P EST SF 10 MIN: CPT | Performed by: STUDENT IN AN ORGANIZED HEALTH CARE EDUCATION/TRAINING PROGRAM

## 2021-10-19 PROCEDURE — G8427 DOCREV CUR MEDS BY ELIG CLIN: HCPCS | Performed by: STUDENT IN AN ORGANIZED HEALTH CARE EDUCATION/TRAINING PROGRAM

## 2021-10-19 RX ORDER — ESCITALOPRAM OXALATE 20 MG/1
TABLET ORAL
Qty: 120 TABLET | Refills: 3 | Status: SHIPPED | OUTPATIENT
Start: 2021-10-19

## 2021-10-19 RX ORDER — PANTOPRAZOLE SODIUM 40 MG/1
40 TABLET, DELAYED RELEASE ORAL
Qty: 90 TABLET | Refills: 1 | Status: SHIPPED
Start: 2021-10-19 | End: 2022-01-21

## 2021-10-19 RX ORDER — DIVALPROEX SODIUM 250 MG/1
TABLET, DELAYED RELEASE ORAL
Qty: 180 TABLET | Refills: 0 | Status: SHIPPED | OUTPATIENT
Start: 2021-10-19

## 2021-10-19 RX ORDER — LEVOTHYROXINE SODIUM 0.07 MG/1
TABLET ORAL
Qty: 90 TABLET | Refills: 1 | Status: SHIPPED
Start: 2021-10-19 | End: 2022-02-08 | Stop reason: SDUPTHER

## 2021-10-19 RX ORDER — BUDESONIDE AND FORMOTEROL FUMARATE DIHYDRATE 80; 4.5 UG/1; UG/1
AEROSOL RESPIRATORY (INHALATION)
Qty: 10.2 G | Refills: 1 | Status: SHIPPED
Start: 2021-10-19 | End: 2022-02-08 | Stop reason: SDUPTHER

## 2021-10-19 RX ORDER — BENZOYL PEROXIDE 2.5 G/100G
1 GEL TOPICAL 2 TIMES DAILY
Qty: 60 G | Refills: 1 | Status: SHIPPED | OUTPATIENT
Start: 2021-10-19

## 2021-10-19 RX ORDER — GABAPENTIN 300 MG/1
600 CAPSULE ORAL 3 TIMES DAILY
Qty: 540 CAPSULE | Refills: 0 | Status: SHIPPED
Start: 2021-10-19 | End: 2021-12-14

## 2021-10-19 RX ORDER — DIPHENHYDRAMINE HCL 25 MG
25 CAPSULE ORAL EVERY 6 HOURS PRN
COMMUNITY
End: 2022-01-21

## 2021-10-19 RX ORDER — BACLOFEN 20 MG/1
TABLET ORAL
Qty: 360 TABLET | Refills: 3 | Status: SHIPPED
Start: 2021-10-19 | End: 2022-02-08 | Stop reason: SDUPTHER

## 2021-10-19 RX ORDER — DANTROLENE SODIUM 25 MG/1
CAPSULE ORAL
Qty: 360 CAPSULE | Refills: 3 | Status: SHIPPED
Start: 2021-10-19 | End: 2022-04-13 | Stop reason: SDUPTHER

## 2021-10-19 RX ORDER — ALBUTEROL SULFATE 90 UG/1
AEROSOL, METERED RESPIRATORY (INHALATION)
Qty: 18 G | Refills: 5 | Status: SHIPPED
Start: 2021-10-19 | End: 2022-02-15 | Stop reason: SDUPTHER

## 2021-10-19 RX ORDER — LORATADINE 10 MG/1
10 TABLET ORAL DAILY
Qty: 30 TABLET | Refills: 3 | Status: SHIPPED
Start: 2021-10-19 | End: 2022-02-15 | Stop reason: SDUPTHER

## 2021-10-19 RX ORDER — MECLIZINE HYDROCHLORIDE 25 MG/1
25 TABLET ORAL 3 TIMES DAILY PRN
Qty: 30 TABLET | Refills: 2 | Status: SHIPPED
Start: 2021-10-19 | End: 2021-12-14

## 2021-10-19 RX ORDER — DICYCLOMINE HCL 20 MG
40 TABLET ORAL 4 TIMES DAILY
Qty: 120 TABLET | Refills: 3 | Status: SHIPPED
Start: 2021-10-19 | End: 2022-04-11

## 2021-10-19 RX ORDER — MONTELUKAST SODIUM 10 MG/1
10 TABLET ORAL DAILY
Qty: 30 TABLET | Refills: 3 | Status: SHIPPED
Start: 2021-10-19 | End: 2022-02-08 | Stop reason: SDUPTHER

## 2021-10-19 ASSESSMENT — LIFESTYLE VARIABLES: HOW OFTEN DO YOU HAVE A DRINK CONTAINING ALCOHOL: NEVER

## 2021-10-19 NOTE — PROGRESS NOTES
[facility]  FAMILY MEDICINE RESIDENCY PROGRAM  DATE OF VISIT : 10/19/2021    Patient : Alison Dudley   Age : 32 y.o.  : 1990   MRN : [id number]   ______________________________________________________________________    Chief Complaint :   Chief Complaint   Patient presents with    Check-Up     back pain       HPI : Alison Dudley is 32 y.o. female who presented to the clinic today for concern of back pain x 2 weeks. She denies any inciting event. Describes it as \"really bad pain\" that is sometimes throbbing and aching, and it is worse with prolonged standing and movement, better with rest. Does shoot down both legs, but denies neuropathy. Tylenol, ibuprofen, and Icyhot did not help. Patient also reports chest pain for the past week, and states that it is really bad throbbing pain and sometimes stabbing. It is located along the right parasternal chest and sometimes goes into the R breast. It is constant and does not change with movement. She denies SOB, sweating, and vomiting. She does report [place] all the time for years. \" She reports she often feels off balance, but no dizziness. She reports she eats without difficulty but it does make the nausea feel worse. She also reports severe migraines lately for a couple of months, described as a \"really bad pressure\" located on top of her head. Throbbing. No aura prior to migraine. Eyes do get sensitive to lights. She states the depakote helps, but that she only takes it as prescribed, which is one tablet BID. It lasts throughout the day and she gets it every day. Needs some medication refills today. Okay for flu shot.     Past Medical History :  Past Medical History:   Diagnosis Date    ADHD     Arthritis     Asthma     controlled    Chronic midline low back pain without sciatica 2017    COPD (chronic obstructive pulmonary disease) (HCC)     Depression     Gastritis     GERD (gastroesophageal reflux disease)     IBS (irritable bowel syndrome)     Migraines     Seizures ([facility] [address]     last episode 2016    Thyroid disease      Past Surgical History:   Procedure Laterality Date    COLONOSCOPY      EYE SURGERY      probing of ductal system right eye     FRACTURE SURGERY      R ELBOW CRUSH INJURY W PLATE AND PINS PLACED    PAIN MANAGEMENT PROCEDURE N/A 8/17/2021    T12-L1 EPIDURAL STEROID INJECTION #1 performed by Mayuri Mac DO at [facility] W/BIOPSY SINGLE/MULTIPLE  10/29/2018    COLONOSCOPY WITH BIOPSY performed by Clem Kapadia MD at [address] [place] [address] Y/O N/A 5/4/2018    TONSILLECTOMY performed by Orlando Patricia MD at [facility] N/A 10/29/2020    EGD BIOPSY performed by Shannon Faulkner MD at [address] [place] [address] : Allergies   Allergen Reactions    Latex Rash    Aspirin-Acetaminophen-Caffeine Shortness Of Breath    Dye [Iodides] Shortness Of Breath    Penicillins Anaphylaxis    Topamax [Topiramate] Nausea And Vomiting, Other (See Comments) and Shortness Of Breath    Tylenol With Codeine #3 [Acetaminophen-Codeine] Shortness Of Breath    Lactose Other (See Comments)     Usually just causes stomach pain, diarrhea if pt consumes too much of it.      Blueberry Flavor      ALLERGIC TO BLUEBERRY    Fish-Derived Products      SEAFOOD, ESPECIALLY SHRIMP    Iodine      Seafood allergy    Lidocaine Diarrhea, Itching and Swelling    Nemesio Flavor Hives     Nem[place]io fruit    Vicodin [Hydrocodone-Acetaminophen]      Acts drunk         Medication List :    Current Outpatient Medications   Medication Sig Dispense Refill    diphenhydrAMINE (BENADRYL) 25 MG capsule Take 25 mg by mouth every 6 hours as needed for Itching      budesonide-formoterol (SYMBICORT) 80-4.5 MCG/ACT AERO inhale 2 puffs by mouth daily 10.2 g 1    Benzoyl Peroxide 2.5 % GEL Apply 1 cm topically 2 times daily 60 g 1    dantrolene (DANTRIUM) 25 MG capsule Take 1 capsule by mouth 2 times daily AND 2 capsules nightly. 360 capsule 3    escitalopram (LEXAPRO) 20 MG tablet take 1 tablet by mouth once daily 120 tablet 3    montelukast (SINGULAIR) 10 MG tablet Take 1 tablet by mouth daily 30 tablet 3    loratadine (CLARITIN) 10 MG tablet Take 1 tablet by mouth daily 30 tablet 3    gabapentin (NEURONTIN) 300 MG capsule Take 2 capsules by mouth 3 times daily for 90 days. Intended supply: 90 days 540 capsule 0    meclizine (ANTIVERT) 25 MG tablet Take 1 tablet by mouth 3 times daily as needed for Dizziness 30 tablet 2    levothyroxine (SYNTHROID) 75 MCG tablet take 1 tablet by mouth once daily 90 tablet 1    dicyclomine (BENTYL) 20 MG tablet Take 2 tablets by mouth 4 times daily 120 tablet 3    divalproex (DEPAKOTE) 250 MG DR tablet take 1 tablet by mouth twice a day 180 tablet 0    albuterol sulfate  (90 Base) MCG/ACT inhaler inhale 2 puffs by mouth and INTO THE LUNGS every 6 hours if needed for wheezing 18 g 5    baclofen (LIORESAL) 20 MG tablet Take 1 tablet by mouth every morning AND 1 tablet Daily with lunch AND 2 tablets nightly.  360 tablet 3    diclofenac sodium (VOLTAREN) 1 % GEL Apply 2 g topically 4 times daily 50 g 0    pantoprazole (PROTONIX) 40 MG tablet Take 1 tablet by mouth every morning (before breakfast) 90 tablet 1    mometasone (ELOCON) 0.1 % lotion apply 3 to 4 drop at bedtime for 10 days for itching 1 each 2    vitamin D (ERGOCALCIFEROL) 1.25 MG (93560 UT) CAPS capsule Take 1 capsule by mouth Twice a Week 24 capsule 1    medroxyPROGESTERone (PROVERA) 10 MG tablet Take 1 tablet by mouth daily 30 tablet 11    Saline GEL 1 spray by Nasal route daily as needed       No current facility-administered medications for this visit.        ______________________________________________________________________    Physical Exam :    Vitals: /60 (Site: Right Upper Arm, Position: Sitting, Cuff Size: Large Adult)   Pulse 80   Temp 98.5 °F (36.9 °C) (Temporal)   Resp 18   Ht 5' 8\" (1.727 m)   Wt 289 lb (131.1 kg)   SpO2 97%   BMI 43.94 kg/m²   General Appearance: Awake, alert, oriented, and in NAD  HEENT: NCAT, no pallor or icterus. Neck: Symmetrical, trachea midline. Chest wall/Lung: CTAB, respirations unlabored. When pressing on chest wall along R parasternal area, chest pain described is reproduced. Heart: RRR , normal S1 and S2, no murmurs, rubs or gallops  Abdomen: SNTND  Musculokeletal: Tenderness to palpation along lower thoracic paraspinal muscles; negative straight leg test B/L  Neurologic:  No focal motor deficits detected   Psychiatric: Normal mood. Normal affect. Normal behavior  ______________________________________________________________________    Assessment & Plan :    1. Chest pain, unspecified type  · EKG NSR, more likely costochondritis vs heartburn, continue to monitor; PPI as below  - EKG 12 lead; Future  - EKG 12 lead    2. Acute bilateral low back pain without sciatica  · Trial of diclofenac gel  · PT offered, patient declines  - diclofenac sodium (VOLTAREN) 1 % GEL; Apply 2 g topically 4 times daily  Dispense: 50 g; Refill: 0    3. Chronic nonintractable headache, unspecified headache type  · Patient instructed to call her neurologist    4. Dizziness  · Refill:  - meclizine (ANTIVERT) 25 MG tablet; Take 1 tablet by mouth 3 times daily as needed for Dizziness  Dispense: 30 tablet; Refill: 2    5. Mild intermittent asthma without complication  · Refill:  - budesonide-formoterol (SYMBICORT) 80-4.5 MCG/ACT AERO; inhale 2 puffs by mouth daily  Dispense: 10.2 g; Refill: 1  - montelukast (SINGULAIR) 10 MG tablet; Take 1 tablet by mouth daily  Dispense: 30 tablet; Refill: 3  - albuterol sulfate  (90 Base) MCG/ACT inhaler; inhale 2 puffs by mouth and INTO THE LUNGS every 6 hours if needed for wheezing  Dispense: 18 g; Refill: 5    6. Acne, unspecified acne type  · Refill:  - Benzoyl Peroxide 2.5 % GEL;  Apply 1 cm topically 2 times daily  Dispense: 60 g; Refill: 1    7. Congenital spastic quadriparesis (HCC)  · Refill:  - dantrolene (DANTRIUM) 25 MG capsule; Take 1 capsule by mouth 2 times daily AND 2 capsules nightly. Dispense: 360 capsule; Refill: 3  - baclofen (LIORESAL) 20 MG tablet; Take 1 tablet by mouth every morning AND 1 tablet Daily with lunch AND 2 tablets nightly. Dispense: 360 tablet; Refill: 3    8. Depression, unspecified depression type  · Refill:  - escitalopram (LEXAPRO) 20 MG tablet; take 1 tablet by mouth once daily  Dispense: 120 tablet; Refill: 3  - divalproex (DEPAKOTE) 250 MG DR tablet; take 1 tablet by mouth twice a day  Dispense: 180 tablet; Refill: 0    9. Environmental allergies  · Refill:  - loratadine (CLARITIN) 10 MG tablet; Take 1 tablet by mouth daily  Dispense: 30 tablet; Refill: 3    10. Carpal tunnel syndrome, unspecified laterality  · Refill:  - gabapentin (NEURONTIN) 300 MG capsule; Take 2 capsules by mouth 3 times daily for 90 days. Intended supply: 90 days  Dispense: 540 capsule; Refill: 0    11. Hypothyroidism, unspecified type  · Refill:  - levothyroxine (SYNTHROID) 75 MCG tablet; take 1 tablet by mouth once daily  Dispense: 90 tablet; Refill: 1    12. Irritable bowel syndrome, unspecified type  · Refill:  - dicyclomine (BENTYL) 20 MG tablet; Take 2 tablets by mouth 4 times daily  Dispense: 120 tablet; Refill: 3    13. Heart burn  · Trial of protonix for possible heartburn causing chest pain  - pantoprazole (PROTONIX) 40 MG tablet; Take 1 tablet by mouth every morning (before breakfast)  Dispense: 90 tablet; Refill: 1    14. Encounter for vaccination  - John Jernigan, 3 YRS AND OLDER, IM PF, PREFILL SYR OR SDV, 0.5ML (FRANCISCA French)      Additional plan and future considerations:       Return to Office: Return in about 4 weeks (around 11/16/2021) for low back pain.     Rashel Gomez, DO   Case discussed with Dr. Jay Garcia

## 2021-10-19 NOTE — PROGRESS NOTES
Attending Physician Statement    S:   Chief Complaint   Patient presents with    Check-Up     back pain      Chest pain - 1 week, constant, right parasternal, not associated with meals or activity. Back pain - 2 weeks for bilateral pain, no JOSE. \"tightness radiates to bothe LEs\". Patient denies fevers, chills, point tenderness over the spine, saddle anesthesia, loss of bowel or bladder function, or decreased muscle strength/sensation in the lower extremities. Headaches - follows with Neuro, symptoms worsening  O: Blood pressure 116/60, pulse 80, temperature 98.5 °F (36.9 °C), temperature source Temporal, resp. rate 18, height 5' 8\" (1.727 m), weight 289 lb (131.1 kg), SpO2 97 %, not currently breastfeeding. Exam:   Heart - RRR, chest wall TTP   Lungs - clear   ABD- SNTND, +BSx4   MSK - negative SLR bilaterally, mild paraspinal TTP   Neuro  A: As above  P:  Chest pain - GERD vs Costochondritis, EKG, PPI trial, follow up in 1 month   Headaches - follow up with Neuro   Back pain - PT, diclofenac gel   Follow-up as ordered    I have discussed the case, including pertinent history and exam findings with the resident. I agree with the documented assessment and plan.

## 2021-10-19 NOTE — PROGRESS NOTES
Vaccine Information Sheet, \"Influenza - Inactivated\"  given to Megan Mahoney, or parent/legal guardian of  Megan Mahoney and verbalized understanding. Patient responses:    Have you ever had a reaction to a flu vaccine? No  Do you have any current illness? No  Have you ever had Guillian Branch Syndrome? No  Do you have a serious allergy to any of the follow: Neomycin, Polymyxin, Thimerosal, eggs or egg products? No    Flu vaccine given per order. Please see immunization tab. Risks and benefits explained. Current VIS given.       Immunizations Administered     Name Date Dose Route    Influenza, Quadv, IM, PF (6 mo and older Fluzone, Flulaval, Fluarix, and 3 yrs and older Afluria) 10/19/2021 0.5 mL Intramuscular    Site: Deltoid- Left    Lot: [id number]    NDC: 75875-229-17

## 2021-11-08 DIAGNOSIS — L23.7 ALLERGIC CONTACT DERMATITIS DUE TO PLANTS, EXCEPT FOOD: ICD-10-CM

## 2021-11-08 RX ORDER — PREDNISONE 10 MG/1
TABLET ORAL
Qty: 30 TABLET | Refills: 0 | Status: SHIPPED
Start: 2021-11-08 | End: 2021-12-20 | Stop reason: ALTCHOICE

## 2021-11-18 ENCOUNTER — CLINICAL DOCUMENTATION (OUTPATIENT)
Dept: NUTRITION | Age: 31
End: 2021-11-18

## 2021-12-08 ENCOUNTER — OFFICE VISIT (OUTPATIENT)
Dept: PAIN MANAGEMENT | Age: 31
End: 2021-12-08
Payer: COMMERCIAL

## 2021-12-08 ENCOUNTER — PREP FOR PROCEDURE (OUTPATIENT)
Dept: PAIN MANAGEMENT | Age: 31
End: 2021-12-08

## 2021-12-08 VITALS
HEIGHT: 68 IN | OXYGEN SATURATION: 94 % | HEART RATE: 105 BPM | TEMPERATURE: 98 F | SYSTOLIC BLOOD PRESSURE: 118 MMHG | WEIGHT: 289 LBS | BODY MASS INDEX: 43.8 KG/M2 | DIASTOLIC BLOOD PRESSURE: 72 MMHG | RESPIRATION RATE: 16 BRPM

## 2021-12-08 DIAGNOSIS — M51.36 DDD (DEGENERATIVE DISC DISEASE), LUMBAR: ICD-10-CM

## 2021-12-08 DIAGNOSIS — G89.4 CHRONIC PAIN SYNDROME: ICD-10-CM

## 2021-12-08 DIAGNOSIS — M54.16 LUMBAR RADICULOPATHY: Primary | ICD-10-CM

## 2021-12-08 DIAGNOSIS — M54.42 CHRONIC BILATERAL LOW BACK PAIN WITH BILATERAL SCIATICA: ICD-10-CM

## 2021-12-08 DIAGNOSIS — M54.41 CHRONIC BILATERAL LOW BACK PAIN WITH BILATERAL SCIATICA: ICD-10-CM

## 2021-12-08 DIAGNOSIS — G89.29 CHRONIC BILATERAL LOW BACK PAIN WITH BILATERAL SCIATICA: ICD-10-CM

## 2021-12-08 PROCEDURE — 99213 OFFICE O/P EST LOW 20 MIN: CPT | Performed by: PHYSICIAN ASSISTANT

## 2021-12-08 PROCEDURE — G8427 DOCREV CUR MEDS BY ELIG CLIN: HCPCS | Performed by: PHYSICIAN ASSISTANT

## 2021-12-08 PROCEDURE — 4004F PT TOBACCO SCREEN RCVD TLK: CPT | Performed by: PHYSICIAN ASSISTANT

## 2021-12-08 PROCEDURE — G8417 CALC BMI ABV UP PARAM F/U: HCPCS | Performed by: PHYSICIAN ASSISTANT

## 2021-12-08 PROCEDURE — G8482 FLU IMMUNIZE ORDER/ADMIN: HCPCS | Performed by: PHYSICIAN ASSISTANT

## 2021-12-08 NOTE — PROGRESS NOTES
[facility] Pain Management  [facility] [address] [place] [address]    Follow up Note      Megan Mahoney     Date of Visit:  2021    CC:  Patient presents for follow up   Chief Complaint   Patient presents with    Follow-up    Lower Back Pain       HPI:    Pain is somewhat worse. Injection is gradually wearing off. Now at 20% relief. Reports similar pain to prior. Appropriate analgesia with current medications regimen: Not applicable. Change in quality of symptoms:no. Medication side effects:none. Recent diagnostic testing:none. Recent interventional procedures:none. She has not been on anticoagulation medications to include ASA, NSAIDS, Plavix, heparin, LMW heparin and warfarin and has not been on herbal supplements.  She is not diabetic.     Imagin lumbar MRI -  EXAMINATION:   MRI OF THE LUMBAR SPINE WITHOUT CONTRAST, 2021 11:46 am       TECHNIQUE:   Multiplanar multisequence MRI of the lumbar spine was performed without the   administration of intravenous contrast.       COMPARISON:   None       HISTORY:   ORDERING SYSTEM PROVIDED HISTORY: L-S radiculopathy   TECHNOLOGIST PROVIDED HISTORY:   Reason for exam:->LS radiculopathy; L low back pain   What reading provider will be dictating this exam?->CRC       FINDINGS:   BONES/ALIGNMENT: There is normal alignment of the spine. The vertebral body   heights are maintained.  The bone marrow signal appears unremarkable.  No   acute fracture is appreciated.       SPINAL CORD: The conus terminates normally.       SOFT TISSUES: No paraspinal mass identified.       Moderate-sized right paracentral disc protrusion is identified at T12-L1.       L1-L2: Small right paracentral disc protrusion is appreciated resulting in   minimal right lateral recess stenosis.       L2-L3: There is no significant disc herniation, spinal canal stenosis or   neural foraminal narrowing.       L3-L4: Mild left foraminal stenosis identified due to encroachment by shallow   disc bulge and facet disease.       L4-L5: Moderate left foraminal stenosis and mild right foraminal stenosis are   identified due to encroachment by disc bulge and facet disease.       L5-S1: Mild right foraminal stenosis is identified secondary to mild facet   arthropathy.           Impression   Moderate left foraminal stenosis and mild right foraminal stenosis at L4-L5.       Mild right foraminal stenosis at L5-S1.       Mild left foraminal stenosis at L3-L4.       Small right paracentral disc protrusion at L1-L2.       Moderate-sized right paracentral disc protrusion at T12-L1.                                          Potential Aberrant Drug-Related Behavior:  None.     Urine Drug Screening:  No opioids initiated.      OARRS report:  12/2021 Consistent     Opioid Agreement:  Date enacted: N/A  Renewal date:    Past Medical History:   Diagnosis Date    ADHD     Arthritis     Asthma     controlled    Chronic midline low back pain without sciatica 11/13/2017    COPD (chronic obstructive pulmonary disease) (HCC)     Depression     Gastritis     GERD (gastroesophageal reflux disease)     IBS (irritable bowel syndrome)     Migraines     Seizures ([address]     last episode 2016    Thyroid disease        Past Surgical History:   Procedure Laterality Date    COLONOSCOPY      EYE SURGERY      probing of ductal system right eye     FRACTURE SURGERY      R ELBOW CRUSH INJURY W PLATE AND PINS PLACED    PAIN MANAGEMENT PROCEDURE N/A 8/17/2021    T12-L1 EPIDURAL STEROID INJECTION #1 performed by [place] Guzm[place] DO at [facility] W/BIOPSY SINGLE/MULTIPLE  10/29/2018    COLONOSCOPY WITH BIOPSY performed by Omar Lofton MD at [address] [place] [address] TONSILS,12+ Y/O N/A 5/4/2018    TONSILLECTOMY performed by Jenn Barajas MD at [address] [place] [address] N/A 10/29/2020    EGD BIOPSY performed by Eduardo Baxter MD at [place] [address]       Prior to Admission medications    Medication Sig Start Date End Date Taking? Authorizing Provider   diphenhydrAMINE (BENADRYL) 25 MG capsule Take 25 mg by mouth every 6 hours as needed for Itching   Yes Historical Provider, MD   budesonide-formoterol (SYMBICORT) 80-4.5 MCG/ACT AERO inhale 2 puffs by mouth daily 10/19/21  Yes Geneva A Lissy, DO   Benzoyl Peroxide 2.5 % GEL Apply 1 cm topically 2 times daily 10/19/21  Yes Geneva A Rech, DO   dantrolene (DANTRIUM) 25 MG capsule Take 1 capsule by mouth 2 times daily AND 2 capsules nightly. 10/19/21  Yes Geneva A Rech, DO   escitalopram (LEXAPRO) 20 MG tablet take 1 tablet by mouth once daily 10/19/21  Yes Geneva A Lissy, DO   montelukast (SINGULAIR) 10 MG tablet Take 1 tablet by mouth daily 10/19/21  Yes Geneva A Rech, DO   loratadine (CLARITIN) 10 MG tablet Take 1 tablet by mouth daily 10/19/21  Yes Geneva A Lissy, DO   gabapentin (NEURONTIN) 300 MG capsule Take 2 capsules by mouth 3 times daily for 90 days. Intended supply: 90 days 10/19/21 1/17/22 Yes Geneva A Lissy, DO   meclizine (ANTIVERT) 25 MG tablet Take 1 tablet by mouth 3 times daily as needed for Dizziness 10/19/21  Yes Rashel Gomez, DO   levothyroxine (SYNTHROID) 75 MCG tablet take 1 tablet by mouth once daily 10/19/21  Yes Geneva A Rech, DO   dicyclomine (BENTYL) 20 MG tablet Take 2 tablets by mouth 4 times daily 10/19/21  Yes Geneva A Rech, DO   divalproex (DEPAKOTE) 250 MG DR tablet take 1 tablet by mouth twice a day 10/19/21  Yes Geneva A Rech, DO   albuterol sulfate  (90 Base) MCG/ACT inhaler inhale 2 puffs by mouth and INTO THE LUNGS every 6 hours if needed for wheezing 10/19/21  Yes Geneva A Rech, DO   baclofen (LIORESAL) 20 MG tablet Take 1 tablet by mouth every morning AND 1 tablet Daily with lunch AND 2 tablets nightly.  10/19/21  Yes Geneva A Lissy, DO   diclofenac sodium (VOLTAREN) 1 % GEL Apply 2 g topically 4 times daily 10/19/21  Yes Geneva Yuan,    pantoprazole (PROTONIX) 40 MG tablet Take 1 tablet by mouth every morning (before breakfast) 10/19/21  Yes Geneva A Rech, DO   mometasone (ELOCON) 0.1 % lotion apply 3 to 4 drop at bedtime for 10 days for itching 9/7/21  Yes Geneva A Rech, DO   vitamin D (ERGOCALCIFEROL) 1.25 MG (82228 UT) CAPS capsule Take 1 capsule by mouth Twice a Week 9/9/21  Yes Geneva A Rech, DO   medroxyPROGESTERone (PROVERA) 10 MG tablet Take 1 tablet by mouth daily 7/22/21  Yes Denita Sanabria MD   Saline GEL 1 spray by Nasal route daily as needed   Yes Historical Provider, MD   predniSONE (DELTASONE) 10 MG tablet TAKE 4 TABS BY MOUTH ONCE DAILY FOR 3 DAYS, THEN 3 TABS FOR 3 DAYS, THEN 2 TABS FOR 3 DAYS, THEN 1 TAB FOR 3 DAYS 11/8/21   Celia Chow MD   mometasone (ELOCON) 0.1 % cream Apply topically daily Apply topically daily. 10/29/20   Celia Chow MD       Allergies   Allergen Reactions    Latex Rash    Aspirin-Acetaminophen-Caffeine Shortness Of Breath    Dye [Iodides] Shortness Of Breath    Penicillins Anaphylaxis    Topamax [Topiramate] Nausea And Vomiting, Other (See Comments) and Shortness Of Breath    Tylenol With Codeine #3 [Acetaminophen-Codeine] Shortness Of Breath    Lactose Other (See Comments)     Usually just causes stomach pain, diarrhea if pt consumes too much of it.      Blueberry Flavor      ALLERGIC TO BLUEBERRY    Fish-Derived Products      SEAFOOD, ESPECIALLY SHRIMP    Iodine      Seafood allergy    Lidocaine Diarrhea, Itching and Swelling    Nemesio Flavor Hives     Nemesio fruit    Vicodin [Hydrocodone-Acetaminophen]      Acts drunk         Social History     Socioeconomic History    Marital status: Single     Spouse name: Not on file    Number of children: Not on file    Years of education: 12    Highest education level: Not on file   Occupational History    Occupation:      Employer: [employer]     Comment: unable to work due to injury   Tobacco Use    Smoking status: Current Every Day Smoker     Packs/day: 0.25     Years: 1.00 Pack years: 0.25     Types: Cigarettes     Start date: 2020    Smokeless tobacco: Never Used    Tobacco comment: Smokes 1 cig daily   Vaping Use    Vaping Use: Never used   Substance and Sexual Activity    Alcohol use: No     Alcohol/week: 0.0 standard drinks    Drug use: No    Sexual activity: Not on file   Other Topics Concern    Not on file   Social History Narrative    1-2 cups coffee/week     Social Determinants of Health     Financial Resource Strain: Low Risk     Difficulty of Paying Living Expenses: Not hard at all   Food Insecurity: No Food Insecurity    Worried About Running Out of Food in the Last Year: Never true    Tete of Food in the Last Year: Never true   Transportation Needs:     Lack of Transportation (Medical): Not on file    Lack of Transportation (Non-Medical):  Not on file   Physical Activity:     Days of Exercise per Week: Not on file    Minutes of Exercise per Session: Not on file   Stress:     Feeling of Stress : Not on file   Social Connections:     Frequency of Communication with Friends and Family: Not on file    Frequency of Social Gatherings with Friends and Family: Not on file    Attends Sabianist Services: Not on file    Active Member of [address] [place] [address] [employer] or Organizations: Not on file    Attends Club or Organization Meetings: Not on file    Marital Status: Not on file   Intimate Partner Violence:     Fear of Current or Ex-Partner: Not on file    Emotionally Abused: Not on file    Physically Abused: Not on file    Sexually Abused: Not on file   Housing Stability:     Unable to Pay for Housing in the Last Year: Not on file    Number of [place] in the Last Year: Not on file    Unstable Housing in the Last Year: Not on file       Family History   Problem Relation Age of Onset    Early Death Maternal Grandfather     Cancer Maternal Grandfather     Asthma Brother     Heart Disease Maternal Aunt     Cancer Maternal Aunt     Heart Disease Maternal Uncle     Cancer Maternal Uncle     High Cholesterol Father     Diabetes Father     High Cholesterol Mother     Cancer Paternal Aunt     Cancer Paternal Uncle     Cancer Maternal Grandmother     Cancer Paternal Grandmother     Cancer Paternal Grandfather        REVIEW OF SYSTEMS:     Sarah Otero denies fever/chills, chest pain, shortness of breath, new bowel or bladder complaints. All other review of systems was negative. PHYSICAL EXAMINATION:      /72   Pulse 105   Temp 98 °F (36.7 °C) (Infrared)   Resp 16   Ht 5' 8\" (1.727 m)   Wt 289 lb (131.1 kg)   SpO2 94%   BMI 43.94 kg/m²     General:      General appearance:   pleasant and well-hydrated. , in no discomfort and A & O x3  Build:Obese    HEENT:    Head:normocephalic and atraumatic  Sclera: icterus absent,    Lungs:    Breathing:Normal expansion. No wheezing. Abdomen:    Shape:non-distended and normal    Thoracic spine:    Palpation:midline tenderness. Range of motion:abnormal mildly flexion, extension rotation bilateral and is painful. Lumbar spine:    Palpation:midline tenderness. Range of motion:abnormal mildly Lateral bending, flexion, extension rotation bilateral and is painful. Extremities:    Tremors:None bilaterally upper and lower  Range of motion:Generally normal shoulders, pain with internal rotation of hips not done. Intact:Yes  Edema:Normal    Neurological:    [address]    Dermatology:    Skin:no unusual rashes, no skin lesions, no palpable subcutaneous nodules and good skin turgor    Impression:    LBP and diffuse BLE pain  2021 lumbar MRI shows moderate right paracentral disc protrusion at T12-L1  Follows with neurology for congenital spastic quadriparesis with cognitive involvement     Plan:     Urine screen deferred  OARRS report reviewed              On gabapentin, baclofen, dantrium via neurology  T12-L1 TAMRA #1 with 20% relief at this point. pt would like to repeat- r/b and procedure discussed.   Script for prednisone noted in medications - reports no longer taking. Failed remote PT   Patient encouraged to stay active and to lose weight  Treatment plan discussed with the patient including medication and procedure side effects                   We discussed with the patient that combining opioids, benzodiazepines, alcohol, illicit drugs or sleep aids increases the risk of respiratory depression including death. We discussed that these medications may cause drowsiness, sedation or dizziness and have counseled the patient not to drive or operate machinery. We have discussed that these medications will be prescribed only by one provider. We have discussed with the patient about age related risk factors and have thoroughly discussed the importance of taking these medications as prescribed. The patient verbalizes understanding.     ccreferring physic

## 2021-12-12 DIAGNOSIS — J30.2 SEASONAL ALLERGIES: ICD-10-CM

## 2021-12-12 DIAGNOSIS — Z76.0 MEDICATION REFILL: ICD-10-CM

## 2021-12-12 DIAGNOSIS — R42 DIZZINESS: ICD-10-CM

## 2021-12-12 DIAGNOSIS — G56.00 CARPAL TUNNEL SYNDROME, UNSPECIFIED LATERALITY: ICD-10-CM

## 2021-12-14 RX ORDER — CHLORHEXIDINE GLUCONATE 0.12 MG/ML
RINSE ORAL
Qty: 473 ML | Refills: 5 | OUTPATIENT
Start: 2021-12-14

## 2021-12-14 RX ORDER — GABAPENTIN 300 MG/1
CAPSULE ORAL
Qty: 540 CAPSULE | Refills: 0 | Status: SHIPPED
Start: 2021-12-14 | End: 2022-02-08 | Stop reason: SDUPTHER

## 2021-12-14 RX ORDER — MECLIZINE HYDROCHLORIDE 25 MG/1
TABLET ORAL
Qty: 30 TABLET | Refills: 2 | Status: SHIPPED
Start: 2021-12-14 | End: 2022-02-22 | Stop reason: SDUPTHER

## 2021-12-14 RX ORDER — FLUTICASONE PROPIONATE 50 MCG
SPRAY, SUSPENSION (ML) NASAL
Qty: 48 G | OUTPATIENT
Start: 2021-12-14

## 2021-12-14 NOTE — TELEPHONE ENCOUNTER
Last Appointment:  10/19/2021  Future Appointments   Date Time Provider Donna Page   1/12/2022 11:00 AM Deborah Mckeon DO [facility] PAIN [facility]   1/25/2022  2:20 PM JOSSE Rene - [facility]

## 2021-12-20 ENCOUNTER — TELEPHONE (OUTPATIENT)
Dept: PAIN MANAGEMENT | Age: 31
End: 2021-12-20

## 2021-12-20 NOTE — PROGRESS NOTES
[facility]  INSTRUCTIONS  . .......................................................................................................................................... [] Parking the day of Surgery is located in the [facility].   Upon entering the door, make immediate right into the surgery reception room    []  Bring photo ID and insurance card     [] You may have a light breakfast day of procedure    []  Wear loose comfortable clothing    []  Please follow instructions for medications as given per Dr's office    [] You can expect a call the business day prior to procedure to notify you of your arrival time     [] Please arrange for     []  Other instructions

## 2021-12-20 NOTE — PROGRESS NOTES

## 2021-12-20 NOTE — TELEPHONE ENCOUNTER
Call to Nuria Gudino that procedure was approved for 12/21/2021 and that the surgery center should call her a few days before for the pre op call and after 3:00 PM the business day before with the arrival time. Instructed Chad to hold ibuprofen for 24 hours, naprosyn for 4 days and any aspirin containing products or fish oil for 7 days. Instructed to call office back if any questions. Chad verbalized understanding.     Cruz Mora RN  Pain Management

## 2021-12-21 ENCOUNTER — HOSPITAL ENCOUNTER (OUTPATIENT)
Age: 31
Setting detail: OUTPATIENT SURGERY
Discharge: HOME | End: 2021-12-21
Attending: PAIN MEDICINE | Admitting: PAIN MEDICINE
Payer: COMMERCIAL

## 2021-12-21 ENCOUNTER — HOSPITAL ENCOUNTER (OUTPATIENT)
Dept: GENERAL RADIOLOGY | Age: 31
Setting detail: OUTPATIENT SURGERY
Discharge: HOME | End: 2021-12-23
Attending: PAIN MEDICINE
Payer: COMMERCIAL

## 2021-12-21 VITALS
WEIGHT: 289 LBS | HEIGHT: 68 IN | RESPIRATION RATE: 18 BRPM | HEART RATE: 78 BPM | SYSTOLIC BLOOD PRESSURE: 135 MMHG | OXYGEN SATURATION: 97 % | DIASTOLIC BLOOD PRESSURE: 86 MMHG | TEMPERATURE: 98.1 F | BODY MASS INDEX: 43.8 KG/M2

## 2021-12-21 DIAGNOSIS — R52 PAIN MANAGEMENT: ICD-10-CM

## 2021-12-21 LAB
HCG, URINE, POC: NEGATIVE
Lab: NORMAL
NEGATIVE QC PASS/FAIL: NORMAL
POSITIVE QC PASS/FAIL: NORMAL

## 2021-12-21 PROCEDURE — 6360000004 HC RX CONTRAST MEDICATION: Performed by: PAIN MEDICINE

## 2021-12-21 PROCEDURE — 7100000011 HC PHASE II RECOVERY - ADDTL 15 MIN: Performed by: PAIN MEDICINE

## 2021-12-21 PROCEDURE — 6360000002 HC RX W HCPCS: Performed by: PAIN MEDICINE

## 2021-12-21 PROCEDURE — 62323 NJX INTERLAMINAR LMBR/SAC: CPT | Performed by: PAIN MEDICINE

## 2021-12-21 PROCEDURE — 7100000010 HC PHASE II RECOVERY - FIRST 15 MIN: Performed by: PAIN MEDICINE

## 2021-12-21 PROCEDURE — 2500000003 HC RX 250 WO HCPCS: Performed by: PAIN MEDICINE

## 2021-12-21 PROCEDURE — 2709999900 HC NON-CHARGEABLE SUPPLY: Performed by: PAIN MEDICINE

## 2021-12-21 PROCEDURE — A9579 GAD-BASE MR CONTRAST NOS,1ML: HCPCS | Performed by: PAIN MEDICINE

## 2021-12-21 PROCEDURE — 3209999900 FLUORO FOR SURGICAL PROCEDURES

## 2021-12-21 PROCEDURE — 3600000002 HC SURGERY LEVEL 2 BASE: Performed by: PAIN MEDICINE

## 2021-12-21 RX ORDER — LIDOCAINE HYDROCHLORIDE 5 MG/ML
INJECTION, SOLUTION INFILTRATION; INTRAVENOUS PRN
Status: DISCONTINUED | OUTPATIENT
Start: 2021-12-21 | End: 2021-12-21 | Stop reason: ALTCHOICE

## 2021-12-21 RX ORDER — METHYLPREDNISOLONE ACETATE 40 MG/ML
INJECTION, SUSPENSION INTRA-ARTICULAR; INTRALESIONAL; INTRAMUSCULAR; SOFT TISSUE PRN
Status: DISCONTINUED | OUTPATIENT
Start: 2021-12-21 | End: 2021-12-21 | Stop reason: ALTCHOICE

## 2021-12-21 ASSESSMENT — PAIN DESCRIPTION - PAIN TYPE: TYPE: CHRONIC PAIN

## 2021-12-21 ASSESSMENT — PAIN DESCRIPTION - DESCRIPTORS: DESCRIPTORS: ACHING

## 2021-12-21 ASSESSMENT — PAIN DESCRIPTION - ORIENTATION: ORIENTATION: MID;LOWER

## 2021-12-21 ASSESSMENT — PAIN SCALES - GENERAL
PAINLEVEL_OUTOF10: 0
PAINLEVEL_OUTOF10: 10
PAINLEVEL_OUTOF10: 0

## 2021-12-21 ASSESSMENT — PAIN DESCRIPTION - LOCATION: LOCATION: BACK

## 2021-12-21 NOTE — OP NOTE
2021    Patient: Stacia Shaw  :  1990  Age:  32 y.o. Sex:  female     PRE-OPERATIVE DIAGNOSIS: Lumbar disc displacement, lumbar radiculopathy. POST-OPERATIVE DIAGNOSIS: Same. PROCEDURE: Fluoroscopic guided therapeutic lumbar epidural steroid injection at the T12-L1 level (# 2). SURGEON: AUTUMN Cohn. ANESTHESIA: local    ESTIMATED BLOOD LOSS: None.  __________________________________________________    BRIEF HISTORY:  Stacia Shaw comes in today for second lumbar epidural injection at T12-L1 level. The potential complications of this procedure were discussed with her again today. She has elected to undergo the aforementioned procedure. Donald complete History & Physical examination were reviewed in depth, a copy of which is in the chart. DESCRIPTION OF PROCEDURE:    After confirming written and informed consent, a time-out was performed and Donald name and date of birth, the procedure to be performed as well as the plan for the location of the needle insertion were confirmed. The patient was brought into the procedure room and placed in the prone position on the fluoroscopy table. A pillow was placed under the patient's lower abdomen/upper pelvis to increase lumbar interlaminar space. Standard monitors were placed, and vital signs were observed throughout the procedure. The area of the lumbar spine was prepped with chloraprep and draped in a sterile manner. The T12-L1 interspace was identified and marked under AP fluoroscopy. The skin and subcutaneous tissues at the above level were anesthestized with 0.5% lidocaine. With intermittent fluoroscopy, an # 18 gauge 3 1/2 tuohy epidural needle was inserted and directed toward the interlaminar space. The needle was slowly advanced using loss of resistance technique and 5 cc glass syringe until the tip of the epidural needle has passed through the ligamentum flavum and entered the epidural space.  AP and lateral fluoroscopic imaging was performed to verify that the epidural needle was properly placed. Negative aspiration of blood and CSF was confirmed. Two ml of Prohance was used for confirmation of even epidural spread under both live lateral and live AP fluoroscopy. After negative aspiration, a solution of 0.5 % Lidocaine 3 ml and 40 mg DepoMedrol was easily injected. The needle was gently removed intact . The patient's back was cleaned and a Band-Aid was placed over the needle insertion point. Disposition the patient tolerated the procedure well and there were no complications . Vital signs remained stable throughout the procedure. The patient was escorted to the recovery area where they remained until discharge and written discharge instructions for the procedure were given. Plan: Mona Campuzano will return to our pain management center as scheduled.      Liberty Benjamin, DO

## 2021-12-21 NOTE — H&P
ANDREA PEREZ [facility] - BE[facility]AVIOR[facility] HE[facility] Pain Management        [address] [facility], [address] [facility]  Dept: [phone]    Procedure History & Physical      Veronica Perez     HPI:    Patient  is here for LBP BLE pain for T12-L1 TAMRA   Labs/imaging studies reviewed   All question and concerns addressed including R/B/A associated with the procedure    Past Medical History:   Diagnosis Date    ADHD     Arthritis     Asthma     controlled    Chronic midline low back pain without sciatica 11/13/2017    COPD (chronic obstructive pulmonary disease) (HCC)     Depression     GERD (gastroesophageal reflux disease)     IBS (irritable bowel syndrome)     Migraines     Seizures ([address]     last episode 2016    Thyroid disease        Past Surgical History:   Procedure Laterality Date    COLONOSCOPY      EYE SURGERY      probing of ductal system right eye     FRACTURE SURGERY      R ELBOW CRUSH INJURY W PLATE AND PINS PLACED    PAIN MANAGEMENT PROCEDURE N/A 8/17/2021    T12-L1 EPIDURAL STEROID INJECTION #1 performed by Tawnya Flores DO at [facility] W/BIOPSY SINGLE/MULTIPLE  10/29/2018    COLONOSCOPY WITH BIOPSY performed by Sarah Sawyer MD at [address] [place] [address] TONSILS,12+ Y/O N/A 5/4/2018    TONSILLECTOMY performed by Gee Flores MD at [address] [place] [address] N/A 10/29/2020    EGD BIOPSY performed by Steve Cedeno MD at [place] [address]       Prior to Admission medications    Medication Sig Start Date End Date Taking?  Authorizing Provider   meclizine (ANTIVERT) 25 MG tablet take 1 tablet by mouth three times a day if needed for dizziness 12/14/21  Yes Savi Gibbons MD   gabapentin (NEURONTIN) 300 MG capsule take 2 capsules by mouth three times a day 12/14/21 1/13/22 Yes Savi Gibbons MD   diphenhydrAMINE (BENADRYL) 25 MG capsule Take 25 mg by mouth every 6 hours as needed for Itching   Yes Historical Provider, MD   budesonide-formoterol (SYMBICORT) 80-4.5 MCG/ACT AERO inhale 2 puffs by mouth daily 10/19/21  Yes Geneva A Lissy, DO   Benzoyl Peroxide 2.5 % GEL Apply 1 cm topically 2 times daily 10/19/21  Yes Geneva A Rech, DO   dantrolene (DANTRIUM) 25 MG capsule Take 1 capsule by mouth 2 times daily AND 2 capsules nightly. 10/19/21  Yes Geneva A Rech, DO   escitalopram (LEXAPRO) 20 MG tablet take 1 tablet by mouth once daily 10/19/21  Yes Geneva A Rech, DO   montelukast (SINGULAIR) 10 MG tablet Take 1 tablet by mouth daily 10/19/21  Yes Geneva A Rech, DO   loratadine (CLARITIN) 10 MG tablet Take 1 tablet by mouth daily 10/19/21  Yes Geneva A Rech, DO   levothyroxine (SYNTHROID) 75 MCG tablet take 1 tablet by mouth once daily 10/19/21  Yes Geneva A Rech, DO   dicyclomine (BENTYL) 20 MG tablet Take 2 tablets by mouth 4 times daily 10/19/21  Yes Geneva A Rech, DO   divalproex (DEPAKOTE) 250 MG DR tablet take 1 tablet by mouth twice a day 10/19/21  Yes Geneva A Rech, DO   albuterol sulfate  (90 Base) MCG/ACT inhaler inhale 2 puffs by mouth and INTO THE LUNGS every 6 hours if needed for wheezing 10/19/21  Yes Geneva A Rech, DO   baclofen (LIORESAL) 20 MG tablet Take 1 tablet by mouth every morning AND 1 tablet Daily with lunch AND 2 tablets nightly.  10/19/21  Yes Geneva A Lissy, DO   diclofenac sodium (VOLTAREN) 1 % GEL Apply 2 g topically 4 times daily 10/19/21  Yes Geneva A Rech, DO   pantoprazole (PROTONIX) 40 MG tablet Take 1 tablet by mouth every morning (before breakfast) 10/19/21  Yes Geneva A Lissy, DO   vitamin D (ERGOCALCIFEROL) 1.25 MG (23564 UT) CAPS capsule Take 1 capsule by mouth Twice a Week 9/9/21  Yes Geneva A Lissy, DO   medroxyPROGESTERone (PROVERA) 10 MG tablet Take 1 tablet by mouth daily 7/22/21  Yes Yang Ojeda MD   Saline GEL 1 spray by Nasal route daily as needed   Yes Historical Provider, MD   mometasone (ELOCON) 0.1 % lotion apply 3 to 4 drop at bedtime for 10 days for itching 9/7/21   Geneva Yuan DO   mometasone (ELOCON) 0.1 % cream Apply topically daily Apply topically daily. 10/29/20   Marta Clement MD       Allergies   Allergen Reactions    Latex Rash    Aspirin-Acetaminophen-Caffeine Shortness Of Breath    Dye [Iodides] Shortness Of Breath    Penicillins Anaphylaxis    Topamax [Topiramate] Nausea And Vomiting, Other (See Comments) and Shortness Of Breath    Tylenol With Codeine #3 [Acetaminophen-Codeine] Shortness Of Breath    Lactose Other (See Comments)     Usually just causes stomach pain, diarrhea if pt consumes too much of it.      Blueberry Flavor      ALLERGIC TO BLUEBERRY    Fish-Derived Products      SEAFOOD, ESPECIALLY SHRIMP    Iodine      Seafood allergy    Lidocaine Diarrhea, Itching and Swelling    Alvarado Flavor Hives     Nemesio fruit    Vicodin [Hydrocodone-Acetaminophen]      Acts drunk         Social History     Socioeconomic History    Marital status: Single     Spouse name: Not on file    Number of children: Not on file    Years of education: 12    Highest education level: Not on file   Occupational History    Occupation: [employer]     Employer: [employer]     Comment: unable to work due to injury   Tobacco Use    Smoking status: Current Every Day Smoker     Packs/day: 0.25     Years: 1.00     Pack years: 0.25     Types: Cigarettes     Start date: 2020    Smokeless tobacco: Never Used    Tobacco comment: Smokes 1 cig daily   Vaping Use    Vaping Use: Never used   Substance and Sexual Activity    Alcohol use: No     Alcohol/week: 0.0 standard drinks    Drug use: No    Sexual activity: Not on file   Other Topics Concern    Not on file   Social History Narrative    1-2 cups coffee/week     Social Determinants of Health     Financial Resource Strain: Low Risk     Difficulty of Paying Living Expenses: Not hard at all   Food Insecurity: No Food Insecurity    Worried About Running Out of Food in the Last Year: Never true    Tete of Food in the Last Year: Never true Transportation Needs:     Lack of Transportation (Medical): Not on file    Lack of Transportation (Non-Medical):  Not on file   Physical Activity:     Days of Exercise per Week: Not on file    Minutes of Exercise per Session: Not on file   Stress:     Feeling of Stress : Not on file   Social Connections:     Frequency of Communication with Friends and Family: Not on file    Frequency of Social Gatherings with Friends and Family: Not on file    Attends Baptism Services: Not on file    Active Member of [address] [place] [address] [employer] or Organizations: Not on file    Attends Club or Organization Meetings: Not on file    Marital Status: Not on file   Intimate Partner Violence:     Fear of Current or Ex-Partner: Not on file    Emotionally Abused: Not on file    Physically Abused: Not on file    Sexually Abused: Not on file   Housing Stability:     Unable to Pay for Housing in the Last Year: Not on file    Number of [place] in the Last Year: Not on file    Unstable Housing in the Last Year: Not on file       Family History   Problem Relation Age of Onset    Early Death Maternal Grandfather     Cancer Maternal Grandfather     Asthma Brother     Heart Disease Maternal Aunt     Cancer Maternal Aunt     Heart Disease Maternal Uncle     Cancer Maternal Uncle     High Cholesterol Father     Diabetes Father     High Cholesterol Mother     Cancer Paternal Aunt     Cancer Paternal Uncle     Cancer Maternal Grandmother     Cancer Paternal Grandmother     Cancer Paternal Grandfather          REVIEW OF SYSTEMS:    CONSTITUTIONAL:  negative for  fevers, chills, sweats and fatigue    RESPIRATORY:  negative for  dry cough, cough with sputum, dyspnea, wheezing and chest pain    CARDIOVASCULAR:  negative for chest pain, dyspnea, palpitations, syncope    GASTROINTESTINAL:  negative for nausea, vomiting, change in bowel habits, diarrhea, constipation and abdominal pain    MUSCULOSKELETAL: negative for muscle weakness    SKIN: negative for itching or rashes. BEHAVIOR/PSYCH:  negative for poor appetite, increased appetite, decreased sleep and poor concentration    All other systems negative      PHYSICAL EXAM:    VITALS:  /88   Pulse 79   Temp 97.9 °F (36.6 °C) (Temporal)   Resp 18   Ht 5' 8\" (1.727 m)   Wt 289 lb (131.1 kg)   SpO2 96%   BMI 43.94 kg/m²     CONSTITUTIONAL:  awake, alert, cooperative, no apparent distress, and appears stated age    EYES: PERRLA, EOMI    LUNGS:  No increased work of breathing, no audible wheezing    CARDIOVASCULAR:  regular rate and rhythm    ABDOMEN:  Soft non tender non distended     EXTREMITIES: no signs of clubbing or cyanosis. MUSCULOSKELETAL: negative for flaccid muscle tone or spastic movements. SKIN: gross examination reveals no signs of rashes, or diaphoresis. NEURO: Cranial nerves II-XII grossly intact. No signs of agitated mood.        Assessment/Plan:    LBP BLE pain for T12-L1 TAMRA

## 2022-01-07 DIAGNOSIS — J45.20 MILD INTERMITTENT ASTHMA WITHOUT COMPLICATION: Primary | ICD-10-CM

## 2022-01-07 RX ORDER — ALBUTEROL SULFATE 90 UG/1
2 AEROSOL, METERED RESPIRATORY (INHALATION) 4 TIMES DAILY PRN
Qty: 18 G | Refills: 5 | Status: SHIPPED
Start: 2022-01-07 | End: 2022-05-25 | Stop reason: SDUPTHER

## 2022-01-19 DIAGNOSIS — Z76.0 MEDICATION REFILL: ICD-10-CM

## 2022-01-19 DIAGNOSIS — R12 HEART BURN: ICD-10-CM

## 2022-01-19 DIAGNOSIS — J06.9 VIRAL UPPER RESPIRATORY TRACT INFECTION: ICD-10-CM

## 2022-01-19 DIAGNOSIS — L29.9 EAR ITCH: ICD-10-CM

## 2022-01-19 DIAGNOSIS — M54.50 ACUTE BILATERAL LOW BACK PAIN WITHOUT SCIATICA: ICD-10-CM

## 2022-01-21 RX ORDER — CHLORHEXIDINE GLUCONATE 0.12 MG/ML
RINSE ORAL
Qty: 473 ML | Refills: 5 | Status: SHIPPED
Start: 2022-01-21 | End: 2022-05-25 | Stop reason: SDUPTHER

## 2022-01-21 RX ORDER — DIPHENHYDRAMINE HYDROCHLORIDE 25 MG/1
CAPSULE ORAL
Qty: 30 CAPSULE | Refills: 2 | Status: SHIPPED
Start: 2022-01-21 | End: 2022-09-08

## 2022-01-21 RX ORDER — PANTOPRAZOLE SODIUM 40 MG/1
TABLET, DELAYED RELEASE ORAL
Qty: 90 TABLET | Refills: 1 | Status: SHIPPED
Start: 2022-01-21 | End: 2022-06-30

## 2022-01-21 RX ORDER — GUAIFENESIN 200 MG/10ML
SOLUTION ORAL
Qty: 236 ML | Refills: 2 | Status: SHIPPED
Start: 2022-01-21 | End: 2022-02-15 | Stop reason: SDUPTHER

## 2022-01-21 RX ORDER — MOMETASONE FUROATE 1 MG/ML
SOLUTION TOPICAL
Qty: 60 ML | Refills: 3 | Status: SHIPPED
Start: 2022-01-21 | End: 2022-03-25

## 2022-02-08 ENCOUNTER — NURSE TRIAGE (OUTPATIENT)
Dept: OTHER | Facility: CLINIC | Age: 32
End: 2022-02-08

## 2022-02-08 ENCOUNTER — OFFICE VISIT (OUTPATIENT)
Dept: FAMILY MEDICINE CLINIC | Age: 32
End: 2022-02-08
Payer: COMMERCIAL

## 2022-02-08 VITALS
OXYGEN SATURATION: 96 % | BODY MASS INDEX: 44.41 KG/M2 | TEMPERATURE: 97.9 F | WEIGHT: 293 LBS | SYSTOLIC BLOOD PRESSURE: 113 MMHG | RESPIRATION RATE: 18 BRPM | HEIGHT: 68 IN | DIASTOLIC BLOOD PRESSURE: 65 MMHG | HEART RATE: 78 BPM

## 2022-02-08 DIAGNOSIS — L08.9 SOFT TISSUE INFECTION: Primary | ICD-10-CM

## 2022-02-08 DIAGNOSIS — J45.20 MILD INTERMITTENT ASTHMA WITHOUT COMPLICATION: ICD-10-CM

## 2022-02-08 DIAGNOSIS — E03.9 HYPOTHYROIDISM, UNSPECIFIED TYPE: ICD-10-CM

## 2022-02-08 DIAGNOSIS — G56.00 CARPAL TUNNEL SYNDROME, UNSPECIFIED LATERALITY: ICD-10-CM

## 2022-02-08 DIAGNOSIS — G80.0 CONGENITAL SPASTIC QUADRIPARESIS (HCC): ICD-10-CM

## 2022-02-08 PROCEDURE — G8427 DOCREV CUR MEDS BY ELIG CLIN: HCPCS | Performed by: FAMILY MEDICINE

## 2022-02-08 PROCEDURE — G8417 CALC BMI ABV UP PARAM F/U: HCPCS | Performed by: FAMILY MEDICINE

## 2022-02-08 PROCEDURE — 99212 OFFICE O/P EST SF 10 MIN: CPT | Performed by: FAMILY MEDICINE

## 2022-02-08 PROCEDURE — G8482 FLU IMMUNIZE ORDER/ADMIN: HCPCS | Performed by: FAMILY MEDICINE

## 2022-02-08 PROCEDURE — 99213 OFFICE O/P EST LOW 20 MIN: CPT | Performed by: FAMILY MEDICINE

## 2022-02-08 PROCEDURE — 4004F PT TOBACCO SCREEN RCVD TLK: CPT | Performed by: FAMILY MEDICINE

## 2022-02-08 RX ORDER — BACLOFEN 20 MG/1
TABLET ORAL
Qty: 360 TABLET | Refills: 3 | Status: SHIPPED
Start: 2022-02-08 | End: 2022-04-26 | Stop reason: SDUPTHER

## 2022-02-08 RX ORDER — LEVOTHYROXINE SODIUM 0.07 MG/1
TABLET ORAL
Qty: 90 TABLET | Refills: 1 | Status: SHIPPED
Start: 2022-02-08 | End: 2022-09-23

## 2022-02-08 RX ORDER — MONTELUKAST SODIUM 10 MG/1
10 TABLET ORAL DAILY
Qty: 30 TABLET | Refills: 3 | Status: SHIPPED
Start: 2022-02-08 | End: 2022-04-13 | Stop reason: SDUPTHER

## 2022-02-08 RX ORDER — GABAPENTIN 300 MG/1
CAPSULE ORAL
Qty: 540 CAPSULE | Refills: 0 | Status: SHIPPED
Start: 2022-02-08 | End: 2022-04-26 | Stop reason: SDUPTHER

## 2022-02-08 RX ORDER — DOXYCYCLINE HYCLATE 100 MG
100 TABLET ORAL 2 TIMES DAILY
Qty: 20 TABLET | Refills: 0 | Status: SHIPPED | OUTPATIENT
Start: 2022-02-08 | End: 2022-02-18

## 2022-02-08 RX ORDER — BUDESONIDE AND FORMOTEROL FUMARATE DIHYDRATE 80; 4.5 UG/1; UG/1
AEROSOL RESPIRATORY (INHALATION)
Qty: 10.2 G | Refills: 1 | Status: SHIPPED
Start: 2022-02-08 | End: 2022-04-13 | Stop reason: SDUPTHER

## 2022-02-08 NOTE — TELEPHONE ENCOUNTER
Received call from Adama Trujillo at [facility] with [employer] Complaint. Subjective: Caller states belly button has bloody drainage with pus,shoveled snow 2/4/22, then noticed that night bleeding from belly button     Current Symptoms: painful area at belly button,dime sized area of redness,bloody and purulent drainage    Onset: 2/4/22    Associated Symptoms: NA    Pain Severity: 10/10    Temperature: Denies    What has been tried: neosporin,hydrogen peroxide    Recommended disposition: See provider within next 4 hours. UCC/ER if no appt. available    Care advice provided, patient verbalizes understanding; denies any other questions or concerns; instructed to call back for any new or worsening symptoms. Writer provided warm transfer to  at [facility] for appointment scheduling     Attention Provider: Thank you for allowing me to participate in the care of your patient. The patient was connected to triage in response to information provided to the ECC/PSC. Please do not respond through this encounter as the response is not directed to a shared pool.       Reason for Disposition   Severe pain in the wound    Protocols used: WOUND INFECTION-ADULT-OH

## 2022-02-08 NOTE — PROGRESS NOTES
[facility]  Department of Family Medicine  Family Medicine Residency Program      Patient:  Lin Horan 28 y.o. female     Date of Service: 2/8/22      Chief complaint:   Chief Complaint   Patient presents with    Other     belly button very sore bleeding and green pus drainage    Other     stomach pain around the belly button         History of Present Illness   The patient is a 28 y.o. female with a PMH of hypothyroidism, asthma, quadriparesis who presents to the clinic for the following medical concerns:     Belly button pain: Shoveling snow Friday. Noticed belly button pain that evening. Has been having some yellow discharge and scant bleeding as well as pain around the belly button. Using neosporin and peroxide. Not getting any better. No fevers or chills. Chronic nausea unchanged.       Past Medical History:      Diagnosis Date    ADHD     Arthritis     Asthma     controlled    Chronic midline low back pain without sciatica 11/13/2017    COPD (chronic obstructive pulmonary disease) (HCC)     Depression     GERD (gastroesophageal reflux disease)     IBS (irritable bowel syndrome)     Migraines     Seizures ([address]     last episode 2016    Thyroid disease        Past Surgical History:        Procedure Laterality Date    COLONOSCOPY      EYE SURGERY      probing of ductal system right eye     FRACTURE SURGERY      R ELBOW CRUSH INJURY W PLATE AND PINS PLACED    PAIN MANAGEMENT PROCEDURE N/A 8/17/2021    T12-L1 EPIDURAL STEROID INJECTION #1 performed by Zakiya Astudillo DO at [place] [address] N/A 12/21/2021    LUMBAR EPIDURAL STEROID INJECTION T12-L1 performed by Zakiya Astudillo DO at [facility] COLONOSCOPY W/BIOPSY SINGLE/MULTIPLE  10/29/2018    COLONOSCOPY WITH BIOPSY performed by Marilin Jones MD at [address] [place] [address] TONSILS,12+ Y/O N/A 5/4/2018    TONSILLECTOMY performed by Alex Carter MD at [address] [place] [address]  UPPER GASTROINTESTINAL ENDOSCOPY N/A 10/29/2020    EGD BIOPSY performed by Barbra Dial MD at [address]:    Latex, Aspirin-acetaminophen-caffeine, Dye [iodides], Penicillins, Topamax [topiramate], Tylenol with codeine #3 [acetaminophen-codeine], Lactose, Blueberry flavor, Fish-derived products, Iodine, Lidocaine, Nemesio flavor, and Vicodin [hydrocodone-acetaminophen]    Social History:   Social History     Tobacco Use    Smoking status: Current Every Day Smoker     Packs/day: 0.25     Years: 1.00     Pack years: 0.25     Types: Cigarettes     Start date: 2020    Smokeless tobacco: Never Used    Tobacco comment: Smokes 1 cig daily   Substance Use Topics    Alcohol use: No     Alcohol/week: 0.0 standard drinks        Family History:       Problem Relation Age of Onset    Early Death Maternal Grandfather     Cancer Maternal Grandfather     Asthma Brother     Heart Disease Maternal Aunt     Cancer Maternal Aunt     Heart Disease Maternal Uncle     Cancer Maternal Uncle     High Cholesterol Father     Diabetes Father     High Cholesterol Mother     Cancer Paternal Aunt     Cancer Paternal Uncle     Cancer Maternal Grandmother     Cancer Paternal Grandmother     Cancer Paternal Grandfather        Reviewof Systems:   Review of Systems Negative unless otherwise stated in HPI. Physical Exam   Vitals: /65 (Site: Right Upper Arm, Position: Sitting, Cuff Size: Medium Adult)   Pulse 78   Temp 97.9 °F (36.6 °C) (Temporal)   Resp 18   Ht 5' 8\" (1.727 m)   Wt 300 lb (136.1 kg)   SpO2 96%   BMI 45.61 kg/m²        Physical Exam  Constitutional:       Appearance: Normal appearance. Eyes:      Extraocular Movements: Extraocular movements intact. Conjunctiva/sclera: Conjunctivae normal.   Cardiovascular:      Rate and Rhythm: Normal rate and regular rhythm. Heart sounds: No murmur heard. No friction rub. No gallop.     Pulmonary:      Effort: Pulmonary effort is normal.      Breath sounds: Normal breath sounds. Abdominal:      General: Abdomen is flat. Palpations: Abdomen is soft. Musculoskeletal:         General: No swelling or tenderness. Cervical back: Normal range of motion and neck supple. Right lower leg: No edema. Left lower leg: No edema. Skin:     General: Skin is warm and dry. Findings: Lesion (fissure at 5 o' clock of belly button with no active bleeding or drainage. Tenderness without induration surrounding the lesion.) present. Neurological:      Mental Status: She is alert and oriented to person, place, and time. Mental status is at baseline. Psychiatric:         Mood and Affect: Mood normal.         Thought Content: Thought content normal.          Assessment and Plan       1. Soft tissue infection  Start doxycycline  Use vaseline to keep area moist  Running water with mild soap for cleansing, advised against rubbing/scrubbing area  Follow up 1 week  Red flag symptoms discussed    2. Mild intermittent asthma without complication  - budesonide-formoterol (SYMBICORT) 80-4.5 MCG/ACT AERO; inhale 2 puffs by mouth daily  Dispense: 10.2 g; Refill: 1  - montelukast (SINGULAIR) 10 MG tablet; Take 1 tablet by mouth daily  Dispense: 30 tablet; Refill: 3    3. Hypothyroidism, unspecified type  - levothyroxine (SYNTHROID) 75 MCG tablet; take 1 tablet by mouth once daily  Dispense: 90 tablet; Refill: 1    4. Congenital spastic quadriparesis (HCC)  - baclofen (LIORESAL) 20 MG tablet; Take 1 tablet by mouth every morning AND 1 tablet Daily with lunch AND 2 tablets nightly. Dispense: 360 tablet; Refill: 3    5. Carpal tunnel syndrome, unspecified laterality  - gabapentin (NEURONTIN) 300 MG capsule; take 2 capsules by mouth three times a day  Dispense: 540 capsule; Refill: 0      Return to Office: Return in about 1 week (around 2/15/2022).       Medication List:    Current Outpatient Medications   Medication Sig Dispense Refill  budesonide-formoterol (SYMBICORT) 80-4.5 MCG/ACT AERO inhale 2 puffs by mouth daily 10.2 g 1    montelukast (SINGULAIR) 10 MG tablet Take 1 tablet by mouth daily 30 tablet 3    levothyroxine (SYNTHROID) 75 MCG tablet take 1 tablet by mouth once daily 90 tablet 1    baclofen (LIORESAL) 20 MG tablet Take 1 tablet by mouth every morning AND 1 tablet Daily with lunch AND 2 tablets nightly. 360 tablet 3    gabapentin (NEURONTIN) 300 MG capsule take 2 capsules by mouth three times a day 540 capsule 0    doxycycline hyclate (VIBRA-TABS) 100 MG tablet Take 1 tablet by mouth 2 times daily for 10 days 20 tablet 0    pantoprazole (PROTONIX) 40 MG tablet take 1 tablet by mouth EVERY MORNING BEFORE BREAKFAST 90 tablet 1    diclofenac sodium (VOLTAREN) 1 % GEL apply 2 grams topically four times a day 100 g 2    BANOPHEN 25 MG capsule take 1 capsule by mouth every evening if needed for itching or allergies 30 capsule 2    SM TUSSIN MUCUS+CHEST CONGEST 100 MG/5ML liquid take 10 milliliters by mouth every 4 hours if needed for cough 236 mL 2    mometasone (ELOCON) 0.1 % lotion apply 3 to 4 drops at bedtime for 10 days for itching 60 mL 3    chlorhexidine (PERIDEX) 0.12 % solution RINSE WITH 1/2 OUNCE BY MOUTH FOR 30 SECONDS THEN SPIT OUT. USE TWICE DAILY. 473 mL 5    albuterol sulfate HFA (VENTOLIN HFA) 108 (90 Base) MCG/ACT inhaler Inhale 2 puffs into the lungs 4 times daily as needed for Wheezing 18 g 5    meclizine (ANTIVERT) 25 MG tablet take 1 tablet by mouth three times a day if needed for dizziness 30 tablet 2    Benzoyl Peroxide 2.5 % GEL Apply 1 cm topically 2 times daily 60 g 1    dantrolene (DANTRIUM) 25 MG capsule Take 1 capsule by mouth 2 times daily AND 2 capsules nightly.  360 capsule 3    escitalopram (LEXAPRO) 20 MG tablet take 1 tablet by mouth once daily 120 tablet 3    loratadine (CLARITIN) 10 MG tablet Take 1 tablet by mouth daily 30 tablet 3    dicyclomine (BENTYL) 20 MG tablet Take 2

## 2022-02-15 ENCOUNTER — OFFICE VISIT (OUTPATIENT)
Dept: FAMILY MEDICINE CLINIC | Age: 32
End: 2022-02-15
Payer: COMMERCIAL

## 2022-02-15 VITALS
HEIGHT: 68 IN | RESPIRATION RATE: 16 BRPM | HEART RATE: 97 BPM | WEIGHT: 293 LBS | BODY MASS INDEX: 44.41 KG/M2 | OXYGEN SATURATION: 98 % | SYSTOLIC BLOOD PRESSURE: 108 MMHG | TEMPERATURE: 98.1 F | DIASTOLIC BLOOD PRESSURE: 76 MMHG

## 2022-02-15 DIAGNOSIS — R30.0 DYSURIA: ICD-10-CM

## 2022-02-15 DIAGNOSIS — Z91.09 ENVIRONMENTAL ALLERGIES: ICD-10-CM

## 2022-02-15 DIAGNOSIS — M79.671 RIGHT FOOT PAIN: ICD-10-CM

## 2022-02-15 DIAGNOSIS — L08.9 SOFT TISSUE INFECTION: Primary | ICD-10-CM

## 2022-02-15 LAB
BILIRUBIN, POC: NEGATIVE
BLOOD URINE, POC: NORMAL
CLARITY, POC: CLEAR
COLOR, POC: YELLOW
GLUCOSE URINE, POC: NEGATIVE
KETONES, POC: NEGATIVE
LEUKOCYTE EST, POC: NEGATIVE
NITRITE, POC: NEGATIVE
PH, POC: 5.5
PROTEIN, POC: NEGATIVE
SPECIFIC GRAVITY, POC: 1.02
UROBILINOGEN, POC: 0.2

## 2022-02-15 PROCEDURE — 81002 URINALYSIS NONAUTO W/O SCOPE: CPT | Performed by: STUDENT IN AN ORGANIZED HEALTH CARE EDUCATION/TRAINING PROGRAM

## 2022-02-15 PROCEDURE — G8427 DOCREV CUR MEDS BY ELIG CLIN: HCPCS | Performed by: STUDENT IN AN ORGANIZED HEALTH CARE EDUCATION/TRAINING PROGRAM

## 2022-02-15 PROCEDURE — G8417 CALC BMI ABV UP PARAM F/U: HCPCS | Performed by: STUDENT IN AN ORGANIZED HEALTH CARE EDUCATION/TRAINING PROGRAM

## 2022-02-15 PROCEDURE — G8482 FLU IMMUNIZE ORDER/ADMIN: HCPCS | Performed by: STUDENT IN AN ORGANIZED HEALTH CARE EDUCATION/TRAINING PROGRAM

## 2022-02-15 PROCEDURE — 99212 OFFICE O/P EST SF 10 MIN: CPT | Performed by: STUDENT IN AN ORGANIZED HEALTH CARE EDUCATION/TRAINING PROGRAM

## 2022-02-15 PROCEDURE — 99213 OFFICE O/P EST LOW 20 MIN: CPT | Performed by: STUDENT IN AN ORGANIZED HEALTH CARE EDUCATION/TRAINING PROGRAM

## 2022-02-15 PROCEDURE — 4004F PT TOBACCO SCREEN RCVD TLK: CPT | Performed by: STUDENT IN AN ORGANIZED HEALTH CARE EDUCATION/TRAINING PROGRAM

## 2022-02-15 RX ORDER — SULFAMETHOXAZOLE AND TRIMETHOPRIM 800; 160 MG/1; MG/1
1 TABLET ORAL 2 TIMES DAILY
Qty: 6 TABLET | Refills: 0 | Status: SHIPPED | OUTPATIENT
Start: 2022-02-15 | End: 2022-02-18

## 2022-02-15 RX ORDER — MUPIROCIN CALCIUM 20 MG/G
CREAM TOPICAL
Qty: 30 G | Refills: 2 | Status: SHIPPED | OUTPATIENT
Start: 2022-02-15 | End: 2022-03-17

## 2022-02-15 RX ORDER — LORATADINE 10 MG/1
10 TABLET ORAL DAILY
Qty: 30 TABLET | Refills: 3 | Status: SHIPPED
Start: 2022-02-15 | End: 2022-04-13

## 2022-02-15 ASSESSMENT — PATIENT HEALTH QUESTIONNAIRE - PHQ9
SUM OF ALL RESPONSES TO PHQ QUESTIONS 1-9: 0
SUM OF ALL RESPONSES TO PHQ9 QUESTIONS 1 & 2: 0
1. LITTLE INTEREST OR PLEASURE IN DOING THINGS: 0
SUM OF ALL RESPONSES TO PHQ QUESTIONS 1-9: 0
8. MOVING OR SPEAKING SO SLOWLY THAT OTHER PEOPLE COULD HAVE NOTICED. OR THE OPPOSITE, BEING SO FIGETY OR RESTLESS THAT YOU HAVE BEEN MOVING AROUND A LOT MORE THAN USUAL: 0
SUM OF ALL RESPONSES TO PHQ QUESTIONS 1-9: 0
7. TROUBLE CONCENTRATING ON THINGS, SUCH AS READING THE NEWSPAPER OR WATCHING TELEVISION: 0
SUM OF ALL RESPONSES TO PHQ QUESTIONS 1-9: 0
2. FEELING DOWN, DEPRESSED OR HOPELESS: 0
10. IF YOU CHECKED OFF ANY PROBLEMS, HOW DIFFICULT HAVE THESE PROBLEMS MADE IT FOR YOU TO DO YOUR WORK, TAKE CARE OF THINGS AT HOME, OR GET ALONG WITH OTHER PEOPLE: 0
4. FEELING TIRED OR HAVING LITTLE ENERGY: 0
6. FEELING BAD ABOUT YOURSELF - OR THAT YOU ARE A FAILURE OR HAVE LET YOURSELF OR YOUR FAMILY DOWN: 0
9. THOUGHTS THAT YOU WOULD BE BETTER OFF DEAD, OR OF HURTING YOURSELF: 0
5. POOR APPETITE OR OVEREATING: 0
3. TROUBLE FALLING OR STAYING ASLEEP: 0

## 2022-02-15 ASSESSMENT — LIFESTYLE VARIABLES: HOW OFTEN DO YOU HAVE A DRINK CONTAINING ALCOHOL: NEVER

## 2022-02-15 NOTE — PATIENT INSTRUCTIONS
Patient Education                  Learning About Benefits From Quitting Smoking  How does quitting smoking make you healthier? If you're thinking about quitting smoking, you may have a few reasons to be smoke-free. Your health may be one of them. · When you quit smoking, you lower your risks for cancer, lung disease, heart attack, stroke, blood vessel disease, and blindness from macular degeneration. · When you're smoke-free, you get sick less often, and you heal faster. You are less likely to get colds, flu, bronchitis, and pneumonia. · As a nonsmoker, you may find that your mood is better and you are less stressed. When and how will you feel healthier? Quitting has real health benefits that start from day 1 of being smoke-free. And the longer you stay smoke-free, the healthier you get and the better you feel. The first hours  · After just 20 minutes, your blood pressure and heart rate go down. That means there's less stress on your heart and blood vessels. · Within 12 hours, the level of carbon monoxide in your blood drops back to normal. That makes room for more oxygen. With more oxygen in your body, you may notice that you have more energy than when you smoked. After 2 weeks  · Your lungs start to work better. · Your risk of heart attack starts to drop. After 1 month  · When your lungs are clear, you cough less and breathe deeper, so it's easier to be active. · Your sense of taste and smell return. That means you can enjoy food more than you have since you started smoking. Over the years  · Over the years, your risks of heart disease, heart attack, and stroke are lower. · After 10 years, your risk of dying from lung cancer is cut by about half. And your risk for many other types of cancer is lower too. How would quitting help others in your life? When you quit smoking, you improve the health of everyone who now breathes in your smoke.   · Their heart, lung, and cancer risks drop, much like yours.  · They are sick less. For babies and small children, living smoke-free means they're less likely to have ear infections, pneumonia, and bronchitis. · If you're a woman who is or will be pregnant someday, quitting smoking means a healthier . · Children who are close to you are less likely to become adult smokers. Where can you learn more? Go to https://[employer]. org and sign in to your [employer] account. Enter [phone] 24 in the [employer] box to learn more about \"Learning About Benefits From Quitting Smoking. \"     If you do not have an account, please click on the \"Sign Up Now\" link. Current as of: 2021               Content Version: 13.1  © 2323-4296 Healthwise, Incorporated. Care instructions adapted under license by [facility] ([facility]). If you have questions about a medical condition or this instruction, always ask your healthcare professional. [place] [address] any warranty or liability for your use of this information.

## 2022-02-15 NOTE — PROGRESS NOTES
Attending Physician Statement    S:   Chief Complaint   Patient presents with    Wound Check    Foot Pain     right     Urinary Tract Infection     frequency, dysuria       Patient is a 28year old female presents for follow up of umbilical cellculitis . Started on doxycycline last week. Still has blood and drainage. Pain in area. No fevers or chills, no redness welling or streaking. Not using anything topical.     Foot pain - has been going on for a year. Right medial ball of foot. Aching , throbbing. Hurts with walking a pressure. Numbness and tingling does not change. Has a foot doctor but has not been there in a while. Dysuria - burning with urination. 2 weeks. Low back pain shoots into groin. Does have frequency. Has had pyelo in the past.       O: Blood pressure 108/76, pulse 97, temperature 98.1 °F (36.7 °C), temperature source Temporal, resp. rate 16, height 5' 8\" (1.727 m), weight 297 lb (134.7 kg), SpO2 98 %, not currently breastfeeding. Exam:   Heart - RRR   Lungs - clear   Skin - no swelling . Mild erythema. Small amount of blood. No purulent drainage. Foot- medial ball of foot , has callous formation. abd - suprapubic tenderness. Bilateral mild cva ttp      A: umbilical cellulitis , foot pain, dysuria  P:  Continue doxycycline    mupiricin topical    Foot pain - Call podiatry    Dysuria - urine culture, bactrim twice a day for 3 days    Follow-up as ordered    Attending Attestation   I have discussed the case, including pertinent history and exam findings with the resident. I agree with the documented assessment and plan.

## 2022-02-15 NOTE — PROGRESS NOTES
[facility]  FAMILY MEDICINE RESIDENCY PROGRAM  DATE OF VISIT : 2/15/2022    Patient : Shakira Stewart   Age : 28 y.o.  : 1990   MRN : [id number]   ______________________________________________________________________    Chief Complaint :   Chief Complaint   Patient presents with    Wound Check    Foot Pain     right     Urinary Tract Infection     frequency, dysuria        HPI : Shakira Stewart is 28 y.o. female who presented to the clinic today for wound check, R foot pain, and concern of possible UTI. Wound check-Patient presented one week ago for bleeding and purulent drainage of umbilicus. It was sore at the time. She was started on doxycycline 100 mg BID x 10 days. Is not using rubbing alcohol to clean it any more. She reports she still has some soreness and drainage. Has a few days of antibiotic left. R foot pain  Onset: been going on for a year. Location: medial ball of foot. Injury: none. Description: aching/throbbing, and hurts with walking/pressure, radiates up towards ankle. Duration: constant. Associated symptoms: some numbness/tingling in direct area without radiation. Has podiatrist but has not been there in a while    Dysuria-Reports low back pain shoot down to groin. Dysuria, no blood in urine. More often than usual. Same as last with pyelonephritis episode. Also reports long standing nausea, agreeable to come back to discuss that.     Past Medical History :  Past Medical History:   Diagnosis Date    ADHD     Arthritis     Asthma     controlled    Chronic midline low back pain without sciatica 2017    COPD (chronic obstructive pulmonary disease) ([facility])     Depression     GERD (gastroesophageal reflux disease)     IBS (irritable bowel syndrome)     Migraines     Seizures ([address]     last episode 2016    Thyroid disease      Past Surgical History:   Procedure Laterality Date    COLONOSCOPY      EYE SURGERY      probing of ductal system right eye  FRACTURE SURGERY      R ELBOW CRUSH INJURY W PLATE AND PINS PLACED    PAIN MANAGEMENT PROCEDURE N/A 8/17/2021    T12-L1 EPIDURAL STEROID INJECTION #1 performed by Raf Miranda DO at [facility] N/A 12/21/2021    LUMBAR EPIDURAL STEROID INJECTION T12-L1 performed by Raf Miranda DO at [facility] W/BIOPSY SINGLE/MULTIPLE  10/29/2018    COLONOSCOPY WITH BIOPSY performed by Ike Mckeon MD at [address] [place] [address] Y/O N/A 5/4/2018    TONSILLECTOMY performed by Severa Ear., MD at [address] [facility] N/A 10/29/2020    EGD BIOPSY performed by Flora Wooten MD at [address] : Allergies   Allergen Reactions    Latex Rash    Aspirin-Acetaminophen-Caffeine Shortness Of Breath    Dye [Iodides] Shortness Of Breath    Penicillins Anaphylaxis    Topamax [Topiramate] Nausea And Vomiting, Other (See Comments) and Shortness Of Breath    Tylenol With Codeine #3 [Acetaminophen-Codeine] Shortness Of Breath    Lactose Other (See Comments)     Usually just causes stomach pain, diarrhea if pt consumes too much of it.  Blueberry Flavor      ALLERGIC TO BLUEBERRY    Fish-Derived Products      SEAFOOD, ESPECIALLY SHRIMP    Iodine      Seafood allergy    Lidocaine Diarrhea, Itching and Swelling    Nemesio Flavor Hives     [place] fruit    Vicodin [Hydrocodone-Acetaminophen]      Acts drunk         Medication List :    Current Outpatient Medications   Medication Sig Dispense Refill    loratadine (CLARITIN) 10 MG tablet Take 1 tablet by mouth daily 30 tablet 3    guaiFENesin (SM TUSSIN MUCUS+CHEST CONGEST) 100 MG/5ML liquid take 10 milliliters by mouth every 4 hours if needed for cough 236 mL 2    mupirocin (BACTROBAN) 2 % cream Apply 3 times daily.  30 g 2    budesonide-formoterol (SYMBICORT) 80-4.5 MCG/ACT AERO inhale 2 puffs by mouth daily 10.2 g 1    montelukast (SINGULAIR) 10 MG tablet Take 1 tablet by mouth daily 30 tablet 3    levothyroxine (SYNTHROID) 75 MCG tablet take 1 tablet by mouth once daily 90 tablet 1    baclofen (LIORESAL) 20 MG tablet Take 1 tablet by mouth every morning AND 1 tablet Daily with lunch AND 2 tablets nightly. 360 tablet 3    gabapentin (NEURONTIN) 300 MG capsule take 2 capsules by mouth three times a day 540 capsule 0    pantoprazole (PROTONIX) 40 MG tablet take 1 tablet by mouth EVERY MORNING BEFORE BREAKFAST 90 tablet 1    diclofenac sodium (VOLTAREN) 1 % GEL apply 2 grams topically four times a day 100 g 2    BANOPHEN 25 MG capsule take 1 capsule by mouth every evening if needed for itching or allergies 30 capsule 2    mometasone (ELOCON) 0.1 % lotion apply 3 to 4 drops at bedtime for 10 days for itching 60 mL 3    chlorhexidine (PERIDEX) 0.12 % solution RINSE WITH 1/2 OUNCE BY MOUTH FOR 30 SECONDS THEN SPIT OUT. USE TWICE DAILY. 473 mL 5    albuterol sulfate HFA (VENTOLIN HFA) 108 (90 Base) MCG/ACT inhaler Inhale 2 puffs into the lungs 4 times daily as needed for Wheezing 18 g 5    meclizine (ANTIVERT) 25 MG tablet take 1 tablet by mouth three times a day if needed for dizziness 30 tablet 2    Benzoyl Peroxide 2.5 % GEL Apply 1 cm topically 2 times daily 60 g 1    dantrolene (DANTRIUM) 25 MG capsule Take 1 capsule by mouth 2 times daily AND 2 capsules nightly.  360 capsule 3    escitalopram (LEXAPRO) 20 MG tablet take 1 tablet by mouth once daily 120 tablet 3    dicyclomine (BENTYL) 20 MG tablet Take 2 tablets by mouth 4 times daily 120 tablet 3    divalproex (DEPAKOTE) 250 MG DR tablet take 1 tablet by mouth twice a day 180 tablet 0    vitamin D (ERGOCALCIFEROL) 1.25 MG (24637 UT) CAPS capsule Take 1 capsule by mouth Twice a Week 24 capsule 1    medroxyPROGESTERone (PROVERA) 10 MG tablet Take 1 tablet by mouth daily 30 tablet 11    Saline GEL 1 spray by Nasal route daily as needed      mupirocin (BACTROBAN) 2 % ointment Apply topically 3 times daily. 15 g 1     No current facility-administered medications for this visit.        ______________________________________________________________________    Physical Exam :    Vitals: /76   Pulse 97   Temp 98.1 °F (36.7 °C) (Temporal)   Resp 16   Ht 5' 8\" (1.727 m)   Wt 297 lb (134.7 kg)   SpO2 98%   BMI 45.16 kg/m²   General Appearance: Awake, alert, oriented, and in NAD  HEENT: NCAT, no pallor or icterus  Neck: Symmetrical, trachea midline. Chest wall/Lung: CTAB, respirations unlabored. No ronchi/wheezing/rales   Heart: RRR, normal S1 and S2, no murmurs, rubs or gallops  Abdomen: SNTND except around umbilicus; mild Mane's punch positive B/L; mild suprapubic tenderness  Extremities: R foot with call formation over medial plantar surface around area of first metatarsal head  Skin: Umbilicus with spots of blood, mild erythema. No purulent drainage. Gauze used to apply pressure shows yellow fluid consistent with serosanguinous fluid. No edema. Neurologic: Alert&Oriented x3. No focal motor deficits detected   Psychiatric: Normal mood. Normal affect. Normal behavior  ______________________________________________________________________    Assessment & Plan :    1. Soft tissue infection  · Finish course of doxycycline   · Apply mupirocin ointment  - mupirocin (BACTROBAN) 2 % cream; Apply 3 times daily. Dispense: 30 g; Refill: 2    2. Right foot pain  · Call podiatrist, or call if need new podiatrist    3. Dysuria  - POCT Urinalysis no Micro  - Culture, Urine; Future  - sulfamethoxazole-trimethoprim (BACTRIM DS;SEPTRA DS) 800-160 MG per tablet; Take 1 tablet by mouth 2 times daily for 3 days  Dispense: 6 tablet; Refill: 0    4. Environmental allergies  · Refill:  - loratadine (CLARITIN) 10 MG tablet; Take 1 tablet by mouth daily  Dispense: 30 tablet;  Refill: 3  - guaiFENesin (SM TUSSIN MUCUS+CHEST CONGEST) 100 MG/5ML liquid; take 10 milliliters by mouth every 4 hours if needed for cough  Dispense: 236 mL; Refill: 2      Additional plan and future considerations:       Return to Office: Return for 1-2 weeks for nausea.     Valentín De Oliveira DO   Case discussed with Dr. Delon Beth

## 2022-02-18 DIAGNOSIS — L08.9 SOFT TISSUE INFECTION: Primary | ICD-10-CM

## 2022-02-18 LAB — URINE CULTURE, ROUTINE: NORMAL

## 2022-02-22 ENCOUNTER — OFFICE VISIT (OUTPATIENT)
Dept: FAMILY MEDICINE CLINIC | Age: 32
End: 2022-02-22
Payer: COMMERCIAL

## 2022-02-22 VITALS
DIASTOLIC BLOOD PRESSURE: 83 MMHG | SYSTOLIC BLOOD PRESSURE: 129 MMHG | OXYGEN SATURATION: 97 % | BODY MASS INDEX: 44.41 KG/M2 | TEMPERATURE: 97.6 F | HEART RATE: 89 BPM | WEIGHT: 293 LBS | HEIGHT: 68 IN

## 2022-02-22 DIAGNOSIS — H60.503 ACUTE OTITIS EXTERNA OF BOTH EARS, UNSPECIFIED TYPE: ICD-10-CM

## 2022-02-22 DIAGNOSIS — R42 DIZZINESS: Primary | ICD-10-CM

## 2022-02-22 DIAGNOSIS — H81.10 BENIGN PAROXYSMAL POSITIONAL VERTIGO, UNSPECIFIED LATERALITY: ICD-10-CM

## 2022-02-22 DIAGNOSIS — L08.9 SOFT TISSUE INFECTION: ICD-10-CM

## 2022-02-22 DIAGNOSIS — R11.0 NAUSEA: ICD-10-CM

## 2022-02-22 PROCEDURE — 4130F TOPICAL PREP RX AOE: CPT | Performed by: STUDENT IN AN ORGANIZED HEALTH CARE EDUCATION/TRAINING PROGRAM

## 2022-02-22 PROCEDURE — G8427 DOCREV CUR MEDS BY ELIG CLIN: HCPCS | Performed by: STUDENT IN AN ORGANIZED HEALTH CARE EDUCATION/TRAINING PROGRAM

## 2022-02-22 PROCEDURE — G8417 CALC BMI ABV UP PARAM F/U: HCPCS | Performed by: STUDENT IN AN ORGANIZED HEALTH CARE EDUCATION/TRAINING PROGRAM

## 2022-02-22 PROCEDURE — 99214 OFFICE O/P EST MOD 30 MIN: CPT | Performed by: STUDENT IN AN ORGANIZED HEALTH CARE EDUCATION/TRAINING PROGRAM

## 2022-02-22 PROCEDURE — 99212 OFFICE O/P EST SF 10 MIN: CPT | Performed by: STUDENT IN AN ORGANIZED HEALTH CARE EDUCATION/TRAINING PROGRAM

## 2022-02-22 PROCEDURE — 4004F PT TOBACCO SCREEN RCVD TLK: CPT | Performed by: STUDENT IN AN ORGANIZED HEALTH CARE EDUCATION/TRAINING PROGRAM

## 2022-02-22 PROCEDURE — G8482 FLU IMMUNIZE ORDER/ADMIN: HCPCS | Performed by: STUDENT IN AN ORGANIZED HEALTH CARE EDUCATION/TRAINING PROGRAM

## 2022-02-22 RX ORDER — MECLIZINE HYDROCHLORIDE 25 MG/1
TABLET ORAL
Qty: 30 TABLET | Refills: 2 | Status: SHIPPED
Start: 2022-02-22 | End: 2022-04-11

## 2022-02-22 RX ORDER — CIPROFLOXACIN AND DEXAMETHASONE 3; 1 MG/ML; MG/ML
4 SUSPENSION/ DROPS AURICULAR (OTIC) 2 TIMES DAILY
Qty: 7.5 ML | Refills: 0 | Status: SHIPPED | OUTPATIENT
Start: 2022-02-22 | End: 2022-03-01

## 2022-02-22 NOTE — PROGRESS NOTES
S: 28 y.o. female who presents with soft tissue infection in umbilicus that has been going on for several weeks as well as dysuria x 1 week. Bactrim was given and symptoms resolved. Lastly, she has had longstanding nausea to discuss. The soft tissue infection was tx with doxycycline for 10 days, mupiricin ointment topically. Still having mild pain and purulent drainage. Nausea x several months. Vague complaint. Occasionally associated with migraines. Seems to occur anytime she gets vertigo. Vertigo symptoms can last for a few hours. Not positional per the patient. She had used antivert previously for these symptoms and it helped. O: VS: /83   Pulse 89   Temp 97.6 °F (36.4 °C) (Temporal)   Ht 5' 8\" (1.727 m)   Wt 295 lb (133.8 kg)   SpO2 97%   BMI 44.85 kg/m²    General: NAD, appropriate affect and grooming   CV:  RRR, no gallops, rubs, or murmurs   Resp: CTAB   Abd:  Soft, nontender   Ext:  No edema              Neuro:  Normal movement/strength/alternating movements; normal Romberg; minimal discrpancy in sensation right compared to left side of face/body. Skin:  Minimal erythema, no induration or swelling, there is yellow discharge deep within the umbilicus with some mild bleeding. Impression:   1. Umbilical cellulitis  2. Otitis externa  3. UTI-resolved  4. Vertigo-concern for BPPV    Plan:   continue mupirocin while awaiting culture results  Send cultures to lab to identify  cipro otic ggts  Refer for vestibular therapy        Attending Physician Statement  I have discussed the case, including pertinent history and exam findings with the resident. I also have seen the patient and performed key portions of the examination. I agree with the documented assessment and plan.

## 2022-02-22 NOTE — PROGRESS NOTES
[facility]  FAMILY MEDICINE RESIDENCY PROGRAM  DATE OF VISIT : 2022    Patient : Maya Maurer   Age : 28 y.o.  : 1990   MRN : [id number]   ______________________________________________________________________    Chief Complaint :   Chief Complaint   Patient presents with    Wound Check     right foot no changes     Nausea       HPI : Maya Maurer is 28 y.o. female who presented to the clinic today for follow up of umbilical wound, and for nausea. Wound-continues to have tenderness and drainage. Denies fever, chills. Nausea -present for several months. No trigger that she knows of. Denies vomiting, diarrhea, constipation, hematemesis, melena, hematochezia, diaphoresis, abdominal pain. Nausea is bad enough that she needs antiemetics. Says gets bad enough that she does not want to eat or drink anything. Sometimes gets nausea with migrianes, sometimes without. Lasts for a couple fo hours. Nothing makes better. Reports dizziness where it feels like \"everything\" is spinning. Nausea always comes with dizziness. Happens every day, no particular time. Doesn't matter what activity is happening. No blurred or double vision. Occasional heartburn, but on protonix    Of note from prior visit, symptoms of dysuria have relieved. During exam, erythema of external ear canals noted. When asked, patient denies q-tip use but states those have been bothering her and having drainage lately too.     Past Medical History :  Past Medical History:   Diagnosis Date    ADHD     Arthritis     Asthma     controlled    Chronic midline low back pain without sciatica 2017    COPD (chronic obstructive pulmonary disease) ([facility])     Depression     GERD (gastroesophageal reflux disease)     IBS (irritable bowel syndrome)     Migraines     Seizures ([address]     last episode 2016    Thyroid disease      Past Surgical History:   Procedure Laterality Date    COLONOSCOPY      EYE SURGERY      probing of ductal system right eye     FRACTURE SURGERY      R ELBOW CRUSH INJURY W PLATE AND PINS PLACED    PAIN MANAGEMENT PROCEDURE N/A 8/17/2021    T12-L1 EPIDURAL STEROID INJECTION #1 performed by Zuleima Quintero DO at [facility] N/A 12/21/2021    LUMBAR EPIDURAL STEROID INJECTION T12-L1 performed by Zuleima Quintero DO at [facility] W/BIOPSY SINGLE/MULTIPLE  10/29/2018    COLONOSCOPY WITH BIOPSY performed by Juan Hinkle MD at [address] Y/O N/A 5/4/2018    [facility] 10/29/2020    EGD BIOPSY performed by Lucy Tanner MD at [address] : Allergies   Allergen Reactions    Latex Rash    Aspirin-Acetaminophen-Caffeine Shortness Of Breath    Dye [Iodides] Shortness Of Breath    Penicillins Anaphylaxis    Topamax [Topiramate] Nausea And Vomiting, Other (See Comments) and Shortness Of Breath    Tylenol With Codeine #3 [Acetaminophen-Codeine] Shortness Of Breath    Lactose Other (See Comments)     Usually just causes stomach pain, diarrhea if pt consumes too much of it.      Blueberry Flavor      ALLERGIC TO BLUEBERRY    Fish-Derived Products      SEAFOOD, ESPECIALLY SHRIMP    Iodine      Seafood allergy    Lidocaine Diarrhea, Itching and Swelling    [place] Flavor Hives     [place] fruit    Vicodin [Hydrocodone-Acetaminophen]      Acts drunk         Medication List :    Current Outpatient Medications   Medication Sig Dispense Refill    meclizine (ANTIVERT) 25 MG tablet take 1 tablet by mouth three times a day if needed for dizziness 30 tablet 2    ciprofloxacin-dexamethasone (CIPRODEX) 0.3-0.1 % otic suspension Place 4 drops into both ears 2 times daily for 7 days 7.5 mL 0    loratadine (CLARITIN) 10 MG tablet Take 1 tablet by mouth daily 30 tablet 3    guaiFENesin (SM TUSSIN MUCUS+CHEST CONGEST) 100 MG/5ML liquid take 10 milliliters by mouth every 4 hours if needed for cough 236 mL 2    mupirocin (BACTROBAN) 2 % cream Apply 3 times daily. 30 g 2    budesonide-formoterol (SYMBICORT) 80-4.5 MCG/ACT AERO inhale 2 puffs by mouth daily 10.2 g 1    montelukast (SINGULAIR) 10 MG tablet Take 1 tablet by mouth daily 30 tablet 3    levothyroxine (SYNTHROID) 75 MCG tablet take 1 tablet by mouth once daily 90 tablet 1    baclofen (LIORESAL) 20 MG tablet Take 1 tablet by mouth every morning AND 1 tablet Daily with lunch AND 2 tablets nightly. 360 tablet 3    gabapentin (NEURONTIN) 300 MG capsule take 2 capsules by mouth three times a day 540 capsule 0    pantoprazole (PROTONIX) 40 MG tablet take 1 tablet by mouth EVERY MORNING BEFORE BREAKFAST 90 tablet 1    diclofenac sodium (VOLTAREN) 1 % GEL apply 2 grams topically four times a day 100 g 2    BANOPHEN 25 MG capsule take 1 capsule by mouth every evening if needed for itching or allergies 30 capsule 2    mometasone (ELOCON) 0.1 % lotion apply 3 to 4 drops at bedtime for 10 days for itching 60 mL 3    chlorhexidine (PERIDEX) 0.12 % solution RINSE WITH 1/2 OUNCE BY MOUTH FOR 30 SECONDS THEN SPIT OUT. USE TWICE DAILY. 473 mL 5    albuterol sulfate HFA (VENTOLIN HFA) 108 (90 Base) MCG/ACT inhaler Inhale 2 puffs into the lungs 4 times daily as needed for Wheezing 18 g 5    Benzoyl Peroxide 2.5 % GEL Apply 1 cm topically 2 times daily 60 g 1    dantrolene (DANTRIUM) 25 MG capsule Take 1 capsule by mouth 2 times daily AND 2 capsules nightly.  360 capsule 3    escitalopram (LEXAPRO) 20 MG tablet take 1 tablet by mouth once daily 120 tablet 3    dicyclomine (BENTYL) 20 MG tablet Take 2 tablets by mouth 4 times daily 120 tablet 3    divalproex (DEPAKOTE) 250 MG DR tablet take 1 tablet by mouth twice a day 180 tablet 0    vitamin D (ERGOCALCIFEROL) 1.25 MG (89639 UT) CAPS capsule Take 1 capsule by mouth Twice a Week 24 capsule 1    medroxyPROGESTERone (PROVERA) 10 MG tablet Take 1 tablet by mouth daily 30 tablet 11    Saline GEL 1 spray by Nasal route daily as needed       No current facility-administered medications for this visit.        ______________________________________________________________________    Physical Exam :    Vitals: /83   Pulse 89   Temp 97.6 °F (36.4 °C) (Temporal)   Ht 5' 8\" (1.727 m)   Wt 295 lb (133.8 kg)   SpO2 97%   BMI 44.85 kg/m²   General Appearance: Awake, alert, oriented, and in NAD  HEENT: NCAT, no pallor or icterus. External canals erythematous with possible clear drainage. TM normal B/L  Neck: Symmetrical, trachea midline. Chest wall/Lung: CTAB, respirations unlabored. No ronchi/wheezing/rales   Heart: RRR, normal S1 and S2, no murmurs, rubs or gallops  Abdomen: SND, tenderness only around periumbilical region  Skin: Umbilicus wound with mild erythema in umbilicus, fissure previously noted not appreciated. Some drainage present. No fluctuance, no induration  Neurologic: Alert&Oriented x3. No focal motor deficits detected. CN II-XII grossly intact. Reports dizziness with Romberg but no swaying present, so negative. Finger to nose intact. Rapid movements intact. Best horizontal gaze intact. [place]-Hallpike without nystagmus, but reports dizziness with maneuver. Psychiatric: Normal mood. Normal affect. Normal behavior  ______________________________________________________________________    Assessment & Plan :    Dizziness  Benign paroxysmal positional vertigo, unspecified laterality  Nausea  · Chronic nausea most likely due to dizziness  · barbara hallpike mildly suggestive of BPPV  · antivert as needed, and trial of vestibular rehab  · If no improvement in dizziness with nausea, consider further work up  · Of note, patient has IBS history that may also contribute to nausea  - meclizine (ANTIVERT) 25 MG tablet; take 1 tablet by mouth three times a day if needed for dizziness  Dispense: 30 tablet;  Refill: 2  - PT vestibular rehab; Future    Soft tissue infection  · Wound culture sent to evaluate for infection as healing is prolonged  · Continue mupirocin ointment  - Culture, Wound; Future    Acute otitis externa of both ears, unspecified type  · Suspected OE bilaterally, trial of ciprodex  - ciprofloxacin-dexamethasone (CIPRODEX) 0.3-0.1 % otic suspension; Place 4 drops into both ears 2 times daily for 7 days  Dispense: 7.5 mL; Refill: 0      Additional plan and future considerations:       Return to Office: Return 2-4 weeks, for assess response of symptoms to new treatment.     José Bales DO   Case discussed with Dr. Jazz Navarrete

## 2022-02-25 LAB — WOUND/ABSCESS: NORMAL

## 2022-03-04 NOTE — PROGRESS NOTES
[facility] - BEHAVIORAL HEALTH SERVICES Pain Management  [facility]  [facility] - BEHAVIORAL HEALTH SERVICES, [facility] [place]    Follow up Note      Gloria Gordon     Date of Visit:  3/8/2022    CC:  Patient presents for follow up   Chief Complaint   Patient presents with    Follow-up     post procedure        HPI:    Pain is somewhat better. Appropriate analgesia with current medications regimen: Not applicable. Change in quality of symptoms:no. Medication side effects:none. Recent diagnostic testing:none. Recent interventional procedures:T12-L1 TAMRA 2021 with 50% relief. She has not been on anticoagulation medications to include ASA, NSAIDS, Plavix, heparin, LMW heparin and warfarin and has not been on herbal supplements.  She is not diabetic.     Imagin lumbar MRI -  EXAMINATION:   MRI OF THE LUMBAR SPINE WITHOUT CONTRAST, 2021 11:46 am       TECHNIQUE:   Multiplanar multisequence MRI of the lumbar spine was performed without the   administration of intravenous contrast.       COMPARISON:   None       HISTORY:   ORDERING SYSTEM PROVIDED HISTORY: L-S radiculopathy   TECHNOLOGIST PROVIDED HISTORY:   Reason for exam:->LS radiculopathy; L low back pain   What reading provider will be dictating this exam?->CRC       FINDINGS:   BONES/ALIGNMENT: There is normal alignment of the spine. The vertebral body   heights are maintained.  The bone marrow signal appears unremarkable.  No   acute fracture is appreciated.       SPINAL CORD: The conus terminates normally.       SOFT TISSUES: No paraspinal mass identified.       Moderate-sized right paracentral disc protrusion is identified at T12-L1.       L1-L2: Small right paracentral disc protrusion is appreciated resulting in   minimal right lateral recess stenosis.       L2-L3: There is no significant disc herniation, spinal canal stenosis or   neural foraminal narrowing.       L3-L4: Mild left foraminal stenosis identified due to encroachment by shallow   disc bulge and facet disease.       L4-L5: Moderate left foraminal stenosis and mild right foraminal stenosis are   identified due to encroachment by disc bulge and facet disease.       L5-S1: Mild right foraminal stenosis is identified secondary to mild facet   arthropathy.           Impression   Moderate left foraminal stenosis and mild right foraminal stenosis at L4-L5.       Mild right foraminal stenosis at L5-S1.       Mild left foraminal stenosis at L3-L4.       Small right paracentral disc protrusion at L1-L2.       Moderate-sized right paracentral disc protrusion at T12-L1.                                          Potential Aberrant Drug-Related Behavior:  None.     Urine Drug Screening:  No opioids initiated.      OARRS report:  12/2021 Consistent     Opioid Agreement:  Date enacted: N/A  Renewal date:    Past Medical History:   Diagnosis Date    ADHD     Arthritis     Asthma     controlled    Chronic midline low back pain without sciatica 11/13/2017    COPD (chronic obstructive pulmonary disease) (HCC)     Depression     GERD (gastroesophageal reflux disease)     IBS (irritable bowel syndrome)     Migraines     Seizures ([address]     last episode 2016    Thyroid disease        Past Surgical History:   Procedure Laterality Date    COLONOSCOPY      EYE SURGERY      probing of ductal system right eye     FRACTURE SURGERY      R ELBOW CRUSH INJURY W PLATE AND PINS PLACED    PAIN MANAGEMENT PROCEDURE N/A 8/17/2021    T12-L1 EPIDURAL STEROID INJECTION #1 performed by Carlyn Medina DO at [address] [place] [address] N/A 12/21/2021    LUMBAR EPIDURAL STEROID INJECTION T12-L1 performed by Carlyn Medina DO at [facility] COLONOSCOPY W/BIOPSY SINGLE/MULTIPLE  10/29/2018    COLONOSCOPY WITH BIOPSY performed by Jamia Hernandez MD at [address] [place] [address] TONSILS,12+ Y/O N/A 5/4/2018    TONSILLECTOMY performed by Lyubov Nuñez MD at [facility] N/A 10/29/2020    EGD BIOPSY performed by Huber Branch MD at [facility]       Prior to Admission medications    Medication Sig Start Date End Date Taking? Authorizing Provider   meclizine (ANTIVERT) 25 MG tablet take 1 tablet by mouth three times a day if needed for dizziness 2/22/22  Yes Geneva A Rech, DO   loratadine (CLARITIN) 10 MG tablet Take 1 tablet by mouth daily 2/15/22  Yes Geneva A Rech, DO   guaiFENesin (SM TUSSIN MUCUS+CHEST CONGEST) 100 MG/5ML liquid take 10 milliliters by mouth every 4 hours if needed for cough 2/15/22  Yes Geneva A Rech, DO   mupirocin (BACTROBAN) 2 % cream Apply 3 times daily. 2/15/22 3/17/22 Yes Geneva A Rech, DO   budesonide-formoterol (SYMBICORT) 80-4.5 MCG/ACT AERO inhale 2 puffs by mouth daily 2/8/22  Yes Elvira Chiu MD   montelukast (SINGULAIR) 10 MG tablet Take 1 tablet by mouth daily 2/8/22  Yes Elvira Chiu MD   levothyroxine (SYNTHROID) 75 MCG tablet take 1 tablet by mouth once daily 2/8/22  Yes Elvira Chiu MD   baclofen (LIORESAL) 20 MG tablet Take 1 tablet by mouth every morning AND 1 tablet Daily with lunch AND 2 tablets nightly. 2/8/22  Yes Elvira Chiu MD   gabapentin (NEURONTIN) 300 MG capsule take 2 capsules by mouth three times a day 2/8/22 2/8/23 Yes Elvira Chiu MD   pantoprazole (PROTONIX) 40 MG tablet take 1 tablet by mouth EVERY MORNING BEFORE BREAKFAST 1/21/22  Yes Ralf Mendez MD   diclofenac sodium (VOLTAREN) 1 % GEL apply 2 grams topically four times a day 1/21/22  Yes Ralf Mendez MD   BANOPHEN 25 MG capsule take 1 capsule by mouth every evening if needed for itching or allergies 1/21/22  Yes Ralf Mendez MD   mometasone (ELOCON) 0.1 % lotion apply 3 to 4 drops at bedtime for 10 days for itching 1/21/22  Yes Ralf Mendez MD   chlorhexidine (PERIDEX) 0.12 % solution RINSE WITH 1/2 OUNCE BY MOUTH FOR 30 SECONDS THEN SPIT OUT. USE TWICE DAILY.  1/21/22  Yes Ralf Mendez MD   albuterol sulfate HFA (VENTOLIN HFA) 108 (90 Base) MCG/ACT inhaler Inhale 2 puffs into the lungs 4 times daily as needed for Wheezing 1/7/22  Yes Geneva A Rech, DO   Benzoyl Peroxide 2.5 % GEL Apply 1 cm topically 2 times daily 10/19/21  Yes Geneva A Rech, DO   dantrolene (DANTRIUM) 25 MG capsule Take 1 capsule by mouth 2 times daily AND 2 capsules nightly. 10/19/21  Yes Geneva A Rech, DO   escitalopram (LEXAPRO) 20 MG tablet take 1 tablet by mouth once daily 10/19/21  Yes Geneva A Rech, DO   dicyclomine (BENTYL) 20 MG tablet Take 2 tablets by mouth 4 times daily 10/19/21  Yes Geneva A Rech, DO   divalproex (DEPAKOTE) 250 MG DR tablet take 1 tablet by mouth twice a day 10/19/21  Yes Geneva A Rech, DO   vitamin D (ERGOCALCIFEROL) 1.25 MG (04812 UT) CAPS capsule Take 1 capsule by mouth Twice a Week 9/9/21  Yes Geneva A Rech, DO   medroxyPROGESTERone (PROVERA) 10 MG tablet Take 1 tablet by mouth daily 7/22/21  Yes Artist MD Rocío   Saline GEL 1 spray by Nasal route daily as needed   Yes Historical Provider, MD   mometasone (ELOCON) 0.1 % cream Apply topically daily Apply topically daily. 10/29/20   Kadeem Elaine MD       Allergies   Allergen Reactions    Latex Rash    Aspirin-Acetaminophen-Caffeine Shortness Of Breath    Dye [Iodides] Shortness Of Breath    Penicillins Anaphylaxis    Topamax [Topiramate] Nausea And Vomiting, Other (See Comments) and Shortness Of Breath    Tylenol With Codeine #3 [Acetaminophen-Codeine] Shortness Of Breath    Lactose Other (See Comments)     Usually just causes stomach pain, diarrhea if pt consumes too much of it.      Blueberry Flavor      ALLERGIC TO BLUEBERRY    Fish-Derived Products      SEAFOOD, ESPECIALLY SHRIMP    Iodine      Seafood allergy    Lidocaine Diarrhea, Itching and Swelling    Soquel Flavor Hives     Nemesio fruit    Vicodin [Hydrocodone-Acetaminophen]      Acts drunk         Social History     Socioeconomic History    Marital status: Single     Spouse name: Not on file    Number of children: Not on file    Years of education: 15    Highest education level: Not on file   Occupational History    Occupation: [employer]     Employer: [employer]     Comment: unable to work due to injury   Tobacco Use    Smoking status: Current Every Day Smoker     Packs/day: 0.25     Years: 1.00     Pack years: 0.25     Types: Cigarettes     Start date: 2020    Smokeless tobacco: Never Used    Tobacco comment: Smokes 1 cig daily   Vaping Use    Vaping Use: Never used   Substance and Sexual Activity    Alcohol use: No     Alcohol/week: 0.0 standard drinks    Drug use: No    Sexual activity: Not on file   Other Topics Concern    Not on file   Social History Narrative    1-2 cups coffee/week     Social Determinants of Health     Financial Resource Strain: Low Risk     Difficulty of Paying Living Expenses: Not hard at all   Food Insecurity: No Food Insecurity    Worried About Running Out of Food in the Last Year: Never true    Tete of Food in the Last Year: Never true   Transportation Needs:     Lack of Transportation (Medical): Not on file    Lack of Transportation (Non-Medical):  Not on file   Physical Activity:     Days of Exercise per Week: Not on file    Minutes of Exercise per Session: Not on file   Stress:     Feeling of Stress : Not on file   Social Connections:     Frequency of Communication with Friends and Family: Not on file    Frequency of Social Gatherings with Friends and Family: Not on file    Attends [employer] Services: Not on file    Active Member of Clubs or Organizations: Not on file    Attends Club or Organization Meetings: Not on file    Marital Status: Not on file   Intimate Partner Violence:     Fear of Current or Ex-Partner: Not on file    Emotionally Abused: Not on file    Physically Abused: Not on file    Sexually Abused: Not on file   Housing Stability:     Unable to Pay for Housing in the Last Year: Not on file    Number of [place] in the Last Year: Not on file    Unstable Housing in the Last Year: Not on file       Family History   Problem Relation Age of Onset    Early Death Maternal Grandfather     Cancer Maternal Grandfather     Asthma Brother     Heart Disease Maternal Aunt     Cancer Maternal Aunt     Heart Disease Maternal Uncle     Cancer Maternal Uncle     High Cholesterol Father     Diabetes Father     High Cholesterol Mother     Cancer Paternal Aunt     Cancer Paternal Uncle     Cancer Maternal Grandmother     Cancer Paternal Grandmother     Cancer Paternal Grandfather        REVIEW OF SYSTEMS:     Vance Prior denies fever/chills, chest pain, shortness of breath, new bowel or bladder complaints. All other review of systems was negative. PHYSICAL EXAMINATION:      /68   Pulse 80   Temp 97.3 °F (36.3 °C) (Infrared)   Resp 16   SpO2 98%     General:      General appearance:pleasant and well-hydrated, in no discomfort and A & O x3  Build:Obese    HEENT:    Head:normocephalic and atraumatic  Sclera: icterus absent    Lungs:    Breathing:Normal expansion. No audible wheezing. Abdomen:    Shape:non-distended and normal    Thoracic spine:    Palpation:midline tenderness. Range of motion:abnormal mildly flexion, extension rotation bilateral and is painful. Lumbar spine:    Palpation:midline tenderness. Range of motion:abnormal mildly Lateral bending, flexion, extension rotation bilateral and is painful. Extremities:    Tremors:None bilaterally upper and lower  Range of motion:pain with internal rotation of hips not done.   Intact:Yes  Edema:Normal    Neurological:    [address]    Dermatology:    Skin:no unusual rashes, no skin lesions    Impression:    LBP and diffuse BLE pain  2021 lumbar MRI shows moderate right paracentral disc protrusion at T12-L1  Follows with neurology for congenital spastic quadriparesis with cognitive involvement     Plan:     Urine screen deferred  OARRS report reviewed              On gabapentin, baclofen, dantrium via neurology  T12-L1 TAMRA #2 with 50% relief, pt would like to repeat  Failed remote PT   Patient encouraged to stay active and to lose weight  Treatment plan discussed with the patient including medication and procedure side effects

## 2022-03-08 ENCOUNTER — OFFICE VISIT (OUTPATIENT)
Dept: PAIN MANAGEMENT | Age: 32
End: 2022-03-08
Payer: COMMERCIAL

## 2022-03-08 ENCOUNTER — PREP FOR PROCEDURE (OUTPATIENT)
Dept: PAIN MANAGEMENT | Age: 32
End: 2022-03-08

## 2022-03-08 VITALS
HEART RATE: 80 BPM | TEMPERATURE: 97.3 F | SYSTOLIC BLOOD PRESSURE: 122 MMHG | DIASTOLIC BLOOD PRESSURE: 68 MMHG | OXYGEN SATURATION: 98 % | RESPIRATION RATE: 16 BRPM

## 2022-03-08 DIAGNOSIS — M54.42 CHRONIC BILATERAL LOW BACK PAIN WITH BILATERAL SCIATICA: ICD-10-CM

## 2022-03-08 DIAGNOSIS — M54.50 ACUTE BILATERAL LOW BACK PAIN WITHOUT SCIATICA: ICD-10-CM

## 2022-03-08 DIAGNOSIS — M51.36 DDD (DEGENERATIVE DISC DISEASE), LUMBAR: ICD-10-CM

## 2022-03-08 DIAGNOSIS — M54.16 LUMBAR RADICULOPATHY: Primary | ICD-10-CM

## 2022-03-08 DIAGNOSIS — M54.16 LUMBAR RADICULOPATHY: ICD-10-CM

## 2022-03-08 DIAGNOSIS — G89.29 CHRONIC BILATERAL LOW BACK PAIN WITH BILATERAL SCIATICA: ICD-10-CM

## 2022-03-08 DIAGNOSIS — G89.4 CHRONIC PAIN SYNDROME: Primary | ICD-10-CM

## 2022-03-08 DIAGNOSIS — M54.41 CHRONIC BILATERAL LOW BACK PAIN WITH BILATERAL SCIATICA: ICD-10-CM

## 2022-03-08 PROCEDURE — G8417 CALC BMI ABV UP PARAM F/U: HCPCS | Performed by: PAIN MEDICINE

## 2022-03-08 PROCEDURE — 4004F PT TOBACCO SCREEN RCVD TLK: CPT | Performed by: PAIN MEDICINE

## 2022-03-08 PROCEDURE — 99213 OFFICE O/P EST LOW 20 MIN: CPT | Performed by: PAIN MEDICINE

## 2022-03-08 PROCEDURE — G8482 FLU IMMUNIZE ORDER/ADMIN: HCPCS | Performed by: PAIN MEDICINE

## 2022-03-08 PROCEDURE — G8427 DOCREV CUR MEDS BY ELIG CLIN: HCPCS | Performed by: PAIN MEDICINE

## 2022-03-08 NOTE — PROGRESS NOTES
Do you currently have any of the following:    Fever: No  Headache:  No  Cough: No  Shortness of breath: No  Exposed to anyone with these symptoms: No         Zelda Ribera [facility] the [facility]. Tari Spears is complaining of pain lower back  The pain is constant. The pain is described as aching, throbbing, shooting and stabbing. Pain is rated on her best day at a 8, on her worst day at a 10, and on average at a 8 on the VAS scale. She took her last dose of gabapentin today    Any procedures since your last visit:     Pacemaker or defibrillator: No managed by     She is not on NSAIDS and is not on anticoagulation medications to include none and is managed by     Medication Contract and Consent for Opioid Use Documents Filed      No documents found                /68   Pulse 80   Temp 97.3 °F (36.3 °C) (Infrared)   Resp 16   SpO2 98%      No LMP recorded. (Menstrual status: Other - See Notes).

## 2022-03-09 NOTE — TELEPHONE ENCOUNTER
Last Appointment:  11/25/2020  Future Appointments   Date Time Provider Donna Page   3/30/2022  2:00 PM Hira Ha [facility]   4/26/2022  2:20 PM [facility]   5/10/2022 11:00 AM Samantha Luo DO [facility]

## 2022-03-18 DIAGNOSIS — Z76.0 MEDICATION REFILL: ICD-10-CM

## 2022-03-18 RX ORDER — MOMETASONE FUROATE 1 MG/G
CREAM TOPICAL
Qty: 45 G | Refills: 3 | OUTPATIENT
Start: 2022-03-18

## 2022-03-18 NOTE — TELEPHONE ENCOUNTER
Last Appointment:  11/25/2020  Future Appointments   Date Time Provider Donna Brunneri   4/13/2022  2:00 PM [facility]   4/26/2022  2:20 PM JOSSE Resendez - CNP [facility]   5/10/2022 11:00 AM DO FREDERICK CorcoranM [facility]IN [facility]

## 2022-03-22 DIAGNOSIS — L08.9 SOFT TISSUE INFECTION: Primary | ICD-10-CM

## 2022-03-24 DIAGNOSIS — L29.9 EAR ITCH: ICD-10-CM

## 2022-03-25 RX ORDER — MOMETASONE FUROATE 1 MG/ML
SOLUTION TOPICAL
Qty: 60 ML | Refills: 3 | Status: SHIPPED
Start: 2022-03-25 | End: 2022-09-06

## 2022-03-25 NOTE — TELEPHONE ENCOUNTER
Last Appointment:  11/25/2020  Future Appointments   Date Time Provider Donna Page   4/13/2022  2:00 PM Eleanor Lesches, DO Kelvin Leigh [facility]   4/26/2022  2:20 PM Mathew Krabbe, APRN - CNP [facility]   5/10/2022 11:00 AM Henriette Bence, DO [facility]

## 2022-03-29 DIAGNOSIS — E55.9 VITAMIN D DEFICIENCY: ICD-10-CM

## 2022-03-29 NOTE — TELEPHONE ENCOUNTER
Last Appointment:  2/22/2022  Future Appointments   Date Time Provider Donna Kaylee   4/13/2022  2:00 PM DO Gail Ovalles [facility]   4/26/2022  2:20 PM JOSSE Sotomayor - CNP [facility]O [facility]   5/10/2022 11:00 AM DO FREDERICK BarrazaM [facility]IN [facility]

## 2022-03-31 RX ORDER — ERGOCALCIFEROL 1.25 MG/1
CAPSULE ORAL
Qty: 24 CAPSULE | Refills: 1 | OUTPATIENT
Start: 2022-03-31

## 2022-04-07 DIAGNOSIS — E55.9 VITAMIN D DEFICIENCY: ICD-10-CM

## 2022-04-07 DIAGNOSIS — K58.9 IRRITABLE BOWEL SYNDROME, UNSPECIFIED TYPE: ICD-10-CM

## 2022-04-07 DIAGNOSIS — R42 DIZZINESS: ICD-10-CM

## 2022-04-08 ENCOUNTER — TELEPHONE (OUTPATIENT)
Dept: PAIN MANAGEMENT | Age: 32
End: 2022-04-08

## 2022-04-08 NOTE — TELEPHONE ENCOUNTER
Procedure was still pending with [facility] today. Spoke with [place] and her new tentative date is 4/28/2022. She verbalized understanding.

## 2022-04-11 RX ORDER — DICYCLOMINE HCL 20 MG
TABLET ORAL
Qty: 120 TABLET | Refills: 3 | Status: SHIPPED
Start: 2022-04-11 | End: 2022-06-30 | Stop reason: ALTCHOICE

## 2022-04-11 RX ORDER — ERGOCALCIFEROL 1.25 MG/1
CAPSULE ORAL
Qty: 24 CAPSULE | Refills: 1 | Status: SHIPPED
Start: 2022-04-11 | End: 2022-10-03

## 2022-04-11 RX ORDER — MECLIZINE HYDROCHLORIDE 25 MG/1
TABLET ORAL
Qty: 30 TABLET | Refills: 2 | Status: SHIPPED
Start: 2022-04-11 | End: 2022-05-25 | Stop reason: SDUPTHER

## 2022-04-13 ENCOUNTER — OFFICE VISIT (OUTPATIENT)
Dept: FAMILY MEDICINE CLINIC | Age: 32
End: 2022-04-13
Payer: COMMERCIAL

## 2022-04-13 VITALS
HEIGHT: 68 IN | OXYGEN SATURATION: 99 % | TEMPERATURE: 99.5 F | WEIGHT: 293 LBS | RESPIRATION RATE: 18 BRPM | HEART RATE: 98 BPM | DIASTOLIC BLOOD PRESSURE: 75 MMHG | SYSTOLIC BLOOD PRESSURE: 127 MMHG | BODY MASS INDEX: 44.41 KG/M2

## 2022-04-13 DIAGNOSIS — G80.0 CONGENITAL SPASTIC QUADRIPARESIS (HCC): ICD-10-CM

## 2022-04-13 DIAGNOSIS — R30.0 DYSURIA: Primary | ICD-10-CM

## 2022-04-13 DIAGNOSIS — R35.0 URINE FREQUENCY: ICD-10-CM

## 2022-04-13 DIAGNOSIS — L98.9 SKIN LESION: Primary | ICD-10-CM

## 2022-04-13 DIAGNOSIS — J02.9 SORE THROAT: ICD-10-CM

## 2022-04-13 DIAGNOSIS — R73.03 PREDIABETES: ICD-10-CM

## 2022-04-13 DIAGNOSIS — L98.9 SKIN LESION: ICD-10-CM

## 2022-04-13 DIAGNOSIS — J45.20 MILD INTERMITTENT ASTHMA WITHOUT COMPLICATION: ICD-10-CM

## 2022-04-13 DIAGNOSIS — Z91.09 ENVIRONMENTAL ALLERGIES: ICD-10-CM

## 2022-04-13 LAB
BACTERIA: ABNORMAL /HPF
BILIRUBIN URINE: NEGATIVE
BILIRUBIN, POC: NEGATIVE
BLOOD URINE, POC: NORMAL
BLOOD, URINE: ABNORMAL
CLARITY, POC: NORMAL
CLARITY: ABNORMAL
COLOR, POC: NORMAL
COLOR: YELLOW
EPITHELIAL CELLS, UA: ABNORMAL /HPF
GLUCOSE URINE, POC: NEGATIVE
GLUCOSE URINE: NEGATIVE MG/DL
HBA1C MFR BLD: 5 %
KETONES, POC: NEGATIVE
KETONES, URINE: NEGATIVE MG/DL
LEUKOCYTE EST, POC: NORMAL
LEUKOCYTE ESTERASE, URINE: ABNORMAL
NITRITE, POC: NEGATIVE
NITRITE, URINE: NEGATIVE
PH UA: 6.5 (ref 5–9)
PH, POC: 7
PROTEIN UA: NEGATIVE MG/DL
PROTEIN, POC: NEGATIVE
RBC UA: ABNORMAL /HPF (ref 0–2)
SPECIFIC GRAVITY UA: 1.01 (ref 1–1.03)
SPECIFIC GRAVITY, POC: 1.02
UROBILINOGEN, POC: NORMAL
UROBILINOGEN, URINE: 0.2 E.U./DL
WBC UA: ABNORMAL /HPF (ref 0–5)

## 2022-04-13 PROCEDURE — 99213 OFFICE O/P EST LOW 20 MIN: CPT | Performed by: STUDENT IN AN ORGANIZED HEALTH CARE EDUCATION/TRAINING PROGRAM

## 2022-04-13 PROCEDURE — 99212 OFFICE O/P EST SF 10 MIN: CPT | Performed by: STUDENT IN AN ORGANIZED HEALTH CARE EDUCATION/TRAINING PROGRAM

## 2022-04-13 PROCEDURE — 81002 URINALYSIS NONAUTO W/O SCOPE: CPT | Performed by: STUDENT IN AN ORGANIZED HEALTH CARE EDUCATION/TRAINING PROGRAM

## 2022-04-13 PROCEDURE — 83036 HEMOGLOBIN GLYCOSYLATED A1C: CPT | Performed by: STUDENT IN AN ORGANIZED HEALTH CARE EDUCATION/TRAINING PROGRAM

## 2022-04-13 RX ORDER — BUDESONIDE AND FORMOTEROL FUMARATE DIHYDRATE 80; 4.5 UG/1; UG/1
AEROSOL RESPIRATORY (INHALATION)
Qty: 10.2 G | Refills: 1 | Status: SHIPPED
Start: 2022-04-13 | End: 2022-05-25 | Stop reason: SDUPTHER

## 2022-04-13 RX ORDER — NITROFURANTOIN 25; 75 MG/1; MG/1
100 CAPSULE ORAL 2 TIMES DAILY
Qty: 10 CAPSULE | Refills: 0 | Status: SHIPPED | OUTPATIENT
Start: 2022-04-13 | End: 2022-04-14

## 2022-04-13 RX ORDER — MONTELUKAST SODIUM 10 MG/1
10 TABLET ORAL DAILY
Qty: 30 TABLET | Refills: 3 | Status: SHIPPED | OUTPATIENT
Start: 2022-04-13

## 2022-04-13 RX ORDER — DANTROLENE SODIUM 25 MG/1
CAPSULE ORAL
Qty: 360 CAPSULE | Refills: 3 | Status: SHIPPED
Start: 2022-04-13 | End: 2022-04-26 | Stop reason: SDUPTHER

## 2022-04-13 RX ORDER — LORATADINE 10 MG/1
10 TABLET ORAL DAILY
Qty: 30 TABLET | Refills: 3 | Status: SHIPPED
Start: 2022-04-13 | End: 2022-06-30

## 2022-04-13 NOTE — PROGRESS NOTES
[address] [facility] RESIDENCY PROGRAM  DATE OF VISIT : 2022    Patient : Chapis Banegas   Age : 28 y.o.  : 1990   MRN : [id number]   ______________________________________________________________________    Chief Complaint :   Chief Complaint   Patient presents with    Urinary Tract Infection    Pharyngitis       HPI : Chapis Banegas is 28 y.o. female who presented to the clinic today for concern of UTI, pharyngitis, and for medication refills. In addition, patient pointed out mole on face that has been painful recently. Urinary tract symptoms-Did have relief after last course of ABx in February for UTI. Current symptoms of burning/dysuria, and low biliateral back pain that is sharp/aching/burning and radiates down into legs has been present for 1.5 weeks. No fever, chills. Not been sexually active lately. Pharyngitis- Sore throat for past 1.5 weeks. Hurts to swallow water, sometimes hurts with eating food, no sensation of anything getting stuck. No ear pain, stuffy/runny nose, fever, chills, nausea, pain with chewing. Needs refill of guaifenesin, reports occasional cough. Takes guaifenesin as needed. Also needs refill of Singulair and Symbicort. Reports using rescue inhaler if walking a lot or if walk up hills, currently using once a day at least. Also smokes 1-2 cigarettes per day. Mole-present several years. Recently sore. No changes to mole. Interested in referral to dermatologist for consideration of freezing off.     Past Medical History :  Past Medical History:   Diagnosis Date    ADHD     Arthritis     Asthma     controlled    Chronic midline low back pain without sciatica 2017    COPD (chronic obstructive pulmonary disease) (HCC)     Depression     GERD (gastroesophageal reflux disease)     IBS (irritable bowel syndrome)     Migraines     Seizures ([address]     last episode 2016    Thyroid disease      Past Surgical History:   Procedure Laterality Date    COLONOSCOPY      EYE SURGERY      probing of ductal system right eye     FRACTURE SURGERY      R ELBOW CRUSH INJURY W PLATE AND PINS PLACED    PAIN MANAGEMENT PROCEDURE N/A 8/17/2021    T12-L1 EPIDURAL STEROID INJECTION #1 performed by José Luis Gutierrez DO at [address] N/A 12/21/2021    LUMBAR EPIDURAL STEROID INJECTION T12-L1 performed by José Luis Gutierrez DO at [facility] W/BIOPSY SINGLE/MULTIPLE  10/29/2018    COLONOSCOPY WITH BIOPSY performed by Annalise Payton MD at [address] Y/O N/A 5/4/2018    TONSILLECTOMY performed by Marybel Chanel MD at [address] [facility] N/A 10/29/2020    EGD BIOPSY performed by Kira Silverio MD at [address] : Allergies   Allergen Reactions    Latex Rash    Aspirin-Acetaminophen-Caffeine Shortness Of Breath    Dye [Iodides] Shortness Of Breath    Penicillins Anaphylaxis    Topamax [Topiramate] Nausea And Vomiting, Other (See Comments) and Shortness Of Breath    Tylenol With Codeine #3 [Acetaminophen-Codeine] Shortness Of Breath    Lactose Other (See Comments)     Usually just causes stomach pain, diarrhea if pt consumes too much of it.      Blueberry Flavor      ALLERGIC TO BLUEBERRY    Fish-Derived Products      SEAFOOD, ESPECIALLY SHRIMP    Iodine      Seafood allergy    Lidocaine Diarrhea, Itching and Swelling    [place] Flavor Hives     [place] fruit    Vicodin [Hydrocodone-Acetaminophen]      Acts drunk         Medication List :    Current Outpatient Medications   Medication Sig Dispense Refill    guaiFENesin (SM TUSSIN MUCUS+CHEST CONGEST) 100 MG/5ML liquid take 10 milliliters by mouth every 4 hours if needed for cough 236 mL 2    budesonide-formoterol (SYMBICORT) 80-4.5 MCG/ACT AERO inhale 2 puffs by mouth daily 10.2 g 1    dantrolene (DANTRIUM) 25 MG capsule Take 1 capsule by mouth 2 times daily AND 2 capsules nightly. 360 capsule 3    montelukast (SINGULAIR) 10 MG tablet Take 1 tablet by mouth daily 30 tablet 3    nitrofurantoin, macrocrystal-monohydrate, (MACROBID) 100 MG capsule Take 1 capsule by mouth 2 times daily for 5 days 10 capsule 0    loratadine (CLARITIN) 10 MG tablet Take 1 tablet by mouth daily 30 tablet 3    dicyclomine (BENTYL) 20 MG tablet take 2 tablets by mouth four times a day 120 tablet 3    meclizine (ANTIVERT) 25 MG tablet take 1 tablet by mouth three times a day if needed for dizziness 30 tablet 2    vitamin D (ERGOCALCIFEROL) 1.25 MG (62259 UT) CAPS capsule take 1 capsule by mouth TWO TIMES PER WEEK 24 capsule 1    mometasone (ELOCON) 0.1 % lotion apply 3 to 4 drops at bedtime for 10 days for itching 60 mL 3    diclofenac sodium (VOLTAREN) 1 % GEL apply 2 grams to affected area four times a day AS NEEDED FOR PAIN 100 g 2    levothyroxine (SYNTHROID) 75 MCG tablet take 1 tablet by mouth once daily 90 tablet 1    baclofen (LIORESAL) 20 MG tablet Take 1 tablet by mouth every morning AND 1 tablet Daily with lunch AND 2 tablets nightly. 360 tablet 3    gabapentin (NEURONTIN) 300 MG capsule take 2 capsules by mouth three times a day 540 capsule 0    pantoprazole (PROTONIX) 40 MG tablet take 1 tablet by mouth EVERY MORNING BEFORE BREAKFAST 90 tablet 1    BANOPHEN 25 MG capsule take 1 capsule by mouth every evening if needed for itching or allergies 30 capsule 2    chlorhexidine (PERIDEX) 0.12 % solution RINSE WITH 1/2 OUNCE BY MOUTH FOR 30 SECONDS THEN SPIT OUT. USE TWICE DAILY.  473 mL 5    albuterol sulfate HFA (VENTOLIN HFA) 108 (90 Base) MCG/ACT inhaler Inhale 2 puffs into the lungs 4 times daily as needed for Wheezing (Patient taking differently: Inhale 2 puffs into the lungs 4 times daily as needed for Wheezing ) 18 g 5    Benzoyl Peroxide 2.5 % GEL Apply 1 cm topically 2 times daily 60 g 1    escitalopram (LEXAPRO) 20 MG tablet take 1 tablet by mouth once daily 120 tablet 3    divalproex (DEPAKOTE) 250 MG DR tablet take 1 tablet by mouth twice a day 180 tablet 0    medroxyPROGESTERone (PROVERA) 10 MG tablet Take 1 tablet by mouth daily 30 tablet 11    Saline GEL 1 spray by Nasal route daily as needed       No current facility-administered medications for this visit.        ______________________________________________________________________    Physical Exam :    Vitals: /75 (Site: Right Upper Arm, Position: Sitting, Cuff Size: Large Adult)   Pulse 98   Temp 99.5 °F (37.5 °C) (Temporal)   Resp 18   Ht 5' 8\" (1.727 m)   Wt 299 lb (135.6 kg)   SpO2 99%   BMI 45.46 kg/m²   General Appearance: Awake, alert, oriented, and in NAD  HEENT: NCAT, no pallor or icterus; mild tenderness, bogginess of bilateral nasal passages. TM normal B/L. No exudate or cobblestoning of pharynx appreciated. Neck: Symmetrical, trachea midline. Chest wall/Lung: CTAB, respirations unlabored. No ronchi/wheezing/rales   Heart: RRR, normal S1 and S2, no murmurs, rubs or gallops  Abdomen: SNTND; suprapubic tenderness present  Extremities: Extremities normal, atraumatic, no cyanosis, clubbing or edema. Skin: 3 mm mole on R face, raised, well demarcated, brownish coloration on surface, lighter color below. No erythema, swelling  Musculokeletal: Mane's punch negative B/L, mild tenderness to palpation of lumbar paraspinal muscles; straight leg negative B/zl  Neurologic: Alert&Oriented x3. No focal motor deficits detected   Psychiatric: Normal mood. Normal affect. Normal behavior  ______________________________________________________________________    Assessment & Plan :    1. Dysuria  · Recurrent UTIs, 5 day course of macrobid, rule out diabetes  · Urine culture ordered  - Urinalysis; Future  - POCT Urinalysis no Micro  - nitrofurantoin, macrocrystal-monohydrate, (MACROBID) 100 MG capsule; Take 1 capsule by mouth 2 times daily for 5 days  Dispense: 10 capsule; Refill: 0    2. Prediabetes  · Concern for recurrent UTIs  - POCT glycosylated hemoglobin (Hb A1C)    3. Sore throat  · Likely 2/2 to post-nasal drip, restart loratadine     4. Environmental allergies  · Refill:  - guaiFENesin (SM TUSSIN MUCUS+CHEST CONGEST) 100 MG/5ML liquid; take 10 milliliters by mouth every 4 hours if needed for cough  Dispense: 236 mL; Refill: 2  - loratadine (CLARITIN) 10 MG tablet; Take 1 tablet by mouth daily  Dispense: 30 tablet; Refill: 3    5. Mild intermittent asthma without complication  · Refill current meds, get PFTs  - budesonide-formoterol (SYMBICORT) 80-4.5 MCG/ACT AERO; inhale 2 puffs by mouth daily  Dispense: 10.2 g; Refill: 1  - montelukast (SINGULAIR) 10 MG tablet; Take 1 tablet by mouth daily  Dispense: 30 tablet; Refill: 3  - Full PFT Study With Bronchodilator; Future    6. Congenital spastic quadriparesis (HCC)  · Refill:  - dantrolene (DANTRIUM) 25 MG capsule; Take 1 capsule by mouth 2 times daily AND 2 capsules nightly. Dispense: 360 capsule; Refill: 3    7. Skin lesion  · Skin lesion, appears benign, acting benign per history  · Referral to dermatology will be placed for consideration of freezing off      Additional plan and future considerations:       Return to Office: Return in about 3 months (around 7/13/2022) for chronic medical conditions, or sooner as needed.     Yanet Mathew DO   Case discussed with Dr. Praveen Wick

## 2022-04-13 NOTE — PROGRESS NOTES
S: 28 y.o. female presents today for: UTI, sore throat. 1) UTI - had uti in feb, completed abx w/ relief. 1.5 weeks of pain/burning a/ bilat lower back pain x1.5 weeks. Denies f/c. No SA. 2) ST 1.5 weeks. Mild Pain w/ swallowing food. Has noticed some postnasal drip. O: VS: /75 (Site: Right Upper Arm, Position: Sitting, Cuff Size: Large Adult)   Pulse 98   Temp 99.5 °F (37.5 °C) (Temporal)   Resp 18   Ht 5' 8\" (1.727 m)   Wt 299 lb (135.6 kg)   SpO2 99%   BMI 45.46 kg/m²      AAO/NAD, appropriate affect for mood  CV:  RRR, no murmur  Resp: CTAB  Throat : not signif erythema,  No cobblestoning. exudative pharyngitis. NP boggy, erythematous. Abd: mild SP ttp, no flank TTP/percussion. Impression/Plan:   1) UTI follow. 2) Sore Throat follow. Attending Physician Statement  I have discussed the case, including pertinent history and exam findings with the resident. I also have seen the patient and performed key portions of the examination. I agree with the documented assessment and plan.       Mike Velez MD

## 2022-04-13 NOTE — PROGRESS NOTES
Patient with mole that has been painful, but otherwise unchanged for years. Referral to dermatology placed.

## 2022-04-14 RX ORDER — NITROFURANTOIN 25; 75 MG/1; MG/1
100 CAPSULE ORAL 2 TIMES DAILY
Qty: 10 CAPSULE | Refills: 0 | Status: SHIPPED | OUTPATIENT
Start: 2022-04-14 | End: 2022-04-19

## 2022-04-20 ENCOUNTER — TELEPHONE (OUTPATIENT)
Dept: PAIN MANAGEMENT | Age: 32
End: 2022-04-20

## 2022-04-20 NOTE — TELEPHONE ENCOUNTER
Call to Clara Pelayo that procedure was approved for 04.28.2022 and that the surgery center should call her a few days before for the pre op call and after 3:00 PM the business day before with the arrival time. Instructed Chad to hold ibuprofen for 24 hours, naprosyn for 4 days and any aspirin containing products or fish oil for 7 days. Instructed to call office back if any questions. Chad verbalized understanding.

## 2022-04-25 NOTE — PROGRESS NOTES
[address] [facility] TINY Jackson, F.A.C.P. Melissa Cheung, DNP, APRN, CNS  Kamila Chapman. Liberty Echevarria, MSN, APRN-FNP-C  Bernie Thomas MSN, APRN, FNP-C  Eric BIGGS PA-C  [address] MSN, APRN, FNP-C  [address] [facility] [address]  Phone: [phone]  Fax: [phone]          Juan F Corbin is a 28 y.o. right handed female     We are following her for ?congenital spastic quadriparesis and mid back pain   She previously followed with Dr. Elizabeth Becerra    She presents alone and remains a good historian. She has not been seen since last year. MRI of the lumbar spine showed multilevel foraminal stenosis and protruding discs but no central canal stenosis. Gabapentin was increased due to her persistent low back pains with radicular symptoms and she was referred to pain management. Physical therapy was not beneficial for her. She has been getting lumbar epidural spinal injections, which have been helpful and is due for one soon. No falls or cauda equina symptoms. She is not overly active because she states it exacerbates her pain. She does not know how to swim. With regard to his spasms, Dantrium and baclofen doses are helpful. She still reports chronic pain in her right ankle from fracture long ago. No chest pain or palpitations  No SOB  No vertigo, lightheadedness or loss of consciousness  No itching or bruising appreciated  No focal limb weakness  No speech or swallowing troubles    ROS otherwise negative      Current Outpatient Medications   Medication Sig Dispense Refill    guaiFENesin (SM TUSSIN MUCUS+CHEST CONGEST) 100 MG/5ML liquid take 10 milliliters by mouth every 4 hours if needed for cough 236 mL 2    budesonide-formoterol (SYMBICORT) 80-4.5 MCG/ACT AERO inhale 2 puffs by mouth daily 10.2 g 1    dantrolene (DANTRIUM) 25 MG capsule Take 1 capsule by mouth 2 times daily AND 2 capsules nightly.  360 capsule 3    montelukast (SINGULAIR) 10 MG tablet Take 1 tablet by mouth daily 30 tablet 3    loratadine (CLARITIN) 10 MG tablet Take 1 tablet by mouth daily 30 tablet 3    dicyclomine (BENTYL) 20 MG tablet take 2 tablets by mouth four times a day 120 tablet 3    meclizine (ANTIVERT) 25 MG tablet take 1 tablet by mouth three times a day if needed for dizziness 30 tablet 2    vitamin D (ERGOCALCIFEROL) 1.25 MG (64677 UT) CAPS capsule take 1 capsule by mouth TWO TIMES PER WEEK 24 capsule 1    mometasone (ELOCON) 0.1 % lotion apply 3 to 4 drops at bedtime for 10 days for itching 60 mL 3    diclofenac sodium (VOLTAREN) 1 % GEL apply 2 grams to affected area four times a day AS NEEDED FOR PAIN 100 g 2    levothyroxine (SYNTHROID) 75 MCG tablet take 1 tablet by mouth once daily 90 tablet 1    baclofen (LIORESAL) 20 MG tablet Take 1 tablet by mouth every morning AND 1 tablet Daily with lunch AND 2 tablets nightly. 360 tablet 3    gabapentin (NEURONTIN) 300 MG capsule take 2 capsules by mouth three times a day 540 capsule 0    pantoprazole (PROTONIX) 40 MG tablet take 1 tablet by mouth EVERY MORNING BEFORE BREAKFAST 90 tablet 1    BANOPHEN 25 MG capsule take 1 capsule by mouth every evening if needed for itching or allergies 30 capsule 2    chlorhexidine (PERIDEX) 0.12 % solution RINSE WITH 1/2 OUNCE BY MOUTH FOR 30 SECONDS THEN SPIT OUT. USE TWICE DAILY.  473 mL 5    albuterol sulfate HFA (VENTOLIN HFA) 108 (90 Base) MCG/ACT inhaler Inhale 2 puffs into the lungs 4 times daily as needed for Wheezing (Patient taking differently: Inhale 2 puffs into the lungs 4 times daily as needed for Wheezing ) 18 g 5    Benzoyl Peroxide 2.5 % GEL Apply 1 cm topically 2 times daily 60 g 1    escitalopram (LEXAPRO) 20 MG tablet take 1 tablet by mouth once daily 120 tablet 3    divalproex (DEPAKOTE) 250 MG DR tablet take 1 tablet by mouth twice a day 180 tablet 0    medroxyPROGESTERone (PROVERA) 10 MG tablet Take 1 tablet by mouth daily 30 tablet 11  Saline GEL 1 spray by Nasal route daily as needed       No current facility-administered medications for this visit. Objective:     /68   Pulse 83   Temp 98.8 °F (37.1 °C)   Ht 5' 8\" (1.727 m)   Wt (!) 301 lb (136.5 kg)   LMP  (LMP Unknown)   SpO2 96%   BMI 45.77 kg/m²      General appearance: alert, appears stated age and cooperative  Head: Normocephalic, without obvious abnormality, atraumatic  Eyes: conjunctivae/corneas clear  Neck: Spastic cervical paraspinal; full range of motion without cervicalgia  Lungs: clear to auscultation bilaterally  Heart: regular rate and rhythm--- no murmur  Extremities: Mild atrophy of calves; no cyanosis or edema  Pulses: 1+ and symmetric  Skin: Skin color, texture, turgor normal. No rashes or lesions       Mental Status: Alert, oriented x4--flat affect but pleasant    Appropriate attention/concentration  Dull intellect but memories intact  Follows all simple commands well    Speech: no dysarthria  Language: No aphasias    Cranial Nerves:  I: smell NA   II: visual acuity  NA   II: visual fields Full to confrontation   II: pupils LATOYA   III,VII: ptosis None   III,IV,VI: extraocular muscles  Full ROM   V: mastication Normal   V: facial light touch sensation  Normal   V,VII: corneal reflex     VII: facial muscle function - upper  Normal   VII: facial muscle function - lower Normal   VIII: hearing Normal   IX: soft palate elevation  Normal   IX,X: gag reflex    XI: trapezius strength  5/5   XI: sternocleidomastoid strength 5/5   XI: neck extension strength  5/5   XII: tongue strength  Normal     Motor:  5/5 throughout  Obese bulk; mildly spastic legs  No drift or abnormal movement    Sensory:  LT normal in all limbs  Vib mildly impaired at ankles L>R    Coordination:   FN, FFM normal    Gait:  Bilateral pes planus  Lumbering    DTR:   3+ throughout    No Mckoy's  No pathological reflexes    Laboratory/Radiology:     MRI lumbar spine May 2021:  Moderate left foraminal stenosis and mild right foraminal stenosis at L4-L5. Mild right foraminal stenosis at L5-S1. Mild left foraminal stenosis at L3-L4. Small right paracentral disc protrusion at L1-L2. Moderate-sized right paracentral disc protrusion at T12-L1. 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 Paresthesias are nonproblematic and her exam is unchanged. Lumbar foraminal stenosis with radiculopathy: Pains have improved on gabapentin and with injections per pain management. No motor weakness    Remote history of seizures: Without recurrence.  Using Depakote per PCP mostly for mood    Carpal tunnel syndrome    Hx migraines: Nonproblematic    Plan:     Continue gabapentin 600 mg TID    Continue Dantrium 25 mg BID and 50 mg HS    Continue baclofen 20 mg AM, 20 mg PM, 40 mg HS    F/u with pain mgmt    Weight management and increasing exercise reviewed    Return to office 6 months or sooner ADRYAN Marie, APRN - CNP  9:59 AM  4/25/2022

## 2022-04-26 ENCOUNTER — OFFICE VISIT (OUTPATIENT)
Dept: NEUROLOGY | Age: 32
End: 2022-04-26
Payer: COMMERCIAL

## 2022-04-26 VITALS
SYSTOLIC BLOOD PRESSURE: 105 MMHG | HEART RATE: 83 BPM | WEIGHT: 293 LBS | DIASTOLIC BLOOD PRESSURE: 68 MMHG | BODY MASS INDEX: 44.41 KG/M2 | HEIGHT: 68 IN | OXYGEN SATURATION: 96 % | TEMPERATURE: 98.8 F

## 2022-04-26 DIAGNOSIS — M54.17 L-S RADICULOPATHY: ICD-10-CM

## 2022-04-26 DIAGNOSIS — G80.0 CONGENITAL SPASTIC QUADRIPARESIS (HCC): Primary | ICD-10-CM

## 2022-04-26 DIAGNOSIS — G56.00 CARPAL TUNNEL SYNDROME, UNSPECIFIED LATERALITY: ICD-10-CM

## 2022-04-26 PROBLEM — N20.0 KIDNEY STONES: Status: RESOLVED | Noted: 2019-05-02 | Resolved: 2022-04-26

## 2022-04-26 PROBLEM — G43.709 CHRONIC MIGRAINE WITHOUT AURA WITHOUT STATUS MIGRAINOSUS, NOT INTRACTABLE: Status: RESOLVED | Noted: 2019-01-14 | Resolved: 2022-04-26

## 2022-04-26 PROCEDURE — G8427 DOCREV CUR MEDS BY ELIG CLIN: HCPCS | Performed by: NURSE PRACTITIONER

## 2022-04-26 PROCEDURE — 99214 OFFICE O/P EST MOD 30 MIN: CPT | Performed by: NURSE PRACTITIONER

## 2022-04-26 PROCEDURE — G8417 CALC BMI ABV UP PARAM F/U: HCPCS | Performed by: NURSE PRACTITIONER

## 2022-04-26 PROCEDURE — 4004F PT TOBACCO SCREEN RCVD TLK: CPT | Performed by: NURSE PRACTITIONER

## 2022-04-26 RX ORDER — BACLOFEN 20 MG/1
TABLET ORAL
Qty: 360 TABLET | Refills: 3 | Status: SHIPPED | OUTPATIENT
Start: 2022-04-26

## 2022-04-26 RX ORDER — DIVALPROEX SODIUM 250 MG/1
TABLET, DELAYED RELEASE ORAL
Qty: 180 TABLET | Refills: 0 | Status: CANCELLED | OUTPATIENT
Start: 2022-04-26

## 2022-04-26 RX ORDER — DANTROLENE SODIUM 25 MG/1
CAPSULE ORAL
Qty: 360 CAPSULE | Refills: 3 | Status: SHIPPED
Start: 2022-04-26 | End: 2022-08-01 | Stop reason: SDUPTHER

## 2022-04-26 RX ORDER — GABAPENTIN 300 MG/1
600 CAPSULE ORAL 3 TIMES DAILY
Qty: 540 CAPSULE | Refills: 0 | Status: SHIPPED
Start: 2022-04-26 | End: 2022-09-06

## 2022-04-26 NOTE — PROGRESS NOTES
Have you been tested for COVID  Yes           Have you been told you were positive for COVID No  Have you had any known exposure to someone that is positive for COVID No  Do you have a cough                   No              Do you have shortness of breath No                 Do you have a sore throat            No                Are you having chills                    No                Are you having muscle aches. No                    Please come to the hospital wearing a mask and have your significant other wear a mask as well. Both of you should check your temperature before leaving to come here,  if it is 100 or higher please call [phone] for instruction. [facility] PAIN MANAGEMENT  INSTRUCTIONS  . .......................................................................................................................................... [x] Parking the day of Surgery is located in the [facility].   Upon entering the door, make immediate right into the surgery reception room    [x]  Bring photo ID and insurance card     [x] You may have a light breakfast day of procedure    [x]  Wear loose comfortable clothing    [x]  Please follow instructions for medications as given per Dr's office    [x] You can expect a call the business day prior to procedure to notify you of your arrival time     [x] Please arrange for     []  Other instructions

## 2022-04-28 ENCOUNTER — HOSPITAL ENCOUNTER (OUTPATIENT)
Dept: GENERAL RADIOLOGY | Age: 32
Setting detail: OUTPATIENT SURGERY
Discharge: HOME | End: 2022-04-30
Attending: PAIN MEDICINE
Payer: COMMERCIAL

## 2022-04-28 ENCOUNTER — HOSPITAL ENCOUNTER (OUTPATIENT)
Age: 32
Setting detail: OUTPATIENT SURGERY
Discharge: HOME | End: 2022-04-28
Attending: PAIN MEDICINE | Admitting: PAIN MEDICINE
Payer: COMMERCIAL

## 2022-04-28 VITALS
OXYGEN SATURATION: 98 % | SYSTOLIC BLOOD PRESSURE: 145 MMHG | BODY MASS INDEX: 44.41 KG/M2 | WEIGHT: 293 LBS | HEIGHT: 68 IN | DIASTOLIC BLOOD PRESSURE: 80 MMHG | RESPIRATION RATE: 16 BRPM | HEART RATE: 71 BPM

## 2022-04-28 DIAGNOSIS — R52 PAIN MANAGEMENT: ICD-10-CM

## 2022-04-28 LAB
HCG, URINE, POC: NEGATIVE
Lab: NORMAL
NEGATIVE QC PASS/FAIL: NORMAL
POSITIVE QC PASS/FAIL: NORMAL

## 2022-04-28 PROCEDURE — 2500000003 HC RX 250 WO HCPCS: Performed by: PAIN MEDICINE

## 2022-04-28 PROCEDURE — 6360000002 HC RX W HCPCS: Performed by: PAIN MEDICINE

## 2022-04-28 PROCEDURE — 62323 NJX INTERLAMINAR LMBR/SAC: CPT | Performed by: PAIN MEDICINE

## 2022-04-28 PROCEDURE — 7100000011 HC PHASE II RECOVERY - ADDTL 15 MIN: Performed by: PAIN MEDICINE

## 2022-04-28 PROCEDURE — 3600000002 HC SURGERY LEVEL 2 BASE: Performed by: PAIN MEDICINE

## 2022-04-28 PROCEDURE — 7100000010 HC PHASE II RECOVERY - FIRST 15 MIN: Performed by: PAIN MEDICINE

## 2022-04-28 PROCEDURE — 6360000004 HC RX CONTRAST MEDICATION: Performed by: PAIN MEDICINE

## 2022-04-28 PROCEDURE — A9579 GAD-BASE MR CONTRAST NOS,1ML: HCPCS | Performed by: PAIN MEDICINE

## 2022-04-28 PROCEDURE — 3209999900 FLUORO FOR SURGICAL PROCEDURES

## 2022-04-28 PROCEDURE — 2709999900 HC NON-CHARGEABLE SUPPLY: Performed by: PAIN MEDICINE

## 2022-04-28 RX ORDER — METHYLPREDNISOLONE ACETATE 40 MG/ML
INJECTION, SUSPENSION INTRA-ARTICULAR; INTRALESIONAL; INTRAMUSCULAR; SOFT TISSUE PRN
Status: DISCONTINUED | OUTPATIENT
Start: 2022-04-28 | End: 2022-04-28 | Stop reason: ALTCHOICE

## 2022-04-28 RX ORDER — LIDOCAINE HYDROCHLORIDE 5 MG/ML
INJECTION, SOLUTION INFILTRATION; INTRAVENOUS PRN
Status: DISCONTINUED | OUTPATIENT
Start: 2022-04-28 | End: 2022-04-28 | Stop reason: ALTCHOICE

## 2022-04-28 ASSESSMENT — PAIN - FUNCTIONAL ASSESSMENT: PAIN_FUNCTIONAL_ASSESSMENT: 0-10

## 2022-04-28 NOTE — OP NOTE
2022    Patient: Jose March  :  1990  Age:  28 y.o. Sex:  female     PRE-OPERATIVE DIAGNOSIS: Lumbar disc displacement, lumbar radiculopathy. POST-OPERATIVE DIAGNOSIS: Same. PROCEDURE: Fluoroscopic guided therapeutic lumbar epidural steroid injection at the T12-L1 level (# 1). SURGEON: AUTUMN Villeda ANESTHESIA: local    ESTIMATED BLOOD LOSS: None.  __________________________________________________    BRIEF HISTORY:  Jose March comes in today for first lumbar epidural injection at T12-L1 level. The potential complications of this procedure were discussed with her again today. She has elected to undergo the aforementioned procedure. Donald complete History & Physical examination were reviewed in depth, a copy of which is in the chart. DESCRIPTION OF PROCEDURE:    After confirming written and informed consent, a time-out was performed and Donald name and date of birth, the procedure to be performed as well as the plan for the location of the needle insertion were confirmed. The patient was brought into the procedure room and placed in the prone position on the fluoroscopy table. A pillow was placed under the patient's lower abdomen/upper pelvis to increase lumbar interlaminar space. Standard monitors were placed, and vital signs were observed throughout the procedure. The area of the lumbar spine was prepped with chloraprep and draped in a sterile manner. The T12-L1 interspace was identified and marked under AP fluoroscopy. The skin and subcutaneous tissues at the above level were anesthestized with 0.5% lidocaine. With intermittent fluoroscopy, an # 18 gauge 3 1/2 tuohy epidural needle was inserted and directed toward the interlaminar space. The needle was slowly advanced using loss of resistance technique and 5 cc glass syringe until the tip of the epidural needle has passed through the ligamentum flavum and entered the epidural space.  AP and lateral fluoroscopic imaging was performed to verify that the epidural needle was properly placed. Negative aspiration of blood and CSF was confirmed. Two ml of Prohance was used for confirmation of even epidural spread under both live lateral and live AP fluoroscopy. After negative aspiration, a solution of 0.5 % Lidocaine 3 ml and 40 mg DepoMedrol was easily injected. The needle was gently removed intact . The patient's back was cleaned and a Band-Aid was placed over the needle insertion point. Disposition the patient tolerated the procedure well and there were no complications . Vital signs remained stable throughout the procedure. The patient was escorted to the recovery area where they remained until discharge and written discharge instructions for the procedure were given. Plan: Josh Burciaga will return to our pain management center as scheduled.      Candace Garcia DO

## 2022-04-28 NOTE — H&P
ANDREA QUIGLEY [facility] - BEHAVIORAL HEALTH SERVICES Pain Management        [address] [facility], [address] [facility]  Dept: [phone]    Procedure History & Physical      Karin Landau     HPI:    Patient  is here for LBP BLE pain for T12-L1 TAMRA  Labs/imaging studies reviewed   All question and concerns addressed including R/B/A associated with the procedure    Past Medical History:   Diagnosis Date    ADHD     Arthritis     Asthma     controlled    Chronic midline low back pain without sciatica 11/13/2017    COPD (chronic obstructive pulmonary disease) (HCC)     Depression     GERD (gastroesophageal reflux disease)     IBS (irritable bowel syndrome)     Migraines     Seizures ([facility] [address]     last episode 2016    Thyroid disease        Past Surgical History:   Procedure Laterality Date    COLONOSCOPY      EYE SURGERY      probing of ductal system right eye     FRACTURE SURGERY      R ELBOW CRUSH INJURY W PLATE AND PINS PLACED    PAIN MANAGEMENT PROCEDURE N/A 8/17/2021    T12-L1 EPIDURAL STEROID INJECTION #1 performed by Penelope Duggan DO at [place] [address] N/A 12/21/2021    LUMBAR EPIDURAL STEROID INJECTION T12-L1 performed by Penelope Dgugan DO at [facility] COLONOSCOPY W/BIOPSY SINGLE/MULTIPLE  10/29/2018    COLONOSCOPY WITH BIOPSY performed by Macario Rivero MD at [address] [place] [address] TONSILS,12+ Y/O N/A 5/4/2018    TONSILLECTOMY performed by Kenia Delacruz MD at [address] [place] [address] N/A 10/29/2020    EGD BIOPSY performed by Laurita Grullon MD at [place] [address]       Prior to Admission medications    Medication Sig Start Date End Date Taking? Authorizing Provider   baclofen (LIORESAL) 20 MG tablet Take 1 tablet by mouth every morning AND 1 tablet Daily with lunch AND 2 tablets nightly. 4/26/22   Ankit Peña APRN - CNP   dantrolene (DANTRIUM) 25 MG capsule Take 1 capsule by mouth 2 times daily AND 2 capsules nightly.  4/26/22   Sánchez Sebastian JOSSE Vargas CNP   gabapentin (NEURONTIN) 300 MG capsule Take 2 capsules by mouth 3 times daily for 90 days. 4/26/22 7/25/22  JOSSE Ventura CNP   guaiFENesin (SM TUSSIN MUCUS+CHEST CONGEST) 100 MG/5ML liquid take 10 milliliters by mouth every 4 hours if needed for cough 4/13/22   Alexandra Iba, DO   budesonide-formoterol (SYMBICORT) 80-4.5 MCG/ACT AERO inhale 2 puffs by mouth daily 4/13/22   Racheal Cruz Rech, DO   montelukast (SINGULAIR) 10 MG tablet Take 1 tablet by mouth daily 4/13/22   Alexandra Iba, DO   loratadine (CLARITIN) 10 MG tablet Take 1 tablet by mouth daily 4/13/22   Santi Yuan, DO   dicyclomine (BENTYL) 20 MG tablet take 2 tablets by mouth four times a day 4/11/22   Zahra Poole MD   meclizine (ANTIVERT) 25 MG tablet take 1 tablet by mouth three times a day if needed for dizziness 4/11/22   Zahra Poole MD   vitamin D (ERGOCALCIFEROL) 1.25 MG (79686 UT) CAPS capsule take 1 capsule by mouth TWO TIMES PER WEEK 4/11/22   Zahra Poole MD   mometasone (ELOCON) 0.1 % lotion apply 3 to 4 drops at bedtime for 10 days for itching 3/25/22   Zahra Poole MD   diclofenac sodium (VOLTAREN) 1 % GEL apply 2 grams to affected area four times a day AS NEEDED FOR PAIN 3/9/22   Zahra Poole MD   levothyroxine (SYNTHROID) 75 MCG tablet take 1 tablet by mouth once daily 2/8/22   Clotilde Mendez MD   pantoprazole (PROTONIX) 40 MG tablet take 1 tablet by mouth EVERY MORNING BEFORE BREAKFAST 1/21/22   Zahra Poole MD   BANOPHEN 25 MG capsule take 1 capsule by mouth every evening if needed for itching or allergies 1/21/22   Zahra Poole MD   chlorhexidine (PERIDEX) 0.12 % solution RINSE WITH 1/2 OUNCE BY MOUTH FOR 30 SECONDS THEN SPIT OUT. USE TWICE DAILY.  1/21/22   Zahra Poole MD   albuterol sulfate HFA (VENTOLIN HFA) 108 (90 Base) MCG/ACT inhaler Inhale 2 puffs into the lungs 4 times daily as needed for Wheezing  Patient taking differently: Inhale 2 puffs into the lungs 4 times daily as needed for Wheezing  1/7/22   Lakshmi Villasenor, DO   Benzoyl Peroxide 2.5 % GEL Apply 1 cm topically 2 times daily 10/19/21   Lakshmi Villasenor DO   escitalopram (LEXAPRO) 20 MG tablet take 1 tablet by mouth once daily 10/19/21   Lakshmi Villasenor, DO   divalproex (DEPAKOTE) 250 MG DR tablet take 1 tablet by mouth twice a day 10/19/21   Lakshmi Villasenor, DO   medroxyPROGESTERone (PROVERA) 10 MG tablet Take 1 tablet by mouth daily 7/22/21   Cuba Chatman MD   mometasone (ELOCON) 0.1 % cream Apply topically daily Apply topically daily. 10/29/20   Merlyn Novoa MD   Saline GEL 1 spray by Nasal route daily as needed    Historical Provider, MD       Allergies   Allergen Reactions    Latex Rash    Aspirin-Acetaminophen-Caffeine Shortness Of Breath    Dye [Iodides] Shortness Of Breath    Penicillins Anaphylaxis    Topamax [Topiramate] Nausea And Vomiting, Other (See Comments) and Shortness Of Breath    Tylenol With Codeine #3 [Acetaminophen-Codeine] Shortness Of Breath    Lactose Other (See Comments)     Usually just causes stomach pain, diarrhea if pt consumes too much of it.      Blueberry Flavor      ALLERGIC TO BLUEBERRY    Fish-Derived Products      SEAFOOD, ESPECIALLY SHRIMP    Iodine      Seafood allergy    Lidocaine Diarrhea, Itching and Swelling    Nemesio Flavor Hives     Nemeiso fruit    Vicodin [Hydrocodone-Acetaminophen]      Acts drunk         Social History     Socioeconomic History    Marital status: Single     Spouse name: Not on file    Number of children: Not on file    Years of education: 12    Highest education level: Not on file   Occupational History    Occupation:      Employer: [address] [place] [address]     Comment: unable to work due to injury   Tobacco Use    Smoking status: Current Every Day Smoker     Packs/day: 0.50     Years: 1.00     Pack years: 0.50     Types: Cigarettes     Start date: 2020    Smokeless tobacco: Never Used    Tobacco comment: Smokes 1 cig daily Vaping Use    Vaping Use: Never used   Substance and Sexual Activity    Alcohol use: No     Alcohol/week: 0.0 standard drinks    Drug use: No    Sexual activity: Not on file   Other Topics Concern    Not on file   Social History Narrative    1-2 cups coffee/week     Social Determinants of Health     Financial Resource Strain: Low Risk     Difficulty of Paying Living Expenses: Not hard at all   Food Insecurity: No Food Insecurity    Worried About Running Out of Food in the Last Year: Never true    Tete of Food in the Last Year: Never true   Transportation Needs:     Lack of Transportation (Medical): Not on file    Lack of Transportation (Non-Medical):  Not on file   Physical Activity:     Days of Exercise per Week: Not on file    Minutes of Exercise per Session: Not on file   Stress:     Feeling of Stress : Not on file   Social Connections:     Frequency of Communication with Friends and Family: Not on file    Frequency of Social Gatherings with Friends and Family: Not on file    Attends Mandaeism Services: Not on file    Active Member of [address] [place] [address] or Organizations: Not on file    Attends Club or Organization Meetings: Not on file    Marital Status: Not on file   Intimate Partner Violence:     Fear of Current or Ex-Partner: Not on file    Emotionally Abused: Not on file    Physically Abused: Not on file    Sexually Abused: Not on file   Housing Stability:     Unable to Pay for Housing in the Last Year: Not on file    Number of [place] in the Last Year: Not on file    Unstable Housing in the Last Year: Not on file       Family History   Problem Relation Age of Onset    Early Death Maternal Grandfather     Cancer Maternal Grandfather     Asthma Brother     Heart Disease Maternal Aunt     Cancer Maternal Aunt     Heart Disease Maternal Uncle     Cancer Maternal Uncle     High Cholesterol Father     Diabetes Father     High Cholesterol Mother     Cancer Paternal Romelia Hedge Cancer Paternal Uncle     Cancer Maternal Grandmother     Cancer Paternal Grandmother     Cancer Paternal Grandfather          REVIEW OF SYSTEMS:    CONSTITUTIONAL:  negative for  fevers, chills, sweats and fatigue    RESPIRATORY:  negative for  dry cough, cough with sputum, dyspnea, wheezing and chest pain    CARDIOVASCULAR:  negative for chest pain, dyspnea, palpitations, syncope    GASTROINTESTINAL:  negative for nausea, vomiting, change in bowel habits, diarrhea, constipation and abdominal pain    MUSCULOSKELETAL: negative for muscle weakness    SKIN: negative for itching or rashes. BEHAVIOR/PSYCH:  negative for poor appetite, increased appetite, decreased sleep and poor concentration    All other systems negative      PHYSICAL EXAM:    VITALS:  /82   Pulse 81   Resp 20   Ht 5' 8\" (1.727 m)   Wt 299 lb (135.6 kg)   LMP  (LMP Unknown)   SpO2 94%   BMI 45.46 kg/m²     CONSTITUTIONAL:  awake, alert, cooperative, no apparent distress, and appears stated age    EYES: PERRLA, EOMI    LUNGS:  No increased work of breathing, no audible wheezing    CARDIOVASCULAR:  regular rate and rhythm    ABDOMEN:  Soft non tender non distended     EXTREMITIES: no signs of clubbing or cyanosis. MUSCULOSKELETAL: negative for flaccid muscle tone or spastic movements. SKIN: gross examination reveals no signs of rashes, or diaphoresis. NEURO: Cranial nerves II-XII grossly intact. No signs of agitated mood.        Assessment/Plan:    LBP BLE pain for T12-L1 TAMRA

## 2022-05-25 ENCOUNTER — OFFICE VISIT (OUTPATIENT)
Dept: FAMILY MEDICINE CLINIC | Age: 32
End: 2022-05-25
Payer: COMMERCIAL

## 2022-05-25 VITALS
RESPIRATION RATE: 18 BRPM | DIASTOLIC BLOOD PRESSURE: 70 MMHG | OXYGEN SATURATION: 96 % | WEIGHT: 293 LBS | HEART RATE: 79 BPM | BODY MASS INDEX: 44.41 KG/M2 | SYSTOLIC BLOOD PRESSURE: 113 MMHG | TEMPERATURE: 98.1 F | HEIGHT: 68 IN

## 2022-05-25 DIAGNOSIS — K21.9 GASTROESOPHAGEAL REFLUX DISEASE, UNSPECIFIED WHETHER ESOPHAGITIS PRESENT: ICD-10-CM

## 2022-05-25 DIAGNOSIS — M54.50 ACUTE BILATERAL LOW BACK PAIN WITHOUT SCIATICA: ICD-10-CM

## 2022-05-25 DIAGNOSIS — R42 DIZZINESS: Primary | ICD-10-CM

## 2022-05-25 DIAGNOSIS — N39.0 URINARY TRACT INFECTION WITHOUT HEMATURIA, SITE UNSPECIFIED: ICD-10-CM

## 2022-05-25 DIAGNOSIS — B96.89 ACUTE BACTERIAL SINUSITIS: ICD-10-CM

## 2022-05-25 DIAGNOSIS — R11.0 NAUSEA: ICD-10-CM

## 2022-05-25 DIAGNOSIS — H60.503 ACUTE OTITIS EXTERNA OF BOTH EARS, UNSPECIFIED TYPE: ICD-10-CM

## 2022-05-25 DIAGNOSIS — J01.90 ACUTE BACTERIAL SINUSITIS: ICD-10-CM

## 2022-05-25 DIAGNOSIS — Z76.0 MEDICATION REFILL: ICD-10-CM

## 2022-05-25 DIAGNOSIS — Z91.09 ENVIRONMENTAL ALLERGIES: ICD-10-CM

## 2022-05-25 DIAGNOSIS — T30.0 BURN: ICD-10-CM

## 2022-05-25 DIAGNOSIS — J45.20 MILD INTERMITTENT ASTHMA WITHOUT COMPLICATION: ICD-10-CM

## 2022-05-25 PROCEDURE — 4130F TOPICAL PREP RX AOE: CPT | Performed by: STUDENT IN AN ORGANIZED HEALTH CARE EDUCATION/TRAINING PROGRAM

## 2022-05-25 PROCEDURE — 4004F PT TOBACCO SCREEN RCVD TLK: CPT | Performed by: STUDENT IN AN ORGANIZED HEALTH CARE EDUCATION/TRAINING PROGRAM

## 2022-05-25 PROCEDURE — G8427 DOCREV CUR MEDS BY ELIG CLIN: HCPCS | Performed by: STUDENT IN AN ORGANIZED HEALTH CARE EDUCATION/TRAINING PROGRAM

## 2022-05-25 PROCEDURE — 99213 OFFICE O/P EST LOW 20 MIN: CPT | Performed by: STUDENT IN AN ORGANIZED HEALTH CARE EDUCATION/TRAINING PROGRAM

## 2022-05-25 PROCEDURE — G8417 CALC BMI ABV UP PARAM F/U: HCPCS | Performed by: STUDENT IN AN ORGANIZED HEALTH CARE EDUCATION/TRAINING PROGRAM

## 2022-05-25 PROCEDURE — 99212 OFFICE O/P EST SF 10 MIN: CPT | Performed by: STUDENT IN AN ORGANIZED HEALTH CARE EDUCATION/TRAINING PROGRAM

## 2022-05-25 RX ORDER — CHLORHEXIDINE GLUCONATE 0.12 MG/ML
RINSE ORAL
Qty: 473 ML | Refills: 5 | Status: SHIPPED
Start: 2022-05-25 | End: 2022-09-15 | Stop reason: ALTCHOICE

## 2022-05-25 RX ORDER — CIPROFLOXACIN AND DEXAMETHASONE 3; 1 MG/ML; MG/ML
4 SUSPENSION/ DROPS AURICULAR (OTIC) 2 TIMES DAILY
Qty: 1 EACH | Refills: 0 | Status: SHIPPED | OUTPATIENT
Start: 2022-05-25 | End: 2022-06-01

## 2022-05-25 RX ORDER — ALBUTEROL SULFATE 90 UG/1
2 AEROSOL, METERED RESPIRATORY (INHALATION) 4 TIMES DAILY PRN
Qty: 1 EACH | Refills: 3 | Status: SHIPPED | OUTPATIENT
Start: 2022-05-25

## 2022-05-25 RX ORDER — LORATADINE 10 MG/1
10 TABLET ORAL DAILY
Qty: 90 TABLET | Refills: 3 | Status: SHIPPED
Start: 2022-05-25 | End: 2022-06-30

## 2022-05-25 RX ORDER — MECLIZINE HYDROCHLORIDE 25 MG/1
25 TABLET ORAL 3 TIMES DAILY PRN
Qty: 90 TABLET | Refills: 2 | Status: SHIPPED
Start: 2022-05-25 | End: 2022-09-15 | Stop reason: SDUPTHER

## 2022-05-25 RX ORDER — PREDNISONE 10 MG/1
TABLET ORAL
Qty: 46 TABLET | Refills: 0 | Status: SHIPPED | OUTPATIENT
Start: 2022-05-25 | End: 2022-06-05

## 2022-05-25 RX ORDER — BUDESONIDE AND FORMOTEROL FUMARATE DIHYDRATE 80; 4.5 UG/1; UG/1
AEROSOL RESPIRATORY (INHALATION)
Qty: 10.2 G | Refills: 1 | Status: SHIPPED | OUTPATIENT
Start: 2022-05-25

## 2022-05-25 RX ORDER — DIVALPROEX SODIUM 250 MG/1
TABLET, DELAYED RELEASE ORAL
Qty: 180 TABLET | Refills: 0 | Status: CANCELLED | OUTPATIENT
Start: 2022-05-25

## 2022-05-25 RX ORDER — LEVOFLOXACIN 500 MG/1
500 TABLET, FILM COATED ORAL DAILY
Qty: 5 TABLET | Refills: 0 | Status: SHIPPED | OUTPATIENT
Start: 2022-05-25 | End: 2022-05-30

## 2022-05-25 NOTE — PROGRESS NOTES
daily for 5 days    Burn  -     silver sulfADIAZINE (SILVADENE) 1 % cream; Apply topically daily. Acute bilateral low back pain without sciatica  -     diclofenac sodium (VOLTAREN) 1 % GEL; apply 2 grams to affected area four times a day AS NEEDED FOR PAIN    Environmental allergies  -     guaiFENesin (SM TUSSIN MUCUS+CHEST CONGEST) 100 MG/5ML liquid; take 10 milliliters by mouth every 4 hours if needed for cough    Gastroesophageal reflux disease, unspecified whether esophagitis present  -     Addie Srinivasan MD, General Surgery, [place]    Medication refill  -     chlorhexidine (PERIDEX) 0.12 % solution; RINSE WITH 1/2 OUNCE BY MOUTH FOR 30 SECONDS THEN SPIT OUT. USE TWICE DAILY. Attending Physician Statement  I have discussed the case, including pertinent history and exam findings with the resident. I agree with the documented assessment and plan.
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
## 2022-05-25 NOTE — PATIENT INSTRUCTIONS
10-day course of prednisone:  60 mg daily on days 1 through 5   40 mg on day 6  30 mg on day 7  20 mg on day 8  10 mg on day 9   5 mg on day 10        Patient Education        Epley Maneuver at Home for Vertigo: Exercises  Introduction  Vertigo is a spinning or whirling sensation when you move your head. Your doctor may have moved you in different positions to help your vertigo get better faster. This is called the Epley maneuver. Your doctor also may haveasked you to do these exercises at home. Do the exercises as often as your doctor recommends. If your vertigo is gettingworse, your doctor may have you change the exercise or stop it. Step 1  Step 1    1. Sit on the edge of a bed or sofa. Step 2    1. Turn your head 45 degrees in the direction your doctor told you to. This should be toward the ear that causes the most vertigo for you. In this picture, the woman is turning toward her left ear. Step 3    1. Tilt yourself backward until you are lying on your back. Your head should still be at a 45-degree turn. Your head should be about midway between looking straight ahead and looking out to your side. Hold for 30 seconds. If you have vertigo, stay in this position until it stops. Step 4    1. Turn your head 90 degrees toward the ear that has the least vertigo. In this picture, the woman is turning to the right because she has vertigo on her left side. The point of your chin should be raised and over your shoulder. Hold for 30 seconds. Step 5    1. Roll onto the side with the least vertigo. You should now be looking at the floor. Hold for 30 seconds. Follow-up care is a key part of your treatment and safety. Be sure to make and go to all appointments, and call your doctor if you are having problems. It's also a good idea to know your test results and keep alist of the medicines you take. Where can you learn more? Go to https://donaldo.[email or web address] org and sign in to your [email or web address] account.  Enter [id number] [id number] in the Search Health Information box to learn more about \"Epley Maneuver at Home for Vertigo: Exercises. \"     If you do not have an account, please click on the \"Sign Up Now\" link. Current as of: December 13, 2021               Content Version: 13.2  © 2006-2022 Healthwise, Incorporated. Care instructions adapted under license by [facility] ([facility]). If you have questions about a medical condition or this instruction, always ask your healthcare professional. [place] [address] any warranty or liability for your use of this information. Patient Education        Epley Maneuver for Vertigo: Exercises  Introduction  The Epley maneuver is a series of movements your doctor may use to treat your vertigo. Here are the steps for the exercises. Your doctor or physicaltherapist will guide you through the movements. A single 10- to 15-minute session often is all that's needed. Crystal debris(canaliths) cause the vertigo. When your head is moved into different positions, the debris moves freely. Thismay cause your symptoms to stop. How to do the exercises  Step 1    1. You will sit on the doctor's exam table. Your legs will be out in front of you. The doctor or physical therapist will turn your head so that it is intermediate between looking straight ahead and looking to the side that causes the worst vertigo. 2. Without changing your head position, he or she will guide you back quickly. Your shoulders will be on the table. Your head will hang over the edge of the table. At this point, the side of your head that is causing the worst vertigo will face the floor. You'll stay in this position for 30 seconds or until your symptoms stop. Step 2    1. Then, the doctor or physical therapist will turn your head to the other side. You don't need to lift your head. The other side of your head will face the floor. You will stay in this position for 30 seconds or until your symptoms stop. Step 3    1.  The doctor or physical therapist will help you roll your body in the same direction that your head is facing. You will lie on your side. (For example, if you are looking to your right, you will roll onto your right side.) The side that causes the worst symptoms should be facing up. You'll stay in this position for another 30 seconds or until your symptoms stop. Step 4    1. The doctor or physical therapist will then help you to sit back up. Your legs will hang off the table on the same side that you were facing. Follow-up care is a key part of your treatment and safety. Be sure to make and go to all appointments, and call your doctor if you are having problems. It's also a good idea to know your test results and keep alist of the medicines you take. Where can you learn more? Go to https://EZDOCTORpepiceweb.[employer]. org and sign in to your [employer] account. Enter S923 in the CinemaNow box to learn more about \"Epley Maneuver for Vertigo: Exercises. \"     If you do not have an account, please click on the \"Sign Up Now\" link. Current as of: September 8, 2021               Content Version: 13.2  © 2006-2022 Healthwise, Incorporated. Care instructions adapted under license by [facility] ([facility]). If you have questions about a medical condition or this instruction, always ask your healthcare professional. [address] any warranty or liability for your use of this information.

## 2022-05-31 ENCOUNTER — HOSPITAL ENCOUNTER (EMERGENCY)
Age: 32
Discharge: HOME | End: 2022-05-31
Payer: COMMERCIAL

## 2022-05-31 VITALS
BODY MASS INDEX: 45.16 KG/M2 | SYSTOLIC BLOOD PRESSURE: 117 MMHG | OXYGEN SATURATION: 98 % | DIASTOLIC BLOOD PRESSURE: 86 MMHG | WEIGHT: 293 LBS | HEART RATE: 85 BPM | TEMPERATURE: 98.5 F

## 2022-05-31 DIAGNOSIS — Z20.822 SUSPECTED COVID-19 VIRUS INFECTION: Primary | ICD-10-CM

## 2022-05-31 PROCEDURE — U0003 INFECTIOUS AGENT DETECTION BY NUCLEIC ACID (DNA OR RNA); SEVERE ACUTE RESPIRATORY SYNDROME CORONAVIRUS 2 (SARS-COV-2) (CORONAVIRUS DISEASE [COVID-19]), AMPLIFIED PROBE TECHNIQUE, MAKING USE OF HIGH THROUGHPUT TECHNOLOGIES AS DESCRIBED BY CMS-2020-01-R: HCPCS

## 2022-05-31 PROCEDURE — 99283 EMERGENCY DEPT VISIT LOW MDM: CPT

## 2022-05-31 PROCEDURE — 6370000000 HC RX 637 (ALT 250 FOR IP): Performed by: NURSE PRACTITIONER

## 2022-05-31 PROCEDURE — U0005 INFEC AGEN DETEC AMPLI PROBE: HCPCS

## 2022-05-31 RX ORDER — GUAIFENESIN/DEXTROMETHORPHAN 100-10MG/5
10 SYRUP ORAL 3 TIMES DAILY PRN
Qty: 120 ML | Refills: 0 | Status: SHIPPED | OUTPATIENT
Start: 2022-05-31 | End: 2022-06-10

## 2022-05-31 RX ORDER — GUAIFENESIN/DEXTROMETHORPHAN 100-10MG/5
10 SYRUP ORAL ONCE
Status: COMPLETED | OUTPATIENT
Start: 2022-05-31 | End: 2022-05-31

## 2022-05-31 RX ORDER — ACETAMINOPHEN 500 MG
1000 TABLET ORAL EVERY 6 HOURS PRN
Qty: 30 TABLET | Refills: 0 | Status: SHIPPED | OUTPATIENT
Start: 2022-05-31

## 2022-05-31 RX ORDER — ACETAMINOPHEN 500 MG
1000 TABLET ORAL ONCE
Status: COMPLETED | OUTPATIENT
Start: 2022-05-31 | End: 2022-05-31

## 2022-05-31 RX ORDER — IBUPROFEN 800 MG/1
800 TABLET ORAL ONCE
Status: COMPLETED | OUTPATIENT
Start: 2022-05-31 | End: 2022-05-31

## 2022-05-31 RX ORDER — IBUPROFEN 800 MG/1
800 TABLET ORAL EVERY 8 HOURS PRN
Qty: 30 TABLET | Refills: 0 | Status: SHIPPED | OUTPATIENT
Start: 2022-05-31

## 2022-05-31 RX ADMIN — ACETAMINOPHEN 1000 MG: 500 TABLET ORAL at 16:39

## 2022-05-31 RX ADMIN — GUAIFENESIN SYRUP AND DEXTROMETHORPHAN 10 ML: 100; 10 SYRUP ORAL at 16:39

## 2022-05-31 RX ADMIN — IBUPROFEN 800 MG: 800 TABLET, FILM COATED ORAL at 16:39

## 2022-06-01 LAB — SARS-COV-2, PCR: DETECTED

## 2022-06-01 NOTE — ED PROVIDER NOTES
[address]  Department of Emergency Medicine   ED  Encounter Note  Admit Date/RoomTime: 2022  4:31 PM  ED Room: [place] [address]    NAME: Juan F Corbin  : 1990  MRN: [id number]     Chief Complaint:  Concern For COVID-19 (pos test at home, headache and cough, here for confirmatory test)    History of Present Illness       Juan F Corbin is a 28 y.o. old female who presents to the emergency department by private vehicle, for cough, which began a few day(s) prior to arrival.  Since onset the symptoms have been stable and mild-moderate in severity. The symptoms are associated with headaches. She was exposed to covid-19 1 week ago. She had a positive home test and is here desiring a confirmatory test. She denies any chest pain, shortness of breath or hemoptysis. ROS   Pertinent positives and negatives are stated within HPI, all other systems reviewed and are negative. Past Medical History:  has a past medical history of ADHD, Arthritis, Asthma, Chronic midline low back pain without sciatica, COPD (chronic obstructive pulmonary disease) ([address] Depression, GERD (gastroesophageal reflux disease), IBS (irritable bowel syndrome), Migraines, Seizures ([address] and Thyroid disease. Surgical History:  has a past surgical history that includes eye surgery; Colonoscopy; pr removal of tonsils,12+ y/o (N/A, 2018); pr colonoscopy w/biopsy single/multiple (10/29/2018); Upper gastrointestinal endoscopy (N/A, 10/29/2020); fracture surgery; Pain management procedure (N/A, 2021); Tonsillectomy; Pain management procedure (N/A, 2021); and Pain management procedure (N/A, 2022). Social History:  reports that she has been smoking cigarettes. She started smoking about 2 years ago. She has a 0.50 pack-year smoking history. She has never used smokeless tobacco. She reports that she does not drink alcohol and does not use drugs.     Family History: family history includes Asthma in her brother; Cancer in her maternal aunt, maternal grandfather, maternal grandmother, maternal uncle, paternal aunt, paternal grandfather, paternal grandmother, and paternal uncle; Diabetes in her father; Early Death in her maternal grandfather; Heart Disease in her maternal aunt and maternal uncle; High Cholesterol in her father and mother. Allergies: Latex, Aspirin-acetaminophen-caffeine, Dye [iodides], Penicillins, Topamax [topiramate], Tylenol with codeine #3 [acetaminophen-codeine], Lactose, Blueberry flavor, Fish-derived products, Iodine, Lidocaine, Mulat flavor, and Vicodin [hydrocodone-acetaminophen]    Physical Exam   Oxygen Saturation Interpretation: Normal on room air analysis. ED Triage Vitals   BP Temp Temp src Heart Rate Resp SpO2 Height Weight   05/31/22 1633 05/31/22 1551 -- 05/31/22 1551 -- 05/31/22 1551 -- 05/31/22 1633   117/86 98.5 °F (36.9 °C)  (!) 105  97 %  297 lb (134.7 kg)         · Constitutional:  Alert, development consistent with age. · Ears:  External Ears: Bilateral normal.               TM's & External Canals: normal TM's and external ear canals bilaterally. · Nose:   There is no discharge, swelling or lesions noted. · Sinuses: no Bilateral maxillary sinus tenderness. no Bilateral frontal sinus tenderness. · Mouth:  normal tongue and buccal mucosa. · Throat: no erythema or exudates noted. Teeth and gums normal..  Airway Patent. · Neck:  Supple. No meningeal signs. There is no  preauricular, anterior cervical and posterior cervical node tenderness. · Respiratory:   Breath sounds: Bilateral normal.  Lung sounds: normal.   · CV:  Regular rate and rhythm, normal heart sounds, without pathological murmurs, ectopy, gallops, or rubs. · GI:  Abdomen Soft, nontender, good bowel sounds. No firm or pulsatile mass. · Integument:  Normal turgor. Warm, dry, without visible rash. · Neurological:  Oriented. Motor functions intact.        Lab / Imaging Results   (All laboratory and radiology results have been personally reviewed by myself)  Labs:  No results found for this visit on 05/31/22. Imaging: All Radiology results interpreted by Radiologist unless otherwise noted. No orders to display       ED Course / Medical Decision Making     Medications   acetaminophen (TYLENOL) tablet 1,000 mg (1,000 mg Oral Given 5/31/22 1639)   ibuprofen (ADVIL;MOTRIN) tablet 800 mg (800 mg Oral Given 5/31/22 1639)   guaiFENesin-dextromethorphan (ROBITUSSIN DM) 100-10 MG/5ML syrup 10 mL (10 mLs Oral Given 5/31/22 1639)       Consults:   None    Procedures:   none    Medical Decision Making:   Karin Landau presented to ED with complaints of Concern For COVID-19 (pos test at home, headache and cough, here for confirmatory test)    With low suspicion for pneumonia as per history/physical findings, imaging was not done. With high suspicion for COVID-19, testing was obtained and results are pending. Based on history and examination findings of Karin Landau, the causative nature of illness is likely to be COVID-19 in etiology. Therefore, symptomatic control with supportive meds is considered appropriate at this time. She is not hypoxic. Patient is well appearing, non toxic, meets criteria for and is appropriate for outpatient management. Normal progression of disease discussed. Quarantine for 5 days followed by mask wearing for 5 days discussed. Explained the rationale for symptomatic treatment rather than use of an antibiotic. Instruction provided in the use of fluids, vaporizer, acetaminophen, and other OTC medication for symptom control. Analgesics as needed, dosages and times reviewed. Patient educated on signs and symptoms which require emergent re-evaluation. Assessment     1. Suspected COVID-19 virus infection      Plan   Discharged home.   Patient condition is good    New Medications     Discharge Medication List as of 5/31/2022  4:38 PM START taking these medications    Details   acetaminophen (TYLENOL) 500 MG tablet Take 2 tablets by mouth every 6 hours as needed for Pain or Fever Maximum dose- 8 tablets/24 hours. , Disp-30 tablet, R-0Print      ibuprofen (IBU) 800 MG tablet Take 1 tablet by mouth every 8 hours as needed for Pain or Fever Take with food. , Disp-30 tablet, R-0Print      guaiFENesin-dextromethorphan (ROBITUSSIN DM) 100-10 MG/5ML syrup Take 10 mLs by mouth 3 times daily as needed for Cough, Disp-120 mL, R-0Print           Electronically signed by JOSSE Alexandra CNP   DD: 6/1/22  **This report was transcribed using voice recognition software. Every effort was made to ensure accuracy; however, inadvertent computerized transcription errors may be present.   END OF ED PROVIDER NOTE        JOSSE Marin CNP  06/01/22 0822

## 2022-06-29 RX ORDER — DIPHENHYDRAMINE HYDROCHLORIDE 25 MG/1
CAPSULE ORAL
Qty: 30 CAPSULE | Refills: 2 | OUTPATIENT
Start: 2022-06-29

## 2022-06-30 ENCOUNTER — OFFICE VISIT (OUTPATIENT)
Dept: SURGERY | Age: 32
End: 2022-06-30
Payer: COMMERCIAL

## 2022-06-30 VITALS — WEIGHT: 293 LBS | BODY MASS INDEX: 44.41 KG/M2 | HEIGHT: 68 IN

## 2022-06-30 VITALS
HEART RATE: 83 BPM | BODY MASS INDEX: 44.41 KG/M2 | TEMPERATURE: 98.1 F | HEIGHT: 68 IN | SYSTOLIC BLOOD PRESSURE: 129 MMHG | OXYGEN SATURATION: 94 % | WEIGHT: 293 LBS | DIASTOLIC BLOOD PRESSURE: 70 MMHG

## 2022-06-30 DIAGNOSIS — K21.9 GASTROESOPHAGEAL REFLUX DISEASE WITHOUT ESOPHAGITIS: ICD-10-CM

## 2022-06-30 DIAGNOSIS — R12 HEART BURN: ICD-10-CM

## 2022-06-30 PROCEDURE — G8427 DOCREV CUR MEDS BY ELIG CLIN: HCPCS | Performed by: SURGERY

## 2022-06-30 PROCEDURE — 99213 OFFICE O/P EST LOW 20 MIN: CPT | Performed by: SURGERY

## 2022-06-30 PROCEDURE — 4004F PT TOBACCO SCREEN RCVD TLK: CPT | Performed by: SURGERY

## 2022-06-30 PROCEDURE — 99212 OFFICE O/P EST SF 10 MIN: CPT | Performed by: SURGERY

## 2022-06-30 PROCEDURE — G8417 CALC BMI ABV UP PARAM F/U: HCPCS | Performed by: SURGERY

## 2022-06-30 RX ORDER — HYOSCYAMINE SULFATE 0.12 MG/1
1 TABLET SUBLINGUAL EVERY 8 HOURS
Qty: 120 EACH | Refills: 3 | Status: SHIPPED | OUTPATIENT
Start: 2022-06-30

## 2022-06-30 RX ORDER — PANTOPRAZOLE SODIUM 40 MG/1
40 TABLET, DELAYED RELEASE ORAL 2 TIMES DAILY
Qty: 90 TABLET | Refills: 1 | Status: SHIPPED
Start: 2022-06-30 | End: 2022-08-25 | Stop reason: SDUPTHER

## 2022-06-30 NOTE — PROGRESS NOTES
Progress Note     Subjective:  Lakshmi Richardson is a 31yo female with a Hx of IBS. She has fecal urgency her Bentyl was working well for quite a while but it stopped working recently. She also has a history of reflux and had an EGD on 10- of a small sliding hiatal hernia no esophagitis or gastritis. Last colonoscopy was by me she had 2 polyps 1 at 10 cm with 20 cm    Path EGD - Mild chronic gastritis with focal   intestinal metaplasia; negative for epithelial dysplasia  Path Colonoscopy - Hyperplastic polyps      Objective:   Physical Exam   Constitutional: She is oriented to person, place, and time. She appears well-developed. HENT:   Head: Normocephalic. Eyes: Pupils are equal, round, and reactive to light. EOM are normal.   Neck: Normal range of motion. Neck supple. Cardiovascular: Normal rate. Abdomen- Obese , soft, non tender   Pulmonary/Chest: Effort normal.   Neurological: She is alert and oriented to person, place, and time. Skin: Skin is warm and dry.            Assessment:  IBS  GERD     Plan: Will stop Bentyl and try hyoscyamine  Increase Protonix to twice a day explained to patient considering she has a hiatal hernia if her symptoms are not controlled with the medications I would recommend her follow back up with Dr. Sly Jacob. She had seen in the past and had an EGD done by him but she did not follow-up as she was to have weight loss surgery and could not afford it. Lifestyle and dietary measures : Weight loss and Elevation of the head of   the bed in individuals with nocturnal or laryngeal symptoms (eg, cough,   hoarseness, throat clearing). I also suggest a corollary to this recommendation:   refraining from assuming a supine position after meals and avoidance of meals   two to three hours before bedtime.  We also discussed selective   elimination of dietary triggers (caffeine, chocolate, spicy foods, food with   high fat content, carbonated beverages, and peppermint) in patients who note   correlation with GERD symptoms and an improvement in symptoms with elimination.      Rennis Holter, MD

## 2022-07-01 NOTE — PROGRESS NOTES
Duplicate encounter created      Progress Note     Subjective:  Jose Dumont a 31yo female with a Hx of IBS. She has fecal urgency her Bentyl was working well for quite a while but it stopped working recently. She also has a history of reflux and had an EGD on 10- of a small sliding hiatal hernia no esophagitis or gastritis. Last colonoscopy was by me she had 2 polyps 1 at 10 cm with 20 cm     Path EGD - Mild chronic gastritis with focal   intestinal metaplasia; negative for epithelial dysplasia  Path Colonoscopy - Hyperplastic polyps      Objective:   Physical Exam   Constitutional: She is oriented to person, place, and time. She appears well-developed. HENT:   Head: Normocephalic. Eyes: Pupils are equal, round, and reactive to light. EOM are normal.   Neck: Normal range of motion. Neck supple. Cardiovascular: Normal rate. Abdomen- Obese , soft, non tender   Pulmonary/Chest: Effort normal.   Neurological: She is alert and oriented to person, place, and time. Skin: Skin is warm and dry.            Assessment:  IBS  GERD     Plan:   Will stop Bentyl and try hyoscyamine  Increase Protonix to twice a day explained to patient considering she has a hiatal hernia if her symptoms are not controlled with the medications I would recommend her follow back up with Dr. Jazlyn Phma. She had seen in the past and had an EGD done by him but she did not follow-up as she was to have weight loss surgery and could not afford it.        Lifestyle and dietary measures : Weight loss and Elevation of the head of   the bed in individuals with nocturnal or laryngeal symptoms (eg, cough,   hoarseness, throat clearing). I also suggest a corollary to this recommendation:   refraining from assuming a supine position after meals and avoidance of meals   two to three hours before bedtime.  We also discussed selective   elimination of dietary triggers (caffeine, chocolate, spicy foods, food with   high fat content, carbonated beverages, and peppermint) in patients who note   correlation with GERD symptoms and an improvement in symptoms with elimination.      Bhavya Danielle MD

## 2022-07-08 RX ORDER — DIPHENHYDRAMINE HYDROCHLORIDE 25 MG/1
CAPSULE ORAL
Qty: 30 CAPSULE | Refills: 2 | OUTPATIENT
Start: 2022-07-08

## 2022-07-15 ENCOUNTER — TELEPHONE (OUTPATIENT)
Dept: SURGERY | Age: 32
End: 2022-07-15

## 2022-07-15 NOTE — TELEPHONE ENCOUNTER
MA received reminder to contact patient to check in on symptoms. Patient stating she is still experiencing epigastric pain and burning. Patient states she did  the Hyoscyamine, states she is concerned as they did end up paying out of pocket for the medication and it was rather expensive. Patient states the medication did work though and has been experiencing improved symptoms in regards to the IBS. Patient questioning if there is a cheaper alternative. MA routing message to Dr Melida Rojo informatively/for advisement.      Electronically signed by Jovan Mijares on 7/15/22 at 10:43 AM EDT

## 2022-07-27 ENCOUNTER — TELEPHONE (OUTPATIENT)
Dept: SURGERY | Age: 32
End: 2022-07-27

## 2022-07-27 DIAGNOSIS — R30.0 DYSURIA: ICD-10-CM

## 2022-07-27 DIAGNOSIS — M54.50 ACUTE BILATERAL LOW BACK PAIN WITHOUT SCIATICA: ICD-10-CM

## 2022-07-27 DIAGNOSIS — K44.9 HIATAL HERNIA: Primary | ICD-10-CM

## 2022-07-27 RX ORDER — DIPHENHYDRAMINE HYDROCHLORIDE 25 MG/1
CAPSULE ORAL
Qty: 30 CAPSULE | Refills: 2 | OUTPATIENT
Start: 2022-07-27

## 2022-07-27 RX ORDER — NITROFURANTOIN 25; 75 MG/1; MG/1
CAPSULE ORAL
Qty: 10 CAPSULE | Refills: 0 | OUTPATIENT
Start: 2022-07-27

## 2022-07-27 NOTE — TELEPHONE ENCOUNTER
MA spoke w/ Dr Sammy Ball and informed that her previous gallbladder ultrasound was normal, for her hiatal hernia she can continue to take the PPI BID. To reach out to the insurance to see if any additional forms of Hyosciamine would be covered. That she could re-scope her if she'd like. MA contacted patient to inform. MA informed patient that we have contacted insurance and are waiting for a signature from Dr Sammy Ball to attempt to prior auth the Hyosciamine as there are no alternatives/no additional forms covered. Patient informed about the gallbladder and hiatal hernia, to continue PPI BID. Patient reports continuing PPI with no relief. Patient would like to discuss surgical options for repair. MA routing message to Dr Sammy Ball for advisement.      Electronically signed by Todd Gruber on 7/27/22 at 3:08 PM EDT

## 2022-07-27 NOTE — TELEPHONE ENCOUNTER
----- Message from Randy Selene sent at 6/30/2022  3:08 PM EDT -----  Regarding: call patient  Please call patient in about 2 weeks to check in and see how her symptoms are following starting medications.

## 2022-07-27 NOTE — TELEPHONE ENCOUNTER
Last Appointment:  5/25/2022  Future Appointments   Date Time Provider Donna Page   8/23/2022 10:40 AM MD Tyler Dnuham [facility]   10/24/2022  2:40 PM JOSSE Castellon - CAMILLE Garcia [facility]

## 2022-08-01 DIAGNOSIS — J45.20 MILD INTERMITTENT ASTHMA WITHOUT COMPLICATION: Primary | ICD-10-CM

## 2022-08-01 DIAGNOSIS — G80.0 CONGENITAL SPASTIC QUADRIPARESIS (HCC): ICD-10-CM

## 2022-08-01 RX ORDER — DANTROLENE SODIUM 25 MG/1
CAPSULE ORAL
Qty: 360 CAPSULE | Refills: 3 | Status: SHIPPED | OUTPATIENT
Start: 2022-08-01

## 2022-08-01 RX ORDER — ALBUTEROL SULFATE 90 UG/1
2 AEROSOL, METERED RESPIRATORY (INHALATION) 4 TIMES DAILY PRN
Qty: 18 G | Refills: 5 | Status: SHIPPED | OUTPATIENT
Start: 2022-08-01

## 2022-08-15 DIAGNOSIS — K58.9 IRRITABLE BOWEL SYNDROME, UNSPECIFIED TYPE: ICD-10-CM

## 2022-08-15 RX ORDER — DICYCLOMINE HCL 20 MG
TABLET ORAL
Qty: 120 TABLET | Refills: 3 | OUTPATIENT
Start: 2022-08-15

## 2022-08-16 ENCOUNTER — TELEPHONE (OUTPATIENT)
Dept: ADMINISTRATIVE | Age: 32
End: 2022-08-16

## 2022-08-16 ENCOUNTER — OFFICE VISIT (OUTPATIENT)
Dept: PRIMARY CARE CLINIC | Age: 32
End: 2022-08-16
Payer: COMMERCIAL

## 2022-08-16 VITALS
RESPIRATION RATE: 18 BRPM | BODY MASS INDEX: 44.41 KG/M2 | HEIGHT: 68 IN | WEIGHT: 293 LBS | TEMPERATURE: 97 F | OXYGEN SATURATION: 96 % | HEART RATE: 77 BPM | DIASTOLIC BLOOD PRESSURE: 89 MMHG | SYSTOLIC BLOOD PRESSURE: 124 MMHG

## 2022-08-16 DIAGNOSIS — N23 RENAL COLIC, BILATERAL: Primary | ICD-10-CM

## 2022-08-16 LAB
BILIRUBIN, POC: NORMAL
BLOOD URINE, POC: NORMAL
CLARITY, POC: CLEAR
COLOR, POC: YELLOW
GLUCOSE URINE, POC: NORMAL
KETONES, POC: NORMAL
LEUKOCYTE EST, POC: NORMAL
NITRITE, POC: NORMAL
PH, POC: 7
PROTEIN, POC: NORMAL
SPECIFIC GRAVITY, POC: 1.01
UROBILINOGEN, POC: 0.2

## 2022-08-16 PROCEDURE — 81002 URINALYSIS NONAUTO W/O SCOPE: CPT | Performed by: EMERGENCY MEDICINE

## 2022-08-16 PROCEDURE — 99213 OFFICE O/P EST LOW 20 MIN: CPT | Performed by: EMERGENCY MEDICINE

## 2022-08-16 RX ORDER — LEVOFLOXACIN 500 MG/1
500 TABLET, FILM COATED ORAL DAILY
Qty: 7 TABLET | Refills: 0 | Status: SHIPPED | OUTPATIENT
Start: 2022-08-16 | End: 2022-08-23

## 2022-08-16 ASSESSMENT — ENCOUNTER SYMPTOMS
SORE THROAT: 0
EYE PAIN: 0
BACK PAIN: 0
EYE REDNESS: 0
ABDOMINAL DISTENTION: 0
DIARRHEA: 0
WHEEZING: 0
SINUS PRESSURE: 0
COUGH: 0
EYE DISCHARGE: 0
VOMITING: 0
SHORTNESS OF BREATH: 0
NAUSEA: 0

## 2022-08-16 NOTE — TELEPHONE ENCOUNTER
Pt called and said she was not going to be able to make it to her new pt appt today with Dr. Terry Farrell. Pt was being seen for possible UTI/kidney infection. Unsure if ok to schedule first available new pt appt in September or if pt needs seen sooner. Staff unavailable due to pt care. Please advise.

## 2022-08-16 NOTE — TELEPHONE ENCOUNTER
Called and spoke with patient. Informed her new patient appts for Dr. Pau Mosquera are scheduling into mid September. Advised her to call PCP or go to urgent care for uti like symptoms.

## 2022-08-16 NOTE — PROGRESS NOTES
Chief Complaint:   Urinary Frequency      History of Present Illness   Source of history provided by:  patient. Veronica Perez is a 28 y.o. old female who has a past medical history of:   Past Medical History:   Diagnosis Date    ADHD     Arthritis     Asthma     controlled    Chronic midline low back pain without sciatica 11/13/2017    COPD (chronic obstructive pulmonary disease) (HCC)     Depression     GERD (gastroesophageal reflux disease)     IBS (irritable bowel syndrome)     Migraines     Seizures ([facility] [address]     last episode 2016    Thyroid disease     presents to the [facility] care for dysuria. Pt states the symptoms have progressed over the past 2 days. Pt states they have had increased frequency, lower abdominal pressure, and occasional nausea. positive flank pain. Denies any vaginal discharge, vaginal bleeding, vomiting, diarrhea, or lethargy. No LMP recorded. (Menstrual status: Other - See Notes). ROS   Review of Systems   Constitutional:  Negative for chills and fever. HENT:  Negative for ear pain, sinus pressure and sore throat. Eyes:  Negative for pain, discharge and redness. Respiratory:  Negative for cough, shortness of breath and wheezing. Cardiovascular:  Negative for chest pain. Gastrointestinal:  Negative for abdominal distention, diarrhea, nausea and vomiting. Genitourinary:  Positive for flank pain, pelvic pain and urgency. Negative for dysuria and frequency. Musculoskeletal:  Negative for arthralgias and back pain. Skin:  Negative for rash and wound. Neurological:  Negative for weakness and headaches. Hematological:  Negative for adenopathy. Psychiatric/Behavioral: Negative. All other systems reviewed and are negative.      Past Surgical history:   Past Surgical History:   Procedure Laterality Date    COLONOSCOPY      EYE SURGERY      probing of ductal system right eye     FRACTURE SURGERY      R ELBOW CRUSH INJURY W PLATE AND PINS PLACED    PAIN MANAGEMENT PROCEDURE N/A 8/17/2021    T12-L1 EPIDURAL STEROID INJECTION #1 performed by Tramaine Casillas DO at [place] [address] N/A 12/21/2021    LUMBAR EPIDURAL STEROID INJECTION T12-L1 performed by Tramaine Casillas DO at [facility] [address] N/A 4/28/2022    T12-L1 EPIDURAL STEROID INJECTION performed by Tramaine Casillas DO at [address] W/BIOPSY SINGLE/MULTIPLE  10/29/2018    COLONOSCOPY WITH BIOPSY performed by Rubén Alcazar MD at [address] [facility] Y/O N/A 5/4/2018    TONSILLECTOMY performed by Shana England MD at [address] [facility] N/A 10/29/2020    EGD BIOPSY performed by Anali Terry MD at [address] [facility] [address] History:  reports that she has been smoking cigarettes. She started smoking about 2 years ago. She has a 0.50 pack-year smoking history. She has never used smokeless tobacco. She reports that she does not drink alcohol and does not use drugs. Family History: family history includes Asthma in her brother; Cancer in her maternal aunt, maternal grandfather, maternal grandmother, maternal uncle, paternal aunt, paternal grandfather, paternal grandmother, and paternal uncle; Diabetes in her father; Early Death in her maternal grandfather; Heart Disease in her maternal aunt and maternal uncle; High Cholesterol in her father and mother. Allergies: Latex, Aspirin-acetaminophen-caffeine, Dye [iodides], Penicillins, Topamax [topiramate], Tylenol with codeine #3 [acetaminophen-codeine], Lactose, Blueberry flavor, Fish-derived products, Iodine, Lidocaine, Nemesio flavor, and Vicodin [hydrocodone-acetaminophen]  Outpatient Encounter Medications as of 8/16/2022   Medication Sig Dispense Refill    levoFLOXacin (LEVAQUIN) 500 MG tablet Take 1 tablet by mouth in the morning for 7 days.  7 tablet 0    albuterol sulfate HFA (VENTOLIN HFA) 108 (90 Base) MCG/ACT inhaler Inhale 2 puffs into the lungs 4 times daily as needed for Wheezing 18 g 5    dantrolene (DANTRIUM) 25 MG capsule Take 1 capsule by mouth 2 times daily AND 2 capsules nightly. 360 capsule 3    diclofenac sodium (VOLTAREN) 1 % GEL apply 2 grams to affected area four times a day AS NEEDED FOR PAIN 100 g 2    pantoprazole (PROTONIX) 40 MG tablet Take 1 tablet by mouth in the morning and at bedtime 90 tablet 1    Hyoscyamine Sulfate SL 0.125 MG SUBL Place 1 tablet under the tongue every 8 (eight) hours 120 each 3    acetaminophen (TYLENOL) 500 MG tablet Take 2 tablets by mouth every 6 hours as needed for Pain or Fever Maximum dose- 8 tablets/24 hours. 30 tablet 0    ibuprofen (IBU) 800 MG tablet Take 1 tablet by mouth every 8 hours as needed for Pain or Fever Take with food. 30 tablet 0    budesonide-formoterol (SYMBICORT) 80-4.5 MCG/ACT AERO inhale 2 puffs by mouth daily 10.2 g 1    meclizine (ANTIVERT) 25 MG tablet Take 1 tablet by mouth 3 times daily as needed for Dizziness 90 tablet 2    chlorhexidine (PERIDEX) 0.12 % solution RINSE WITH 1/2 OUNCE BY MOUTH FOR 30 SECONDS THEN SPIT OUT. USE TWICE DAILY. 473 mL 5    albuterol sulfate HFA (VENTOLIN HFA) 108 (90 Base) MCG/ACT inhaler Inhale 2 puffs into the lungs 4 times daily as needed for Wheezing 1 each 3    guaiFENesin (SM TUSSIN MUCUS+CHEST CONGEST) 100 MG/5ML liquid take 10 milliliters by mouth every 4 hours if needed for cough 236 mL 2    silver sulfADIAZINE (SILVADENE) 1 % cream Apply topically daily. 25 g 0    baclofen (LIORESAL) 20 MG tablet Take 1 tablet by mouth every morning AND 1 tablet Daily with lunch AND 2 tablets nightly. 360 tablet 3    gabapentin (NEURONTIN) 300 MG capsule Take 2 capsules by mouth 3 times daily for 90 days.  540 capsule 0    montelukast (SINGULAIR) 10 MG tablet Take 1 tablet by mouth daily 30 tablet 3    vitamin D (ERGOCALCIFEROL) 1.25 MG (92389 UT) CAPS capsule take 1 capsule by mouth TWO TIMES PER WEEK 24 capsule 1    mometasone (ELOCON) 0.1 % lotion apply 3 to 4 drops at bedtime for 10 days for itching 60 mL 3    levothyroxine (SYNTHROID) 75 MCG tablet take 1 tablet by mouth once daily 90 tablet 1    BANOPHEN 25 MG capsule take 1 capsule by mouth every evening if needed for itching or allergies 30 capsule 2    Benzoyl Peroxide 2.5 % GEL Apply 1 cm topically 2 times daily 60 g 1    escitalopram (LEXAPRO) 20 MG tablet take 1 tablet by mouth once daily 120 tablet 3    divalproex (DEPAKOTE) 250 MG DR tablet take 1 tablet by mouth twice a day 180 tablet 0    medroxyPROGESTERone (PROVERA) 10 MG tablet Take 1 tablet by mouth daily 30 tablet 11    [DISCONTINUED] mometasone (ELOCON) 0.1 % cream Apply topically daily Apply topically daily. 45 g 3    Saline GEL 1 spray by Nasal route daily as needed       No facility-administered encounter medications on file as of 8/16/2022. Physical Exam         VS:  /89   Pulse 77   Temp 97 °F (36.1 °C) (Temporal)   Resp 18   Ht 5' 8\" (1.727 m)   Wt 297 lb (134.7 kg)   SpO2 96%   BMI 45.16 kg/m²    Oxygen Saturation Interpretation: Normal.    Physical Exam  Vitals and nursing note reviewed. Constitutional:       Appearance: She is well-developed. HENT:      Head: Normocephalic and atraumatic. Right Ear: Hearing and external ear normal.      Left Ear: Hearing and external ear normal.      Nose: Nose normal.      Mouth/Throat:      Pharynx: Uvula midline. Eyes:      General: Lids are normal.      Conjunctiva/sclera: Conjunctivae normal.      Pupils: Pupils are equal, round, and reactive to light. Cardiovascular:      Rate and Rhythm: Normal rate and regular rhythm. Heart sounds: Normal heart sounds. No murmur heard. Pulmonary:      Effort: Pulmonary effort is normal.      Breath sounds: Normal breath sounds. Abdominal:      General: Bowel sounds are normal.      Palpations: Abdomen is soft. Abdomen is not rigid. Tenderness: There is abdominal tenderness in the suprapubic area.  There is right CVA tenderness and left CVA tenderness. There is no guarding or rebound. Musculoskeletal:      Cervical back: Normal range of motion and neck supple. Skin:     General: Skin is warm and dry. Findings: No abrasion or rash. Neurological:      Mental Status: She is alert and oriented to person, place, and time. GCS: GCS eye subscore is 4. GCS verbal subscore is 5. GCS motor subscore is 6. Cranial Nerves: No cranial nerve deficit. Sensory: No sensory deficit. Coordination: Coordination normal.      Gait: Gait normal.        Lab / Imaging Results   (All laboratory and radiology results have been personally reviewed by myself)  Labs:  Results for orders placed or performed in visit on 08/16/22   POCT Urinalysis no Micro   Result Value Ref Range    Color, UA yellow     Clarity, UA clear     Glucose, UA POC neg     Bilirubin, UA neg     Ketones, UA neg     Spec Grav, UA 1.010     Blood, UA POC neg     pH, UA 7.0     Protein, UA POC neg     Urobilinogen, UA 0.2     Leukocytes, UA neg     Nitrite, UA neg        Imaging: All Radiology results interpreted by Radiologist unless otherwise noted. No orders to display       Medical Decision Making:    Patient is well appearing, non toxic and appropriate for outpatient management. Plan is for symptom management and PCP follow up. Assessment / Plan     Impression(s):  Scarlett Marcus was seen today for urinary frequency. Diagnoses and all orders for this visit:    Renal colic, bilateral  -     levoFLOXacin (LEVAQUIN) 500 MG tablet; Take 1 tablet by mouth in the morning for 7 days. Other orders  -     POCT Urinalysis no Micro       Disposition:    Follow up with PCP in 7-10 days for repeat urine and recheck of symptoms. ER if changes or worse. Advised to take all medications as directed.

## 2022-08-25 DIAGNOSIS — M54.50 ACUTE BILATERAL LOW BACK PAIN WITHOUT SCIATICA: ICD-10-CM

## 2022-08-25 DIAGNOSIS — R12 HEART BURN: ICD-10-CM

## 2022-08-25 DIAGNOSIS — J45.20 MILD INTERMITTENT ASTHMA WITHOUT COMPLICATION: ICD-10-CM

## 2022-08-25 RX ORDER — PANTOPRAZOLE SODIUM 40 MG/1
40 TABLET, DELAYED RELEASE ORAL 2 TIMES DAILY
Qty: 90 TABLET | Refills: 1 | Status: SHIPPED
Start: 2022-08-25 | End: 2022-09-21 | Stop reason: SDUPTHER

## 2022-08-25 RX ORDER — ALBUTEROL SULFATE 90 UG/1
2 AEROSOL, METERED RESPIRATORY (INHALATION) 4 TIMES DAILY PRN
Qty: 18 G | Refills: 5 | OUTPATIENT
Start: 2022-08-25

## 2022-08-25 NOTE — TELEPHONE ENCOUNTER
Last Appointment:  5/25/2022  Future Appointments   Date Time Provider Donna Brunneri   10/24/2022  2:40 PM JOSSE Alanis CNP [facility]   10/27/2022  2:40 PM MD Davis Armendariz [facility]

## 2022-09-02 DIAGNOSIS — L29.9 EAR ITCH: ICD-10-CM

## 2022-09-02 DIAGNOSIS — G56.00 CARPAL TUNNEL SYNDROME, UNSPECIFIED LATERALITY: ICD-10-CM

## 2022-09-06 RX ORDER — MOMETASONE FUROATE 1 MG/ML
SOLUTION TOPICAL
Qty: 60 ML | Refills: 3 | Status: SHIPPED | OUTPATIENT
Start: 2022-09-06

## 2022-09-06 RX ORDER — GABAPENTIN 300 MG/1
600 CAPSULE ORAL 3 TIMES DAILY
Qty: 540 CAPSULE | Refills: 0 | Status: SHIPPED | OUTPATIENT
Start: 2022-09-06 | End: 2023-09-01

## 2022-09-06 NOTE — TELEPHONE ENCOUNTER
Last Appointment:  11/25/2020  Future Appointments   Date Time Provider Donna Kaylee   10/24/2022  2:40 PM JOSSE Aceves - [place] Mckeon [facility]   10/27/2022  2:40 PM MD Cara Narayan [facility]

## 2022-09-08 RX ORDER — DIPHENHYDRAMINE HYDROCHLORIDE 25 MG/1
CAPSULE ORAL
Qty: 30 CAPSULE | Refills: 2 | Status: SHIPPED | OUTPATIENT
Start: 2022-09-08

## 2022-09-08 NOTE — TELEPHONE ENCOUNTER
Last Appointment:  11/25/2020  Future Appointments   Date Time Provider Donna Kaylee   10/24/2022  2:40 PM JOSSE Aldana - [place] Anaya [facility]   10/27/2022  2:40 PM MD Miguel Monroy [facility]

## 2022-09-15 DIAGNOSIS — R42 DIZZINESS: ICD-10-CM

## 2022-09-15 RX ORDER — MECLIZINE HYDROCHLORIDE 25 MG/1
25 TABLET ORAL 3 TIMES DAILY PRN
Qty: 90 TABLET | Refills: 2 | Status: SHIPPED | OUTPATIENT
Start: 2022-09-15

## 2022-09-21 ENCOUNTER — INITIAL CONSULT (OUTPATIENT)
Dept: SURGERY | Age: 32
End: 2022-09-21
Payer: COMMERCIAL

## 2022-09-21 VITALS
SYSTOLIC BLOOD PRESSURE: 136 MMHG | HEART RATE: 76 BPM | WEIGHT: 240 LBS | HEIGHT: 68 IN | DIASTOLIC BLOOD PRESSURE: 72 MMHG | BODY MASS INDEX: 36.37 KG/M2 | TEMPERATURE: 97.6 F

## 2022-09-21 DIAGNOSIS — G56.00 CARPAL TUNNEL SYNDROME, UNSPECIFIED LATERALITY: ICD-10-CM

## 2022-09-21 DIAGNOSIS — G80.0 CONGENITAL SPASTIC QUADRIPARESIS (HCC): ICD-10-CM

## 2022-09-21 DIAGNOSIS — R12 HEART BURN: ICD-10-CM

## 2022-09-21 DIAGNOSIS — K21.9 GASTROESOPHAGEAL REFLUX DISEASE WITHOUT ESOPHAGITIS: Primary | ICD-10-CM

## 2022-09-21 DIAGNOSIS — M54.50 ACUTE BILATERAL LOW BACK PAIN WITHOUT SCIATICA: ICD-10-CM

## 2022-09-21 DIAGNOSIS — J45.20 MILD INTERMITTENT ASTHMA WITHOUT COMPLICATION: ICD-10-CM

## 2022-09-21 DIAGNOSIS — R42 DIZZINESS: ICD-10-CM

## 2022-09-21 DIAGNOSIS — L29.9 EAR ITCH: ICD-10-CM

## 2022-09-21 PROCEDURE — 99213 OFFICE O/P EST LOW 20 MIN: CPT | Performed by: SURGERY

## 2022-09-21 PROCEDURE — 4004F PT TOBACCO SCREEN RCVD TLK: CPT | Performed by: SURGERY

## 2022-09-21 PROCEDURE — G8417 CALC BMI ABV UP PARAM F/U: HCPCS | Performed by: SURGERY

## 2022-09-21 PROCEDURE — G8427 DOCREV CUR MEDS BY ELIG CLIN: HCPCS | Performed by: SURGERY

## 2022-09-21 RX ORDER — BACLOFEN 20 MG/1
TABLET ORAL
Qty: 360 TABLET | Refills: 3 | Status: CANCELLED | OUTPATIENT
Start: 2022-09-21

## 2022-09-21 RX ORDER — PANTOPRAZOLE SODIUM 40 MG/1
40 TABLET, DELAYED RELEASE ORAL 2 TIMES DAILY
Qty: 90 TABLET | Refills: 1 | Status: SHIPPED | OUTPATIENT
Start: 2022-09-21

## 2022-09-21 RX ORDER — GABAPENTIN 300 MG/1
600 CAPSULE ORAL 3 TIMES DAILY
Qty: 540 CAPSULE | Refills: 0 | Status: CANCELLED | OUTPATIENT
Start: 2022-09-21 | End: 2023-09-16

## 2022-09-21 NOTE — PROGRESS NOTES
General Surgery History and Physical  [facility]    Patient's Name/Date of Birth: Madonna Valdez / 1990    Date: September 21, 2022     Surgeon: Sophy Rodriguez M.D.    PCP: Delbert Granados MD     Chief Complaint: GERD, HIATAL HERNIA    HPI:   Madonna Valdez is a 28 y.o. female who presents for evaluation of GERD and symptomatic hiatal hernia. Timing is intermittent with food, radiation to midline and epigastrium, alleviated by npo and started several months ago and severity is 8/10. Has been under medical management for GERD for many months, symptoms have become refractory to maximal medical therapy. Patient is an extremely poor historian and does not offer much medical history upon discussion. She had previously seen me for surgical weight loss evaluation and had undergone upper endoscopy with a small sliding hiatal hernia. She states she has been on antacid therapy and cannot tell me whether she is had improvement or not. Points to her throat and her chest when asked about pain. She cannot tell me which foods make it worse what makes it better. She does not appear malnourished she does not appear in any distress. She is very very resistant to sharing most of her medical history and seems annoyed to be in our office.     Patient Active Problem List   Diagnosis    Mild intermittent asthma without complication    Carpal tunnel syndrome    Congenital spastic quadriparesis (HCC)    IBS (irritable bowel syndrome)    Obesity    Depression    COPD (chronic obstructive pulmonary disease) (HCC)    Hypothyroidism    Chronic bilateral low back pain with bilateral sciatica    Chronic pain syndrome    Lumbar radiculopathy    DDD (degenerative disc disease), lumbar       Past Medical History:   Diagnosis Date    ADHD     Arthritis     Asthma     controlled    Chronic midline low back pain without sciatica 11/13/2017    COPD (chronic obstructive pulmonary disease) (HCC)     Depression     GERD (gastroesophageal reflux disease)     IBS (irritable bowel syndrome)     Migraines     Seizures ([address]     last episode 2016    Thyroid disease        Past Surgical History:   Procedure Laterality Date    COLONOSCOPY      EYE SURGERY      probing of ductal system right eye     FRACTURE SURGERY      R ELBOW CRUSH INJURY W PLATE AND PINS PLACED    PAIN MANAGEMENT PROCEDURE N/A 8/17/2021    T12-L1 EPIDURAL STEROID INJECTION #1 performed by Holden No DO at [address] [facility] N/A 12/21/2021    LUMBAR EPIDURAL STEROID INJECTION T12-L1 performed by Holden No DO at [address] [facility] N/A 4/28/2022    T12-L1 EPIDURAL STEROID INJECTION performed by Holden No DO at [address] W/BIOPSY SINGLE/MULTIPLE  10/29/2018    COLONOSCOPY WITH BIOPSY performed by Patricia Mccann MD at [address] [place] [address] Y/O N/A 5/4/2018    TONSILLECTOMY performed by Robbin Fish MD at [place] [address] N/A 10/29/2020    EGD BIOPSY performed by Courtney Persaud MD at [address]   Allergen Reactions    Latex Rash    Aspirin-Acetaminophen-Caffeine Shortness Of Breath    Dye [Iodides] Shortness Of Breath    Penicillins Anaphylaxis    Topamax [Topiramate] Nausea And Vomiting, Other (See Comments) and Shortness Of Breath    Tylenol With Codeine #3 [Acetaminophen-Codeine] Shortness Of Breath    Lactose Other (See Comments)     Usually just causes stomach pain, diarrhea if pt consumes too much of it.      Blueberry Flavor      ALLERGIC TO BLUEBERRY    Fish-Derived Products      SEAFOOD, ESPECIALLY SHRIMP    Iodine      Seafood allergy    Lidocaine Diarrhea, Itching and Swelling    Nemesio Flavor Hives     [place] fruit    Vicodin [Hydrocodone-Acetaminophen]      Acts drunk         The patient has a family history that is negative for severe cardiovascular or respiratory issues, negative for reaction to anesthesia. The patient does not report any history of gastrointestinal cancer in their mother or father    Time spent reviewing past medical, surgical, social and family history, vitals, nursing assessment and images. No changes from above documented history.     Social History     Socioeconomic History    Marital status: Single     Spouse name: Not on file    Number of children: Not on file    Years of education: 12    Highest education level: Not on file   Occupational History    Occupation: [employer]     Employer: [employer]     Comment: unable to work due to injury   Tobacco Use    Smoking status: Every Day     Packs/day: 0.50     Years: 1.00     Pack years: 0.50     Types: Cigarettes     Start date: 2020    Smokeless tobacco: Never    Tobacco comments:     Smokes 1 cig daily   Vaping Use    Vaping Use: Never used   Substance and Sexual Activity    Alcohol use: No     Alcohol/week: 0.0 standard drinks    Drug use: No    Sexual activity: Not on file   Other Topics Concern    Not on file   Social History Narrative    1-2 cups coffee/week     Social Determinants of Health     Financial Resource Strain: Not on file   Food Insecurity: Not on file   Transportation Needs: Not on file   Physical Activity: Unknown    Days of Exercise per Week: Patient refused    Minutes of Exercise per Session: 0 min   Stress: Not on file   Social Connections: Not on file   Intimate Partner Violence: Not At Risk    Fear of Current or Ex-Partner: No    Emotionally Abused: No    Physically Abused: No    Sexually Abused: No   Housing Stability: Not on file           Review of Systems  Review of Systems -  General ROS: negative for - chills, fatigue or malaise  ENT ROS: negative for - hearing change, nasal congestion or nasal discharge  Allergy and Immunology ROS: negative for - hives, itchy/watery eyes or nasal congestion  Hematological and Lymphatic ROS: negative for - blood clots, blood transfusions, bruising or fatigue  Endocrine ROS: negative for - malaise/lethargy, mood swings, palpitations or polydipsia/polyuria  Respiratory ROS: negative for - sputum changes, stridor, tachypnea or wheezing  Cardiovascular ROS: negative for - irregular heartbeat, loss of consciousness, murmur or orthopnea  Gastrointestinal ROS: negative for - constipation, diarrhea, gas/bloating, heartburn or hematemesis  Genito-Urinary ROS: negative for -  genital discharge, genital ulcers or hematuria  Musculoskeletal ROS: negative for - gait disturbance, muscle pain or muscular weakness  Psych/Soc ROS: Neg suicidal ideation or visual/auditory hallucinations    Physical exam:   /72   Pulse 76   Temp 97.6 °F (36.4 °C)   Ht 5' 8\" (1.727 m)   Wt 240 lb (108.9 kg)   BMI 36.49 kg/m²   General appearance:  NAD  Head: NCAT, PERRLA, EOMI, red conjunctiva  Neck: supple, no masses  Lungs: CTAB, equal chest rise bilateral  Heart: Reg rate  Abdomen: soft, nontender, nondistended  Rectal: No bleeding  Skin; no lesions  Gu: no cva tenderness  Extremities: no edema  Psychosocial: No auditory or visual hallucinations      Radiology:  CT abdomen pelvis from patient's electronic medical record history including most recent and any previous available images were reviewed at length and selected images may be enclosed below.     Last EGD pathology will be obtained and reviewed if performed by another physician    Assessment:  Aly Muñiz is a 28 y.o. female with chest pain neck pain history of small sliding hiatal hernia  Patient Active Problem List   Diagnosis    Mild intermittent asthma without complication    Carpal tunnel syndrome    Congenital spastic quadriparesis (HCC)    IBS (irritable bowel syndrome)    Obesity    Depression    COPD (chronic obstructive pulmonary disease) (HCC)    Hypothyroidism    Chronic bilateral low back pain with bilateral sciatica    Chronic pain syndrome    Lumbar radiculopathy    DDD (degenerative disc disease), lumbar Plan:  Patient is made and canceled 3 separate appointments in my office therefore we will proceed with a upper GI study to see if we can accommodate her inability to travel to our office. I discussed with her likely would need repeat upper endoscopy and Bravo pH placement to evaluate whether her symptoms are secondary to acid reflux. When asked if she is a smoker she states not very much but then she is carrying a lighter on her belt and her and a friend who are in the office with her seem to joke that the latter is not hers. She does admit to being around significant secondhand smoke. Recommend continuing PPI therapy if she is getting relief from it but I am not confident I can find a surgical answer to alleviate her symptoms. Her upper GI is normal may proceed with right upper quadrant ultrasound to evaluate gallbladder dysfunction  Lifestyle and dietary measures : Weight loss and Elevation of the head of the bed in individuals with nocturnal or laryngeal symptoms (eg, cough, hoarseness, throat clearing). I also suggest a corollary to this recommendation: refraining from assuming a supine position after meals and avoidance of meals two to three hours before bedtime. We also discussed selective elimination of dietary triggers (caffeine, chocolate, spicy foods, food with high fat content, carbonated beverages, and peppermint) in patients who note correlation with GERD symptoms and an improvement in symptoms with elimination. I discussed with her also the need for weight loss as this is likely contributing to her reflux and dyspepsia. She seemed frustrated with this,.   We will telephone follow-up after upper GI            Gladys Valadez MD  12:04 PM  9/21/2022

## 2022-09-22 ENCOUNTER — HOSPITAL ENCOUNTER (EMERGENCY)
Age: 32
Discharge: HOME | End: 2022-09-22
Payer: COMMERCIAL

## 2022-09-22 ENCOUNTER — APPOINTMENT (OUTPATIENT)
Dept: GENERAL RADIOLOGY | Age: 32
End: 2022-09-22
Payer: COMMERCIAL

## 2022-09-22 VITALS
TEMPERATURE: 98 F | OXYGEN SATURATION: 97 % | HEART RATE: 101 BPM | SYSTOLIC BLOOD PRESSURE: 133 MMHG | WEIGHT: 240 LBS | DIASTOLIC BLOOD PRESSURE: 94 MMHG | BODY MASS INDEX: 36.49 KG/M2 | RESPIRATION RATE: 16 BRPM

## 2022-09-22 DIAGNOSIS — S42.292A HUMERAL HEAD FRACTURE, LEFT, CLOSED, INITIAL ENCOUNTER: Primary | ICD-10-CM

## 2022-09-22 PROCEDURE — 6370000000 HC RX 637 (ALT 250 FOR IP)

## 2022-09-22 PROCEDURE — 99284 EMERGENCY DEPT VISIT MOD MDM: CPT

## 2022-09-22 PROCEDURE — 96372 THER/PROPH/DIAG INJ SC/IM: CPT

## 2022-09-22 PROCEDURE — 6360000002 HC RX W HCPCS

## 2022-09-22 PROCEDURE — 73030 X-RAY EXAM OF SHOULDER: CPT

## 2022-09-22 RX ORDER — MECLIZINE HYDROCHLORIDE 25 MG/1
25 TABLET ORAL 3 TIMES DAILY PRN
Qty: 90 TABLET | Refills: 2 | OUTPATIENT
Start: 2022-09-22

## 2022-09-22 RX ORDER — KETOROLAC TROMETHAMINE 30 MG/ML
30 INJECTION, SOLUTION INTRAMUSCULAR; INTRAVENOUS ONCE
Status: COMPLETED | OUTPATIENT
Start: 2022-09-22 | End: 2022-09-22

## 2022-09-22 RX ORDER — ALBUTEROL SULFATE 90 UG/1
2 AEROSOL, METERED RESPIRATORY (INHALATION) 4 TIMES DAILY PRN
Qty: 18 G | Refills: 5 | OUTPATIENT
Start: 2022-09-22

## 2022-09-22 RX ORDER — MOMETASONE FUROATE 1 MG/ML
SOLUTION TOPICAL
Qty: 60 ML | Refills: 3 | OUTPATIENT
Start: 2022-09-22

## 2022-09-22 RX ORDER — DIPHENHYDRAMINE HCL 25 MG
CAPSULE ORAL
Qty: 30 CAPSULE | Refills: 2 | OUTPATIENT
Start: 2022-09-22

## 2022-09-22 RX ORDER — HYDROCODONE BITARTRATE AND ACETAMINOPHEN 5; 325 MG/1; MG/1
1 TABLET ORAL EVERY 6 HOURS PRN
Qty: 12 TABLET | Refills: 0 | Status: SHIPPED | OUTPATIENT
Start: 2022-09-22 | End: 2022-09-25

## 2022-09-22 RX ORDER — DANTROLENE SODIUM 25 MG/1
CAPSULE ORAL
Qty: 360 CAPSULE | Refills: 3 | OUTPATIENT
Start: 2022-09-22

## 2022-09-22 RX ORDER — HYDROCODONE BITARTRATE AND ACETAMINOPHEN 5; 325 MG/1; MG/1
1 TABLET ORAL ONCE
Status: COMPLETED | OUTPATIENT
Start: 2022-09-22 | End: 2022-09-22

## 2022-09-22 RX ADMIN — KETOROLAC TROMETHAMINE 30 MG: 30 INJECTION, SOLUTION INTRAMUSCULAR; INTRAVENOUS at 13:19

## 2022-09-22 RX ADMIN — HYDROCODONE BITARTRATE AND ACETAMINOPHEN 1 TABLET: 5; 325 TABLET ORAL at 14:14

## 2022-09-22 ASSESSMENT — PAIN DESCRIPTION - DESCRIPTORS
DESCRIPTORS: ACHING
DESCRIPTORS: ACHING;THROBBING

## 2022-09-22 ASSESSMENT — PAIN DESCRIPTION - ORIENTATION
ORIENTATION: LEFT
ORIENTATION: RIGHT

## 2022-09-22 ASSESSMENT — PAIN SCALES - GENERAL
PAINLEVEL_OUTOF10: 10
PAINLEVEL_OUTOF10: 10

## 2022-09-22 ASSESSMENT — PAIN DESCRIPTION - LOCATION
LOCATION: SHOULDER
LOCATION: SHOULDER

## 2022-09-22 NOTE — TELEPHONE ENCOUNTER
Last Appointment:  11/25/2020  Future Appointments   Date Time Provider Donna Brunneri   9/28/2022  1:00 PM [facility] 9 XRAY RM SEYZ RAD [facility]   10/3/2022  8:15 AM MD Juve Oseguerae Leaks Surg [facility]   10/24/2022  2:40 PM JOSSE Ruelas - CNP [place] [facility]   10/27/2022  2:40 PM Clyde Villalpando MD [facility]

## 2022-09-22 NOTE — TELEPHONE ENCOUNTER
Last Appointment:  Visit date not found  Future Appointments   Date Time Provider Donna Page   9/28/2022  1:00 PM [facility] 9 XRAY RM SEYZ RAD [facility]   10/3/2022  8:15 AM Tyson Spencer MD [facility] Surg [facility]   10/24/2022  2:40 PM [facility]   10/27/2022  2:40 PM MD Nely Wilder [facility]

## 2022-09-22 NOTE — ED PROVIDER NOTES
[facility]    Department of Emergency Medicine  ED Provider Note  Admit Date: 9/22/2022  Room: [place] [address]            HPI:  9/22/22, Time: 1:16 PM EDT  . Lakshmi Richardson is a 28 y.o. female presenting to the ED for evaluation of left shoulder pain after a fall last night at her home. Patient states her left hip gave out and her to fall. Patient reports falling directly onto her left shoulder and she has had pain and limited range of motion to left shoulder since that he has had similar episodes in the past.  She denies head injury, loss of consciousness, neck pain, back pain, or blood thinner use. The complaint has been persistent, moderate to severe in severity, and worsened by movement of her left arm. Glascow Coma Scale at time of initial examination  Best Eye Response 4 - Opens eyes on own   Best Verbal Response 5 - Alert and oriented   Best Motor Response 6 - Follows simple motor commands   Total 15         Review of Systems:   Pertinent positives and negatives are stated within HPI, all other systems reviewed and are negative.              --------------------------------------------- PAST HISTORY ---------------------------------------------  Past Medical History:  has a past medical history of ADHD, Arthritis, Asthma, Chronic midline low back pain without sciatica, COPD (chronic obstructive pulmonary disease) ([facility] [address] Depression, GERD (gastroesophageal reflux disease), IBS (irritable bowel syndrome), Migraines, Seizures ([facility] [address] and Thyroid disease. Past Surgical History:  has a past surgical history that includes eye surgery; Colonoscopy; pr removal of tonsils,12+ y/o (N/A, 5/4/2018); pr colonoscopy w/biopsy single/multiple (10/29/2018); Upper gastrointestinal endoscopy (N/A, 10/29/2020); fracture surgery; Pain management procedure (N/A, 8/17/2021); Tonsillectomy; Pain management procedure (N/A, 12/21/2021); and Pain management procedure (N/A, 4/28/2022).     Social History:  reports that she has been smoking cigarettes. She started smoking about 2 years ago. She has a 0.50 pack-year smoking history. She has never used smokeless tobacco. She reports that she does not drink alcohol and does not use drugs. Family History: family history includes Asthma in her brother; Cancer in her maternal aunt, maternal grandfather, maternal grandmother, maternal uncle, paternal aunt, paternal grandfather, paternal grandmother, and paternal uncle; Diabetes in her father; Early Death in her maternal grandfather; Heart Disease in her maternal aunt and maternal uncle; High Cholesterol in her father and mother. The patients home medications have been reviewed. Allergies: Latex, Aspirin-acetaminophen-caffeine, Dye [iodides], Penicillins, Topamax [topiramate], Tylenol with codeine #3 [acetaminophen-codeine], Lactose, Blueberry flavor, Fish-derived products, Iodine, Lavender oil, Lidocaine, Nemesio flavor, and Vicodin [hydrocodone-acetaminophen]            ------------------------- NURSING NOTES AND VITALS REVIEWED ---------------------------   The nursing notes within the ED encounter and vital signs as below have been reviewed. BP (!) 133/94   Pulse (!) 101   Temp 98 °F (36.7 °C)   Resp 16   Wt 240 lb (108.9 kg)   SpO2 97%   BMI 36.49 kg/m²   Oxygen Saturation Interpretation: Normal    The patients available past medical records and past encounters were reviewed. -------------------------------------------------- RESULTS -------------------------------------------------  Laboratory testing and radiology reports reviewed    LABS:  No results found for this visit on 09/22/22.     RADIOLOGY:  Interpreted by Radiologist.  XR SHOULDER LEFT (MIN 2 VIEWS)   Final Result   Minimally displaced comminuted avulsion fracture left humeral greater   tubercle.                 ---------------------------------------------------PHYSICAL EXAM--------------------------------------      Primary Survey:  Airway: patient, trachea midline,   Breathing: Spontaneous, breath sounds equal bilaterally, symmetric chest rise  Circulation: 2+ femoral pulses, 2+ DP/PT pulses  Disability: GCS 15      Constitutional/General: Alert and oriented x3, well appearing, non toxic in NAD  Head: Normocephalic and atraumatic  Eyes: PERRL, EOMI  Ears: TMs clear with no hemotympanum  Mouth: Oropharynx clear, handling secretions, no trismus. No dental trauma, no oral trauma  Neck: No midline tenderness, crepitus, no palpable lacerations, abrasions, deformities, or stepoffs. Diffusely tender over anterior and posterior shoulder and into upper trapezius area. Back: No midline cervical, thoracic, lumbar spine tenderness. No Stepoffs, abrasions, lacerations, or deformities. Pulmonary: Lungs clear to auscultation bilaterally, no wheezes, rales, or rhonchi. Not in respiratory distress  Chest: no chest wall tenderness, no crepitus  Cardiovascular:  Regular rate and rhythm, no murmurs, gallops, or rubs. 2+ distal pulses  Abdomen: Soft, non tender, non distended, +BS, no rebound, guarding, or rigidity. No pulsatile masses appreciated  Extremities: Moves all extremities x 4 with limited range of motion to left shoulder due to pain. Full flexion extension at elbow. Strength 4 out of 5 left upper extremity. Warm and well perfused, no clubbing, cyanosis, ecchymosis, or edema. Capillary refill <3 seconds.    Skin: warm and dry without rash  Neurologic: GCS 15, CN 2-12 grossly intact, no focal deficits, symmetric strength 5/5 in the upper and lower extremities bilaterally  Psych: Normal Affect        ------------------------------ ED COURSE/MEDICAL DECISION MAKING----------------------  Medications   ketorolac (TORADOL) injection 30 mg (30 mg IntraMUSCular Given 9/22/22 1319)   HYDROcodone-acetaminophen (NORCO) 5-325 MG per tablet 1 tablet (1 tablet Oral Given 9/22/22 1414)       ED COURSE:  ED Course as of 09/22/22 1751   Thu Sep 22, 2022   1410 In to re-evaluate and JOSSE - [facility]  09/22/22 [address], [facility] - [facility]  11/09/22 1128

## 2022-09-22 NOTE — Clinical Note
Logan Dumont was seen and treated in our emergency department on 9/22/2022. She may return to work on 09/26/2022. If you have any questions or concerns, please don't hesitate to call.       Kenji Subramanian, JOSSE - CNP

## 2022-09-23 ENCOUNTER — TELEPHONE (OUTPATIENT)
Dept: ORTHOPEDIC SURGERY | Age: 32
End: 2022-09-23

## 2022-09-23 ENCOUNTER — TELEPHONE (OUTPATIENT)
Dept: FAMILY MEDICINE CLINIC | Age: 32
End: 2022-09-23

## 2022-09-23 DIAGNOSIS — E03.9 HYPOTHYROIDISM, UNSPECIFIED TYPE: ICD-10-CM

## 2022-09-23 RX ORDER — GABAPENTIN 300 MG/1
CAPSULE ORAL
Qty: 540 CAPSULE | Refills: 0 | OUTPATIENT
Start: 2022-09-23

## 2022-09-23 RX ORDER — LEVOTHYROXINE SODIUM 0.07 MG/1
TABLET ORAL
Qty: 90 TABLET | Refills: 1 | Status: SHIPPED | OUTPATIENT
Start: 2022-09-23

## 2022-09-23 NOTE — TELEPHONE ENCOUNTER
Patient was seen at [facility] for Humeral head fracture, left, closed, initial encounter. Was advised to follow up with Dr Wilda Castillo Please contact the patient to schedule.

## 2022-09-23 NOTE — TELEPHONE ENCOUNTER
Last Appointment:  2/8/2022  Future Appointments   Date Time Provider Donna Brunneri   9/28/2022  1:00 PM [facility] 9 XRAY [facility] RAD [facility] Radiolo   10/3/2022  8:15 AM Meryl Morris MD [facility]   10/24/2022  2:40 PM JOSSE Nathan - CNP [place] [facility]   10/27/2022  2:40 PM MD Bryce Lopes [facility]

## 2022-09-23 NOTE — TELEPHONE ENCOUNTER
I filled both of these medications 2 weeks ago. According to the chart ([facility]) she filled the gabapentin for 540 tablets on 9/6.

## 2022-09-23 NOTE — TELEPHONE ENCOUNTER
----- Message from Bereket Sr sent at 9/23/2022  2:16 PM EDT -----  Subject: Message to Provider    QUESTIONS  Information for Provider? Eloisa Case Management from [facility] would   like to inform clinical staff that patient was in [address] for displace   fracture of left Humeral  ---------------------------------------------------------------------------  --------------  CALL BACK INFO  [phone]; OK to leave message on voicemail  ---------------------------------------------------------------------------  --------------  SCRIPT ANSWERS  Relationship to Patient? Third Party  Third Party Type? Insurance? Representative Name?  Paul Puckett

## 2022-09-26 DIAGNOSIS — S42.252A CLOSED DISPLACED FRACTURE OF GREATER TUBEROSITY OF LEFT HUMERUS, INITIAL ENCOUNTER: Primary | ICD-10-CM

## 2022-09-26 NOTE — TELEPHONE ENCOUNTER
Call placed to patient, LVM, appointment made at this time.   Future Appointments   Date Time Provider Donna Page   9/27/2022  8:30 AM Gurvinder Araujo MD [facility]   9/28/2022  1:00 PM SE 9 XRAY RM SE[facility] Radiolo   10/3/2022  8:15 AM Modesto Jesus MD [facility] Surg [facility]   10/24/2022  2:40 PM [facility]   10/27/2022  2:40 PM MD Maulik Yanez [facility]

## 2022-09-27 ENCOUNTER — HOSPITAL ENCOUNTER (OUTPATIENT)
Dept: GENERAL RADIOLOGY | Age: 32
Discharge: HOME | End: 2022-09-29
Payer: COMMERCIAL

## 2022-09-27 ENCOUNTER — OFFICE VISIT (OUTPATIENT)
Dept: ORTHOPEDIC SURGERY | Age: 32
End: 2022-09-27
Payer: COMMERCIAL

## 2022-09-27 DIAGNOSIS — S42.252A CLOSED DISPLACED FRACTURE OF GREATER TUBEROSITY OF LEFT HUMERUS, INITIAL ENCOUNTER: Primary | ICD-10-CM

## 2022-09-27 DIAGNOSIS — S42.252A CLOSED DISPLACED FRACTURE OF GREATER TUBEROSITY OF LEFT HUMERUS, INITIAL ENCOUNTER: ICD-10-CM

## 2022-09-27 PROCEDURE — 4004F PT TOBACCO SCREEN RCVD TLK: CPT | Performed by: PHYSICIAN ASSISTANT

## 2022-09-27 PROCEDURE — 73030 X-RAY EXAM OF SHOULDER: CPT

## 2022-09-27 PROCEDURE — 99203 OFFICE O/P NEW LOW 30 MIN: CPT

## 2022-09-27 PROCEDURE — 99203 OFFICE O/P NEW LOW 30 MIN: CPT | Performed by: PHYSICIAN ASSISTANT

## 2022-09-27 PROCEDURE — G8417 CALC BMI ABV UP PARAM F/U: HCPCS | Performed by: PHYSICIAN ASSISTANT

## 2022-09-27 PROCEDURE — G8427 DOCREV CUR MEDS BY ELIG CLIN: HCPCS | Performed by: PHYSICIAN ASSISTANT

## 2022-09-27 RX ORDER — DICYCLOMINE HCL 20 MG
TABLET ORAL
COMMUNITY
Start: 2022-07-27

## 2022-09-27 RX ORDER — TAMSULOSIN HYDROCHLORIDE 0.4 MG/1
CAPSULE ORAL
COMMUNITY
Start: 2022-07-01

## 2022-09-27 NOTE — PROGRESS NOTES
Saadia Wu is a 28 y.o. female who presents for evaluation of arms Left    Referred from ED    Symptom onset: Date: 09/21/2022  Mechanism of Injury: fall    Previous Treatments: rest, sling which have been ineffective    Patient working in patient states she does not work    Weightbearing: left upper Non-weight bearing    Assistive device No Device  Participating in therapy?  no

## 2022-09-27 NOTE — PROGRESS NOTES
New Patient Orthopaedic Progress Note    Lakshmi Rihcardson is a 28 y.o. female, her YOB: 1990 with the following history as recorded in [facility]:      Patient Active Problem List    Diagnosis Date Noted    Chronic pain syndrome 07/14/2021    Lumbar radiculopathy 07/14/2021    DDD (degenerative disc disease), lumbar 07/14/2021    Chronic bilateral low back pain with bilateral sciatica 04/27/2021    IBS (irritable bowel syndrome) 11/25/2020    Obesity 11/25/2020    Depression 11/25/2020    COPD (chronic obstructive pulmonary disease) ([facility] [address] 11/25/2020    Hypothyroidism 11/25/2020    Congenital spastic quadriparesis ([facility] [address] 10/27/2020    Carpal tunnel syndrome 07/21/2020    Mild intermittent asthma without complication 43/71/6855     Current Outpatient Medications   Medication Sig Dispense Refill    dicyclomine (BENTYL) 20 MG tablet take 2 tablets by mouth four times a day      tamsulosin (FLOMAX) 0.4 MG capsule take 1 capsule by mouth 30 MINUTES after dinner      levothyroxine (SYNTHROID) 75 MCG tablet take 1 tablet by mouth once daily 90 tablet 1    pantoprazole (PROTONIX) 40 MG tablet Take 1 tablet by mouth in the morning and at bedtime 90 tablet 1    meclizine (ANTIVERT) 25 MG tablet Take 1 tablet by mouth 3 times daily as needed for Dizziness 90 tablet 2    BANOPHEN 25 MG capsule take 1 capsule by mouth at bedtime if needed for itching or allergies 30 capsule 2    gabapentin (NEURONTIN) 300 MG capsule Take 2 capsules by mouth 3 times daily for 360 days. 540 capsule 0    mometasone (ELOCON) 0.1 % lotion apply 3 to 4 drops at bedtime for 10 days for itching 60 mL 3    diclofenac sodium (VOLTAREN) 1 % GEL Apply 2 g topically 4 times daily as needed for Pain 100 g 2    albuterol sulfate HFA (VENTOLIN HFA) 108 (90 Base) MCG/ACT inhaler Inhale 2 puffs into the lungs 4 times daily as needed for Wheezing 18 g 5    dantrolene (DANTRIUM) 25 MG capsule Take 1 capsule by mouth 2 times daily AND 2 capsules nightly. 360 capsule 3    acetaminophen (TYLENOL) 500 MG tablet Take 2 tablets by mouth every 6 hours as needed for Pain or Fever Maximum dose- 8 tablets/24 hours. 30 tablet 0    budesonide-formoterol (SYMBICORT) 80-4.5 MCG/ACT AERO inhale 2 puffs by mouth daily 10.2 g 1    albuterol sulfate HFA (VENTOLIN HFA) 108 (90 Base) MCG/ACT inhaler Inhale 2 puffs into the lungs 4 times daily as needed for Wheezing 1 each 3    silver sulfADIAZINE (SILVADENE) 1 % cream Apply topically daily. 25 g 0    baclofen (LIORESAL) 20 MG tablet Take 1 tablet by mouth every morning AND 1 tablet Daily with lunch AND 2 tablets nightly. 360 tablet 3    montelukast (SINGULAIR) 10 MG tablet Take 1 tablet by mouth daily 30 tablet 3    vitamin D (ERGOCALCIFEROL) 1.25 MG (77598 UT) CAPS capsule take 1 capsule by mouth TWO TIMES PER WEEK 24 capsule 1    Benzoyl Peroxide 2.5 % GEL Apply 1 cm topically 2 times daily 60 g 1    escitalopram (LEXAPRO) 20 MG tablet take 1 tablet by mouth once daily 120 tablet 3    divalproex (DEPAKOTE) 250 MG DR tablet take 1 tablet by mouth twice a day 180 tablet 0    medroxyPROGESTERone (PROVERA) 10 MG tablet Take 1 tablet by mouth daily 30 tablet 11    Saline GEL 1 spray by Nasal route daily as needed      Hyoscyamine Sulfate SL 0.125 MG SUBL Place 1 tablet under the tongue every 8 (eight) hours (Patient not taking: Reported on 9/27/2022) 120 each 3    ibuprofen (IBU) 800 MG tablet Take 1 tablet by mouth every 8 hours as needed for Pain or Fever Take with food. (Patient not taking: Reported on 9/27/2022) 30 tablet 0    guaiFENesin (SM TUSSIN MUCUS+CHEST CONGEST) 100 MG/5ML liquid take 10 milliliters by mouth every 4 hours if needed for cough (Patient not taking: Reported on 9/27/2022) 236 mL 2     No current facility-administered medications for this visit.      Allergies: Latex, Aspirin-acetaminophen-caffeine, Dye [iodides], Penicillins, Topamax [topiramate], Tylenol with codeine #3 [acetaminophen-codeine], Lactose, Blueberry flavor, Fish-derived products, Iodine, Lavender oil, Lidocaine, Nemesio flavor, and Vicodin [hydrocodone-acetaminophen]  Past Medical History:   Diagnosis Date    ADHD     Arthritis     Asthma     controlled    Chronic midline low back pain without sciatica 11/13/2017    COPD (chronic obstructive pulmonary disease) (HCC)     Depression     GERD (gastroesophageal reflux disease)     IBS (irritable bowel syndrome)     Migraines     Seizures ([address]     last episode 2016    Thyroid disease      Past Surgical History:   Procedure Laterality Date    COLONOSCOPY      EYE SURGERY      probing of ductal system right eye     FRACTURE SURGERY      R ELBOW CRUSH INJURY W PLATE AND PINS PLACED    PAIN MANAGEMENT PROCEDURE N/A 8/17/2021    T12-L1 EPIDURAL STEROID INJECTION #1 performed by Nav Monae, DO at [address] N/A 12/21/2021    LUMBAR EPIDURAL STEROID INJECTION T12-L1 performed by Nav Monae, DO at [address] N/A 4/28/2022    T12-L1 EPIDURAL STEROID INJECTION performed by Nav Cissew DO at [address] W/BIOPSY SINGLE/MULTIPLE  10/29/2018    COLONOSCOPY WITH BIOPSY performed by Miryam Padilla MD at [address] [place] [address] Y/O N/A 5/4/2018    TONSILLECTOMY performed by Brianne Blandon MD at [place] [address] N/A 10/29/2020    EGD BIOPSY performed by Bri Ortiz MD at [facility] History   Problem Relation Age of Onset    Early Death Maternal Grandfather     Cancer Maternal Grandfather     Asthma Brother     Heart Disease Maternal Aunt     Cancer Maternal Aunt     Heart Disease Maternal Uncle     Cancer Maternal Uncle     High Cholesterol Father     Diabetes Father     High Cholesterol Mother     Cancer Paternal Aunt     Cancer Paternal Uncle     Cancer Maternal Grandmother     Cancer Paternal Grandmother     Cancer Paternal Grandfather      Social History     Tobacco Use    Smoking status: Every Day     Packs/day: 0.50     Years: 1.00     Pack years: 0.50     Types: Cigarettes     Start date: 2020    Smokeless tobacco: Never    Tobacco comments:     Smokes 1 cig daily   Substance Use Topics    Alcohol use: No     Alcohol/week: 0.0 standard drinks                             Chief Complaint   Patient presents with    Arm Injury     Left humerus fx; patient states her left hip gave out and she fell onto her left shoulder/humerus; doi 9/21/22; 10/10 pain; patient states she was walking outside and her hip gave out and she fell into a ditch; patient is right handed       SUBJECTIVE: Diamond Bach is a 77-year-old right-hand-dominant female who is an ED follow-up for left shoulder pain after a fall on 9- at her home. Patient states that her left hip gave out causing her to fall. Patient states that she fell directly onto her left shoulder and she had immediate pain and limited range of motion of the left shoulder after the injury. She states that she has had similar episodes in the past.  She denies head injury, loss of consciousness, neck pain, back pain or blood thinner use. The complaint has been persistent, moderate to severe in severity and worsened by movement of the left arm. X-rays in the ED demonstrated a minimally displaced comminuted avulsion fracture left humeral greater tubercle. She was placed into a sling and sent here for follow-up. She states that her sling is not very comfortable and she would like a new one if possible. Denies numbness, tingling or paresthesias in the left arm. No other orthopedic complaints at this time. Review of Systems   Constitutional: Negative for fever, chills, diaphoresis, appetite change and fatigue. HENT: Negative for dental issues, hearing loss and tinnitus. Negative for congestion, sinus pressure, sneezing, sore throat. Negative for headache.   Eyes: Negative for visual disturbance, blurred and double vision. Negative for pain, discharge, redness and itching  Respiratory: Negative for cough, shortness of breath and wheezing. Cardiovascular: Negative for chest pain, palpitations and leg swelling. No dyspnea on exertion   Gastrointestinal:   Negative for nausea, vomiting, abdominal pain, diarrhea, constipation  or black or bloody. Hematologic\Lymphatic:  negative for bleeding, petechiae,   Genitourinary: Negative for hematuria and difficulty urinating. Musculoskeletal: Negative for neck pain and stiffness. Negative for back pain, see HPI  Skin: Negative for pallor, rash and wound. Neurological: Negative for dizziness, tremors, seizures, weakness, light-headedness, no TIA or stroke symptoms. No numbness and headaches. Psychiatric/Behavioral: Negative. OBJECTIVE:      Physical Examination:   General appearance: alert, well appearing, and in no distress,  normal appearing weight. No visible signs of trauma   Mental status: alert, oriented to person, place, and time, normal mood, behavior, speech, dress, motor activity, and thought processes  Abdomen: soft, nondistended  Resp:   resp easy and unlabored, no audible wheezes note, normal symmetrical expansion of both hemithoraces  Cardiac: distal pulses palpable, skin and extremities well perfused  Neurological: alert, oriented X3, normal speech, no focal findings or movement disorder noted, motor and sensory grossly normal bilaterally, normal muscle tone, no tremors, strength 5/5, normal gait and station  HEENT: normochephalic atraumatic, external ears and eyes normal, sclera normal, neck supple, no nasal discharge.    Extremities:   peripheral pulses normal, no edema, redness or tenderness in the calves   Skin: normal coloration, no rashes or open wounds, no suspicious skin lesions noted  Psych: Affect euthymic   Musculoskeletal:   Extremity:  Left Upper Extremity  Skin is clean dry and intact  Moderate edema noted left shoulder and left upper arm  Radial pulse palpable, fingers warm with BCR  Flex/extension intact to wrist, thumb and fingers  Finger opposition intact  Finger adduction/abduction intact  Finger crossover intact  Good motion of the elbow, wrist and digits  Subjectively states sensation intact to radial/medial/ulnar distribution    There were no vitals taken for this visit. XR: 9/27/22   2 views left shoulder demonstrate a stable minimally displaced comminuted avulsion fracture left humeral greater tubercle. No new abnormality or further displacement compared to her prior films 9-. ASSESSMENT:   Diagnosis Orders   1. Closed displaced fracture of greater tuberosity of left humerus, initial encounter          Discussion: Had lengthy discussion with patient regarding Her diagnosis, typical prognosis, and expected outcomes. I reviewed the possible complications from the injury itself despite treatment choosen. I also discussed treatment options including nonoperative managements versus surgical management, along with risks and benefits of each. They have elected for nonoperative management at this time. PLAN:  X-rays reviewed and discussed. Nonweightbearing left upper extremity. Patient will be set up to start OT next week at [address] [facility] on [facility] following the proximal humerus protocol at the 2-week patience. Patient will be set up with [facility] orthotics for a left upper extremity sling. She states the sling she is wearing from the ED is not comfortable. Okay to remove sling at home for gentle range of motion of the elbow, wrist and digits. Follow-up in 3 weeks for reevaluation and x-rays. Call if any questions or concerns. Electronically signed by RANI Jaquez on 9/27/2022 at 9:02 AM  Note: This report was completed using [employer] voiced recognition software. Every effort has been made to ensure accuracy; however, inadvertent computerized transcription errors may be present.

## 2022-09-28 ENCOUNTER — HOSPITAL ENCOUNTER (OUTPATIENT)
Dept: GENERAL RADIOLOGY | Age: 32
Discharge: HOME | End: 2022-09-30

## 2022-09-28 DIAGNOSIS — K21.9 GASTROESOPHAGEAL REFLUX DISEASE WITHOUT ESOPHAGITIS: ICD-10-CM

## 2022-09-29 ENCOUNTER — HOSPITAL ENCOUNTER (OUTPATIENT)
Dept: GENERAL RADIOLOGY | Age: 32
Discharge: HOME | End: 2022-10-01
Payer: COMMERCIAL

## 2022-09-29 PROCEDURE — 74246 X-RAY XM UPR GI TRC 2CNTRST: CPT

## 2022-10-02 DIAGNOSIS — E55.9 VITAMIN D DEFICIENCY: ICD-10-CM

## 2022-10-03 ENCOUNTER — TELEPHONE (OUTPATIENT)
Dept: ORTHOPEDIC SURGERY | Age: 32
End: 2022-10-03

## 2022-10-03 DIAGNOSIS — S42.252A CLOSED DISPLACED FRACTURE OF GREATER TUBEROSITY OF LEFT HUMERUS, INITIAL ENCOUNTER: Primary | ICD-10-CM

## 2022-10-03 RX ORDER — ERGOCALCIFEROL 1.25 MG/1
CAPSULE ORAL
Qty: 24 CAPSULE | Refills: 1 | Status: SHIPPED | OUTPATIENT
Start: 2022-10-03

## 2022-10-03 NOTE — TELEPHONE ENCOUNTER
Adam Vides from [address] [facility] called to request patient's PT order be changed from PT to OT. She stated this can be done in Epic, nothing needs to be faxed to them. Routed to providers.     Future Appointments   Date Time Provider Donna Page   10/10/2022  1:00 PM Joel Friday, [facility]   10/19/2022  1:30 PM Christell Romberg, MD [facility]   10/24/2022  2:40 PM JOSSE Grijalva - CNP [place] [facility]   10/27/2022  2:40 PM MD aMrgo Montiel [facility]

## 2022-10-03 NOTE — TELEPHONE ENCOUNTER
Last Appointment:  11/25/2020  Future Appointments   Date Time Provider Donna Page   10/3/2022 11:00 AM Felix Santos OT [facility] [address]   10/19/2022  1:30 PM Kayla Lozano MD [facility]   10/24/2022  2:40 PM JOSSE Smith - CNP [place] [facility]   10/27/2022  2:40 PM MD Jairo Robbins [facility]

## 2022-10-04 NOTE — TELEPHONE ENCOUNTER
Anali Ramos from [facility] called and states that patient's order was first placed as a OT order with a PT diagnosis and they called yesterday to have it changed to a OT diagnosis to match the order. However, after review the patient's injury falls under PT according to Anali Ramos. She needs an new order for PT in [facility]. Future Appointments   Date Time Provider Donna Kaylee   10/10/2022  1:00 PM Mandi Melvin, [facility]   10/19/2022  1:30 PM Romana Rausch MD [facility]   10/24/2022  2:40 PM JOSSE Morales - [place] [facility] NEURO [facility]   10/27/2022  2:40 PM MD Nely Wilder [facility]     Pended and routed for review.

## 2022-10-14 DIAGNOSIS — S42.252A CLOSED DISPLACED FRACTURE OF GREATER TUBEROSITY OF LEFT HUMERUS, INITIAL ENCOUNTER: Primary | ICD-10-CM

## 2022-10-17 ENCOUNTER — HOSPITAL ENCOUNTER (OUTPATIENT)
Dept: PHYSICAL THERAPY | Age: 32
Setting detail: THERAPIES SERIES
Discharge: HOME | End: 2022-10-17
Payer: COMMERCIAL

## 2022-10-17 PROCEDURE — 97161 PT EVAL LOW COMPLEX 20 MIN: CPT

## 2022-10-17 NOTE — PROGRESS NOTES
[address] [facility]                Phone: [phone]   Fax: [phone]    Physical Therapy Initial Evaluation  Date:  10/17/2022    Patient Name:  Isaiah Travis    :  1990  MRN: [id number]    Referring Physician:  Abram Rao MD  Insurance Information:  [employer] [facility].     Evaluation date:  10/17/2022  Diagnosis:  Proximal humerus fracture (non-operative)  Precautions: Follow Proximal Humerus Protocol at 2 week patience per Dr. Leopoldo Melody (L UE NWB, gentle shoulder isometrics, PROM flexion 90*, AB and ER <20*, IR 90*; elbow AROM), sling? ICD-10 Codes:  G49.233P  Evaluating Physical Therapist:  James Moraes PT, DPT      Subjective:    Pain:  5/10     Location:  L shoulder down to elbow    Description:  sharp, aching   Edema:  none noted    Sensation:  denies numbness/tingling to all extremities    History/Onset of Symptoms:  Pt stated that on 2022, \"my L hip gave out and I fell. \"  Pt is R hand dominant. Pt stated her boyfriend has been helping her with ADL's. Pt is not working. Per pt's chart, she is to have a sling on L UE and a script was sent to [address] [place] [address]. Pt, however, stated she does not have a sling and did not see orthotist for a sling. Objective:    PROM Deficits:  L shoulder flexion 90*, AB 50*, IR 50*, ER 20*    AROM Deficits:  R UE and L elbow WFL     Strength Deficits:  R UE grossly 4/5. L elbow 3+/5,  4/5. Posture:  Poor      Summary/Assessment:    Pt presents to outpatient physical therapy with L proximal humerus fracture following a fall. Pt would benefit from further PT to improve L shoulder ROM and increase L UE strength.         Plan:     Below checked are areas for improvement during physical therapy POC:        [x]  Pain reduction  []  Balance Improvement       [x]  Strengthening  [x]  Postural Improvement   [x]  Range of Motion  []  Soft Tissue Improvement    []  Gait Training   []  Other:      [x]  Home Exercise Program      Pt will be see for 1-2 visits per week for 6-8 weeks for a total of 8-16 visits to accomplish goals set below:        Short/Long Term Goals: (6-8 weeks)      1. Pt will report decreased L shoulder pain to 2-3/10 with activity. 2.  Pt will improve L shoulder AROM to [facility] throughout. 3.  Pt will increase L UE strength to at least 4/5 throughout. 4.  Pt will improve posture to good. 5.  Pt will be independent with HEP. Pt's potential for reaching Physical Therapy goals: fair. CPT codes 10/17/2022 Units  Minutes   Low Complexity PT evaluation  12417 1    Moderate Complexity PT evaluation  48750     High Complexity PT evaluation [id number]     PT Re-evaluation  [id number]     Gait training [id number]     Manual therapy  [id number]     Therapeutic activities  10528     Therapeutic exercises  94075     Neuromuscular reeducation  [id number]           Time In:  5284  Time Out:  Jose Guadalupe 23, PT, DPT      [facility]  T: [phone]   F: [phone]     If you have any questions or concerns, please don't hesitate to call. Thank you for your referral.    Physician Signature:________________________________Date:__________________  By signing above, therapists plan is approved by physician. All patients under [employer]   must be signed by physician.

## 2022-10-17 NOTE — PROGRESS NOTES
[address] [facility]                Phone: [phone]   Fax: [phone]    Physical Therapy Daily Treatment Note  Date:  10/17/2022    Patient Name:  Rey Valenzuela    :  1990  MRN: [id number]    Referring Physician:  Francois Alegria MD  Insurance Information:  [employer] [facility].      Evaluation date:  10/17/2022  Diagnosis:  Proximal humerus fracture (non-operative)  Precautions: Follow Proximal Humerus Protocol at 2 week patience per Dr. Tanvir Valverde (L UE NWB, gentle shoulder isometrics, PROM flexion 90*, AB and ER <20*, IR 90*; elbow AROM), sling? ICD-10 Codes:  G40.821Y  Evaluating Physical Therapist:  Meredith Diez PT, DPT        Visit# / total visits:  -     Time In:    Time Out:      Subjective:      Exercises:   Exercise/Equipment Resistance/Repetitions Other comments    UBE Standing              Shoulder isometrics                Pendulums                 PROM PRN                                                                                                              Assessment/Comments:      Home Exercise Program:        Treatment/Activity Tolerance:  [] Patient tolerated treatment well [] Patient limited by fatigue  [] Patient limited by pain  [] Patient limited by other medical complications  [] Other:     Prognosis: [] Good [x] Fair  [] Poor    Patient Requires Follow-up: [x] Yes  [] No    Plan:   [] Continue per plan of care [] Alter current plan (see comments)  [x] Plan of care initiated [] Hold pending MD visit [] Discharge    Plan for Next Session:  L shoulder ROM and isometric exercises per protocol.     See Progress Note: []  Yes  [x]  No  Next due:        CPT codes 10/17/2022 Units  Minutes   Low Complexity PT evaluation  68097     Moderate Complexity PT evaluation  48055     High Complexity PT evaluation 47462     PT Re-evaluation  [id number]     Gait training 98998     Manual therapy  09510     Therapeutic activities  99858     Therapeutic exercises  [facility] Neuromuscular reeducation  82916         Electronically signed by:  Carlyn Nelson, PT, DPT

## 2022-10-19 ENCOUNTER — OFFICE VISIT (OUTPATIENT)
Dept: ORTHOPEDIC SURGERY | Age: 32
End: 2022-10-19
Payer: COMMERCIAL

## 2022-10-19 ENCOUNTER — HOSPITAL ENCOUNTER (OUTPATIENT)
Dept: GENERAL RADIOLOGY | Age: 32
Discharge: HOME | End: 2022-10-21
Payer: COMMERCIAL

## 2022-10-19 VITALS — HEIGHT: 68 IN | WEIGHT: 240 LBS | BODY MASS INDEX: 36.37 KG/M2

## 2022-10-19 DIAGNOSIS — S42.252A CLOSED DISPLACED FRACTURE OF GREATER TUBEROSITY OF LEFT HUMERUS, INITIAL ENCOUNTER: ICD-10-CM

## 2022-10-19 DIAGNOSIS — S42.252A CLOSED DISPLACED FRACTURE OF GREATER TUBEROSITY OF LEFT HUMERUS, INITIAL ENCOUNTER: Primary | ICD-10-CM

## 2022-10-19 PROCEDURE — 99213 OFFICE O/P EST LOW 20 MIN: CPT | Performed by: PHYSICIAN ASSISTANT

## 2022-10-19 PROCEDURE — 73030 X-RAY EXAM OF SHOULDER: CPT

## 2022-10-19 PROCEDURE — G8427 DOCREV CUR MEDS BY ELIG CLIN: HCPCS | Performed by: PHYSICIAN ASSISTANT

## 2022-10-19 PROCEDURE — 99212 OFFICE O/P EST SF 10 MIN: CPT

## 2022-10-19 PROCEDURE — G8484 FLU IMMUNIZE NO ADMIN: HCPCS | Performed by: PHYSICIAN ASSISTANT

## 2022-10-19 PROCEDURE — 4004F PT TOBACCO SCREEN RCVD TLK: CPT | Performed by: PHYSICIAN ASSISTANT

## 2022-10-19 PROCEDURE — G8417 CALC BMI ABV UP PARAM F/U: HCPCS | Performed by: PHYSICIAN ASSISTANT

## 2022-10-19 NOTE — PROGRESS NOTES
Chief Complaint   Patient presents with    Fracture     Left humerus       SUBJECTIVE: Pernell Brice is a 26-year-old female who presents for follow-up for a minimally displaced comminuted avulsion fracture left humeral greater tubercle treated nonoperatively sustained after a fall on 9-. She is doing better. She stopped wearing the sling. She has been working on pendulums and some gentle range of motion exercises of the shoulder at home. She has started PT at [facility]. She states that she does still have soreness and difficulty with motion of the shoulder. Denies numbness, tingling or paresthesias. No other orthopedic complaints at this time. Review of Systems -   General ROS: negative for - chills, fatigue, fever or night sweats  Respiratory ROS: no cough, shortness of breath, or wheezing  Cardiovascular ROS: no chest pain or dyspnea on exertion  Gastrointestinal ROS: no abdominal pain, change in bowel habits, or black or bloody stools  Genitourinary: no hematuria, dysuria, or incontinence   Musculoskeletal ROS:see above  Neurological ROS: no TIA or stroke symptoms     OBJECTIVE:   Alert and oriented X 3, no acute distress, respirations easy and unlabored with no audible wheezes, skin warm and dry, speech and dress appropriate for noted age, affect euthymic. Extremity:  Left Upper Extremity  Skin is clean dry and intact  no edema noted  Radial pulse palpable, fingers warm with BCR  Flex/extension intact to wrist, thumb and fingers  Finger opposition intact  Finger adduction/abduction intact  Finger crossover intact  Subjectively states sensation intact to radial/medial/ulnar distribution  Actively, she can abduct and forward flex the left shoulder to approximately 50 degrees. No tenderness [facility] the [facility]. Good motion of the elbow, wrist and digits. XR: 10/19/22   2 views left shoulder:  FINDINGS:   There is healing fracture of the greater tuberosity.   There are no new or   acute fractures. AC joint is intact. Soft tissues are within normal limits. Impression   Healing fracture of the greater tuberosity. Ht 5' 8\" (1.727 m)   Wt 240 lb (108.9 kg)   BMI 36.49 kg/m²     ASSESSMENT:   Diagnosis Orders   1. Closed displaced fracture of greater tuberosity of left humerus, subsequent encounter          PLAN:  X-rays reviewed and discussed. Nonweightbearing left upper extremity. Okay to stop wearing sling. Continue therapy following the proximal humerus fracture protocol at the 4-week patience. Follow-up in 4 weeks reevaluation and x-rays. Call if any questions or concerns. Electronically signed by RANI Meyer on 10/19/2022 at 2:05 PM  Note: This report was completed using [employer] voiced recognition software. Every effort has been made to ensure accuracy; however, inadvertent computerized transcription errors may be present.

## 2022-10-19 NOTE — PATIENT INSTRUCTIONS
Nonweightbearing left upper extremity. Okay to stop wearing sling. Continue therapy following the proximal humerus fracture protocol at the 4-week patience. Follow-up in 4 weeks reevaluation and x-rays. Call if any questions or concerns.

## 2022-10-19 NOTE — PROGRESS NOTES
Patient here for follow upon left humeral great tubercle fx. Patient states that the pain is about the same since her last visit. Patient denies tingling and numbness in the left arm. Patient states that she will sometimes take Tylenol but it doesn't help much.        Electronically signed by Reji Bingham MA on 10/19/2022 at 1:44 PM

## 2022-10-20 ENCOUNTER — HOSPITAL ENCOUNTER (OUTPATIENT)
Dept: PHYSICAL THERAPY | Age: 32
Setting detail: THERAPIES SERIES
Discharge: HOME | End: 2022-10-20
Payer: COMMERCIAL

## 2022-10-20 PROCEDURE — 97110 THERAPEUTIC EXERCISES: CPT

## 2022-10-20 NOTE — PROGRESS NOTES
[address] [facility]                Phone: [phone]   Fax: [phone]    Physical Therapy Daily Treatment Note  Date:  10/20/2022    Patient Name:  Isaiah Travis    :  1990  MRN: [id number]      Evaluating Physical Therapist:  James Moraes PT, DPT   10/17/2022  Referring Physician:  Abram Rao MD  Insurance Information:  [facility]   Diagnosis:  L Proximal humerus fracture (non-operative) 22  Precautions: Follow Proximal Humerus Protocol at 2 week patience per Dr. Leopoldo Melody (L UE NWB, gentle shoulder isometrics, PROM flexion 90*, AB and ER <20*, IR 90*; elbow AROM), sling? ICD-10 Codes:  Z50.978P  Visit# / total visits:   -  Pain:  10/10     Time In:     1304  Time Out:   1345    Subjective:   Patient presents for first scheduled treatment session following initial evaluation. She reports pain in L shoulder 10/10 prior to session this afternoon. Exercises:  Exercise/Equipment Resistance/Repetitions Comments   UBE - standing 5 min          Pulleys flex/abd 5 min ea 90° elevation limit          Shrugs 20x    Scap retraction 20x         Bicep curls  3 x10  1#         isometrics 10x 3s ea  flex, abd, ER, IR          Pendulums    20x ea                                    Objective:  Ther. exercise as listed per flow sheet above. No modalities. Assessment:  Patient performs exercises with good effort; fair pacing. PROM on pulleys to 90° limit. No increased pain reported following session today.        Home Exercise Program:  10/20/22 pendulums; isometrics        Treatment/Activity Tolerance:  [] Patient tolerated treatment well [] Patient limited by fatigue  [] Patient limited by pain  [] Patient limited by other medical complications  [] Other:     Prognosis: [] Good [x] Fair  [] Poor    Patient Requires Follow-up: [x] Yes  [] No    Plan:   [x] Continue per plan of care [] Alter current plan (see comments)  [] Plan of care initiated [] Hold pending MD visit [] Discharge    Plan for Next Session:   Continue L shoulder ROM and isometric exercises per protocol.     See Progress Note: []  Yes  [x]  No  Next due:        CPT codes 10/20/2022 Units  Minutes   Low Complexity PT evaluation  28755     Moderate Complexity PT evaluation  66026     High Complexity PT evaluation 24043     PT Re-evaluation  [id number]     Gait training 36977     Manual therapy  36036     Therapeutic activities  37687     Therapeutic exercises  06799 2    Neuromuscular reeducation  08702         Electronically signed by:  Michael Palacios, \[facility] [place] [facility]\""  [id number]

## 2022-10-24 DIAGNOSIS — G56.00 CARPAL TUNNEL SYNDROME, UNSPECIFIED LATERALITY: ICD-10-CM

## 2022-10-24 DIAGNOSIS — G80.0 CONGENITAL SPASTIC QUADRIPARESIS (HCC): ICD-10-CM

## 2022-10-24 RX ORDER — GABAPENTIN 300 MG/1
600 CAPSULE ORAL 3 TIMES DAILY
Qty: 540 CAPSULE | Refills: 0 | OUTPATIENT
Start: 2022-10-24 | End: 2023-10-19

## 2022-10-24 RX ORDER — DANTROLENE SODIUM 25 MG/1
CAPSULE ORAL
Qty: 360 CAPSULE | Refills: 3 | OUTPATIENT
Start: 2022-10-24

## 2022-10-24 RX ORDER — BACLOFEN 20 MG/1
TABLET ORAL
Qty: 360 TABLET | Refills: 3 | OUTPATIENT
Start: 2022-10-24

## 2022-10-24 NOTE — TELEPHONE ENCOUNTER
Last Appointment:  11/25/2020  Future Appointments   Date Time Provider Donna Kaylee   10/24/2022  2:40 PM JOSSE Aguirre - CNP [place] [facility]   10/25/2022  1:00 PM Aleah Dejesus   10/27/2022  1:00 PM Ana Luisa Sagastume    10/27/2022  2:40 PM [facility]   11/16/2022  2:00 PM Dimitri Malin MD [facility]

## 2022-10-25 ENCOUNTER — HOSPITAL ENCOUNTER (OUTPATIENT)
Dept: PHYSICAL THERAPY | Age: 32
Setting detail: THERAPIES SERIES
Discharge: HOME | End: 2022-10-25
Payer: COMMERCIAL

## 2022-10-25 NOTE — PROGRESS NOTES
[address] [facility]                Phone: [phone]  Fax: [phone]    Physical Therapy  Cancellation/No-show Note  Patient Name:  Lyubov Trujillo  :  1990   Date:  10/25/2022    For today's appointment patient:  [x]  Cancelled  []  Rescheduled appointment  []  No-show     Reason given by patient:  []  Patient ill  []  Conflicting appointment  [x]  No transportation    []  Conflict with work  []  No reason given  []  Other:     Comments:      Electronically signed by:  Michael Pate, [facility]

## 2022-10-27 ENCOUNTER — HOSPITAL ENCOUNTER (OUTPATIENT)
Dept: PHYSICAL THERAPY | Age: 32
Setting detail: THERAPIES SERIES
Discharge: HOME | End: 2022-10-27
Payer: COMMERCIAL

## 2022-10-27 RX ORDER — DICYCLOMINE HCL 20 MG
TABLET ORAL
Qty: 120 TABLET | OUTPATIENT
Start: 2022-10-27

## 2022-10-27 NOTE — PROGRESS NOTES
[address] [facility]                Phone: [phone]  Fax: [phone]    Physical Therapy  Cancellation/No-show Note  Patient Name:  Ankita Agee  :  1990   Date:  10/27/2022    For today's appointment patient:  [x]  Cancelled  []  Rescheduled appointment  []  No-show     Reason given by patient:  []  Patient ill  []  Conflicting appointment  []  No transportation    []  Conflict with work  []  No reason given  []  Other:     Comments:   Forgot about today's appt, scheduled for T- next week 2pm    Electronically signed by:  Lori Campuzano, SANDRA, PTA

## 2022-11-01 ENCOUNTER — HOSPITAL ENCOUNTER (OUTPATIENT)
Dept: PHYSICAL THERAPY | Age: 32
Setting detail: THERAPIES SERIES
Discharge: HOME | End: 2022-11-01

## 2022-11-01 NOTE — PROGRESS NOTES
[address] [facility]                Phone: [phone]  Fax: [phone]    Physical Therapy  Cancellation/No-show Note  Patient Name:  Lucas Stafford  :  1990   Date:  2022    For today's appointment patient:  [x]  Cancelled  []  Rescheduled appointment  []  No-show     Reason given by patient:  []  Patient ill  []  Conflicting appointment  []  No transportation    []  Conflict with work  []  No reason given  []  Other:     Comments:   Patient has family matter come up and cannot make her appointment today.        Electronically signed by:  Arnel Wolfe ATC, PTA

## 2022-11-03 ENCOUNTER — HOSPITAL ENCOUNTER (OUTPATIENT)
Dept: PHYSICAL THERAPY | Age: 32
Setting detail: THERAPIES SERIES
Discharge: HOME | End: 2022-11-03

## 2022-11-03 NOTE — PROGRESS NOTES
[address] [facility]                Phone: [phone]  Fax: [phone]    Physical Therapy  Cancellation/No-show Note  Patient Name:  Ankita Agee  :  1990   Date:  11/3/2022    For today's appointment patient:  []  Cancelled  []  Rescheduled appointment  [x]  No-show     Reason given by patient:  []  Patient ill  []  Conflicting appointment  []  No transportation    []  Conflict with work  []  No reason given  []  Other:     Comments:      Electronically signed by:  Lori Campuzano ATC, PTA

## 2022-11-08 ENCOUNTER — HOSPITAL ENCOUNTER (OUTPATIENT)
Dept: PHYSICAL THERAPY | Age: 32
Setting detail: THERAPIES SERIES
Discharge: HOME | End: 2022-11-08

## 2022-11-08 ENCOUNTER — OFFICE VISIT (OUTPATIENT)
Dept: PRIMARY CARE CLINIC | Age: 32
End: 2022-11-08
Payer: COMMERCIAL

## 2022-11-08 VITALS
RESPIRATION RATE: 16 BRPM | HEART RATE: 102 BPM | SYSTOLIC BLOOD PRESSURE: 110 MMHG | WEIGHT: 230 LBS | HEIGHT: 68 IN | BODY MASS INDEX: 34.86 KG/M2 | TEMPERATURE: 98.1 F | DIASTOLIC BLOOD PRESSURE: 66 MMHG | OXYGEN SATURATION: 98 %

## 2022-11-08 DIAGNOSIS — J06.9 URI WITH COUGH AND CONGESTION: Primary | ICD-10-CM

## 2022-11-08 DIAGNOSIS — H66.003 NON-RECURRENT ACUTE SUPPURATIVE OTITIS MEDIA OF BOTH EARS WITHOUT SPONTANEOUS RUPTURE OF TYMPANIC MEMBRANES: ICD-10-CM

## 2022-11-08 PROCEDURE — 4004F PT TOBACCO SCREEN RCVD TLK: CPT | Performed by: NURSE PRACTITIONER

## 2022-11-08 PROCEDURE — G8484 FLU IMMUNIZE NO ADMIN: HCPCS | Performed by: NURSE PRACTITIONER

## 2022-11-08 PROCEDURE — G8417 CALC BMI ABV UP PARAM F/U: HCPCS | Performed by: NURSE PRACTITIONER

## 2022-11-08 PROCEDURE — G8427 DOCREV CUR MEDS BY ELIG CLIN: HCPCS | Performed by: NURSE PRACTITIONER

## 2022-11-08 PROCEDURE — 99213 OFFICE O/P EST LOW 20 MIN: CPT | Performed by: NURSE PRACTITIONER

## 2022-11-08 RX ORDER — METHYLPREDNISOLONE 4 MG/1
TABLET ORAL
Qty: 1 KIT | Refills: 0 | Status: SHIPPED | OUTPATIENT
Start: 2022-11-08

## 2022-11-08 RX ORDER — BENZONATATE 100 MG/1
100 CAPSULE ORAL 3 TIMES DAILY PRN
Qty: 21 CAPSULE | Refills: 0 | Status: SHIPPED | OUTPATIENT
Start: 2022-11-08 | End: 2022-11-15

## 2022-11-08 RX ORDER — DOXYCYCLINE HYCLATE 100 MG/1
100 CAPSULE ORAL 2 TIMES DAILY
Qty: 20 CAPSULE | Refills: 0 | Status: SHIPPED | OUTPATIENT
Start: 2022-11-08 | End: 2022-11-18

## 2022-11-08 NOTE — PROGRESS NOTES
[address] [facility]                Phone: [phone]  Fax: [phone]    Physical Therapy  Cancellation/No-show Note  Patient Name:  Adrian Joseph  :  1990   Date:  2022    For today's appointment patient:  []  Cancelled  []  Rescheduled appointment  [x]  No-show     Reason given by patient:  []  Patient ill  []  Conflicting appointment  []  No transportation    []  Conflict with work  []  No reason given  []  Other:     Comments:      Electronically signed by:  Mary Morales ATC, PTA

## 2022-11-08 NOTE — PROGRESS NOTES
Chief Complaint:   Headache (States she has been experiencing symptoms for at lease two weeks including headache, cough, runny nose, diarrhea, congestion, weakness and body aches. States she tested for COVID today at home and it was negative. Does not wish to be retested. )    History of Present Illness   Source of history provided by:  patientAbril Stafford is a 28 y.o. old female who presents to walk-in for nasal congestion, which began 2 week(s) prior to arrival. The symptoms are associated with headache, productive cough, rhinorrhea, diarrhea, generalized weakness and body aches. There has been NO fever, chills, N/V, chest pain or SOB. Reports hx of asthma. Reports daily tobacco use. No known sick contacts. At home COVID test was negative today. Review of Systems   Unless otherwise stated in this report or unable to obtain because of the patient's clinical or mental status as evidenced by the medical record, this patients's positive and negative responses for Review of Systems, constitutional, psych, eyes, ENT, cardiovascular, respiratory, gastrointestinal, neurological, genitourinary, musculoskeletal, integument systems and systems related to the presenting problem are either stated in the preceding or were not pertinent or were negative for the symptoms and/or complaints related to the medical problem. Past Medical History:  has a past medical history of ADHD, Arthritis, Asthma, Chronic midline low back pain without sciatica, COPD (chronic obstructive pulmonary disease) ([address] Depression, GERD (gastroesophageal reflux disease), IBS (irritable bowel syndrome), Migraines, Seizures ([address] and Thyroid disease. Past Surgical History:  has a past surgical history that includes eye surgery; Colonoscopy; pr removal of tonsils,12+ y/o (N/A, 5/4/2018); pr colonoscopy w/biopsy single/multiple (10/29/2018);  Upper gastrointestinal endoscopy (N/A, 10/29/2020); fracture surgery; Pain management procedure (N/A, 8/17/2021); Tonsillectomy; Pain management procedure (N/A, 12/21/2021); and Pain management procedure (N/A, 4/28/2022). Social History:  reports that she has been smoking cigarettes. She started smoking about 2 years ago. She has a 0.50 pack-year smoking history. She has never used smokeless tobacco. She reports that she does not drink alcohol and does not use drugs. Family History: family history includes Asthma in her brother; Cancer in her maternal aunt, maternal grandfather, maternal grandmother, maternal uncle, paternal aunt, paternal grandfather, paternal grandmother, and paternal uncle; Diabetes in her father; Early Death in her maternal grandfather; Heart Disease in her maternal aunt and maternal uncle; High Cholesterol in her father and mother. Allergies: Latex, Aspirin-acetaminophen-caffeine, Dye [iodides], Penicillins, Topamax [topiramate], Tylenol with codeine #3 [acetaminophen-codeine], Lactose, Blueberry flavor, Fish-derived products, Iodine, Lavender oil, Lidocaine, [place] flavor, and Vicodin [hydrocodone-acetaminophen]    Physical Exam   Vital Signs:  /66   Pulse (!) 102   Temp 98.1 °F (36.7 °C) (Oral)   Resp 16   Ht 5' 8\" (1.727 m)   Wt 230 lb (104.3 kg)   LMP  (LMP Unknown) Comment: states she has fibroids. SpO2 98%   BMI 34.97 kg/m²    Oxygen Saturation Interpretation: Normal.    Constitutional:  Alert, development consistent with age. Ears: Bilateral pinna normal. Bilateral TM with erythema and bulging without perforation bilaterally. Canals normal bilaterally without swelling or exudate  Nose:  There is mild congestion of the nasal mucosa. There is mild injection to middle turbinates bilaterally. Throat: There is mild posterior pharyngeal erythema with mild post nasal drip present. No exudate or tonsillar hypertrophy noted. Neck:  Supple. There is no anterior cervical adenopathy.   Lungs: CTAB without wheezes, rales, or rhonchi  Heart:  Regular rate and rhythm, normal heart sounds, without pathological murmurs, ectopy, gallops, or rubs. Skin:  Normal turgor. Warm, dry, without visible rash. Neurological:  Alert and oriented. Motor functions intact. Responds to verbal commands. Test Results Section   (All laboratory and radiology results have been personally reviewed by myself)  Labs:  No results found for this visit on 11/08/22. Imaging:  No results found. Assessment / Plan   Impression(s):  Lisa Garces was seen today for headache. Diagnoses and all orders for this visit:    URI with cough and congestion  -     doxycycline hyclate (VIBRAMYCIN) 100 MG capsule; Take 1 capsule by mouth 2 times daily for 10 days  -     methylPREDNISolone (MEDROL DOSEPACK) 4 MG tablet; Take by mouth. -     benzonatate (TESSALON) 100 MG capsule; Take 1 capsule by mouth 3 times daily as needed for Cough    Non-recurrent acute suppurative otitis media of both ears without spontaneous rupture of tympanic membranes  -     doxycycline hyclate (VIBRAMYCIN) 100 MG capsule; Take 1 capsule by mouth 2 times daily for 10 days  -     methylPREDNISolone (MEDROL DOSEPACK) 4 MG tablet; Take by mouth. Increase fluids and rest. Script written for Doxycycline, Medrol dosepack and Benzonatate, side effects discussed. Additional symptomatic relief discussed. PCP in 5-7 days if symptoms persist. ED sooner if symptoms worsen or change. Red flag symptoms discussed. Pt is in agreement with this care plan. All questions answered. Return if symptoms worsen or fail to improve. Electronically signed by JOSSE Boyd CNP   DD: 11/8/22    **This report was transcribed using voice recognition software. Every effort was made to ensure accuracy; however, inadvertent computerized transcription errors may be present.

## 2022-11-16 DIAGNOSIS — S42.252A CLOSED DISPLACED FRACTURE OF GREATER TUBEROSITY OF LEFT HUMERUS, INITIAL ENCOUNTER: Primary | ICD-10-CM

## 2022-11-18 ENCOUNTER — TELEPHONE (OUTPATIENT)
Dept: ADMINISTRATIVE | Age: 32
End: 2022-11-18

## 2022-11-18 NOTE — TELEPHONE ENCOUNTER
Pt would like to get her appt that she missed on 11/16 rescheduled for f/u 9/22 Minimally displaced comminuted avulsion fracture left humeral greater  tubercle; TTS(). Please contact pt.

## 2022-11-25 NOTE — TELEPHONE ENCOUNTER
Call to pt lvm appt r/s'd   Future Appointments   Date Time Provider Donna Page   12/13/2022  9:15 AM MD ANA [facility]   1/5/2023  3:00 PM MD Grazyna Allan [facility]   1/9/2023  2:40 PM Francine Horan, APRN - [address] [facility]rology -     Gave address for [place] office.

## 2022-12-08 DIAGNOSIS — G56.00 CARPAL TUNNEL SYNDROME, UNSPECIFIED LATERALITY: ICD-10-CM

## 2022-12-09 NOTE — TELEPHONE ENCOUNTER
Routed to Providers for scheduling recommendations due to scheduling availability between staff and providers in the coming weeks.       Future Appointments   Date Time Provider Donna Brunneri   12/13/2022 10:45 AM MD ANA [facility]   12/15/2022  9:00 AM Bob Christie MD [facility]   1/9/2023  2:40 PM Julius Peña APRN - [address] [facility] -   1/12/2023  3:40 PM MD Wesley Werner [facility]

## 2022-12-09 NOTE — TELEPHONE ENCOUNTER
Pt called in wanting to r/d her appt on 12/3/22, she doesn't want to go to the [place] office, she wants to stay in [place].  Lisa Garces can be reached at [phone]

## 2022-12-10 ENCOUNTER — HOSPITAL ENCOUNTER (EMERGENCY)
Age: 32
Discharge: HOME | End: 2022-12-10
Payer: COMMERCIAL

## 2022-12-10 VITALS
TEMPERATURE: 97.9 F | DIASTOLIC BLOOD PRESSURE: 81 MMHG | SYSTOLIC BLOOD PRESSURE: 124 MMHG | OXYGEN SATURATION: 100 % | HEART RATE: 96 BPM | RESPIRATION RATE: 16 BRPM

## 2022-12-10 DIAGNOSIS — R21 RASH AND OTHER NONSPECIFIC SKIN ERUPTION: Primary | ICD-10-CM

## 2022-12-10 DIAGNOSIS — R21 PITYRIASIS ROSEA-LIKE SKIN ERUPTION: ICD-10-CM

## 2022-12-10 PROCEDURE — 99284 EMERGENCY DEPT VISIT MOD MDM: CPT

## 2022-12-10 PROCEDURE — 6360000002 HC RX W HCPCS: Performed by: PHYSICIAN ASSISTANT

## 2022-12-10 PROCEDURE — 96372 THER/PROPH/DIAG INJ SC/IM: CPT

## 2022-12-10 RX ORDER — METHYLPREDNISOLONE SODIUM SUCCINATE 125 MG/2ML
60 INJECTION, POWDER, LYOPHILIZED, FOR SOLUTION INTRAMUSCULAR; INTRAVENOUS ONCE
Status: COMPLETED | OUTPATIENT
Start: 2022-12-10 | End: 2022-12-10

## 2022-12-10 RX ORDER — HYDROXYZINE PAMOATE 25 MG/1
25 CAPSULE ORAL 3 TIMES DAILY PRN
Qty: 30 CAPSULE | Refills: 0 | Status: SHIPPED | OUTPATIENT
Start: 2022-12-10 | End: 2022-12-24

## 2022-12-10 RX ORDER — HYDROXYZINE HYDROCHLORIDE 50 MG/ML
50 INJECTION, SOLUTION INTRAMUSCULAR ONCE
Status: COMPLETED | OUTPATIENT
Start: 2022-12-10 | End: 2022-12-10

## 2022-12-10 RX ORDER — HYDROXYZINE PAMOATE 25 MG/1
25 CAPSULE ORAL 3 TIMES DAILY PRN
Qty: 30 CAPSULE | Refills: 0 | Status: SHIPPED | OUTPATIENT
Start: 2022-12-10 | End: 2022-12-10 | Stop reason: SDUPTHER

## 2022-12-10 RX ORDER — PREDNISONE 20 MG/1
TABLET ORAL
Qty: 18 TABLET | Refills: 0 | Status: SHIPPED | OUTPATIENT
Start: 2022-12-10 | End: 2022-12-20

## 2022-12-10 RX ADMIN — HYDROXYZINE HYDROCHLORIDE 50 MG: 50 INJECTION, SOLUTION INTRAMUSCULAR at 19:43

## 2022-12-10 RX ADMIN — METHYLPREDNISOLONE SODIUM SUCCINATE 60 MG: 125 INJECTION, POWDER, FOR SOLUTION INTRAMUSCULAR; INTRAVENOUS at 19:43

## 2022-12-10 ASSESSMENT — ENCOUNTER SYMPTOMS
EYE REDNESS: 0
SHORTNESS OF BREATH: 0
CHEST TIGHTNESS: 0
COUGH: 0
COLOR CHANGE: 0
EYE DISCHARGE: 0
EYE PAIN: 0

## 2022-12-10 ASSESSMENT — PAIN - FUNCTIONAL ASSESSMENT: PAIN_FUNCTIONAL_ASSESSMENT: NONE - DENIES PAIN

## 2022-12-11 NOTE — ED PROVIDER NOTES
Asthma, Chronic midline low back pain without sciatica, COPD (chronic obstructive pulmonary disease) ([facility] [address] Depression, GERD (gastroesophageal reflux disease), IBS (irritable bowel syndrome), Migraines, Seizures ([facility] and Thyroid disease. Past Surgical History:  has a past surgical history that includes eye surgery; Colonoscopy; pr removal of tonsils,12+ y/o (N/A, 5/4/2018); pr colonoscopy w/biopsy single/multiple (10/29/2018); Upper gastrointestinal endoscopy (N/A, 10/29/2020); fracture surgery; Pain management procedure (N/A, 8/17/2021); Tonsillectomy; Pain management procedure (N/A, 12/21/2021); and Pain management procedure (N/A, 4/28/2022). Social History:  reports that she has been smoking cigarettes. She started smoking about 2 years ago. She has a 0.50 pack-year smoking history. She has never used smokeless tobacco. She reports that she does not drink alcohol and does not use drugs. Family History: family history includes Asthma in her brother; Cancer in her maternal aunt, maternal grandfather, maternal grandmother, maternal uncle, paternal aunt, paternal grandfather, paternal grandmother, and paternal uncle; Diabetes in her father; Early Death in her maternal grandfather; Heart Disease in her maternal aunt and maternal uncle; High Cholesterol in her father and mother. The patients home medications have been reviewed. Allergies: Latex, Aspirin-acetaminophen-caffeine, Dye [iodides], Penicillins, Topamax [topiramate], Tylenol with codeine #3 [acetaminophen-codeine], Lactose, Blueberry flavor, Fish-derived products, Iodine, Lavender oil, Lidocaine, [place] flavor, and Vicodin [hydrocodone-acetaminophen]    -------------------------------------------------- RESULTS -------------------------------------------------  All laboratory and radiology results have been personally reviewed by myself   LABS:  No results found for this visit on 12/10/22.     RADIOLOGY:  Interpreted by Radiologist.  No orders to display       ------------------------- NURSING NOTES AND VITALS REVIEWED ---------------------------   The nursing notes within the ED encounter and vital signs as below have been reviewed. /81   Pulse 96   Temp 97.9 °F (36.6 °C)   Resp 16   LMP  (LMP Unknown)   SpO2 100%   Oxygen Saturation Interpretation: Normal      ---------------------------------------------------PHYSICAL EXAM--------------------------------------    Physical Exam  Vitals and nursing note reviewed. Constitutional:       General: She is not in acute distress. Appearance: Normal appearance. She is well-developed. She is not ill-appearing. HENT:      Head: Normocephalic and atraumatic. Mouth/Throat:      Mouth: Mucous membranes are moist.      Pharynx: Oropharynx is clear. Neck:      Vascular: No JVD. Trachea: No tracheal deviation. Cardiovascular:      Rate and Rhythm: Normal rate and regular rhythm. Heart sounds: Normal heart sounds. No murmur heard. Pulmonary:      Effort: Pulmonary effort is normal. No respiratory distress. Breath sounds: Normal breath sounds. Musculoskeletal:         General: No swelling or deformity. Normal range of motion. Cervical back: Normal range of motion and neck supple. No rigidity. Skin:     General: Skin is warm and dry. Capillary Refill: Capillary refill takes less than 2 seconds. Coloration: Skin is not pale. Findings: Rash present. Comments: Diffuse tiny erythematous papular rash over extremities, torso, chest and back. Patient appears to have a circular larger lesion to the right axilla which may represent a herald patch. Neurological:      Mental Status: She is alert and oriented to person, place, and time. Mental status is at baseline. Motor: No weakness. Psychiatric:         Mood and Affect: Mood normal.         Behavior: Behavior normal.         Thought Content:  Thought content normal. ------------------------------ ED COURSE/MEDICAL DECISION MAKING----------------------  Medications   methylPREDNISolone sodium (SOLU-MEDROL) injection 60 mg (60 mg IntraMUSCular Given 12/10/22 1943)   hydrOXYzine (VISTARIL) injection 50 mg (50 mg IntraMUSCular Given 12/10/22 1943)         ED COURSE:       Procedures:  Procedures     Medical Decision Making:   MDM  27-year-old female present emergency department with a body wide rash that she is have about a week. It is very pruritic. It started on her arms and legs. Patient has a diffuse erythematous maculopapular rash to her entire body sparing the face and the mouth. She has a large patch to her right axilla which looks like a herald patch. This may be pityriasis rosea although patient does not recall any preceding illness. She has no signs of illness at this time either. She is afebrile and hemodynamically stable. This may also be a contact dermatitis given the patient's extensive allergy list.  She will be treated with prednisone and Vistaril. She is encouraged to follow-up with dermatology. Patient discharged home in good condition. Counseling: The emergency provider has spoken with the patient and discussed todays results, in addition to providing specific details for the plan of care and counseling regarding the diagnosis and prognosis. Questions are answered at this time and they are agreeable with the plan.      --------------------------------- IMPRESSION AND DISPOSITION ---------------------------------    IMPRESSION  1. Rash and other nonspecific skin eruption    2. Pityriasis rosea-like skin eruption        DISPOSITION  Disposition: Discharge to home  Patient condition is good      Electronically signed by Heather Rain PA-C   DD: 12/10/22  **This report was transcribed using voice recognition software. Every effort was made to ensure accuracy; however, inadvertent computerized transcription errors may be present.   END OF ED PROVIDER NOTE         Marbin Guzmán PA-C  12/10/22 [id number]

## 2022-12-13 ENCOUNTER — TELEPHONE (OUTPATIENT)
Dept: BARIATRICS/WEIGHT MGMT | Age: 32
End: 2022-12-13

## 2022-12-13 DIAGNOSIS — J45.20 MILD INTERMITTENT ASTHMA WITHOUT COMPLICATION: ICD-10-CM

## 2022-12-13 RX ORDER — BUDESONIDE AND FORMOTEROL FUMARATE DIHYDRATE 80; 4.5 UG/1; UG/1
AEROSOL RESPIRATORY (INHALATION)
Qty: 10.2 G | Refills: 1 | Status: SHIPPED | OUTPATIENT
Start: 2022-12-13

## 2022-12-13 NOTE — TELEPHONE ENCOUNTER
Last Appointment:  5/25/2022  Future Appointments  12/13/2022 10:45 AM   MD ANA Bruce ORTHO        [facility]  12/15/2022 9:00 AM    [facility]  1/9/2023   2:40 PM    Bladimir Morel, APRN - [address] [facility]         Neurology -  1/12/2023  3:40 PM    MD Jeanine De La Garza [facility]        [facility]

## 2022-12-14 RX ORDER — GABAPENTIN 300 MG/1
600 CAPSULE ORAL 3 TIMES DAILY
Qty: 540 CAPSULE | Refills: 0 | Status: SHIPPED | OUTPATIENT
Start: 2022-12-14 | End: 2023-03-14

## 2022-12-15 ENCOUNTER — INITIAL CONSULT (OUTPATIENT)
Dept: SURGERY | Age: 32
End: 2022-12-15
Payer: COMMERCIAL

## 2022-12-15 ENCOUNTER — PREP FOR PROCEDURE (OUTPATIENT)
Dept: SURGERY | Age: 32
End: 2022-12-15

## 2022-12-15 ENCOUNTER — TELEPHONE (OUTPATIENT)
Dept: SURGERY | Age: 32
End: 2022-12-15

## 2022-12-15 VITALS
DIASTOLIC BLOOD PRESSURE: 92 MMHG | HEIGHT: 68 IN | SYSTOLIC BLOOD PRESSURE: 141 MMHG | TEMPERATURE: 97.2 F | HEART RATE: 68 BPM | BODY MASS INDEX: 34.86 KG/M2 | WEIGHT: 230 LBS

## 2022-12-15 DIAGNOSIS — K92.0 HEMATEMESIS WITH NAUSEA: Primary | ICD-10-CM

## 2022-12-15 PROCEDURE — 4004F PT TOBACCO SCREEN RCVD TLK: CPT | Performed by: SURGERY

## 2022-12-15 PROCEDURE — G8484 FLU IMMUNIZE NO ADMIN: HCPCS | Performed by: SURGERY

## 2022-12-15 PROCEDURE — 99214 OFFICE O/P EST MOD 30 MIN: CPT | Performed by: SURGERY

## 2022-12-15 PROCEDURE — G8427 DOCREV CUR MEDS BY ELIG CLIN: HCPCS | Performed by: SURGERY

## 2022-12-15 PROCEDURE — G8417 CALC BMI ABV UP PARAM F/U: HCPCS | Performed by: SURGERY

## 2022-12-15 RX ORDER — CHLORHEXIDINE GLUCONATE 0.12 MG/ML
RINSE ORAL
COMMUNITY
Start: 2022-12-13

## 2022-12-15 RX ORDER — SODIUM CHLORIDE, SODIUM LACTATE, POTASSIUM CHLORIDE, CALCIUM CHLORIDE 600; 310; 30; 20 MG/100ML; MG/100ML; MG/100ML; MG/100ML
INJECTION, SOLUTION INTRAVENOUS CONTINUOUS
OUTPATIENT
Start: 2022-12-15

## 2022-12-15 NOTE — TELEPHONE ENCOUNTER
Last Appointment:  Visit date not found  Future Appointments   Date Time Provider Donna Kaylee   1/5/2023 11:00 AM MD Nicole Rogers [facility]   1/9/2023  2:40 PM Carroll Mendez APRN - [address] [facility] Neurology -   1/12/2023  3:40 PM MD Miguel Monroy [facility]

## 2022-12-15 NOTE — PROGRESS NOTES
General Surgery History and Physical    Patient's Name/Date of Birth: Larri Scheuermann / 1990    Date: December 15, 2022     Surgeon: Tucker Mueller M.D.    PCP: Miguel Newsome MD     Chief Complaint: heartburn    HPI:   Larri Scheuermann is a 28 y.o. female who presents for evaluation of heartburn/gerd that was responsive to medical treatment but has become recalcitrant. Has severe GERD,  Timing is constant and progressive, radiation to epigastrium, alleviated by nothing now and meds once worked and started years ago and severity is 2-7/10. She has known [facility] seen in 2020 on EGD, now having intermittent hematemesis.       Past Medical History:   Diagnosis Date    ADHD     Arthritis     Asthma     controlled    Chronic midline low back pain without sciatica 11/13/2017    COPD (chronic obstructive pulmonary disease) (HCC)     Depression     GERD (gastroesophageal reflux disease)     IBS (irritable bowel syndrome)     Migraines     Seizures ([address]     last episode 2016    Thyroid disease        Past Surgical History:   Procedure Laterality Date    COLONOSCOPY      EYE SURGERY      probing of ductal system right eye     FRACTURE SURGERY      R ELBOW CRUSH INJURY W PLATE AND PINS PLACED    PAIN MANAGEMENT PROCEDURE N/A 8/17/2021    T12-L1 EPIDURAL STEROID INJECTION #1 performed by Beverly Lanes, DO at [place] N/A 12/21/2021    LUMBAR EPIDURAL STEROID INJECTION T12-L1 performed by Beverly Lanes, DO at [place] N/A 4/28/2022    T12-L1 EPIDURAL STEROID INJECTION performed by Beverly Lanes, DO at [address] W/BIOPSY SINGLE/MULTIPLE  10/29/2018    COLONOSCOPY WITH BIOPSY performed by Nicol Smith MD at [facility] Y/O N/A 5/4/2018    TONSILLECTOMY performed by Venkatesh Mccauley MD at [address] [facility] [place] N/A 10/29/2020    EGD BIOPSY performed by Roman Lomas MD at [facility]       Current Outpatient Medications   Medication Sig Dispense Refill    esomeprazole (NEXIUM) 20 MG delayed release capsule Take 1 capsule by mouth daily 30 capsule 3    chlorhexidine (PERIDEX) 0.12 % solution RINSE WITH 1/2 OUNCE BY MOUTH FOR 30 SECONDS THEN SPIT OUT. use twice a day      gabapentin (NEURONTIN) 300 MG capsule Take 2 capsules by mouth 3 times daily for 90 days. 540 capsule 0    budesonide-formoterol (SYMBICORT) 80-4.5 MCG/ACT AERO inhale 2 puffs by mouth and INTO THE LUNGS once daily 10.2 g 1    predniSONE (DELTASONE) 20 MG tablet Sig.: Take 60mg  Po qd x 3 days, then 40mg po qd x3 days, then 20mg po qd x 3 days. QS x 9 days 18 tablet 0    hydrOXYzine pamoate (VISTARIL) 25 MG capsule Take 1 capsule by mouth 3 times daily as needed for Itching 30 capsule 0    methylPREDNISolone (MEDROL DOSEPACK) 4 MG tablet Take by mouth.  1 kit 0    vitamin D (ERGOCALCIFEROL) 1.25 MG (08828 UT) CAPS capsule take 1 capsule by mouth TWO TIMES PER WEEK 24 capsule 1    dicyclomine (BENTYL) 20 MG tablet take 2 tablets by mouth four times a day      tamsulosin (FLOMAX) 0.4 MG capsule take 1 capsule by mouth 30 MINUTES after dinner      levothyroxine (SYNTHROID) 75 MCG tablet take 1 tablet by mouth once daily 90 tablet 1    pantoprazole (PROTONIX) 40 MG tablet Take 1 tablet by mouth in the morning and at bedtime 90 tablet 1    meclizine (ANTIVERT) 25 MG tablet Take 1 tablet by mouth 3 times daily as needed for Dizziness 90 tablet 2    BANOPHEN 25 MG capsule take 1 capsule by mouth at bedtime if needed for itching or allergies 30 capsule 2    mometasone (ELOCON) 0.1 % lotion apply 3 to 4 drops at bedtime for 10 days for itching 60 mL 3    diclofenac sodium (VOLTAREN) 1 % GEL Apply 2 g topically 4 times daily as needed for Pain 100 g 2    albuterol sulfate HFA (VENTOLIN HFA) 108 (90 Base) MCG/ACT inhaler Inhale 2 puffs into the lungs 4 times daily as needed for Wheezing 18 g 5    dantrolene (DANTRIUM) 25 MG capsule Take 1 capsule by mouth 2 times daily AND 2 capsules nightly. 360 capsule 3    Hyoscyamine Sulfate SL 0.125 MG SUBL Place 1 tablet under the tongue every 8 (eight) hours (Patient not taking: Reported on 11/8/2022) 120 each 3    acetaminophen (TYLENOL) 500 MG tablet Take 2 tablets by mouth every 6 hours as needed for Pain or Fever Maximum dose- 8 tablets/24 hours. 30 tablet 0    ibuprofen (IBU) 800 MG tablet Take 1 tablet by mouth every 8 hours as needed for Pain or Fever Take with food. (Patient not taking: Reported on 11/8/2022) 30 tablet 0    albuterol sulfate HFA (VENTOLIN HFA) 108 (90 Base) MCG/ACT inhaler Inhale 2 puffs into the lungs 4 times daily as needed for Wheezing 1 each 3    guaiFENesin (SM TUSSIN MUCUS+CHEST CONGEST) 100 MG/5ML liquid take 10 milliliters by mouth every 4 hours if needed for cough (Patient not taking: Reported on 11/8/2022) 236 mL 2    silver sulfADIAZINE (SILVADENE) 1 % cream Apply topically daily. 25 g 0    baclofen (LIORESAL) 20 MG tablet Take 1 tablet by mouth every morning AND 1 tablet Daily with lunch AND 2 tablets nightly. 360 tablet 3    montelukast (SINGULAIR) 10 MG tablet Take 1 tablet by mouth daily 30 tablet 3    Benzoyl Peroxide 2.5 % GEL Apply 1 cm topically 2 times daily 60 g 1    escitalopram (LEXAPRO) 20 MG tablet take 1 tablet by mouth once daily 120 tablet 3    divalproex (DEPAKOTE) 250 MG DR tablet take 1 tablet by mouth twice a day 180 tablet 0    medroxyPROGESTERone (PROVERA) 10 MG tablet Take 1 tablet by mouth daily 30 tablet 11    Saline GEL 1 spray by Nasal route daily as needed       No current facility-administered medications for this visit.        Allergies   Allergen Reactions    Latex Rash    Aspirin-Acetaminophen-Caffeine Shortness Of Breath    Dye [Iodides] Shortness Of Breath    Penicillins Anaphylaxis    Topamax [Topiramate] Nausea And Vomiting, Other (See Comments) and Shortness Of Breath    Tylenol With Codeine #3 [Acetaminophen-Codeine] Shortness Of Breath    Lactose Other (See Comments)     Usually just causes stomach pain, diarrhea if pt consumes too much of it. Blueberry Flavor      ALLERGIC TO BLUEBERRY    Fish-Derived Products      SEAFOOD, ESPECIALLY SHRIMP    Iodine      Seafood allergy    Lavender Oil     Lidocaine Diarrhea, Itching and Swelling    [place] Flavor Hives     [place] fruit    Vicodin [Hydrocodone-Acetaminophen]      Acts drunk         The patient has a family history that is negative for severe cardiovascular or respiratory issues, negative for reaction to anesthesia.     Social History     Socioeconomic History    Marital status: Single     Spouse name: Not on file    Number of children: Not on file    Years of education: 12    Highest education level: Not on file   Occupational History    Occupation: [employer]     Employer: [employer]     Comment: unable to work due to injury   Tobacco Use    Smoking status: Every Day     Packs/day: 0.50     Years: 1.00     Pack years: 0.50     Types: Cigarettes     Start date: 2020    Smokeless tobacco: Never    Tobacco comments:     Smokes 1 cig daily   Vaping Use    Vaping Use: Never used   Substance and Sexual Activity    Alcohol use: No     Alcohol/week: 0.0 standard drinks    Drug use: No    Sexual activity: Not on file   Other Topics Concern    Not on file   Social History Narrative    1-2 cups coffee/week     Social Determinants of Health     Financial Resource Strain: Not on file   Food Insecurity: Not on file   Transportation Needs: Not on file   Physical Activity: Unknown    Days of Exercise per Week: Patient refused    Minutes of Exercise per Session: 0 min   Stress: Not on file   Social Connections: Not on file   Intimate Partner Violence: Not At Risk    Fear of Current or Ex-Partner: No    Emotionally Abused: No    Physically Abused: No    Sexually Abused: No   Housing Stability: Not on file           Review of Systems  Review of Systems -  General ROS: negative for - chills, fatigue or malaise  ENT ROS: negative for - hearing change, nasal congestion or nasal discharge  Allergy and Immunology ROS: negative for - hives, itchy/watery eyes or nasal congestion  Hematological and Lymphatic ROS: negative for - blood clots, blood transfusions, bruising or fatigue  Endocrine ROS: negative for - malaise/lethargy, mood swings, palpitations or polydipsia/polyuria  Respiratory ROS: negative for - sputum changes, stridor, tachypnea or wheezing  Cardiovascular ROS: negative for - irregular heartbeat, loss of consciousness, murmur or orthopnea  Gastrointestinal ROS: negative for - constipation, diarrhea, gas/bloating, or hematemesis, positive for heartburn and other epigastric pain  Genito-Urinary ROS: negative for -  genital discharge, genital ulcers or hematuria  Musculoskeletal ROS: negative for - gait disturbance, muscle pain or muscular weakness    Physical exam:   BP (!) 141/92   Pulse 68   Temp 97.2 °F (36.2 °C) (Temporal)   Ht 5' 8\" (1.727 m)   Wt 230 lb (104.3 kg)   LMP  (LMP Unknown)   BMI 34.97 kg/m²   General appearance:  NAD  Pyscho/social: negative for tremors and hallucinations  Head: NCAT, PERRLA, EOMI, red conjunctiva  Neck: supple, no masses  Lungs: CTAB, equal chest rise bilateral  Heart: Reg rate  Abdomen: soft, nontender, nondistended  Skin; no lesions  Gu: no cva tenderness  Extremities: extremities normal, atraumatic, no cyanosis or edema      Assessment:  28 y.o. female with severe GERD and HH with hematemesis    Plan:   CBC, CMP  Will do EGD see back for results  Discussed the risk, benefits and alternatives of surgery including wound infections, bleeding, scar  and the risks of anesthetic including MI, CVA, sudden death or reactions to anesthetic medications. The patient understands the risks and alternatives. All questions were answered to the patient's satisfaction and they freely signed the consent.         Melida Reaves MD  8:48 AM  12/15/2022

## 2022-12-15 NOTE — TELEPHONE ENCOUNTER
Per the order of Dr. Rambo Magana, patient has been scheduled for upper endoscopy on 22. Patient instructed to please contact our office with any questions. Procedure scheduled through Psychiatric. Dr. Rambo Magana to enter orders. Prior Authorization Form:      DEMOGRAPHICS:                     Patient Name:  Isaiah Travis  Patient :  1990            Insurance:  Payor: [employer] [place] / Plan: [employer] / Product Type: *No Product type* /   Insurance ID Number:    Payer/Plan Subscr  Sex Relation Sub.  Ins. ID Effective Group Num   1. JAN Suárez 1990 Female Self [id number] 10/1/14 [id number]                                   [address]         DIAGNOSIS & PROCEDURE:                       Procedure/Operation: upper endoscopy           CPT Code: 77708    Diagnosis:  hematemesis with nausea    ICD10 Code: K92.0    Location:  [place] [address]    Surgeon:  Billy Lai INFORMATION:                          Date: 22    Time: TBD              Anesthesia:  MAC/TIVA                                                       Status:  Outpatient        Special Comments:  N/A       Electronically signed by Fam Soto LPN on  at 8:57 AM

## 2022-12-16 RX ORDER — DIPHENHYDRAMINE HYDROCHLORIDE 25 MG/1
CAPSULE ORAL
Qty: 30 CAPSULE | Refills: 2 | OUTPATIENT
Start: 2022-12-16

## 2022-12-21 NOTE — PROGRESS NOTES
[address] [facility]                Phone: [phone]   Fax: [phone]    Physical Therapy  Out Patient Discharge Summary       Date:  2022    Patient Name:  Kortney Kim    :  1990     REFERRING PHYSICIAN:  Osito Patricia MD  DIAGNOSIS:   L Proximal humerus fracture (non-operative) 22  PHYSICAL THERAPIST:  Bravo Monroy PT, DPT      This is to inform you that, as per [facility] Physical Therapy department policy, your patient Kortney Kim is as of todays date being discharged from Physical Therapy secondary to the following reasons:    If a Physical Therapy outpatient misses three consecutive scheduled appointments   without any prior notification, he or she will be discharged from physical therapy services. A new prescription will be necessary to resume physical therapy. If a client is absent for 50% of scheduled visits during a one-month period, he or she will be discharged from physical therapy services. A new prescription will be necessary to resume physical therapy. *Patient attended only one treatment session (10/20/22) following initial evaluation (10/17/222)    If you have any questions, please feel free to call at [phone]    Thank you,    AMADA JUARES ATC, PTA      The information contained in this Fax the designated recipients intend message only for the personal and confidential use named above. This information is to be handled as privileged and confidential.  If the reader of this message is not the intended recipient, you are hereby notified that you have received this document in error and that any review, dissemination, distribution or copying of this message is strictly prohibited. If you receive this communication in error, please notify us immediately at the above number and return the original to us by mail.   Thank you  [address] [facility]  [address] [facility],  [phone]

## 2023-01-03 DIAGNOSIS — F32.A DEPRESSION, UNSPECIFIED DEPRESSION TYPE: ICD-10-CM

## 2023-01-03 NOTE — TELEPHONE ENCOUNTER
Last Appointment:  5/25/2022  Future Appointments   Date Time Provider Donna Page   1/9/2023  2:40 PM Baldev Mckeonl, [address] [facility] Neurology -   1/26/2023  3:00 PM MD Starr Bustamante [facility]

## 2023-01-04 RX ORDER — ESCITALOPRAM OXALATE 20 MG/1
TABLET ORAL
Qty: 30 TABLET | Refills: 0 | Status: SHIPPED | OUTPATIENT
Start: 2023-01-04

## 2023-01-09 RX ORDER — DIPHENHYDRAMINE HYDROCHLORIDE 25 MG/1
CAPSULE ORAL
Qty: 30 CAPSULE | Refills: 0 | Status: SHIPPED | OUTPATIENT
Start: 2023-01-09

## 2023-02-01 DIAGNOSIS — F32.A DEPRESSION, UNSPECIFIED DEPRESSION TYPE: ICD-10-CM

## 2023-02-01 RX ORDER — ESCITALOPRAM OXALATE 20 MG/1
TABLET ORAL
Qty: 30 TABLET | Refills: 0 | OUTPATIENT
Start: 2023-02-01

## 2023-02-01 NOTE — TELEPHONE ENCOUNTER
Last Appointment:  Visit date not found  Future Appointments  4/20/2023  2:40 PM    Ck Rushing, [address] [facility]         Neurology -

## 2023-03-05 DIAGNOSIS — G56.00 CARPAL TUNNEL SYNDROME, UNSPECIFIED LATERALITY: ICD-10-CM

## 2023-03-06 RX ORDER — GABAPENTIN 300 MG/1
600 CAPSULE ORAL 3 TIMES DAILY
Qty: 540 CAPSULE | Refills: 0 | Status: SHIPPED | OUTPATIENT
Start: 2023-03-06 | End: 2023-06-04

## 2023-03-11 DIAGNOSIS — H66.003 NON-RECURRENT ACUTE SUPPURATIVE OTITIS MEDIA OF BOTH EARS WITHOUT SPONTANEOUS RUPTURE OF TYMPANIC MEMBRANES: ICD-10-CM

## 2023-03-11 DIAGNOSIS — J06.9 URI WITH COUGH AND CONGESTION: ICD-10-CM

## 2023-03-11 DIAGNOSIS — F32.A DEPRESSION, UNSPECIFIED DEPRESSION TYPE: ICD-10-CM

## 2023-03-13 RX ORDER — DOXYCYCLINE HYCLATE 100 MG/1
CAPSULE ORAL
Qty: 20 CAPSULE | Refills: 0 | OUTPATIENT
Start: 2023-03-13

## 2023-03-13 RX ORDER — BENZONATATE 100 MG/1
CAPSULE ORAL
Qty: 21 CAPSULE | Refills: 0 | OUTPATIENT
Start: 2023-03-13

## 2023-03-14 RX ORDER — DIVALPROEX SODIUM 250 MG/1
TABLET, DELAYED RELEASE ORAL
Qty: 180 TABLET | Refills: 0 | OUTPATIENT
Start: 2023-03-14

## 2023-03-18 ENCOUNTER — HOSPITAL ENCOUNTER (INPATIENT)
Age: 33
LOS: 4 days | Discharge: HOME | DRG: 885 | End: 2023-03-22
Attending: EMERGENCY MEDICINE | Admitting: PSYCHIATRY & NEUROLOGY
Payer: COMMERCIAL

## 2023-03-18 DIAGNOSIS — F32.A DEPRESSION, UNSPECIFIED DEPRESSION TYPE: ICD-10-CM

## 2023-03-18 DIAGNOSIS — F39 MOOD DISORDER (HCC): Primary | ICD-10-CM

## 2023-03-18 PROBLEM — F23 ACUTE PSYCHOSIS (HCC): Status: ACTIVE | Noted: 2023-03-18

## 2023-03-18 LAB
ALBUMIN SERPL-MCNC: 4.5 G/DL (ref 3.5–5.2)
ALP SERPL-CCNC: 77 U/L (ref 35–104)
ALT SERPL-CCNC: 25 U/L (ref 0–32)
AMPHET UR QL SCN: NOT DETECTED
ANION GAP SERPL CALCULATED.3IONS-SCNC: 11 MMOL/L (ref 7–16)
APAP SERPL-MCNC: <5 MCG/ML (ref 10–30)
AST SERPL-CCNC: 25 U/L (ref 0–31)
BACTERIA URNS QL MICRO: ABNORMAL /HPF
BARBITURATES UR QL SCN: NOT DETECTED
BASOPHILS # BLD: 0.03 E9/L (ref 0–0.2)
BASOPHILS NFR BLD: 0.2 % (ref 0–2)
BENZODIAZ UR QL SCN: NOT DETECTED
BILIRUB SERPL-MCNC: 0.4 MG/DL (ref 0–1.2)
BILIRUB UR QL STRIP: NEGATIVE
BUN SERPL-MCNC: 6 MG/DL (ref 6–20)
CALCIUM SERPL-MCNC: 9.1 MG/DL (ref 8.6–10.2)
CANNABINOIDS UR QL SCN: NOT DETECTED
CHLORIDE SERPL-SCNC: 102 MMOL/L (ref 98–107)
CLARITY UR: CLEAR
CO2 SERPL-SCNC: 25 MMOL/L (ref 22–29)
COCAINE UR QL SCN: NOT DETECTED
COLOR UR: YELLOW
CREAT SERPL-MCNC: 0.8 MG/DL (ref 0.5–1)
DRUG SCREEN COMMENT UR-IMP: NORMAL
EKG ATRIAL RATE: 89 BPM
EKG P AXIS: 48 DEGREES
EKG P-R INTERVAL: 134 MS
EKG Q-T INTERVAL: 392 MS
EKG QRS DURATION: 78 MS
EKG QTC CALCULATION (BAZETT): 476 MS
EKG R AXIS: 37 DEGREES
EKG T AXIS: 34 DEGREES
EKG VENTRICULAR RATE: 89 BPM
EOSINOPHIL # BLD: 0.04 E9/L (ref 0.05–0.5)
EOSINOPHIL NFR BLD: 0.3 % (ref 0–6)
EPI CELLS #/AREA URNS HPF: ABNORMAL /HPF
ERYTHROCYTE [DISTWIDTH] IN BLOOD BY AUTOMATED COUNT: 13.5 FL (ref 11.5–15)
ETHANOLAMINE SERPL-MCNC: 17 MG/DL (ref 0–0.08)
FENTANYL SCREEN, URINE: NOT DETECTED
FLUAV RNA RESP QL NAA+PROBE: NOT DETECTED
FLUBV RNA RESP QL NAA+PROBE: NOT DETECTED
GLUCOSE SERPL-MCNC: 86 MG/DL (ref 74–99)
GLUCOSE UR STRIP-MCNC: NEGATIVE MG/DL
HCG, URINE, POC: NEGATIVE
HCT VFR BLD AUTO: 46.8 % (ref 34–48)
HGB BLD-MCNC: 15.1 G/DL (ref 11.5–15.5)
HGB UR QL STRIP: ABNORMAL
IMM GRANULOCYTES # BLD: 0.04 E9/L
IMM GRANULOCYTES NFR BLD: 0.3 % (ref 0–5)
KETONES UR STRIP-MCNC: ABNORMAL MG/DL
LEUKOCYTE ESTERASE UR QL STRIP: NEGATIVE
LYMPHOCYTES # BLD: 3.08 E9/L (ref 1.5–4)
LYMPHOCYTES NFR BLD: 25.4 % (ref 20–42)
Lab: NORMAL
MCH RBC QN AUTO: 29.2 PG (ref 26–35)
MCHC RBC AUTO-ENTMCNC: 32.3 % (ref 32–34.5)
MCV RBC AUTO: 90.5 FL (ref 80–99.9)
METHADONE UR QL SCN: NOT DETECTED
MONOCYTES # BLD: 0.92 E9/L (ref 0.1–0.95)
MONOCYTES NFR BLD: 7.6 % (ref 2–12)
NEGATIVE QC PASS/FAIL: NORMAL
NEUTROPHILS # BLD: 8.02 E9/L (ref 1.8–7.3)
NEUTS SEG NFR BLD: 66.2 % (ref 43–80)
NITRITE UR QL STRIP: NEGATIVE
OPIATES UR QL SCN: NOT DETECTED
OXYCODONE URINE: NOT DETECTED
PCP UR QL SCN: NOT DETECTED
PH UR STRIP: 6 [PH] (ref 5–9)
PLATELET # BLD AUTO: 295 E9/L (ref 130–450)
PMV BLD AUTO: 12.4 FL (ref 7–12)
POSITIVE QC PASS/FAIL: NORMAL
POTASSIUM SERPL-SCNC: 3.3 MMOL/L (ref 3.5–5)
PROT SERPL-MCNC: 7.5 G/DL (ref 6.4–8.3)
PROT UR STRIP-MCNC: NEGATIVE MG/DL
RBC # BLD AUTO: 5.17 E12/L (ref 3.5–5.5)
RBC #/AREA URNS HPF: ABNORMAL /HPF (ref 0–2)
SALICYLATES SERPL-MCNC: <0.3 MG/DL (ref 0–30)
SARS-COV-2 RNA RESP QL NAA+PROBE: NOT DETECTED
SODIUM SERPL-SCNC: 138 MMOL/L (ref 132–146)
SP GR UR STRIP: 1.01 (ref 1–1.03)
TRICYCLIC ANTIDEPRESSANTS SCREEN SERUM: NEGATIVE NG/ML
UROBILINOGEN UR STRIP-ACNC: 0.2 E.U./DL
WBC # BLD: 12.1 E9/L (ref 4.5–11.5)
WBC #/AREA URNS HPF: ABNORMAL /HPF (ref 0–5)

## 2023-03-18 PROCEDURE — 1240000000 HC EMOTIONAL WELLNESS R&B

## 2023-03-18 PROCEDURE — 6370000000 HC RX 637 (ALT 250 FOR IP): Performed by: STUDENT IN AN ORGANIZED HEALTH CARE EDUCATION/TRAINING PROGRAM

## 2023-03-18 PROCEDURE — 6370000000 HC RX 637 (ALT 250 FOR IP): Performed by: PSYCHIATRY & NEUROLOGY

## 2023-03-18 PROCEDURE — 80179 DRUG ASSAY SALICYLATE: CPT

## 2023-03-18 PROCEDURE — 85025 COMPLETE CBC W/AUTO DIFF WBC: CPT

## 2023-03-18 PROCEDURE — 87636 SARSCOV2 & INF A&B AMP PRB: CPT

## 2023-03-18 PROCEDURE — 6370000000 HC RX 637 (ALT 250 FOR IP): Performed by: EMERGENCY MEDICINE

## 2023-03-18 PROCEDURE — 80053 COMPREHEN METABOLIC PANEL: CPT

## 2023-03-18 PROCEDURE — 81001 URINALYSIS AUTO W/SCOPE: CPT

## 2023-03-18 PROCEDURE — 80307 DRUG TEST PRSMV CHEM ANLYZR: CPT

## 2023-03-18 PROCEDURE — 6370000000 HC RX 637 (ALT 250 FOR IP): Performed by: NURSE PRACTITIONER

## 2023-03-18 PROCEDURE — 99285 EMERGENCY DEPT VISIT HI MDM: CPT

## 2023-03-18 PROCEDURE — 93010 ELECTROCARDIOGRAM REPORT: CPT | Performed by: INTERNAL MEDICINE

## 2023-03-18 PROCEDURE — 82077 ASSAY SPEC XCP UR&BREATH IA: CPT

## 2023-03-18 PROCEDURE — 80143 DRUG ASSAY ACETAMINOPHEN: CPT

## 2023-03-18 PROCEDURE — 93005 ELECTROCARDIOGRAM TRACING: CPT | Performed by: EMERGENCY MEDICINE

## 2023-03-18 RX ORDER — FOLIC ACID 1 MG/1
1 TABLET ORAL DAILY
Status: DISCONTINUED | OUTPATIENT
Start: 2023-03-18 | End: 2023-03-22 | Stop reason: HOSPADM

## 2023-03-18 RX ORDER — NICOTINE 21 MG/24HR
1 PATCH, TRANSDERMAL 24 HOURS TRANSDERMAL DAILY
Status: DISCONTINUED | OUTPATIENT
Start: 2023-03-18 | End: 2023-03-18

## 2023-03-18 RX ORDER — CHLORDIAZEPOXIDE HYDROCHLORIDE 25 MG/1
25 CAPSULE, GELATIN COATED ORAL 4 TIMES DAILY
Status: DISCONTINUED | OUTPATIENT
Start: 2023-03-18 | End: 2023-03-19

## 2023-03-18 RX ORDER — POTASSIUM CHLORIDE 20 MEQ/1
40 TABLET, EXTENDED RELEASE ORAL ONCE
Status: COMPLETED | OUTPATIENT
Start: 2023-03-18 | End: 2023-03-18

## 2023-03-18 RX ORDER — HYDROXYZINE PAMOATE 50 MG/1
50 CAPSULE ORAL 3 TIMES DAILY PRN
Status: DISCONTINUED | OUTPATIENT
Start: 2023-03-18 | End: 2023-03-22 | Stop reason: HOSPADM

## 2023-03-18 RX ORDER — LANOLIN ALCOHOL/MO/W.PET/CERES
3 CREAM (GRAM) TOPICAL NIGHTLY
Status: DISCONTINUED | OUTPATIENT
Start: 2023-03-18 | End: 2023-03-22 | Stop reason: HOSPADM

## 2023-03-18 RX ORDER — HALOPERIDOL 5 MG/ML
5 INJECTION INTRAMUSCULAR EVERY 6 HOURS PRN
Status: DISCONTINUED | OUTPATIENT
Start: 2023-03-18 | End: 2023-03-22 | Stop reason: HOSPADM

## 2023-03-18 RX ORDER — MAGNESIUM HYDROXIDE/ALUMINUM HYDROXICE/SIMETHICONE 120; 1200; 1200 MG/30ML; MG/30ML; MG/30ML
30 SUSPENSION ORAL PRN
Status: DISCONTINUED | OUTPATIENT
Start: 2023-03-18 | End: 2023-03-22 | Stop reason: HOSPADM

## 2023-03-18 RX ORDER — LANOLIN ALCOHOL/MO/W.PET/CERES
100 CREAM (GRAM) TOPICAL DAILY
Status: DISCONTINUED | OUTPATIENT
Start: 2023-03-18 | End: 2023-03-22 | Stop reason: HOSPADM

## 2023-03-18 RX ORDER — GABAPENTIN 300 MG/1
600 CAPSULE ORAL 3 TIMES DAILY
Status: DISCONTINUED | OUTPATIENT
Start: 2023-03-18 | End: 2023-03-22 | Stop reason: HOSPADM

## 2023-03-18 RX ORDER — DIVALPROEX SODIUM 250 MG/1
250 TABLET, DELAYED RELEASE ORAL 2 TIMES DAILY
Status: DISCONTINUED | OUTPATIENT
Start: 2023-03-18 | End: 2023-03-22 | Stop reason: HOSPADM

## 2023-03-18 RX ORDER — HALOPERIDOL 5 MG/1
5 TABLET ORAL EVERY 6 HOURS PRN
Status: DISCONTINUED | OUTPATIENT
Start: 2023-03-18 | End: 2023-03-22 | Stop reason: HOSPADM

## 2023-03-18 RX ADMIN — CHLORDIAZEPOXIDE HYDROCHLORIDE 25 MG: 25 CAPSULE ORAL at 22:06

## 2023-03-18 RX ADMIN — FOLIC ACID 1 MG: 1 TABLET ORAL at 12:54

## 2023-03-18 RX ADMIN — NICOTINE POLACRILEX 4 MG: 2 GUM, CHEWING BUCCAL at 11:07

## 2023-03-18 RX ADMIN — Medication 100 MG: at 12:54

## 2023-03-18 RX ADMIN — GABAPENTIN 600 MG: 300 CAPSULE ORAL at 09:00

## 2023-03-18 RX ADMIN — MELATONIN 3 MG ORAL TABLET 3 MG: 3 TABLET ORAL at 22:06

## 2023-03-18 RX ADMIN — NICOTINE POLACRILEX 4 MG: 2 GUM, CHEWING BUCCAL at 12:54

## 2023-03-18 RX ADMIN — CHLORDIAZEPOXIDE HYDROCHLORIDE 25 MG: 25 CAPSULE ORAL at 12:54

## 2023-03-18 RX ADMIN — DIVALPROEX SODIUM 250 MG: 250 TABLET, DELAYED RELEASE ORAL at 22:06

## 2023-03-18 RX ADMIN — GABAPENTIN 600 MG: 300 CAPSULE ORAL at 22:06

## 2023-03-18 RX ADMIN — DIVALPROEX SODIUM 250 MG: 250 TABLET, DELAYED RELEASE ORAL at 09:00

## 2023-03-18 RX ADMIN — POTASSIUM CHLORIDE 40 MEQ: 1500 TABLET, EXTENDED RELEASE ORAL at 07:44

## 2023-03-18 RX ADMIN — CHLORDIAZEPOXIDE HYDROCHLORIDE 25 MG: 25 CAPSULE ORAL at 16:41

## 2023-03-18 RX ADMIN — GABAPENTIN 600 MG: 300 CAPSULE ORAL at 14:23

## 2023-03-18 ASSESSMENT — PAIN - FUNCTIONAL ASSESSMENT
PAIN_FUNCTIONAL_ASSESSMENT: NONE - DENIES PAIN
PAIN_FUNCTIONAL_ASSESSMENT: NONE - DENIES PAIN

## 2023-03-18 ASSESSMENT — SLEEP AND FATIGUE QUESTIONNAIRES
DO YOU USE A SLEEP AID: NO
AVERAGE NUMBER OF SLEEP HOURS: 3
AVERAGE NUMBER OF SLEEP HOURS: 3
DO YOU USE A SLEEP AID: NO
SLEEP PATTERN: DIFFICULTY FALLING ASLEEP;DISTURBED/INTERRUPTED SLEEP
SLEEP PATTERN: NORMAL
DO YOU HAVE DIFFICULTY SLEEPING: YES
DO YOU HAVE DIFFICULTY SLEEPING: NO

## 2023-03-18 ASSESSMENT — LIFESTYLE VARIABLES
HOW MANY STANDARD DRINKS CONTAINING ALCOHOL DO YOU HAVE ON A TYPICAL DAY: PATIENT DOES NOT DRINK
HOW OFTEN DO YOU HAVE A DRINK CONTAINING ALCOHOL: NEVER

## 2023-03-18 NOTE — GROUP NOTE
Group Therapy Note    Date: 3/18/2023    Group Start Time: 1105  Group End Time: 1140  Group Topic: Cognitive Skills    [facility]  [address], MSW, LSW        Group Therapy Note    Attendees: 14       Patient's Goal:  Pt will participate in discussion on boundaries and learn how to set boundaries. Notes:  Pt was an active participant in group discussion. Status After Intervention:  Improved    Participation Level: Active Listener, Interactive, and Minimal    Participation Quality: Appropriate, Attentive, Sharing, and Supportive      Speech:  normal      Thought Process/Content: Logical  Linear      Affective Functioning: Flat      Mood: anxious and depressed      Level of consciousness:  Alert, Oriented x4, and Attentive      Response to Learning: Able to verbalize current knowledge/experience, Able to verbalize/acknowledge new learning, Able to retain information, Capable of insight, and Progressing to goal      Endings: None Reported    Modes of Intervention: Education, Support, Socialization, Exploration, Clarifying, and Problem-solving      Discipline Responsible: /Counselor      Signature:   HOMAR Morales, [place]

## 2023-03-18 NOTE — GROUP NOTE
Group Therapy Note    Date: 3/18/2023    Group Start Time: 1000  Group End Time: 9157  Group Topic: Psychoeducation    [facility]  [id number] [address] [facility], [facility] [[email or web address]]    Group Name: Dimensions of wellness                                                  Patient's Goal:  patient will be able to identify what dimensions of his/her life they can improve. Notes:  pleasant and sharing in group able to participate appropriate. Status After Intervention:  Improved    Participation Level:  Active Listener and Interactive    Participation Quality: Appropriate, Attentive, and Sharing      Speech:  normal      Thought Process/Content: Logical      Affective Functioning: Congruent      Mood: euthymic      Level of consciousness:  Alert, Oriented x4, and Attentive      Response to Learning: Able to verbalize/acknowledge new learning, Able to retain information, and Progressing to goal      Endings: None Reported    Modes of Intervention: Education, Support, Socialization, Exploration, and Problem-solving      Discipline Responsible: Psychoeducational Specialist      Signature:  Manisha Mistry
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
## 2023-03-19 PROBLEM — F10.10 ALCOHOL ABUSE: Status: ACTIVE | Noted: 2023-03-19

## 2023-03-19 PROBLEM — F33.2 SEVERE EPISODE OF RECURRENT MAJOR DEPRESSIVE DISORDER, WITHOUT PSYCHOTIC FEATURES (HCC): Status: ACTIVE | Noted: 2023-03-19

## 2023-03-19 PROCEDURE — 6370000000 HC RX 637 (ALT 250 FOR IP): Performed by: NURSE PRACTITIONER

## 2023-03-19 PROCEDURE — 6370000000 HC RX 637 (ALT 250 FOR IP): Performed by: PSYCHIATRY & NEUROLOGY

## 2023-03-19 PROCEDURE — 1240000000 HC EMOTIONAL WELLNESS R&B

## 2023-03-19 RX ORDER — CHLORDIAZEPOXIDE HYDROCHLORIDE 25 MG/1
25 CAPSULE, GELATIN COATED ORAL EVERY 6 HOURS
Status: DISCONTINUED | OUTPATIENT
Start: 2023-03-19 | End: 2023-03-20

## 2023-03-19 RX ORDER — ESCITALOPRAM OXALATE 10 MG/1
20 TABLET ORAL DAILY
Status: DISCONTINUED | OUTPATIENT
Start: 2023-03-19 | End: 2023-03-22 | Stop reason: HOSPADM

## 2023-03-19 RX ADMIN — GABAPENTIN 600 MG: 300 CAPSULE ORAL at 21:39

## 2023-03-19 RX ADMIN — Medication 100 MG: at 08:55

## 2023-03-19 RX ADMIN — GABAPENTIN 600 MG: 300 CAPSULE ORAL at 08:55

## 2023-03-19 RX ADMIN — ESCITALOPRAM OXALATE 20 MG: 10 TABLET ORAL at 14:17

## 2023-03-19 RX ADMIN — FOLIC ACID 1 MG: 1 TABLET ORAL at 08:55

## 2023-03-19 RX ADMIN — MELATONIN 3 MG ORAL TABLET 3 MG: 3 TABLET ORAL at 21:39

## 2023-03-19 RX ADMIN — DIVALPROEX SODIUM 250 MG: 250 TABLET, DELAYED RELEASE ORAL at 21:39

## 2023-03-19 RX ADMIN — NICOTINE POLACRILEX 4 MG: 2 GUM, CHEWING BUCCAL at 10:52

## 2023-03-19 RX ADMIN — CHLORDIAZEPOXIDE HYDROCHLORIDE 25 MG: 25 CAPSULE ORAL at 21:39

## 2023-03-19 RX ADMIN — CHLORDIAZEPOXIDE HYDROCHLORIDE 25 MG: 25 CAPSULE ORAL at 08:55

## 2023-03-19 RX ADMIN — NICOTINE POLACRILEX 4 MG: 2 GUM, CHEWING BUCCAL at 19:06

## 2023-03-19 RX ADMIN — CHLORDIAZEPOXIDE HYDROCHLORIDE 25 MG: 25 CAPSULE ORAL at 14:17

## 2023-03-19 RX ADMIN — NICOTINE POLACRILEX 4 MG: 2 GUM, CHEWING BUCCAL at 22:11

## 2023-03-19 RX ADMIN — GABAPENTIN 600 MG: 300 CAPSULE ORAL at 14:17

## 2023-03-19 RX ADMIN — DIVALPROEX SODIUM 250 MG: 250 TABLET, DELAYED RELEASE ORAL at 08:55

## 2023-03-19 ASSESSMENT — PAIN SCALES - GENERAL: PAINLEVEL_OUTOF10: 0

## 2023-03-19 NOTE — H&P
tablet, Take 1 tablet by mouth every morning AND 1 tablet Daily with lunch AND 2 tablets nightly.   montelukast (SINGULAIR) 10 MG tablet, Take 1 tablet by mouth daily  Benzoyl Peroxide 2.5 % GEL, Apply 1 cm topically 2 times daily  divalproex (DEPAKOTE) 250 MG DR tablet, take 1 tablet by mouth twice a day  medroxyPROGESTERone (PROVERA) 10 MG tablet, Take 1 tablet by mouth daily  Saline GEL, 1 spray by Nasal route daily as needed        Past Medical History:        Diagnosis Date    ADHD     Arthritis     Asthma     controlled    Chronic midline low back pain without sciatica 11/13/2017    COPD (chronic obstructive pulmonary disease) ([facility])     Depression     GERD (gastroesophageal reflux disease)     IBS (irritable bowel syndrome)     Migraines     Seizures ([address]     last episode 2016    Thyroid disease        Past Surgical History:        Procedure Laterality Date    COLONOSCOPY      EYE SURGERY      probing of ductal system right eye     FRACTURE SURGERY      R ELBOW CRUSH INJURY W PLATE AND PINS PLACED    PAIN MANAGEMENT PROCEDURE N/A 8/17/2021    T12-L1 EPIDURAL STEROID INJECTION #1 performed by Madi Leavitt DO at [address] N/A 12/21/2021    LUMBAR EPIDURAL STEROID INJECTION T12-L1 performed by Madi Leavitt DO at [address] N/A 4/28/2022    T12-L1 EPIDURAL STEROID INJECTION performed by Madi Leavitt DO at [facility] COLONOSCOPY W/BIOPSY SINGLE/MULTIPLE  10/29/2018    COLONOSCOPY WITH BIOPSY performed by Gilford Ridges, MD at [address] [place] [address] Y/O N/A 5/4/2018    TONSILLECTOMY performed by Kyler Valentine MD at [address] N/A 10/29/2020    EGD BIOPSY performed by Esthela Tavera MD at [address]:   Latex, Aspirin-acetaminophen-caffeine, Dye [iodides], Penicillins, Topamax [topiramate], Tylenol with codeine #3 [acetaminophen-codeine],

## 2023-03-19 NOTE — BH NOTE
Patient keeps to self in the evening. Patient denies si/hi/avh. No complaints of discomfort. Patient medication compliant.

## 2023-03-19 NOTE — GROUP NOTE
Group Therapy Note    Date: 3/19/2023    Group Start Time: 1100  Group End Time: 7090  Group Topic: Cognitive Skills    [facility] BH 1    HOMAR Nj, [facility]        Group Therapy Note    Attendees: 12       Patient's Goal:  Pt will learn stress management tips as well as tips for self-care. Notes:  Pt was an active participant in group discussion. Status After Intervention:  Improved    Participation Level: Active Listener and Interactive    Participation Quality: Appropriate, Attentive, Sharing, and Supportive      Speech:  normal      Thought Process/Content: Logical  Linear      Affective Functioning: Blunted      Mood: depressed      Level of consciousness:  Alert, Oriented x4, and Attentive      Response to Learning: Able to verbalize current knowledge/experience, Able to verbalize/acknowledge new learning, Able to retain information, Capable of insight, and Progressing to goal      Endings: None Reported    Modes of Intervention: Education, Support, Socialization, Exploration, Clarifying, and Problem-solving      Discipline Responsible: /Counselor      Signature:   HOMAR N[place], [facility]

## 2023-03-20 PROCEDURE — 6370000000 HC RX 637 (ALT 250 FOR IP): Performed by: NURSE PRACTITIONER

## 2023-03-20 PROCEDURE — 6370000000 HC RX 637 (ALT 250 FOR IP): Performed by: PSYCHIATRY & NEUROLOGY

## 2023-03-20 PROCEDURE — 1240000000 HC EMOTIONAL WELLNESS R&B

## 2023-03-20 RX ORDER — CHLORDIAZEPOXIDE HYDROCHLORIDE 25 MG/1
25 CAPSULE, GELATIN COATED ORAL EVERY 6 HOURS PRN
Status: DISCONTINUED | OUTPATIENT
Start: 2023-03-20 | End: 2023-03-22 | Stop reason: HOSPADM

## 2023-03-20 RX ADMIN — Medication 100 MG: at 09:12

## 2023-03-20 RX ADMIN — GABAPENTIN 600 MG: 300 CAPSULE ORAL at 09:12

## 2023-03-20 RX ADMIN — NICOTINE POLACRILEX 4 MG: 2 GUM, CHEWING BUCCAL at 12:53

## 2023-03-20 RX ADMIN — DIVALPROEX SODIUM 250 MG: 250 TABLET, DELAYED RELEASE ORAL at 21:42

## 2023-03-20 RX ADMIN — FOLIC ACID 1 MG: 1 TABLET ORAL at 09:12

## 2023-03-20 RX ADMIN — MELATONIN 3 MG ORAL TABLET 3 MG: 3 TABLET ORAL at 21:42

## 2023-03-20 RX ADMIN — ESCITALOPRAM OXALATE 20 MG: 10 TABLET ORAL at 09:12

## 2023-03-20 RX ADMIN — NICOTINE POLACRILEX 4 MG: 2 GUM, CHEWING BUCCAL at 20:40

## 2023-03-20 RX ADMIN — NICOTINE POLACRILEX 4 MG: 2 GUM, CHEWING BUCCAL at 09:12

## 2023-03-20 RX ADMIN — GABAPENTIN 600 MG: 300 CAPSULE ORAL at 13:40

## 2023-03-20 RX ADMIN — CHLORDIAZEPOXIDE HYDROCHLORIDE 25 MG: 25 CAPSULE ORAL at 09:12

## 2023-03-20 RX ADMIN — DIVALPROEX SODIUM 250 MG: 250 TABLET, DELAYED RELEASE ORAL at 09:13

## 2023-03-20 RX ADMIN — GABAPENTIN 600 MG: 300 CAPSULE ORAL at 21:42

## 2023-03-20 NOTE — BH NOTE
[facility][1]  Day 3 Interdisciplinary Treatment Plan NOTE    Review Date & Time: 3/264620    Patient was in treatment team    Estimated Length of Stay Update:  5-7  Estimated Discharge Date Update: 3/22    EDUCATION:   Learner Progress Toward Treatment Goals: Reviewed results and recommendations of this team and Reviewed group plan and strategies    Method: Small group    Outcome: Verbalized understanding and Demonstrated Understanding    PATIENT GOALS: be less stressed    PLAN/TREATMENT RECOMMENDATIONS UPDATE:encourage meds and group    GOALS UPDATE:   Time frame for Short-Term Goals: 1-3      John Juan RN

## 2023-03-20 NOTE — GROUP NOTE
Group Therapy Note    Date: 3/20/2023  Start Time: 1010  End Time:  1050  Number of Participants: 12    Type of Group: Psychoeducation    Wellness Binder Information  Module Name:  Mantras  Patient's Goal:  ID what mantras are, and ID a specific mantra for one's self. Demonstrate knowledge of discussion through hands on activity. Notes:  Patient actively engaged in discussion of mantras and was able to ID a personal mantra for themselves. Patient engaged in activity of mantra art. Status After Intervention:  Improved    Participation Level:  Active Listener and Interactive    Participation Quality: Appropriate, Attentive, and Sharing      Speech:  normal      Thought Process/Content: Logical      Affective Functioning: Congruent      Mood: euthymic      Level of consciousness:  Alert and Attentive      Response to Learning: Able to verbalize current knowledge/experience, Able to verbalize/acknowledge new learning, and Able to retain information      Endings: None Reported    Modes of Intervention: Education and Socialization      Discipline Responsible: Psychoeducational Specialist      Signature:  Janes Lazo, [address]

## 2023-03-20 NOTE — GROUP NOTE
Group Therapy Note    Date: 3/20/2023  Start Time: 6727  End Time:  6964  Number of Participants: 8    Type of Group: Recreational    Wellness Binder Information  Module Name:  Shuffleboard  Patient's Goal:  Demonstrate and ID new/existing leisure interests. Improve physical well being and socialization. Notes:  Patients actively engaged in a game of shuffleboard. Patients were encouraged to provide positive reinforcement to peers.   CTRS discussed where one can play [employer] in the area (frazier, leagues, [employer]")     Status After Intervention:  Improved    Participation Level: Interactive    Participation Quality: Appropriate, Attentive, and Supportive      Speech:  normal      Thought Process/Content: Logical      Affective Functioning: Congruent      Mood: euthymic      Level of consciousness:  Alert and Attentive      Response to Learning: Able to verbalize current knowledge/experience, Able to verbalize/acknowledge new learning, and Able to retain information      Endings: None Reported    Modes of Intervention: Education, Socialization, and Movement      Discipline Responsible: Psychoeducational Specialist      Signature:  Shane Shannon, [address]

## 2023-03-21 PROCEDURE — 6370000000 HC RX 637 (ALT 250 FOR IP): Performed by: PSYCHIATRY & NEUROLOGY

## 2023-03-21 PROCEDURE — 6370000000 HC RX 637 (ALT 250 FOR IP): Performed by: NURSE PRACTITIONER

## 2023-03-21 PROCEDURE — 1240000000 HC EMOTIONAL WELLNESS R&B

## 2023-03-21 RX ADMIN — FOLIC ACID 1 MG: 1 TABLET ORAL at 09:21

## 2023-03-21 RX ADMIN — NICOTINE POLACRILEX 4 MG: 2 GUM, CHEWING BUCCAL at 18:21

## 2023-03-21 RX ADMIN — Medication 100 MG: at 09:20

## 2023-03-21 RX ADMIN — MELATONIN 3 MG ORAL TABLET 3 MG: 3 TABLET ORAL at 21:16

## 2023-03-21 RX ADMIN — NICOTINE POLACRILEX 4 MG: 2 GUM, CHEWING BUCCAL at 09:23

## 2023-03-21 RX ADMIN — DIVALPROEX SODIUM 250 MG: 250 TABLET, DELAYED RELEASE ORAL at 09:21

## 2023-03-21 RX ADMIN — DIVALPROEX SODIUM 250 MG: 250 TABLET, DELAYED RELEASE ORAL at 21:16

## 2023-03-21 RX ADMIN — GABAPENTIN 600 MG: 300 CAPSULE ORAL at 21:16

## 2023-03-21 RX ADMIN — NICOTINE POLACRILEX 4 MG: 2 GUM, CHEWING BUCCAL at 12:58

## 2023-03-21 RX ADMIN — GABAPENTIN 600 MG: 300 CAPSULE ORAL at 09:21

## 2023-03-21 RX ADMIN — ESCITALOPRAM OXALATE 20 MG: 10 TABLET ORAL at 09:21

## 2023-03-21 RX ADMIN — NICOTINE POLACRILEX 4 MG: 2 GUM, CHEWING BUCCAL at 21:16

## 2023-03-21 RX ADMIN — GABAPENTIN 600 MG: 300 CAPSULE ORAL at 14:46

## 2023-03-21 NOTE — GROUP NOTE
Date: 3/21/2023    Group Start Time: 1410  Group End Time: 1500  Group Topic: Recovery    [facility]  [id number] [facility]                                                                        Group Therapy Note      Number of Participants: 11  Type of Group: Recovery  Notes:  Patient observed as actively listening, engaged in group. Status After Intervention:  Improved  Participation Level: Active Listener  Participation Quality: Appropriate and Attentive  Speech:  normal  Thought Process/Content: Logical  Affective Functioning: Congruent  Response to Learning: Able to verbalize/acknowledge new learning and Able to retain information  Endings: None Reported  Modes of Intervention: Education, Support, and Socialization  Peer Recovery Counselor conducted group.    Signature:  Gordon Valadez

## 2023-03-21 NOTE — GROUP NOTE
Date: 3/21/2023    Group Start Time: 1010  Group End Time: 7186  Group Topic: Psychoeducation    [facility] 1    Joseraleigh Lemustylor, [address]                                                                        Group Therapy Note    Date: 3/21/2023  Group Topic:   A healthy schedule   Patient's Goal:  Patient will be able to id heart steps to institute and healthy routine. Notes:  Pleasant and sharing in group, willing to accept handout. Observed as active listening thru group. Status After Intervention:  Improved    Participation Level:  Active Listener and Interactive    Participation Quality: Appropriate, Attentive, and Sharing      Speech:  normal      Thought Process/Content: Logical      Affective Functioning: Congruent      Mood: euthymic      Level of consciousness:  Alert and Oriented x4      Response to Learning: Able to verbalize/acknowledge new learning, Able to retain information, and Progressing to goal      Endings: None Reported    Modes of Intervention: Education, Support, Socialization, and Exploration      Discipline Responsible: Psychoeducational Specialist      Signature:  Jaymie Fisher

## 2023-03-22 VITALS
WEIGHT: 230 LBS | HEIGHT: 69 IN | OXYGEN SATURATION: 95 % | TEMPERATURE: 96.4 F | BODY MASS INDEX: 34.07 KG/M2 | HEART RATE: 86 BPM | SYSTOLIC BLOOD PRESSURE: 116 MMHG | DIASTOLIC BLOOD PRESSURE: 93 MMHG | RESPIRATION RATE: 16 BRPM

## 2023-03-22 LAB — VALPROATE SERPL-MCNC: 35 MCG/ML (ref 50–100)

## 2023-03-22 PROCEDURE — 80164 ASSAY DIPROPYLACETIC ACD TOT: CPT

## 2023-03-22 PROCEDURE — 6370000000 HC RX 637 (ALT 250 FOR IP): Performed by: PSYCHIATRY & NEUROLOGY

## 2023-03-22 PROCEDURE — 36415 COLL VENOUS BLD VENIPUNCTURE: CPT

## 2023-03-22 PROCEDURE — 6370000000 HC RX 637 (ALT 250 FOR IP): Performed by: NURSE PRACTITIONER

## 2023-03-22 RX ORDER — LANOLIN ALCOHOL/MO/W.PET/CERES
3 CREAM (GRAM) TOPICAL NIGHTLY
Qty: 30 TABLET | Refills: 0 | Status: SHIPPED | OUTPATIENT
Start: 2023-03-22 | End: 2023-04-21

## 2023-03-22 RX ADMIN — Medication 100 MG: at 08:45

## 2023-03-22 RX ADMIN — FOLIC ACID 1 MG: 1 TABLET ORAL at 08:45

## 2023-03-22 RX ADMIN — DIVALPROEX SODIUM 250 MG: 250 TABLET, DELAYED RELEASE ORAL at 12:52

## 2023-03-22 RX ADMIN — ESCITALOPRAM OXALATE 20 MG: 10 TABLET ORAL at 08:45

## 2023-03-22 RX ADMIN — NICOTINE POLACRILEX 4 MG: 2 GUM, CHEWING BUCCAL at 12:52

## 2023-03-22 RX ADMIN — GABAPENTIN 600 MG: 300 CAPSULE ORAL at 08:45

## 2023-03-22 ASSESSMENT — PAIN SCALES - GENERAL: PAINLEVEL_OUTOF10: 0
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
## 2023-03-22 NOTE — PLAN OF CARE
[facility]  Initial Interdisciplinary Treatment Plan NOTE    Review Date & Time: 3/19/2023    11:21 AM     Patient was in treatment team    Admission Type:   Admission Type: Involuntary    Reason for admission:  Reason for Admission: Got drunk and friends say i supposedly attempted suicide but i dont remember nor believe them. Estimated Length of Stay Update:   5 days  Estimated Discharge Date Update: 3/14/23    EDUCATION:   Learner Progress Toward Treatment Goals: Reviewed results and recommendations of this team    Method: Individual    Outcome: Verbalized understanding    PATIENT GOALS: \"stay calm\"    PLAN/TREATMENT RECOMMENDATIONS UPDATE: encourage daily goals and groups.  Start new medication and monitor    GOALS UPDATE:   Time frame for Short-Term Goals:  today    Linda Mcdaniel RN
Addendum for 3.19   5p. Attended / particip well in today's 1h group on insight and problem solving ( home improvement ). Scored 100 on the warm up project. Attentive to a/v program.    Gave very fine presentation to the group answering the 3 questions on the study sheet. Appeared to fully digest key learning points.
Problem: Anxiety  Goal: Will report anxiety at manageable levels  Description: INTERVENTIONS:  1. Administer medication as ordered  2. Teach and rehearse alternative coping skills  3. Provide emotional support with 1:1 interaction with staff  3/19/2023 1143 by Malinda Gordon RN  Outcome: Not Progressing  3/19/2023 0553 by Mayuri Post RN  Outcome: Progressing     Problem: Depression/Self Harm  Goal: Effect of psychiatric condition will be minimized and patient will be protected from self harm  Description: INTERVENTIONS:  1. Assess impact of patient's symptoms on level of functioning, self care needs and offer support as indicated  2. Assess patient/family knowledge of depression, impact on illness and need for teaching  3. Provide emotional support, presence and reassurance  4. Assess for possible suicidal thoughts or ideation. If patient expresses suicidal thoughts or statements do not leave alone, initiate Suicide Precautions, move to a room close to the nursing station and obtain sitter  5. Initiate consults as appropriate with Mental Health Professional, Spiritual Care, Psychosocial CNS, and consider a recommendation to the LIP for a Psychiatric Consultation  3/19/2023 1143 by Malinda Gordon RN  Outcome: Not Progressing  3/19/2023 0553 by Mayuri Post RN  Outcome: Progressing    Pleasant and cooperative. Flat and depressed. Attending groups. Denies suicidal and homicidal thoughts. Denies hallucinations.
Pt discharged with followings belongings:   Clothing: Footwear, Socks (Black hoodie)   Valuables sent home: NONE. Valuables retrieved from safe, Security envelope number:  NONE and returned to patient. Patient left department with One staff via ambulation and steady gait  , discharged to home  . Patient education on aftercare instructions: GIVEN  Patient verbalize understanding of AVS:  YES. Given suicide prevention handout YES. Discharged in stable condition. Status EXAM upon discharge:  Mental Status and Behavioral Exam  Normal: No  Level of Assistance: Independent/Self  Facial Expression: Brightened  Affect: Constricted  Level of Consciousness: Alert  Frequency of Checks: 4 times per hour, close  Mood:Normal: Yes  Mood: Anxious  Motor Activity:Normal: Yes  Eye Contact: Good  Observed Behavior: Friendly, Cooperative  Sexual Misconduct History: Current - no  Involved In Any Sexual Misconduct With Others? : No  History of Sexually Inappropriate Behavior When Previously Hospitalized?: No  Uncontrollable/Compulsive Masturbation?: No  Difficulty Controlling Sexual Impulses?: No  Preception: [place] to person, [place] to time, [place] to place, [place] to situation  Attention:Normal: Yes  Attention: Others (comment) (WNL)  Thought Processes: Other (comment) (WNL)  Thought Content:Normal: Yes  Thought Content: Other (comment) (WNL)  Depression Symptoms: No problems reported or observed. Anxiety Symptoms: No problems reported or observed. Karyn Symptoms: No problems reported or observed.   Hallucinations: None  Delusions: No (none noted)  Memory:Normal: Yes  Memory:  (intact)  Insight and Judgment: No (impaired)  Insight and Judgment: Poor insight    Aggie Coronado RN
Pt resting in bed apparently asleep with easy even respirations at HS q 15 min electronic rounding.
behavior or suspected illegal substances which indicate the need for search of the family and/or belongings  3. Encourage verbalization of thoughts and concerns in a socially appropriate manner  4. Utilize positive, consistent limit setting strategies supporting safety of patient, staff and others  5. Encourage participation in the decision making process about the behavioral management agreement  6. If a visitor's behavior poses a threat to safety call refer to organization policy. 7. Initiate consult with , Psychosocial CNS, Spiritual Care as appropriate  Outcome: Progressing     Problem: Depression/Self Harm  Goal: Effect of psychiatric condition will be minimized and patient will be protected from self harm  Description: INTERVENTIONS:  1. Assess impact of patient's symptoms on level of functioning, self care needs and offer support as indicated  2. Assess patient/family knowledge of depression, impact on illness and need for teaching  3. Provide emotional support, presence and reassurance  4. Assess for possible suicidal thoughts or ideation. If patient expresses suicidal thoughts or statements do not leave alone, initiate Suicide Precautions, move to a room close to the nursing station and obtain sitter  5. Initiate consults as appropriate with Mental Health Professional, Spiritual Care, Psychosocial CNS, and consider a recommendation to the LIP for a Psychiatric Consultation  Outcome: Progressing  Flowsheets (Taken 3/20/2023 1500 by Alvin Moncada RN)  Effect of psychiatric condition will be minimized and patient will be protected from self harm: Assess impact of patients symptoms on level of functioning, self care needs and offer support as indicated     Problem: Involuntary Admit  Goal: Will cooperate with staff recommendations and doctor's orders and will demonstrate appropriate behavior  Description: INTERVENTIONS:  1. Treat underlying conditions and offer medication as ordered  2.  Educate
family identify pros/cons of alternative solutions  4. Provide information as requested by patient/family  5. Respect patient/family right to receive or not to receive information  6. Serve as a liaison between patient and family and health care team  7. Initiate Consults from Ethics, Palliative Care or initiate [address] [place] [address] as is appropriate  Outcome: Progressing     Problem: Behavior  Goal: Pt/Family maintain appropriate behavior and adhere to behavioral management agreement, if implemented  Description: INTERVENTIONS:  1. Assess patient/family's coping skills and  non-compliant behavior (including use of illegal substances)  2. Notify security of behavior or suspected illegal substances which indicate the need for search of the family and/or belongings  3. Encourage verbalization of thoughts and concerns in a socially appropriate manner  4. Utilize positive, consistent limit setting strategies supporting safety of patient, staff and others  5. Encourage participation in the decision making process about the behavioral management agreement  6. If a visitor's behavior poses a threat to safety call refer to organization policy. 7. Initiate consult with , Psychosocial CNS, Spiritual Care as appropriate  Outcome: Adequate for Discharge     Problem: Depression/Self Harm  Goal: Effect of psychiatric condition will be minimized and patient will be protected from self harm  Description: INTERVENTIONS:  1. Assess impact of patient's symptoms on level of functioning, self care needs and offer support as indicated  2. Assess patient/family knowledge of depression, impact on illness and need for teaching  3. Provide emotional support, presence and reassurance  4. Assess for possible suicidal thoughts or ideation. If patient expresses suicidal thoughts or statements do not leave alone, initiate Suicide Precautions, move to a room close to the nursing station and obtain sitter  5.  Initiate consults as
patient/family's coping skills and  non-compliant behavior (including use of illegal substances)  2. Notify security of behavior or suspected illegal substances which indicate the need for search of the family and/or belongings  3. Encourage verbalization of thoughts and concerns in a socially appropriate manner  4. Utilize positive, consistent limit setting strategies supporting safety of patient, staff and others  5. Encourage participation in the decision making process about the behavioral management agreement  6. If a visitor's behavior poses a threat to safety call refer to organization policy. 7. Initiate consult with , Psychosocial CNS, Spiritual Care as appropriate  Outcome: Progressing     Problem: Depression/Self Harm  Goal: Effect of psychiatric condition will be minimized and patient will be protected from self harm  Description: INTERVENTIONS:  1. Assess impact of patient's symptoms on level of functioning, self care needs and offer support as indicated  2. Assess patient/family knowledge of depression, impact on illness and need for teaching  3. Provide emotional support, presence and reassurance  4. Assess for possible suicidal thoughts or ideation. If patient expresses suicidal thoughts or statements do not leave alone, initiate Suicide Precautions, move to a room close to the nursing station and obtain sitter  5.  Initiate consults as appropriate with Mental Health Professional, Spiritual Care, Psychosocial CNS, and consider a recommendation to the LIP for a Psychiatric Consultation  Outcome: Progressing  Flowsheets (Taken 3/21/2023 0800)  Effect of psychiatric condition will be minimized and patient will be protected from self harm: Assess impact of patients symptoms on level of functioning, self care needs and offer support as indicated     Problem: Involuntary Admit  Goal: Will cooperate with staff recommendations and doctor's orders and will demonstrate appropriate

## 2023-03-22 NOTE — GROUP NOTE
Date: 3/22/2023    Group Start Time: 1005  Group End Time: 6577  Group Topic: Psychoeducation    [facility]  [id number], [address]                                                                         Group Therapy Note    Date: 3/22/2023  Module Name:  supporting others     Patient's Goal: Patient will be able to share ways to communicate with others how they can help. Notes:  Pleasant and engaged in group, able to share when prompted. Status After Intervention:  Improved    Participation Level:  Active Listener and Interactive    Participation Quality: Appropriate, Attentive, and Sharing      Speech:  normal      Thought Process/Content: Logical      Affective Functioning: Congruent      Mood: euthymic      Level of consciousness:  Alert, Oriented x4, and Attentive      Response to Learning: Able to verbalize/acknowledge new learning, Able to retain information, and Progressing to goal      Endings: None Reported    Modes of Intervention: Education, Support, and Socialization      Discipline Responsible: Psychoeducational Specialist      Signature:  Colletta Bigger

## 2023-03-22 NOTE — DISCHARGE SUMMARY
aluminum & magnesium hydroxide-simethicone (MAALOX) 200-200-20 MG/5ML suspension 30 mL, 30 mL, Oral, PRN, Izabella Story MD    hydrOXYzine pamoate (VISTARIL) capsule 50 mg, 50 mg, Oral, TID PRN, Izabella Story MD    haloperidol (HALDOL) tablet 5 mg, 5 mg, Oral, Q6H PRN **OR** haloperidol lactate (HALDOL) injection 5 mg, 5 mg, IntraMUSCular, Q6H PRN, Izabella Story MD    melatonin tablet 3 mg, 3 mg, Oral, Nightly, Izabella Story MD, 3 mg at 03/21/23 2116    thiamine tablet 100 mg, 100 mg, Oral, Daily, Izabella Story MD, 100 mg at 81/85/38 6786    folic acid (FOLVITE) tablet 1 mg, 1 mg, Oral, Daily, Izabella Story MD, 1 mg at 03/22/23 0845    nicotine polacrilex (NICORETTE) gum 4 mg, 4 mg, Oral, Q2H PRN, Kyraleonardo Burrell, APRN - CNP, 4 mg at 03/21/23 2116    Examination:  BP (!) 116/93   Pulse 86   Temp (!) 96.4 °F (35.8 °C)   Resp 16   Ht 5' 9\" (1.753 m)   Wt 230 lb (104.3 kg)   SpO2 95%   BMI 33.97 kg/m²   Gait - steady    HOSPITAL COURSE[de-identified]  Patient was admitted to the unit on 3/18/23 was closely monitored for psychosis. She was evaluated and treated with Depakote 250 mg twice daily and Lexapro 20 mg at bedtime. Medical events were insignificant and patient continued to improve on the floor. She started coming out of her room she was attending groups to socializing with peers. She never made any suicidal statements or any suicidal gestures while in the unit. Social workers obtain confirmation patient's voice and who was able to voice any concerns that he had. He reported no safety concerns no access to any guns. Patient was able to state their future plans spontaneously with richness of detail treatment team felt the patient obtain the maximum benefit for her hospitalization She was set up with an outpatient mental health agency for outpatient follow-up services . At the time of discharge patient  did not show impulsive behavior.   She was up on the unit she was attending groups and

## 2023-03-22 NOTE — GROUP NOTE
Group Therapy Note    Date: 3/22/2023    Group Start Time: 1115  Group End Time: 7482  Group Topic: Psychotherapy    [facility]  [address], [place]        Group Therapy Note    Attendees: 5       Patient's Goal:  To increase social interaction and improve relationships with others. Notes:  Pt was attentive in group and was able to identify an agenda. They were also able to verbalize relating to others within the group. Status After Intervention:  Improved    Participation Level:  Active Listener and Interactive    Participation Quality: Appropriate, Attentive, Sharing, and Supportive      Speech:  normal      Thought Process/Content: Logical  Linear      Affective Functioning: Congruent      Mood: anxious      Level of consciousness:  Alert, Oriented x4, and Attentive      Response to Learning: Able to verbalize current knowledge/experience, Able to verbalize/acknowledge new learning, Able to retain information, and Capable of insight      Endings: None Reported    Modes of Intervention: Support, Socialization, and Exploration      Discipline Responsible: /Counselor      Signature:  HOMAR Olson, [place]

## 2023-03-22 NOTE — CARE COORDINATION
Raza contacted pt PCP [address] [place] [address] [phone] to schedule follow up appointment. Pt has appointment with Dr. Valentine Boss 3/27 at 10am in office. They are located at [address] [place] [address] and sw can fax discharge paperwork to [phone]. Raza met with pt. Pt found sitting in the common area coloring and socializing with peers. Pt states that she is feeling good today, that she wants to discharge. Sw informed her that there is not a discharge date known yet. PT states that she still only wants her PCP for follow up, denied wanting an outpatient mental health provider. Pt denied needing further support from raza at this time. Pt plans to discharge home with boyfriend, follow with PCP.
SW met with pt during treatment team. Pt reported that she is feeling good. Pt stated that she is ready to go back home and denied having any SI. Pt stated that she was drinking with her friends and she had a pocket knife but she was not making threats to harm herself. Pt stated that her friend was the one who called 911 and she doesn't remember what all happened. Pt stated that when the police came she tried to tell them that she was not suicidal but they still brought her to the hospital. Pt stated that she has been eating and sleep good. Pt denied any SI. Pt denied HI, AVH. Pt stated that she is looking forward to returning back home and she can care for her cats. Pt stated that she is also looking forward to getting  in the future.
Tamia met with pt. Pt found in the common area socializing with peers. Pt states that she is feeling better, states that she feels ready to discharge home with her boyfriend, him to transport. PT has follow up scheduled with her PCP. Pt states that she does not want connected to a mental health provider at this time, that she will continue to treat outpatient with her PCP for her mental health medications. Pt denies SI/HI/AVH. Pt has safe and stable housing, access to essential needs, steady income, and support system in place. Tamia contacted pt boyfriend Benji Messing [phone] (SANGEETA signed) to discuss pt discharge. He denies any concerns for pt at this time. He states that he has talked to pt and she sounds better to him. He states that he is still able to transport pt when she is discharged.      In order to ensure appropriate transition and discharge planning is in place, the following documents have been transmitted to [facility], as the new outpatient provider:    The d/c diagnosis was transmitted to the next care provider  The reason for hospitalization was transmitted to the next care provider  The d/c medications (dosage and indication) were transmitted to the next care provider   The continuing care plan was transmitted to the next care provider
was crying out of control. He states that he has no concerns for pt. He states that pt sounds like herself, sounds ok. He states that pt can return home at discharge, that he can transport at discharge. He denies pt access to weapons. Bhaskar Flood is not forthcoming/is hesitant with information. Raza contacted pt mother Abdiel Ronquillo to gain collateral. No answer, raza left voicemail. Access to Weapons per Collateral Contact: [] Reports [x] Denies     After consideration of C-SSRS screening results, C-SSRS assessments, and this professional's assessment the patient's overall suicide risk assessed to be:  [] None   [] Low   [x] Moderate   [] High     [x] Discussed current suicide risk, protective and risk factors with RN and NP/Psychiatrist.    Discharge Plan:  [x] Home: with boyfriend, him to transport. [] Shelter:  [] Crisis Unit:  [] Substance Abuse Rehab:  [] Nursing Facility:  [] Other (Specify): Follow up Provider: Pt is active with PCP for mental health medications- [facility] [address]. Pt states that she wants to continue to treat with her PCP and does not want a mental health provider at this time.

## 2023-03-22 NOTE — DISCHARGE INSTR - COC
Readings from Last 1 Encounters:   23 230 lb (104.3 kg)     Mental Status:  {IP PT MENTAL STATUS:}    IV Access:  { ABHILASH IV ACCESS:608329092}    Nursing Mobility/ADLs:  Walking   {CHP DME OIKH:490143507}  Transfer  {CHP DME QGAZ:290008456}  Bathing  {CHP DME SZPE:572824253}  Dressing  {CHP DME VBW}  Toileting  {CHP DME OAIA:023120719}  Feeding  {CHP DME DBUF:691079729}  Med Admin  {CHP DME WJHK:736287148}  Med Delivery   { ABHILASH MED Delivery:174234696}    Wound Care Documentation and Therapy:  Puncture 22 Back (Active)   Number of days: 328       Incision 21 Back (Active)   Number of days: 582       Incision 21 Back (Active)   Number of days: 456        Elimination:  Continence: Bowel: {YES / NK:28760}  Bladder: {YES / XE:45828}  Urinary Catheter: {Urinary Catheter:730043942}   Colostomy/Ileostomy/Ileal Conduit: {YES / RU:02838}       Date of Last BM: ***  No intake or output data in the 24 hours ending 23 1510  No intake/output data recorded.     Safety Concerns:     [employer] Safety Concerns:426108095}    Impairments/Disabilities:      [employer] Impairments/Disabilities:136299710}    Nutrition Therapy:  Current Nutrition Therapy:   [employer] Diet List:812925143}    Routes of Feeding: {P DME Other Feedings:016437616}  Liquids: {Slp liquid thickness:02269}  Daily Fluid Restriction: {CHP DME Yes amt example:722968158}  Last Modified Barium Swallow with Video (Video Swallowing Test): {Done Not Done NCFS:202136769}    Treatments at the Time of Hospital Discharge:   Respiratory Treatments: ***  Oxygen Therapy:  {Therapy; copd oxygen:51119}  Ventilator:    {[facility] Vent LEUR:496741219}    Rehab Therapies: {THERAPEUTIC INTERVENTION:0071216774}  Weight Bearing Status/Restrictions: [employer]  Weight Bearin}  Other Medical Equipment (for information only, NOT a DME order):  {EQUIPMENT:032868468}  Other Treatments: ***    Patient's personal belongings (please select all that

## 2023-03-31 ENCOUNTER — OFFICE VISIT (OUTPATIENT)
Dept: PRIMARY CARE CLINIC | Age: 33
End: 2023-03-31
Payer: COMMERCIAL

## 2023-03-31 VITALS
RESPIRATION RATE: 18 BRPM | HEIGHT: 68 IN | HEART RATE: 91 BPM | DIASTOLIC BLOOD PRESSURE: 68 MMHG | WEIGHT: 292 LBS | SYSTOLIC BLOOD PRESSURE: 124 MMHG | BODY MASS INDEX: 44.25 KG/M2 | TEMPERATURE: 97.5 F | OXYGEN SATURATION: 97 %

## 2023-03-31 DIAGNOSIS — R22.42 LOCALIZED SWELLING OF LEFT FOOT: Primary | ICD-10-CM

## 2023-03-31 DIAGNOSIS — R20.8 SENSATION OF FOREIGN BODY IN FOOT: ICD-10-CM

## 2023-03-31 PROCEDURE — 99213 OFFICE O/P EST LOW 20 MIN: CPT | Performed by: NURSE PRACTITIONER

## 2023-03-31 PROCEDURE — G8427 DOCREV CUR MEDS BY ELIG CLIN: HCPCS | Performed by: NURSE PRACTITIONER

## 2023-03-31 PROCEDURE — 4004F PT TOBACCO SCREEN RCVD TLK: CPT | Performed by: NURSE PRACTITIONER

## 2023-03-31 PROCEDURE — 1111F DSCHRG MED/CURRENT MED MERGE: CPT | Performed by: NURSE PRACTITIONER

## 2023-03-31 PROCEDURE — G8417 CALC BMI ABV UP PARAM F/U: HCPCS | Performed by: NURSE PRACTITIONER

## 2023-03-31 PROCEDURE — G8484 FLU IMMUNIZE NO ADMIN: HCPCS | Performed by: NURSE PRACTITIONER

## 2023-03-31 PROCEDURE — 90715 TDAP VACCINE 7 YRS/> IM: CPT | Performed by: NURSE PRACTITIONER

## 2023-03-31 PROCEDURE — 90471 IMMUNIZATION ADMIN: CPT | Performed by: NURSE PRACTITIONER

## 2023-03-31 RX ORDER — DOXYCYCLINE HYCLATE 100 MG/1
100 CAPSULE ORAL 2 TIMES DAILY
Qty: 20 CAPSULE | Refills: 0 | Status: SHIPPED | OUTPATIENT
Start: 2023-03-31 | End: 2023-04-10

## 2023-03-31 NOTE — PROGRESS NOTES
immediately with any signs of infection including, high fever, redness, warmth, swelling or lymphangitic streaking from the wound. Patient verbalized understanding agreeable plan of care. All questions answered. Return if symptoms worsen or fail to improve. Electronically signed by JOSSE Landaverde CNP   DD: 3/31/23    **This report was transcribed using voice recognition software. Every effort was made to ensure accuracy; however, inadvertent computerized transcription errors may be present.

## 2023-04-26 SDOH — ECONOMIC STABILITY: HOUSING INSECURITY
IN THE LAST 12 MONTHS, WAS THERE A TIME WHEN YOU DID NOT HAVE A STEADY PLACE TO SLEEP OR SLEPT IN A SHELTER (INCLUDING NOW)?: NO

## 2023-04-26 SDOH — ECONOMIC STABILITY: FOOD INSECURITY: WITHIN THE PAST 12 MONTHS, YOU WORRIED THAT YOUR FOOD WOULD RUN OUT BEFORE YOU GOT MONEY TO BUY MORE.: NEVER TRUE

## 2023-04-26 SDOH — ECONOMIC STABILITY: FOOD INSECURITY: WITHIN THE PAST 12 MONTHS, THE FOOD YOU BOUGHT JUST DIDN'T LAST AND YOU DIDN'T HAVE MONEY TO GET MORE.: NEVER TRUE

## 2023-04-26 SDOH — ECONOMIC STABILITY: TRANSPORTATION INSECURITY
IN THE PAST 12 MONTHS, HAS LACK OF TRANSPORTATION KEPT YOU FROM MEETINGS, WORK, OR FROM GETTING THINGS NEEDED FOR DAILY LIVING?: NO

## 2023-04-26 SDOH — ECONOMIC STABILITY: INCOME INSECURITY: HOW HARD IS IT FOR YOU TO PAY FOR THE VERY BASICS LIKE FOOD, HOUSING, MEDICAL CARE, AND HEATING?: NOT HARD AT ALL

## 2023-04-27 ENCOUNTER — OFFICE VISIT (OUTPATIENT)
Dept: FAMILY MEDICINE CLINIC | Age: 33
End: 2023-04-27
Payer: COMMERCIAL

## 2023-04-27 VITALS
OXYGEN SATURATION: 97 % | HEART RATE: 91 BPM | SYSTOLIC BLOOD PRESSURE: 126 MMHG | BODY MASS INDEX: 42.59 KG/M2 | RESPIRATION RATE: 16 BRPM | TEMPERATURE: 96.8 F | DIASTOLIC BLOOD PRESSURE: 68 MMHG | WEIGHT: 281 LBS | HEIGHT: 68 IN

## 2023-04-27 DIAGNOSIS — F32.A DEPRESSION, UNSPECIFIED DEPRESSION TYPE: ICD-10-CM

## 2023-04-27 DIAGNOSIS — R10.84 GENERALIZED ABDOMINAL PAIN: Primary | ICD-10-CM

## 2023-04-27 DIAGNOSIS — K21.9 GASTROESOPHAGEAL REFLUX DISEASE, UNSPECIFIED WHETHER ESOPHAGITIS PRESENT: ICD-10-CM

## 2023-04-27 DIAGNOSIS — E03.9 HYPOTHYROIDISM, UNSPECIFIED TYPE: ICD-10-CM

## 2023-04-27 DIAGNOSIS — F17.210 CIGARETTE NICOTINE DEPENDENCE WITHOUT COMPLICATION: ICD-10-CM

## 2023-04-27 DIAGNOSIS — Z86.018 HISTORY OF UTERINE FIBROID: ICD-10-CM

## 2023-04-27 DIAGNOSIS — R10.84 GENERALIZED ABDOMINAL PAIN: ICD-10-CM

## 2023-04-27 DIAGNOSIS — N92.1 MENORRHAGIA WITH IRREGULAR CYCLE: ICD-10-CM

## 2023-04-27 DIAGNOSIS — G47.00 INSOMNIA, UNSPECIFIED TYPE: ICD-10-CM

## 2023-04-27 LAB
ALBUMIN SERPL-MCNC: 4.5 G/DL (ref 3.5–5.2)
ALP SERPL-CCNC: 81 U/L (ref 35–104)
ALT SERPL-CCNC: 16 U/L (ref 0–32)
ANION GAP SERPL CALCULATED.3IONS-SCNC: 9 MMOL/L (ref 7–16)
AST SERPL-CCNC: 21 U/L (ref 0–31)
BACTERIA URNS QL MICRO: ABNORMAL /HPF
BASOPHILS # BLD: 0.04 E9/L (ref 0–0.2)
BASOPHILS NFR BLD: 0.4 % (ref 0–2)
BILIRUB SERPL-MCNC: 0.6 MG/DL (ref 0–1.2)
BILIRUB UR QL STRIP: ABNORMAL
BUN SERPL-MCNC: 12 MG/DL (ref 6–20)
CALCIUM SERPL-MCNC: 9.4 MG/DL (ref 8.6–10.2)
CHLORIDE SERPL-SCNC: 106 MMOL/L (ref 98–107)
CLARITY UR: ABNORMAL
CO2 SERPL-SCNC: 26 MMOL/L (ref 22–29)
COLOR UR: YELLOW
CONTROL: NORMAL
CREAT SERPL-MCNC: 0.9 MG/DL (ref 0.5–1)
EOSINOPHIL # BLD: 0.11 E9/L (ref 0.05–0.5)
EOSINOPHIL NFR BLD: 1.1 % (ref 0–6)
EPI CELLS #/AREA URNS HPF: ABNORMAL /HPF
ERYTHROCYTE [DISTWIDTH] IN BLOOD BY AUTOMATED COUNT: 14.1 FL (ref 11.5–15)
GLUCOSE SERPL-MCNC: 89 MG/DL (ref 74–99)
GLUCOSE UR STRIP-MCNC: NEGATIVE MG/DL
HCT VFR BLD AUTO: 49.1 % (ref 34–48)
HGB BLD-MCNC: 15.4 G/DL (ref 11.5–15.5)
HGB UR QL STRIP: ABNORMAL
IMM GRANULOCYTES # BLD: 0.05 E9/L
IMM GRANULOCYTES NFR BLD: 0.5 % (ref 0–5)
KETONES UR STRIP-MCNC: NEGATIVE MG/DL
LEUKOCYTE ESTERASE UR QL STRIP: NEGATIVE
LYMPHOCYTES # BLD: 2.7 E9/L (ref 1.5–4)
LYMPHOCYTES NFR BLD: 26.2 % (ref 20–42)
MCH RBC QN AUTO: 29.4 PG (ref 26–35)
MCHC RBC AUTO-ENTMCNC: 31.4 % (ref 32–34.5)
MCV RBC AUTO: 93.9 FL (ref 80–99.9)
MONOCYTES # BLD: 0.79 E9/L (ref 0.1–0.95)
MONOCYTES NFR BLD: 7.7 % (ref 2–12)
NEUTROPHILS # BLD: 6.63 E9/L (ref 1.8–7.3)
NEUTS SEG NFR BLD: 64.1 % (ref 43–80)
NITRITE UR QL STRIP: NEGATIVE
PH UR STRIP: 5 [PH] (ref 5–9)
PLATELET # BLD AUTO: 221 E9/L (ref 130–450)
PMV BLD AUTO: 13.3 FL (ref 7–12)
POTASSIUM SERPL-SCNC: 4.2 MMOL/L (ref 3.5–5)
PREGNANCY TEST URINE, POC: NEGATIVE
PROT SERPL-MCNC: 7.5 G/DL (ref 6.4–8.3)
PROT UR STRIP-MCNC: 100 MG/DL
RBC # BLD AUTO: 5.23 E12/L (ref 3.5–5.5)
RBC #/AREA URNS HPF: >20 /HPF (ref 0–2)
SODIUM SERPL-SCNC: 141 MMOL/L (ref 132–146)
SP GR UR STRIP: >=1.03 (ref 1–1.03)
TSH SERPL-MCNC: 6.68 UIU/ML (ref 0.27–4.2)
UROBILINOGEN UR STRIP-ACNC: 0.2 E.U./DL
WBC # BLD: 10.3 E9/L (ref 4.5–11.5)
WBC #/AREA URNS HPF: ABNORMAL /HPF (ref 0–5)

## 2023-04-27 PROCEDURE — 99213 OFFICE O/P EST LOW 20 MIN: CPT | Performed by: STUDENT IN AN ORGANIZED HEALTH CARE EDUCATION/TRAINING PROGRAM

## 2023-04-27 PROCEDURE — G8427 DOCREV CUR MEDS BY ELIG CLIN: HCPCS | Performed by: STUDENT IN AN ORGANIZED HEALTH CARE EDUCATION/TRAINING PROGRAM

## 2023-04-27 PROCEDURE — 81025 URINE PREGNANCY TEST: CPT | Performed by: STUDENT IN AN ORGANIZED HEALTH CARE EDUCATION/TRAINING PROGRAM

## 2023-04-27 PROCEDURE — 4004F PT TOBACCO SCREEN RCVD TLK: CPT | Performed by: STUDENT IN AN ORGANIZED HEALTH CARE EDUCATION/TRAINING PROGRAM

## 2023-04-27 PROCEDURE — G8417 CALC BMI ABV UP PARAM F/U: HCPCS | Performed by: STUDENT IN AN ORGANIZED HEALTH CARE EDUCATION/TRAINING PROGRAM

## 2023-04-27 PROCEDURE — 36415 COLL VENOUS BLD VENIPUNCTURE: CPT | Performed by: FAMILY MEDICINE

## 2023-04-27 RX ORDER — BUPROPION HYDROCHLORIDE 150 MG/1
150 TABLET ORAL EVERY MORNING
Qty: 30 TABLET | Refills: 3 | Status: SHIPPED | OUTPATIENT
Start: 2023-04-27

## 2023-04-27 RX ORDER — LEVOTHYROXINE SODIUM 0.07 MG/1
75 TABLET ORAL DAILY
Qty: 90 TABLET | Refills: 1 | Status: SHIPPED | OUTPATIENT
Start: 2023-04-27

## 2023-04-27 RX ORDER — MEDROXYPROGESTERONE ACETATE 10 MG/1
10 TABLET ORAL DAILY
Qty: 30 TABLET | Refills: 0 | Status: SHIPPED | OUTPATIENT
Start: 2023-04-27

## 2023-04-27 RX ORDER — ACETAMINOPHEN 500 MG
1000 TABLET ORAL EVERY 6 HOURS PRN
Qty: 30 TABLET | Refills: 0 | Status: SHIPPED | OUTPATIENT
Start: 2023-04-27

## 2023-04-27 RX ORDER — LANOLIN ALCOHOL/MO/W.PET/CERES
3 CREAM (GRAM) TOPICAL NIGHTLY
Qty: 30 TABLET | Refills: 0 | Status: SHIPPED | OUTPATIENT
Start: 2023-04-27 | End: 2023-05-27

## 2023-04-27 RX ORDER — MEDROXYPROGESTERONE ACETATE 10 MG/1
10 TABLET ORAL DAILY
Qty: 30 TABLET | Refills: 11 | Status: SHIPPED
Start: 2023-04-27 | End: 2023-04-27

## 2023-04-27 RX ORDER — DANTROLENE SODIUM 25 MG/1
CAPSULE ORAL
Qty: 360 CAPSULE | Refills: 3 | Status: CANCELLED | OUTPATIENT
Start: 2023-04-27

## 2023-04-27 ASSESSMENT — PATIENT HEALTH QUESTIONNAIRE - PHQ9
7. TROUBLE CONCENTRATING ON THINGS, SUCH AS READING THE NEWSPAPER OR WATCHING TELEVISION: 0
SUM OF ALL RESPONSES TO PHQ9 QUESTIONS 1 & 2: 0
4. FEELING TIRED OR HAVING LITTLE ENERGY: 0
SUM OF ALL RESPONSES TO PHQ QUESTIONS 1-9: 0
8. MOVING OR SPEAKING SO SLOWLY THAT OTHER PEOPLE COULD HAVE NOTICED. OR THE OPPOSITE, BEING SO FIGETY OR RESTLESS THAT YOU HAVE BEEN MOVING AROUND A LOT MORE THAN USUAL: 0
5. POOR APPETITE OR OVEREATING: 0
SUM OF ALL RESPONSES TO PHQ QUESTIONS 1-9: 0
1. LITTLE INTEREST OR PLEASURE IN DOING THINGS: 0
SUM OF ALL RESPONSES TO PHQ QUESTIONS 1-9: 0
3. TROUBLE FALLING OR STAYING ASLEEP: 0
9. THOUGHTS THAT YOU WOULD BE BETTER OFF DEAD, OR OF HURTING YOURSELF: 0
2. FEELING DOWN, DEPRESSED OR HOPELESS: 0
SUM OF ALL RESPONSES TO PHQ QUESTIONS 1-9: 0
10. IF YOU CHECKED OFF ANY PROBLEMS, HOW DIFFICULT HAVE THESE PROBLEMS MADE IT FOR YOU TO DO YOUR WORK, TAKE CARE OF THINGS AT HOME, OR GET ALONG WITH OTHER PEOPLE: 0
6. FEELING BAD ABOUT YOURSELF - OR THAT YOU ARE A FAILURE OR HAVE LET YOURSELF OR YOUR FAMILY DOWN: 0

## 2023-04-27 ASSESSMENT — ENCOUNTER SYMPTOMS
ABDOMINAL PAIN: 1
COUGH: 0
NAUSEA: 1
VOMITING: 0
DIARRHEA: 0
RHINORRHEA: 1
CONSTIPATION: 0
SHORTNESS OF BREATH: 0

## 2023-04-27 NOTE — PROGRESS NOTES
Letter sent
Patient is a 29-year-old female here today to establish care with provider. She has a past medical history of asthma/COPD, depression, GERD, IBS, migraines, seizures, hypothyroidism. She is concerned for rhinorrhea, abdominal pain, nausea x1 week. She denies any exacerbating or alleviating factors. Denies sick contacts. Denies fever, vomiting urinary or bowel changes. She has been out of medications sometime due to not seeing PCP. She would like her medications filled for her chronic conditions. She last saw neurology in 4/2022. She does not follow with counseling or psychiatry. She was admitted to psych last month due to suicidal ideation. Was previously following with OB/GYN for menometrorrhagia. She smokes half a pack per day would like to quit. Has tried patches in the past but they did not help. Blood pressure 126/68, pulse 91, temperature 96.8 °F (36 °C), resp. rate 16, height 5' 8\" (1.727 m), weight 281 lb (127.5 kg), last menstrual period 04/11/2023, SpO2 97 %, not currently breastfeeding. HEENT WNL     Heart regular    Lungs clear    abd non-tender      No edema    Pulses intact        Impression:  Asthma/COPD, depression, GERD, IBS, migraines, seizures, hypothyroidism, nausea    Refill chronic medications. Encouraged to follow-up with neurology and OB/GYN. Will check basic labs, POCT hCG, GC/C. Symptomatic management for likely viral illness. Attending Physician Statement  I have discussed the case, including pertinent history and exam findings with the resident. I agree with the documented assessment and plan.
obvious joint swelling. Lymphnodes: No lymph node enlargement appreciated  Neurologic: Alert&Oriented x3. No focal motor deficits detected. Psychiatric: Normal mood. Restricted affect. Normal behavior. ______________________________________________________________________    Assessment & Plan :    1. Generalized abdominal pain  - could be 2/2 IBS vs viral illness vs GERD. Will check basic labs, UA. Will r/o pregnancy as well, although less likely due to her being on her period currently. - Refill nexium. Trial for 2-4 weeks and reassess   - Symptomatic treatment with increased hydration, tylenol   - Comprehensive Metabolic Panel; Future  - Urinalysis; Future  - POCT urine pregnancy  - CBC with Auto Differential; Future  - esomeprazole (NEXIUM) 20 MG delayed release capsule; Take 1 capsule by mouth daily  Dispense: 30 capsule; Refill: 3    2. Hypothyroidism, unspecified type  - Last TSH 5/2021 2.56. recheck today   - Refill:   - levothyroxine (SYNTHROID) 75 MCG tablet; Take 1 tablet by mouth daily  Dispense: 90 tablet; Refill: 1  - TSH; Future    3. Menorrhagia with irregular cycle  4. History of uterine fibroid  - Encouraged to follow up with Dr. Gracia Tatum  - Will refill provera at this time. Tylenol refilled for cramping   - medroxyPROGESTERone (PROVERA) 10 MG tablet; Take 1 tablet by mouth daily  Dispense: 30 tablet; Refill: 0  - acetaminophen (TYLENOL) 500 MG tablet; Take 2 tablets by mouth every 6 hours as needed for Pain or Fever Maximum dose- 8 tablets/24 hours. Dispense: 30 tablet; Refill: 0    5. Depression, unspecified depression type  - On lexapro 20 mg daily, refills provided  - Discussed starting counseling, patient declines at this time. 6. Insomnia, unspecified type  - Likely due to uncontrolled depression. Was discharged from [facility] on melatonin, will provide refills for now and discuss sleep hygiene during subsequent visit   - melatonin 3 MG TABS tablet;  Take 1 tablet by mouth nightly

## 2023-05-01 DIAGNOSIS — J45.20 MILD INTERMITTENT ASTHMA WITHOUT COMPLICATION: ICD-10-CM

## 2023-05-01 DIAGNOSIS — G80.0 CONGENITAL SPASTIC QUADRIPARESIS (HCC): ICD-10-CM

## 2023-05-02 RX ORDER — BACLOFEN 20 MG/1
TABLET ORAL
Qty: 360 TABLET | Refills: 3 | OUTPATIENT
Start: 2023-05-02

## 2023-05-02 NOTE — TELEPHONE ENCOUNTER
Last Appointment:  5/25/2022  Future Appointments  5/24/2023  1:30 PM    Yony Barr MD      [facility]  6/2/2023   2:20 PM    MD Rico Joseph [facility]

## 2023-05-03 ENCOUNTER — HOSPITAL ENCOUNTER (OUTPATIENT)
Age: 33
End: 2023-05-03
Payer: COMMERCIAL

## 2023-05-03 ENCOUNTER — HOSPITAL ENCOUNTER (OUTPATIENT)
Dept: GENERAL RADIOLOGY | Age: 33
Discharge: HOME | End: 2023-05-05
Payer: COMMERCIAL

## 2023-05-03 ENCOUNTER — OFFICE VISIT (OUTPATIENT)
Dept: PRIMARY CARE CLINIC | Age: 33
End: 2023-05-03
Payer: COMMERCIAL

## 2023-05-03 VITALS
HEART RATE: 87 BPM | RESPIRATION RATE: 18 BRPM | BODY MASS INDEX: 43.19 KG/M2 | TEMPERATURE: 98 F | HEIGHT: 68 IN | WEIGHT: 285 LBS | OXYGEN SATURATION: 98 % | SYSTOLIC BLOOD PRESSURE: 118 MMHG | DIASTOLIC BLOOD PRESSURE: 68 MMHG

## 2023-05-03 DIAGNOSIS — M25.571 ACUTE RIGHT ANKLE PAIN: ICD-10-CM
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
## 2023-05-03 DIAGNOSIS — M79.671 RIGHT FOOT PAIN: ICD-10-CM
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
## 2023-05-03 PROCEDURE — 73610 X-RAY EXAM OF ANKLE: CPT

## 2023-05-03 PROCEDURE — 99213 OFFICE O/P EST LOW 20 MIN: CPT | Performed by: NURSE PRACTITIONER

## 2023-05-03 PROCEDURE — 4004F PT TOBACCO SCREEN RCVD TLK: CPT | Performed by: NURSE PRACTITIONER

## 2023-05-03 PROCEDURE — G8427 DOCREV CUR MEDS BY ELIG CLIN: HCPCS | Performed by: NURSE PRACTITIONER

## 2023-05-03 PROCEDURE — 73630 X-RAY EXAM OF FOOT: CPT

## 2023-05-03 PROCEDURE — G8417 CALC BMI ABV UP PARAM F/U: HCPCS | Performed by: NURSE PRACTITIONER

## 2023-05-03 RX ORDER — ALBUTEROL SULFATE 90 UG/1
AEROSOL, METERED RESPIRATORY (INHALATION)
Qty: 8.5 G | Refills: 1 | Status: SHIPPED | OUTPATIENT
Start: 2023-05-03

## 2023-05-03 NOTE — PROGRESS NOTES
Chief Complaint:   Fall (Patient fell on the right side of her body yesterday and complains of foot pain)    History of Present Illness   Source of history provided by:  patient. Casey Rodney is a 35 y.o. old female presenting to walk-in for evaluation of right foot pain starting 1 day ago. Pt states there was a known traumatic injury that occurred, states that she fell landing on her right foot and notes pain in the foot and ankle. Reports associated swelling and bruising. Denies any paresthesias, knee pain, weakness, fever, chills, or abrasions. Pt states there is mild pain with ambulation. Has been taking Tylenol OTC without symptomatic relief. She states she did break this foot before. Review of Systems   Unless otherwise stated in this report or unable to obtain because of the patient's clinical or mental status as evidenced by the medical record, this patients's positive and negative responses for Review of Systems, constitutional, psych, eyes, ENT, cardiovascular, respiratory, gastrointestinal, neurological, genitourinary, musculoskeletal, integument systems and systems related to the presenting problem are either stated in the preceding or were negative for the symptoms and/or complaints related to the medical problem. Past Medical History:  has a past medical history of ADHD, Arthritis, Asthma, Chronic midline low back pain without sciatica, COPD (chronic obstructive pulmonary disease) ([address] Depression, GERD (gastroesophageal reflux disease), Hypothyroidism, IBS (irritable bowel syndrome), Migraines, and Seizures ([address] Past Surgical History:  has a past surgical history that includes eye surgery; Colonoscopy; pr tonsillectomy primary/secondary age 12/> (N/A, 5/4/2018); pr colonoscopy w/biopsy single/multiple (10/29/2018); Upper gastrointestinal endoscopy (N/A, 10/29/2020); fracture surgery; Pain management procedure (N/A, 8/17/2021);  Tonsillectomy; Pain management procedure (N/A,

## 2023-05-04 RX ORDER — IBUPROFEN 800 MG/1
800 TABLET ORAL 2 TIMES DAILY PRN
Qty: 60 TABLET | Refills: 0 | Status: SHIPPED | OUTPATIENT
Start: 2023-05-04

## 2023-05-19 ENCOUNTER — HOSPITAL ENCOUNTER (EMERGENCY)
Age: 33
Discharge: HOME | End: 2023-05-19
Payer: COMMERCIAL

## 2023-05-19 ENCOUNTER — APPOINTMENT (OUTPATIENT)
Dept: GENERAL RADIOLOGY | Age: 33
End: 2023-05-19
Payer: COMMERCIAL

## 2023-05-19 VITALS
OXYGEN SATURATION: 98 % | DIASTOLIC BLOOD PRESSURE: 91 MMHG | SYSTOLIC BLOOD PRESSURE: 154 MMHG | RESPIRATION RATE: 16 BRPM | HEART RATE: 90 BPM | TEMPERATURE: 98.9 F

## 2023-05-19 DIAGNOSIS — R22.41 LOCALIZED SWELLING OF RIGHT FOOT: ICD-10-CM

## 2023-05-19 DIAGNOSIS — S93.601A SPRAIN OF RIGHT FOOT, INITIAL ENCOUNTER: Primary | ICD-10-CM

## 2023-05-19 PROCEDURE — 99283 EMERGENCY DEPT VISIT LOW MDM: CPT

## 2023-05-19 PROCEDURE — 73630 X-RAY EXAM OF FOOT: CPT

## 2023-05-19 NOTE — ED PROVIDER NOTES
Independent [facility] Visit. [address]  Department of Emergency Medicine   ED  Encounter Note  Admit Date/RoomTime: 2023 12:09 PM  ED Room: PR2/PR2    NAME: Rey Valenzuela  : 1990  MRN: [id number]     Chief Complaint:  Foot Swelling (Fall x 2 weeks ago landed on right foot, complains of swelling and pain. Had an xray at some point that stated it was broke, has been ambulating on foot with no interventions. Not using crutches prescribed )    History of Present Illness         Rey Valenzuela is a 35 y.o. old female presenting to the emergency department by private vehicle, for traumatic Right foot pain which occured 2 week(s) prior to arrival.  Stated she fell 2 weeks ago. Was at [facility] foot doctor today\" and was told the doctor was not there and to go to the ER for evaluation of a foot fracture. XR was performed on 5/3/23 and found to have a chip fracture of her calcaneus. She states nothing was done about it. She ambulated into the ER today with a limp. Has been bearing weight. Notes reveal they provided her with crutches but states she has not been using them because \"it hurts her armpits\". ROS   Pertinent positives and negatives are stated within HPI, all other systems reviewed and are negative. Past Medical History:  has a past medical history of ADHD, Arthritis, Asthma, Chronic midline low back pain without sciatica, COPD (chronic obstructive pulmonary disease) ([address] Depression, GERD (gastroesophageal reflux disease), Hypothyroidism, IBS (irritable bowel syndrome), Migraines, and Seizures ([address] Surgical History:  has a past surgical history that includes eye surgery; Colonoscopy; pr tonsillectomy primary/secondary age 12/> (N/A, 2018); pr colonoscopy w/biopsy single/multiple (10/29/2018); Upper gastrointestinal endoscopy (N/A, 10/29/2020); fracture surgery; Pain management procedure (N/A, 2021);  Tonsillectomy; Pain management procedure (N/A, 2021);

## 2023-05-19 NOTE — DISCHARGE INSTRUCTIONS
CALL PODIATRY AND/OR ORTHOPEDIC SURGERY FOR FURTHER EVALUATION OF XR AND YOUR FOOT PAIN. TYLENOL AND/OR MOTRIN FOR PAIN.  USE THE WALKING BOOT AND USE YOUR CRUTCHES.

## 2023-06-03 DIAGNOSIS — R20.8 SENSATION OF FOREIGN BODY IN FOOT: ICD-10-CM

## 2023-06-03 DIAGNOSIS — G80.0 CONGENITAL SPASTIC QUADRIPARESIS (HCC): ICD-10-CM

## 2023-06-03 DIAGNOSIS — G56.00 CARPAL TUNNEL SYNDROME, UNSPECIFIED LATERALITY: ICD-10-CM

## 2023-06-05 RX ORDER — IBUPROFEN 800 MG/1
TABLET ORAL
Qty: 60 TABLET | Refills: 0 | OUTPATIENT
Start: 2023-06-05

## 2023-06-05 RX ORDER — BACLOFEN 20 MG/1
TABLET ORAL
Qty: 360 TABLET | Refills: 3 | OUTPATIENT
Start: 2023-06-05

## 2023-06-05 RX ORDER — GABAPENTIN 300 MG/1
600 CAPSULE ORAL 3 TIMES DAILY
Qty: 540 CAPSULE | Refills: 0 | OUTPATIENT
Start: 2023-06-05 | End: 2023-09-03

## 2023-06-05 RX ORDER — DOXYCYCLINE HYCLATE 100 MG/1
CAPSULE ORAL
Qty: 20 CAPSULE | Refills: 0 | OUTPATIENT
Start: 2023-06-05

## 2023-06-07 DIAGNOSIS — G80.0 CONGENITAL SPASTIC QUADRIPARESIS (HCC): ICD-10-CM

## 2023-06-07 DIAGNOSIS — Z76.0 MEDICATION REFILL: ICD-10-CM

## 2023-06-07 RX ORDER — CHLORHEXIDINE GLUCONATE 0.12 MG/ML
RINSE ORAL
Qty: 473 ML | Refills: 5 | OUTPATIENT
Start: 2023-06-07

## 2023-06-07 NOTE — TELEPHONE ENCOUNTER
Last Appointment:  5/25/2022  Future Appointments  7/7/2023   1:40 PM    [facility]  7/20/2023  1:30 PM    Deloria Mcardle, MD      [address] [place] [address]

## 2023-06-08 DIAGNOSIS — G80.0 CONGENITAL SPASTIC QUADRIPARESIS (HCC): ICD-10-CM

## 2023-06-08 RX ORDER — BACLOFEN 20 MG/1
TABLET ORAL
Qty: 120 TABLET | Refills: 0 | Status: SHIPPED
Start: 2023-06-08 | End: 2023-07-11

## 2023-06-09 RX ORDER — DANTROLENE SODIUM 25 MG/1
CAPSULE ORAL
Qty: 360 CAPSULE | Refills: 0 | Status: SHIPPED | OUTPATIENT
Start: 2023-06-09

## 2023-06-11 DIAGNOSIS — G47.00 INSOMNIA, UNSPECIFIED TYPE: ICD-10-CM

## 2023-06-11 DIAGNOSIS — Z86.018 HISTORY OF UTERINE FIBROID: ICD-10-CM

## 2023-06-11 DIAGNOSIS — N92.1 MENORRHAGIA WITH IRREGULAR CYCLE: ICD-10-CM

## 2023-06-11 DIAGNOSIS — R42 DIZZINESS: ICD-10-CM

## 2023-06-12 RX ORDER — MEDROXYPROGESTERONE ACETATE 10 MG/1
10 TABLET ORAL DAILY
Qty: 30 TABLET | Refills: 0 | Status: SHIPPED
Start: 2023-06-12 | End: 2023-08-02 | Stop reason: ALTCHOICE

## 2023-06-12 RX ORDER — LANOLIN ALCOHOL/MO/W.PET/CERES
3 CREAM (GRAM) TOPICAL NIGHTLY
Qty: 30 TABLET | Refills: 0 | Status: SHIPPED
Start: 2023-06-12 | End: 2023-08-02 | Stop reason: SDUPTHER

## 2023-06-12 RX ORDER — MECLIZINE HYDROCHLORIDE 25 MG/1
25 TABLET ORAL 3 TIMES DAILY PRN
Qty: 90 TABLET | Refills: 0 | Status: SHIPPED
Start: 2023-06-12 | End: 2023-07-11

## 2023-06-12 NOTE — TELEPHONE ENCOUNTER
Last Appointment:  4/27/2023  Future Appointments   Date Time Provider [address]   7/7/2023  1:40 PM MD Dylan Melendrez [facility]   7/20/2023  1:30 PM Arlet Soto MD [address]   8/17/2023 11:20 AM Félix Slaughter, [facility] Neurology -

## 2023-07-10 DIAGNOSIS — R42 DIZZINESS: ICD-10-CM

## 2023-07-10 DIAGNOSIS — G80.0 CONGENITAL SPASTIC QUADRIPARESIS (HCC): ICD-10-CM

## 2023-07-11 RX ORDER — BACLOFEN 20 MG/1
TABLET ORAL
Qty: 120 TABLET | Refills: 0 | Status: SHIPPED | OUTPATIENT
Start: 2023-07-11

## 2023-07-11 RX ORDER — MECLIZINE HYDROCHLORIDE 25 MG/1
TABLET ORAL
Qty: 30 TABLET | Refills: 0 | Status: SHIPPED | OUTPATIENT
Start: 2023-07-11

## 2023-07-11 RX ORDER — IBUPROFEN 800 MG/1
TABLET ORAL
Qty: 60 TABLET | Refills: 0 | Status: SHIPPED | OUTPATIENT
Start: 2023-07-11

## 2023-07-11 NOTE — TELEPHONE ENCOUNTER
Last Appointment:  4/27/2023  Future Appointments   Date Time Provider [address]   7/20/2023  1:30 PM Leilani Daniel MD [address]   7/31/2023  1:00 PM [facility]   8/17/2023 11:20 AM Mick Mcdaniel, [facility] Neurology -

## 2023-07-11 NOTE — TELEPHONE ENCOUNTER
Not seen in over a year.  Needs appt before further refills will be given or she can get from her PCP

## 2023-07-31 ENCOUNTER — TELEPHONE (OUTPATIENT)
Dept: FAMILY MEDICINE CLINIC | Age: 33
End: 2023-07-31

## 2023-07-31 NOTE — TELEPHONE ENCOUNTER
Patient called in stating she is having swelling on both arms for a week now. Patient states she has bed bugs and is getting bitten up where it is causing her swelling in her arms. She states her arms itch and burn. She cancelled he appt for you today and would like to know if you can just call her I something.  Advised patient I will bring to the attention of her doctor and get back to her

## 2023-08-02 ENCOUNTER — OFFICE VISIT (OUTPATIENT)
Dept: FAMILY MEDICINE CLINIC | Age: 33
End: 2023-08-02
Payer: COMMERCIAL

## 2023-08-02 VITALS
RESPIRATION RATE: 16 BRPM | HEART RATE: 99 BPM | HEIGHT: 68 IN | DIASTOLIC BLOOD PRESSURE: 60 MMHG | SYSTOLIC BLOOD PRESSURE: 100 MMHG | BODY MASS INDEX: 41.98 KG/M2 | TEMPERATURE: 99.7 F | WEIGHT: 277 LBS | OXYGEN SATURATION: 96 %
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
## 2023-08-02 DIAGNOSIS — Z76.0 MEDICATION REFILL: ICD-10-CM

## 2023-08-02 PROCEDURE — G8417 CALC BMI ABV UP PARAM F/U: HCPCS | Performed by: STUDENT IN AN ORGANIZED HEALTH CARE EDUCATION/TRAINING PROGRAM

## 2023-08-02 PROCEDURE — 99213 OFFICE O/P EST LOW 20 MIN: CPT | Performed by: STUDENT IN AN ORGANIZED HEALTH CARE EDUCATION/TRAINING PROGRAM

## 2023-08-02 PROCEDURE — 4004F PT TOBACCO SCREEN RCVD TLK: CPT | Performed by: STUDENT IN AN ORGANIZED HEALTH CARE EDUCATION/TRAINING PROGRAM

## 2023-08-02 PROCEDURE — G8427 DOCREV CUR MEDS BY ELIG CLIN: HCPCS | Performed by: STUDENT IN AN ORGANIZED HEALTH CARE EDUCATION/TRAINING PROGRAM

## 2023-08-02 RX ORDER — LANOLIN ALCOHOL/MO/W.PET/CERES
3 CREAM (GRAM) TOPICAL NIGHTLY
Qty: 30 TABLET | Refills: 0 | Status: SHIPPED | OUTPATIENT
Start: 2023-08-02 | End: 2023-09-01

## 2023-08-02 RX ORDER — DICYCLOMINE HCL 20 MG
TABLET ORAL
Qty: 120 TABLET | Refills: 0 | Status: SHIPPED | OUTPATIENT
Start: 2023-08-02

## 2023-08-02 RX ORDER — ERGOCALCIFEROL 1.25 MG/1
CAPSULE ORAL
Qty: 24 CAPSULE | Refills: 1 | Status: SHIPPED | OUTPATIENT
Start: 2023-08-02

## 2023-08-02 RX ORDER — CETIRIZINE HYDROCHLORIDE 10 MG/1
10 TABLET ORAL DAILY
Qty: 30 TABLET | Refills: 0 | Status: SHIPPED | OUTPATIENT
Start: 2023-08-02

## 2023-08-02 NOTE — TELEPHONE ENCOUNTER
Yes Dr. Lisa Mccain you are correct. Thank you. Patient is schedule for today. sorry about that. I did confirm her appointment with her yesterday.

## 2023-08-02 NOTE — PROGRESS NOTES
[address]  FAMILY MEDICINE RESIDENCY PROGRAM  DATE OF VISIT : 2023    Patient : Shanique Dan   Age : 35 y.o.  : 1990   MRN : [id number]   ______________________________________________________________________    Chief Complaint :   Chief Complaint   Patient presents with    Insect Bite       HPI : Shanique Dan is 35 y.o. female who presented to the clinic today for insect bite. States friend had bed bugs and was at that person's house. They are on her BUE. Itchy, painful. Hasn't used anything to helped with the itchiness. Denies fever, bleeding, swelling         Patient's medications, allergies, past medical, surgical, social and family histories were reviewed and updated as appropriate.     Past Medical History :  Past Medical History:   Diagnosis Date    ADHD     Arthritis     Asthma     controlled    Chronic midline low back pain without sciatica 2017    COPD (chronic obstructive pulmonary disease) ([facility])     Depression     GERD (gastroesophageal reflux disease)     Hypothyroidism     IBS (irritable bowel syndrome)     Migraines     Seizures ([address])     last episode      Past Surgical History:   Procedure Laterality Date    COLONOSCOPY      EYE SURGERY      probing of ductal system right eye     FRACTURE SURGERY      R ELBOW CRUSH INJURY W PLATE AND PINS PLACED    PAIN MANAGEMENT PROCEDURE N/A 2021    T12-L1 EPIDURAL STEROID INJECTION #1 performed by Trudy Smith DO at [address] N/A 2021    LUMBAR EPIDURAL STEROID INJECTION T12-L1 performed by Trudy Smith DO at [address] N/A 2022    T12-L1 EPIDURAL STEROID INJECTION performed by Trudy Smith DO at [address] W/BIOPSY SINGLE/MULTIPLE  10/29/2018    COLONOSCOPY WITH BIOPSY performed by Dom Miranda MD at [address] AGE 12/> N/A 2018    TONSILLECTOMY performed by Kim Clark

## 2023-08-10 DIAGNOSIS — Z86.018 HISTORY OF UTERINE FIBROID: ICD-10-CM

## 2023-08-10 RX ORDER — ACETAMINOPHEN 500 MG
1000 TABLET ORAL EVERY 6 HOURS PRN
Qty: 30 TABLET | Refills: 2 | Status: SHIPPED | OUTPATIENT
Start: 2023-08-10

## 2023-08-10 NOTE — TELEPHONE ENCOUNTER
Last Appointment:  8/2/2023  Future Appointments   Date Time Provider [address]   10/19/2023  3:20 PM Félix Slaughter, [facility] Neurology -   10/26/2023  3:00 PM Arlet Soto MD [address]   11/3/2023  2:00 PM MD Dylan Melendrez [facility]

## 2023-08-11 ENCOUNTER — TELEPHONE (OUTPATIENT)
Dept: FAMILY MEDICINE CLINIC | Age: 33
End: 2023-08-11

## 2023-08-11 DIAGNOSIS — Z76.0 MEDICATION REFILL: ICD-10-CM

## 2023-08-11 DIAGNOSIS — K21.9 GASTROESOPHAGEAL REFLUX DISEASE, UNSPECIFIED WHETHER ESOPHAGITIS PRESENT: ICD-10-CM

## 2023-08-11 RX ORDER — MEDROXYPROGESTERONE ACETATE 10 MG/1
TABLET ORAL
Qty: 30 TABLET | Refills: 0 | Status: SHIPPED
Start: 2023-08-11 | End: 2023-08-14 | Stop reason: SDUPTHER

## 2023-08-11 RX ORDER — PSEUDOEPHED/ACETAMINOPH/DIPHEN 30MG-500MG
TABLET ORAL
Qty: 30 TABLET | Refills: 0 | OUTPATIENT
Start: 2023-08-11

## 2023-08-11 NOTE — TELEPHONE ENCOUNTER
Pt called requesting her birth control pill States that you tried to take her off of it. But states that she really needs it.  She states that will not stop bleeding if she does not have it
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
## 2023-08-11 NOTE — TELEPHONE ENCOUNTER
Patient called in for a refill of Provera. Pharmacy on file. Annual appointment scheduled for October.

## 2023-08-14 DIAGNOSIS — Z76.0 MEDICATION REFILL: ICD-10-CM

## 2023-08-14 RX ORDER — MEDROXYPROGESTERONE ACETATE 10 MG/1
10 TABLET ORAL DAILY
Qty: 30 TABLET | Refills: 0 | Status: SHIPPED | OUTPATIENT
Start: 2023-08-14

## 2023-08-14 RX ORDER — CHLORHEXIDINE GLUCONATE ORAL RINSE 1.2 MG/ML
SOLUTION DENTAL
Qty: 473 ML | Refills: 5 | OUTPATIENT
Start: 2023-08-14

## 2023-08-14 RX ORDER — DICYCLOMINE HCL 20 MG
TABLET ORAL
Qty: 120 TABLET | Refills: 0 | OUTPATIENT
Start: 2023-08-14

## 2023-08-14 NOTE — TELEPHONE ENCOUNTER
Last Appointment:  5/25/2022  Future Appointments  10/19/2023 3:20 PM    Pat Garces, [address]         Neurology -  10/26/2023 3:00 PM    Jeff Rosario MD      [address] [place] [address]  11/3/2023  2:00 PM    Scooby Martinez MD           [facility]

## 2023-08-14 NOTE — TELEPHONE ENCOUNTER
Last Appointment:  8/2/2023  Future Appointments  10/19/2023 3:20 PM    Félix Slaughter, [address]         Neurology -  10/26/2023 3:00 PM    Arlet Soto MD      [address] [place] [address]  11/3/2023  2:00 PM    MD Dylan Melendrez [facility]

## 2023-08-14 NOTE — TELEPHONE ENCOUNTER
Last Appointment:  8/2/2023  Future Appointments  10/19/2023 3:20 PM    Andrew Sánchez, [facility]         Neurology -  10/26/2023 3:00 PM    Bk Mariee MD      [address] [place] [address]  11/3/2023  2:00 PM    MD [facility]

## 2023-08-15 DIAGNOSIS — Z76.0 MEDICATION REFILL: ICD-10-CM

## 2023-08-15 DIAGNOSIS — F17.210 CIGARETTE NICOTINE DEPENDENCE WITHOUT COMPLICATION: ICD-10-CM

## 2023-08-15 RX ORDER — CHLORHEXIDINE GLUCONATE ORAL RINSE 1.2 MG/ML
SOLUTION DENTAL
Qty: 473 ML | Refills: 5 | OUTPATIENT
Start: 2023-08-15

## 2023-08-15 RX ORDER — BUPROPION HYDROCHLORIDE 150 MG/1
150 TABLET ORAL EVERY MORNING
Qty: 30 TABLET | Refills: 3 | Status: SHIPPED | OUTPATIENT
Start: 2023-08-15

## 2023-08-15 NOTE — TELEPHONE ENCOUNTER
Last Appointment:  5/25/2022  Future Appointments  10/19/2023 3:20 PM    Anders Meyers, [address] [place] [address]         Neurology -  10/26/2023 3:00 PM    Kannan Mcgee MD      [address] [place] [address]  11/3/2023  2:00 PM    MD Roro Morrison [facility]

## 2023-08-15 NOTE — TELEPHONE ENCOUNTER
Last Appointment:  8/2/2023  Future Appointments  10/19/2023 3:20 PM    Félix Slaughter, [facility]         Neurology -  10/26/2023 3:00 PM    Arlet Soto MD      [address] [place] [address]  11/3/2023  2:00 PM    MD Dylan Melendrez [facility]

## 2023-09-26 DIAGNOSIS — G56.00 CARPAL TUNNEL SYNDROME, UNSPECIFIED LATERALITY: ICD-10-CM

## 2023-09-26 DIAGNOSIS — Z76.0 MEDICATION REFILL: ICD-10-CM

## 2023-09-26 DIAGNOSIS — G80.0 CONGENITAL SPASTIC QUADRIPARESIS (HCC): ICD-10-CM

## 2023-09-27 RX ORDER — BACLOFEN 20 MG/1
TABLET ORAL
Qty: 120 TABLET | Refills: 0 | OUTPATIENT
Start: 2023-09-27

## 2023-09-27 RX ORDER — DICYCLOMINE HCL 20 MG
TABLET ORAL
Qty: 120 TABLET | Refills: 0 | Status: SHIPPED | OUTPATIENT
Start: 2023-09-27

## 2023-09-27 RX ORDER — GABAPENTIN 300 MG/1
600 CAPSULE ORAL 3 TIMES DAILY
Qty: 540 CAPSULE | Refills: 0 | OUTPATIENT
Start: 2023-09-27 | End: 2023-12-26

## 2023-09-27 RX ORDER — DANTROLENE SODIUM 25 MG/1
CAPSULE ORAL
Qty: 360 CAPSULE | Refills: 0 | OUTPATIENT
Start: 2023-09-27

## 2023-09-27 NOTE — TELEPHONE ENCOUNTER
MD    Medication dantrolene (DANTRIUM) 25 MG capsule, Sig Take 1 capsule by mouth 2 times daily AND 2 capsules nightly. , Taking? , Authorizing Provider JOSSE Morales CNP    Medication albuterol sulfate HFA (PROVENTIL;VENTOLIN;PROAIR) 108 (90 Base) MCG/ACT inhaler, Sig inhale 2 puffs by mouth and INTO THE LUNGS four times a day if needed for wheezing, Taking? , Authorizing Provider Elder Espino MD    Medication levothyroxine (SYNTHROID) 75 MCG tablet, Sig Take 1 tablet by mouth daily, Taking? , Authorizing Provider Elder Espino MD    Medication gabapentin (NEURONTIN) 300 MG capsule, Sig Take 2 capsules by mouth 3 times daily for 90 days. , Taking? , Authorizing Provider JOSSE Morales CNP    Medication escitalopram (LEXAPRO) 20 MG tablet, Sig take 1 tablet by mouth once daily, Taking? , Authorizing Provider Elder Espino MD    Medication budesonide-formoterol (SYMBICORT) 80-4.5 MCG/ACT AERO, Sig inhale 2 puffs by mouth and INTO THE LUNGS once daily, Taking? , Authorizing Provider Elder Espino MD    Medication divalproex (DEPAKOTE) 250 MG DR tablet, Sig take 1 tablet by mouth twice a day, Taking? , Authorizing Provider Uche Looney, DO

## 2023-10-07 DIAGNOSIS — J45.20 MILD INTERMITTENT ASTHMA WITHOUT COMPLICATION: ICD-10-CM

## 2023-10-07 DIAGNOSIS — G56.00 CARPAL TUNNEL SYNDROME, UNSPECIFIED LATERALITY: ICD-10-CM

## 2023-10-07 DIAGNOSIS — G80.0 CONGENITAL SPASTIC QUADRIPARESIS (HCC): ICD-10-CM

## 2023-10-08 DIAGNOSIS — E03.9 HYPOTHYROIDISM, UNSPECIFIED TYPE: ICD-10-CM

## 2023-10-09 RX ORDER — IBUPROFEN 800 MG/1
TABLET ORAL
Qty: 60 TABLET | Refills: 0 | Status: SHIPPED | OUTPATIENT
Start: 2023-10-09

## 2023-10-09 RX ORDER — ALBUTEROL SULFATE 90 UG/1
AEROSOL, METERED RESPIRATORY (INHALATION)
Qty: 8.5 G | Refills: 1 | Status: SHIPPED
Start: 2023-10-09 | End: 2023-10-12 | Stop reason: SDUPTHER

## 2023-10-09 RX ORDER — BACLOFEN 20 MG/1
TABLET ORAL
Qty: 120 TABLET | Refills: 0 | Status: SHIPPED
Start: 2023-10-09 | End: 2023-10-19 | Stop reason: SDUPTHER

## 2023-10-09 RX ORDER — GABAPENTIN 300 MG/1
600 CAPSULE ORAL 3 TIMES DAILY
Qty: 180 CAPSULE | Refills: 0 | Status: SHIPPED
Start: 2023-10-09 | End: 2023-10-19 | Stop reason: SDUPTHER

## 2023-10-09 RX ORDER — DANTROLENE SODIUM 25 MG/1
CAPSULE ORAL
Qty: 120 CAPSULE | Refills: 0 | Status: SHIPPED
Start: 2023-10-09 | End: 2023-10-19 | Stop reason: SDUPTHER

## 2023-10-09 NOTE — TELEPHONE ENCOUNTER
Last Appointment:  8/2/2023  Future Appointments   Date Time Provider [address]   10/19/2023  3:20 PM Berlin Moulton, [address] [facility] Neurology -   10/26/2023  3:00 PM Hakeem Costa MD [facility]   11/3/2023  2:00 PM MD Miguel Rai [facility]

## 2023-10-10 RX ORDER — LEVOTHYROXINE SODIUM 0.07 MG/1
75 TABLET ORAL DAILY
Qty: 30 TABLET | Refills: 0 | Status: SHIPPED
Start: 2023-10-10 | End: 2023-10-12 | Stop reason: SDUPTHER

## 2023-10-12 DIAGNOSIS — Z76.0 MEDICATION REFILL: ICD-10-CM

## 2023-10-12 DIAGNOSIS — J45.20 MILD INTERMITTENT ASTHMA WITHOUT COMPLICATION: ICD-10-CM

## 2023-10-12 DIAGNOSIS — G56.00 CARPAL TUNNEL SYNDROME, UNSPECIFIED LATERALITY: ICD-10-CM

## 2023-10-12 DIAGNOSIS — K21.9 GASTROESOPHAGEAL REFLUX DISEASE, UNSPECIFIED WHETHER ESOPHAGITIS PRESENT: ICD-10-CM

## 2023-10-12 DIAGNOSIS — G80.0 CONGENITAL SPASTIC QUADRIPARESIS (HCC): ICD-10-CM

## 2023-10-12 DIAGNOSIS — E03.9 HYPOTHYROIDISM, UNSPECIFIED TYPE: ICD-10-CM

## 2023-10-12 NOTE — TELEPHONE ENCOUNTER
Last Appointment:  8/2/2023  Future Appointments   Date Time Provider [address]   10/19/2023  3:20 PM Tramaine Holguin, [address] [facility] Neurology -   1/18/2024  3:00 PM Pb Jones MD [address] [place] [address]
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
## 2023-10-13 RX ORDER — LEVOTHYROXINE SODIUM 0.07 MG/1
75 TABLET ORAL DAILY
Qty: 30 TABLET | Refills: 0 | Status: SHIPPED
Start: 2023-10-13 | End: 2023-12-12

## 2023-10-13 RX ORDER — ALBUTEROL SULFATE 90 UG/1
2 AEROSOL, METERED RESPIRATORY (INHALATION) EVERY 6 HOURS PRN
Qty: 8.5 G | Refills: 0 | Status: SHIPPED | OUTPATIENT
Start: 2023-10-13

## 2023-10-13 RX ORDER — CETIRIZINE HYDROCHLORIDE 10 MG/1
10 TABLET ORAL DAILY
Qty: 30 TABLET | Refills: 0 | Status: SHIPPED | OUTPATIENT
Start: 2023-10-13

## 2023-10-13 RX ORDER — ERGOCALCIFEROL 1.25 MG/1
CAPSULE ORAL
Qty: 24 CAPSULE | Refills: 0 | Status: SHIPPED | OUTPATIENT
Start: 2023-10-13

## 2023-10-13 NOTE — TELEPHONE ENCOUNTER
Last Appointment:  8/2/2023  Future Appointments  10/19/2023 3:20 PM    Kathie Marin, [address] [place] [address]         Neurology -  1/18/2024  3:00 PM    Charline Melo MD     [address] [place] [address]

## 2023-10-16 RX ORDER — DANTROLENE SODIUM 25 MG/1
CAPSULE ORAL
Qty: 120 CAPSULE | Refills: 0 | OUTPATIENT
Start: 2023-10-16

## 2023-10-16 RX ORDER — BACLOFEN 20 MG/1
TABLET ORAL
Qty: 120 TABLET | Refills: 0 | OUTPATIENT
Start: 2023-10-16

## 2023-10-16 RX ORDER — GABAPENTIN 300 MG/1
600 CAPSULE ORAL 3 TIMES DAILY
Qty: 180 CAPSULE | Refills: 0 | OUTPATIENT
Start: 2023-10-16 | End: 2023-11-15

## 2023-10-19 PROBLEM — K92.0 HEMATEMESIS WITH NAUSEA: Status: RESOLVED | Noted: 2022-12-15 | Resolved: 2023-10-19

## 2023-10-19 PROBLEM — F23 ACUTE PSYCHOSIS (HCC): Status: RESOLVED | Noted: 2023-03-18 | Resolved: 2023-10-19

## 2023-10-25 DIAGNOSIS — K21.9 GASTROESOPHAGEAL REFLUX DISEASE, UNSPECIFIED WHETHER ESOPHAGITIS PRESENT: ICD-10-CM

## 2023-10-25 NOTE — TELEPHONE ENCOUNTER
Last Appointment:  8/2/2023  Future Appointments  1/18/2024  3:00 PM    Nae Godwin MD     [address] [place] [address]

## 2023-11-09 ENCOUNTER — OFFICE VISIT (OUTPATIENT)
Dept: PRIMARY CARE CLINIC | Age: 33
End: 2023-11-09

## 2023-11-09 VITALS
HEART RATE: 102 BPM | OXYGEN SATURATION: 98 % | TEMPERATURE: 99 F | HEIGHT: 68 IN | BODY MASS INDEX: 40.92 KG/M2 | DIASTOLIC BLOOD PRESSURE: 86 MMHG | WEIGHT: 270 LBS | RESPIRATION RATE: 20 BRPM | SYSTOLIC BLOOD PRESSURE: 130 MMHG

## 2023-11-09 DIAGNOSIS — B96.89 ACUTE BACTERIAL SINUSITIS: Primary | ICD-10-CM

## 2023-11-09 DIAGNOSIS — R11.2 NAUSEA AND VOMITING, UNSPECIFIED VOMITING TYPE: ICD-10-CM

## 2023-11-09 DIAGNOSIS — J01.90 ACUTE BACTERIAL SINUSITIS: Primary | ICD-10-CM

## 2023-11-09 DIAGNOSIS — J02.9 SORE THROAT: ICD-10-CM

## 2023-11-09 LAB — S PYO AG THROAT QL: NORMAL

## 2023-11-09 RX ORDER — ONDANSETRON 4 MG/1
4 TABLET, ORALLY DISINTEGRATING ORAL 3 TIMES DAILY PRN
Qty: 21 TABLET | Refills: 0 | Status: SHIPPED | OUTPATIENT
Start: 2023-11-09

## 2023-11-09 RX ORDER — DOXYCYCLINE HYCLATE 100 MG/1
100 CAPSULE ORAL 2 TIMES DAILY
Qty: 20 CAPSULE | Refills: 0 | Status: SHIPPED | OUTPATIENT
Start: 2023-11-09 | End: 2023-11-19

## 2023-11-09 NOTE — PROGRESS NOTES
commands. Test Results Section   (All laboratory and radiology results have been personally reviewed by myself)  Labs:  Results for orders placed or performed in visit on 11/09/23   POCT rapid strep A   Result Value Ref Range    Strep A Ag None Detected None Detected     Assessment / Plan   Impression(s):  Onelia Cunningham was seen today for pharyngitis. Diagnoses and all orders for this visit:    Acute bacterial sinusitis  -     doxycycline hyclate (VIBRAMYCIN) 100 MG capsule; Take 1 capsule by mouth 2 times daily for 10 days    Nausea and vomiting, unspecified vomiting type  -     ondansetron (ZOFRAN-ODT) 4 MG disintegrating tablet; Place 1 tablet under the tongue 3 times daily as needed for Nausea or Vomiting    Sore throat  -     POCT rapid strep A    Script written for Doxycycline and Zofran-ODT, side effects discussed. Increase fluids and rest. Symptomatic relief discussed. F/u PCP in 5-7 days if symptoms persist. ED sooner if symptoms worsen or change. Red flag symptoms discussed. Patient verbalized understanding and is in agreement with this care plan. All questions answered. Return if symptoms worsen or fail to improve. Electronically signed by JOSSE Ellis CNP   DD: 11/9/23    **This report was transcribed using voice recognition software. Every effort was made to ensure accuracy; however, inadvertent computerized transcription errors may be present.

## 2023-11-10 DIAGNOSIS — K21.9 GASTROESOPHAGEAL REFLUX DISEASE, UNSPECIFIED WHETHER ESOPHAGITIS PRESENT: ICD-10-CM

## 2023-11-10 DIAGNOSIS — Z76.0 MEDICATION REFILL: ICD-10-CM

## 2023-11-10 RX ORDER — DICYCLOMINE HCL 20 MG
TABLET ORAL
Qty: 120 TABLET | Refills: 0 | OUTPATIENT
Start: 2023-11-10

## 2023-11-10 NOTE — TELEPHONE ENCOUNTER
Last Appointment:  8/2/2023  Future Appointments  11/21/2023 3:00 PM    Antoinette Suazo MD          [facility]  1/18/2024  3:00 PM    Mary Stewart MD     [facility]          [facility]

## 2023-12-09 DIAGNOSIS — E03.9 HYPOTHYROIDISM, UNSPECIFIED TYPE: ICD-10-CM

## 2023-12-09 DIAGNOSIS — Z76.0 MEDICATION REFILL: ICD-10-CM

## 2023-12-09 DIAGNOSIS — R42 DIZZINESS: ICD-10-CM
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
## 2023-12-09 DIAGNOSIS — K21.9 GASTROESOPHAGEAL REFLUX DISEASE, UNSPECIFIED WHETHER ESOPHAGITIS PRESENT: ICD-10-CM

## 2023-12-12 RX ORDER — DICYCLOMINE HCL 20 MG
TABLET ORAL
Qty: 120 TABLET | Refills: 0 | Status: SHIPPED | OUTPATIENT
Start: 2023-12-12

## 2023-12-12 RX ORDER — LEVOTHYROXINE SODIUM 0.07 MG/1
75 TABLET ORAL DAILY
Qty: 30 TABLET | Refills: 0 | Status: SHIPPED | OUTPATIENT
Start: 2023-12-12

## 2023-12-12 RX ORDER — CETIRIZINE HYDROCHLORIDE 10 MG/1
10 TABLET ORAL DAILY
Qty: 30 TABLET | Refills: 0 | Status: SHIPPED | OUTPATIENT
Start: 2023-12-12

## 2023-12-12 RX ORDER — MECLIZINE HYDROCHLORIDE 25 MG/1
TABLET ORAL
Qty: 30 TABLET | Refills: 0 | OUTPATIENT
Start: 2023-12-12

## 2023-12-12 NOTE — TELEPHONE ENCOUNTER
Last Appointment:  8/2/2023  Future Appointments  1/9/2024   2:40 PM    MD Dylan Melendrez [facility]  1/18/2024  3:00 PM    Arlet Soto MD     [address] [place] [address]
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
## 2024-01-08 DIAGNOSIS — Z76.0 MEDICATION REFILL: ICD-10-CM

## 2024-01-09 RX ORDER — ERGOCALCIFEROL 1.25 MG/1
CAPSULE ORAL
Qty: 24 CAPSULE | Refills: 0 | Status: SHIPPED | OUTPATIENT
Start: 2024-01-09

## 2024-01-09 NOTE — TELEPHONE ENCOUNTER
Last Appointment:  8/2/2023  Future Appointments  1/18/2024  3:00 PM    Mary Stewart MD     [facility]          [facility]  2/9/2024   2:40 PM    Antoinette Suazo MD          [facility]

## 2024-01-16 DIAGNOSIS — R42 DIZZINESS: ICD-10-CM

## 2024-01-16 RX ORDER — MECLIZINE HYDROCHLORIDE 25 MG/1
TABLET ORAL
Qty: 30 TABLET | Refills: 0 | OUTPATIENT
Start: 2024-01-16

## 2024-01-16 RX ORDER — IBUPROFEN 800 MG/1
TABLET ORAL
Qty: 60 TABLET | Refills: 0 | Status: SHIPPED | OUTPATIENT
Start: 2024-01-16

## 2024-01-16 NOTE — TELEPHONE ENCOUNTER
Last Appointment:  8/2/2023  Future Appointments   Date Time Provider Department Center   2/9/2024  2:40 PM Antoinette Suazo MD [facility]   3/21/2024  3:00 PM Mary Stewart MD [facility]

## 2024-01-29 DIAGNOSIS — Z76.0 MEDICATION REFILL: ICD-10-CM

## 2024-01-29 RX ORDER — DICYCLOMINE HCL 20 MG
TABLET ORAL
Qty: 120 TABLET | Refills: 0 | Status: SHIPPED | OUTPATIENT
Start: 2024-01-29

## 2024-01-29 NOTE — TELEPHONE ENCOUNTER
Last Appointment:  8/2/2023  Future Appointments   Date Time Provider Department Center   2/8/2024  3:00 PM Mary Stewart MD [facility]   2/9/2024  2:40 PM Antoinette Suazo MD [facility]

## 2024-02-08 ENCOUNTER — OFFICE VISIT (OUTPATIENT)
Dept: OBGYN | Age: 34
End: 2024-02-08
Payer: COMMERCIAL

## 2024-02-08 VITALS
HEART RATE: 94 BPM | SYSTOLIC BLOOD PRESSURE: 118 MMHG | WEIGHT: 277 LBS | BODY MASS INDEX: 41.98 KG/M2 | DIASTOLIC BLOOD PRESSURE: 70 MMHG | HEIGHT: 68 IN

## 2024-02-08 DIAGNOSIS — Z01.419 ENCOUNTER FOR WELL WOMAN EXAM: ICD-10-CM

## 2024-02-08 DIAGNOSIS — Z11.3 SCREENING FOR STD (SEXUALLY TRANSMITTED DISEASE): ICD-10-CM

## 2024-02-08 DIAGNOSIS — R30.0 DYSURIA: ICD-10-CM

## 2024-02-08 DIAGNOSIS — Z12.4 SCREENING FOR CERVICAL CANCER: Primary | ICD-10-CM

## 2024-02-08 PROCEDURE — G8484 FLU IMMUNIZE NO ADMIN: HCPCS | Performed by: OBSTETRICS & GYNECOLOGY

## 2024-02-08 PROCEDURE — 99395 PREV VISIT EST AGE 18-39: CPT | Performed by: OBSTETRICS & GYNECOLOGY

## 2024-02-08 RX ORDER — MEDROXYPROGESTERONE ACETATE 10 MG/1
10 TABLET ORAL DAILY
Qty: 30 TABLET | Refills: 11 | Status: SHIPPED | OUTPATIENT
Start: 2024-02-08

## 2024-02-08 RX ORDER — NITROFURANTOIN 25; 75 MG/1; MG/1
100 CAPSULE ORAL 2 TIMES DAILY
Qty: 14 CAPSULE | Refills: 0 | Status: SHIPPED
Start: 2024-02-08 | End: 2024-02-09 | Stop reason: ALTCHOICE

## 2024-02-08 NOTE — PROGRESS NOTES
Chad Peoples     Patient presents for annual exam.     Patient has back pain and pain with urination. Urine culture ordered. Will start macrobid.     Patient is still taking daily provera for cycle control and is still aware that it is not contraception.     Patient is requesting STD testing.     Past Medical History:   Diagnosis Date    ADHD     Arthritis     Asthma     controlled    Chronic midline low back pain without sciatica 11/13/2017    COPD (chronic obstructive pulmonary disease) (HCC)     Depression     GERD (gastroesophageal reflux disease)     Hypothyroidism     IBS (irritable bowel syndrome)     Migraines     Seizures ([facility])     last episode 2016        Past Surgical History:   Procedure Laterality Date    COLONOSCOPY      EYE SURGERY      probing of ductal system right eye     FRACTURE SURGERY      R ELBOW CRUSH INJURY W PLATE AND PINS PLACED    PAIN MANAGEMENT PROCEDURE N/A 8/17/2021    T12-L1 EPIDURAL STEROID INJECTION #1 performed by Tamara Torres DO at [facility] OR    PAIN MANAGEMENT PROCEDURE N/A 12/21/2021    LUMBAR EPIDURAL STEROID INJECTION T12-L1 performed by Tamara Torres DO at [facility] OR    PAIN MANAGEMENT PROCEDURE N/A 4/28/2022    T12-L1 EPIDURAL STEROID INJECTION performed by Tamara Torres DO at [facility] OR    NE COLONOSCOPY W/BIOPSY SINGLE/MULTIPLE  10/29/2018    COLONOSCOPY WITH BIOPSY performed by Bryson Campbell MD at [facility]COPY    NE TONSILLECTOMY PRIMARY/SECONDARY AGE 12/> N/A 5/4/2018    TONSILLECTOMY performed by Alexx Owens Jr., MD at [facility] OR    TONSILLECTOMY      UPPER GASTROINTESTINAL ENDOSCOPY N/A 10/29/2020    EGD BIOPSY performed by Grant Mejía MD at [facility] ENDOSCOPY        Family History   Problem Relation Age of Onset    Early Death Maternal Grandfather     Cancer Maternal Grandfather     Asthma Brother     Heart Disease Maternal Aunt     Cancer Maternal Aunt     Heart Disease Maternal Uncle     Cancer Maternal Uncle     High Cholesterol Father     Diabetes

## 2024-02-08 NOTE — PROGRESS NOTES
Patient alert and pleasant with concerns about pain with urination and lower back pain  Here today for annual GYN exam.  Pelvic exam, pap smear obtained, labeled  and delivered to lab.  Breast exam completed by doctor.   Discharge instructions have been discussed with the patient. Patient advised to call our office with any questions or concerns.   Voiced understanding.   Urine for culture obtained labeled and sent to lab   Health track RX specimen obtained, labeled and sent to lab via [employer] delivery.

## 2024-02-09 ENCOUNTER — OFFICE VISIT (OUTPATIENT)
Dept: FAMILY MEDICINE CLINIC | Age: 34
End: 2024-02-09
Payer: COMMERCIAL

## 2024-02-09 ENCOUNTER — TELEPHONE (OUTPATIENT)
Dept: OBGYN | Age: 34
End: 2024-02-09

## 2024-02-09 VITALS
TEMPERATURE: 98.1 F | HEART RATE: 85 BPM | SYSTOLIC BLOOD PRESSURE: 100 MMHG | RESPIRATION RATE: 16 BRPM | BODY MASS INDEX: 42.13 KG/M2 | DIASTOLIC BLOOD PRESSURE: 74 MMHG | OXYGEN SATURATION: 99 % | HEIGHT: 68 IN | WEIGHT: 278 LBS

## 2024-02-09 DIAGNOSIS — Z00.00 INITIAL MEDICARE ANNUAL WELLNESS VISIT: Primary | ICD-10-CM

## 2024-02-09 DIAGNOSIS — F32.A DEPRESSION, UNSPECIFIED DEPRESSION TYPE: ICD-10-CM

## 2024-02-09 DIAGNOSIS — J45.20 MILD INTERMITTENT ASTHMA WITHOUT COMPLICATION: ICD-10-CM

## 2024-02-09 DIAGNOSIS — F17.210 CIGARETTE NICOTINE DEPENDENCE WITHOUT COMPLICATION: ICD-10-CM

## 2024-02-09 DIAGNOSIS — K21.9 GASTROESOPHAGEAL REFLUX DISEASE, UNSPECIFIED WHETHER ESOPHAGITIS PRESENT: ICD-10-CM

## 2024-02-09 DIAGNOSIS — E03.9 HYPOTHYROIDISM, UNSPECIFIED TYPE: ICD-10-CM

## 2024-02-09 PROCEDURE — G8484 FLU IMMUNIZE NO ADMIN: HCPCS | Performed by: FAMILY MEDICINE

## 2024-02-09 PROCEDURE — G0438 PPPS, INITIAL VISIT: HCPCS | Performed by: STUDENT IN AN ORGANIZED HEALTH CARE EDUCATION/TRAINING PROGRAM

## 2024-02-09 RX ORDER — ESCITALOPRAM OXALATE 20 MG/1
20 TABLET ORAL DAILY
Qty: 30 TABLET | Refills: 0 | Status: SHIPPED | OUTPATIENT
Start: 2024-02-09

## 2024-02-09 RX ORDER — MONTELUKAST SODIUM 10 MG/1
10 TABLET ORAL NIGHTLY
Qty: 30 TABLET | Refills: 2 | Status: SHIPPED | OUTPATIENT
Start: 2024-02-09

## 2024-02-09 RX ORDER — LEVOTHYROXINE SODIUM 0.07 MG/1
75 TABLET ORAL DAILY
Qty: 30 TABLET | Refills: 2 | Status: SHIPPED | OUTPATIENT
Start: 2024-02-09

## 2024-02-09 NOTE — PROGRESS NOTES
Medicare Annual Wellness Visit    Chad Peoples is here for Medicare AWV and Medication Refill    Assessment & Plan   Initial Medicare annual wellness visit  Depression, unspecified depression type  -     escitalopram (LEXAPRO) 20 MG tablet; Take 1 tablet by mouth daily, Disp-30 tablet, R-0Normal  Hypothyroidism, unspecified type  -     levothyroxine (SYNTHROID) 75 MCG tablet; Take 1 tablet by mouth daily, Disp-30 tablet, R-2Normal  Cigarette nicotine dependence without complication  -     [facility] - Smoking Cessation Program ([place])  Mild intermittent asthma without complication  -     montelukast (SINGULAIR) 10 MG tablet; Take 1 tablet by mouth nightly, Disp-30 tablet, R-2Normal    Stop meclizine. Will discuss dizziness more during next visit.   Stop wellbutrin due  to seizure history and patient is already unsure if she is actually taking this.   Stop depakote as patient has only been taking intermittently and was initially prescribed this for migraines?  Would benefit from complex medication review with pharmacist, will schedule for next appt.       Recommendations for Preventive Services Due: see orders and patient instructions/AVS.  Recommended screening schedule for the next 5-10 years is provided to the patient in written form: see Patient Instructions/AVS.     Return for Medicare Annual Wellness Visit in 1 year.     Subjective     Does not have a fear of falling. Denies alcohol or drugs. Feels like general health is good. No difficulty with hearing or vision. No assistance with ADLs.     PHQ-9: 3 - on wellbutrin (patient unsure if she is taking this?) and lexapro for depression. States she was placed on depakote for headaches several years ago and has been taking intermittently. Does have a seizure history. Has not had one in years.     Patient's complete Health Risk Assessment and screening values have been reviewed and are found in Flowsheets. The following problems were reviewed today and where

## 2024-02-09 NOTE — PATIENT INSTRUCTIONS
neck, jaw, or upper belly or in one or both shoulders or arms.     Lightheadedness or sudden weakness.     A fast or irregular heartbeat.   After you call 911, the  may tell you to chew 1 adult-strength or 2 to 4 low-dose aspirin. Wait for an ambulance. Do not try to drive yourself.  Watch closely for changes in your health, and be sure to contact your doctor if you have any problems.  Where can you learn more?  Go to https://www.[employer].net/patientEd and enter F075 to learn more about \"A Healthy Heart: Care Instructions.\"  Current as of: June 25, 2023               Content Version: 13.9  © 0159-6318 [employer].   Care instructions adapted under license by [employer]. If you have questions about a medical condition or this instruction, always ask your healthcare professional. [employer] disclaims any warranty or liability for your use of this information.      Personalized Preventive Plan for Chad Peoples - 2/9/2024  Medicare offers a range of preventive health benefits. Some of the tests and screenings are paid in full while other may be subject to a deductible, co-insurance, and/or copay.    Some of these benefits include a comprehensive review of your medical history including lifestyle, illnesses that may run in your family, and various assessments and screenings as appropriate.    After reviewing your medical record and screening and assessments performed today your provider may have ordered immunizations, labs, imaging, and/or referrals for you.  A list of these orders (if applicable) as well as your Preventive Care list are included within your After Visit Summary for your review.    Other Preventive Recommendations:    A preventive eye exam performed by an eye specialist is recommended every 1-2 years to screen for glaucoma; cataracts, macular degeneration, and other eye disorders.  A preventive dental visit is recommended every 6 months.  Try to get at least 150

## 2024-02-12 LAB
CULTURE: NORMAL
SPECIMEN DESCRIPTION: NORMAL

## 2024-02-13 LAB — GYNECOLOGY CYTOLOGY REPORT: NORMAL

## 2024-02-13 RX ORDER — METRONIDAZOLE 500 MG/1
500 TABLET ORAL
Qty: 14 TABLET | Refills: 0 | Status: SHIPPED | OUTPATIENT
Start: 2024-02-13 | End: 2024-02-20

## 2024-02-13 NOTE — PROGRESS NOTES
S: 34 y.o. female presents today for: AWV     1) Depression - ? Took wellbutrin. +hx seizures. Unsure she was taking the medication.     2) Seizures - pt reports she was taking depakote for migraines and not seizures. Per Neuro note she reported she was on it for Mood. She was using only intermittently. Scant benefit.     O: VS: /74   Pulse 85   Temp 98.1 °F (36.7 °C) (Temporal)   Resp 16   Ht 1.727 m (5' 8\")   Wt 126.1 kg (278 lb)   SpO2 99%   BMI 42.27 kg/m²           Impression/Plan:   1) AWV - HM recs per Dr Suazo note  2) Trial Lexapro in place of Wellbutrin given seizure hx. OK to stop depakote since taking intermittent. Monitor for Sz activity. F/u with neurology recd.   3) Med Review with Pharmacist.     Attending Physician Statement  I have discussed the case, including pertinent history and exam findings with the resident.  I agree with the documented assessment and plan.      Ehsan Nelson MD

## 2024-02-15 DIAGNOSIS — Z76.0 MEDICATION REFILL: ICD-10-CM

## 2024-02-15 RX ORDER — DICYCLOMINE HCL 20 MG
TABLET ORAL
Qty: 120 TABLET | Refills: 0 | OUTPATIENT
Start: 2024-02-15

## 2024-02-15 NOTE — TELEPHONE ENCOUNTER
Last Appointment:  2/9/2024  Future Appointments   Date Time Provider Department Center   2/19/2024  3:00 PM SCHEDULE, YZ TOBACCO INTAKE [facility] TOB TRT [facility]

## 2024-02-19 ENCOUNTER — HOSPITAL ENCOUNTER (OUTPATIENT)
Dept: PSYCHIATRY | Age: 34
Discharge: HOME | End: 2024-02-19

## 2024-02-26 ENCOUNTER — HOSPITAL ENCOUNTER (OUTPATIENT)
Dept: PSYCHIATRY | Age: 34
Discharge: HOME | End: 2024-02-26

## 2024-02-26 PROCEDURE — 99412 PREVENTIVE COUNSELING GROUP: CPT

## 2024-02-26 NOTE — PROGRESS NOTES
[facility]   Progress Note - 5 Week Program     Client Name: Chad Peoples    Treatment Site:   [x][facility]      [] [facility]                      [place] Level:  7 ppm    Length of Service: 60 minutes       Session Type:  [x] In Person [] Virtually - Provider Location [x][facility]      [] [facility]                    Patient Location    []Patient's Home    [] Other:       Session Provided: [] Individual   [x]Group             Client/Staff Ratio: 2/                                Client’s target quit date:        Last date of tobacco use:     Client's actual quit date:       []  Client still smoking     Pharmacotherapy provided this session:  [x]  NRT: Patch  []21mg . / [x]14mg.  / []7mg.      [x]  NRT:  Gum [x]2mg. / []4mg.   x ( 1 )boxes  [x]  NRT: Lozenge [x]2mg.  / []4mg.  x ( 1 ) tubes      []  Varenicline []0.5 mg.  [] 1 mg.  Wks. (  )  []  Bupropion    Wks. (  )  []  Other                                                                Treatment Objectives addressed during the session:       [] Coping Skils [] Secondhand Smoke Risks   [] Relapse Prevention [] Promote Self Efficacy   [x] Addiction [] Enhance Self-Image   [] Stress Management [] Use Self Affirmation   [] Health and Wellness [x] Problem Solving Techniques   []  [] Conflict Resolution/Anger Management      Client’s Response to counseling:  [x] Active participant                        [] Passive listener        [] No behavior change, still participating                   [] Inappropriate:   [] Implemented other strategy/behavior changes:   [] Discussed Quit Plan that will be implemented  [] Re-set Quit Date:     Client’s Response to Pharmacotherapy:  [] Decreased cravings  [] Decrease in tobacco use:   [x] Maintaining abstinence  [] No change experienced by client  []        Risk/Benefit Meds education provided to client  []        Adverse reaction:   []       Other:     Plan of

## 2024-03-03 DIAGNOSIS — F32.A DEPRESSION, UNSPECIFIED DEPRESSION TYPE: ICD-10-CM

## 2024-03-04 ENCOUNTER — HOSPITAL ENCOUNTER (OUTPATIENT)
Dept: PSYCHIATRY | Age: 34
Discharge: HOME | End: 2024-03-04

## 2024-03-04 PROCEDURE — 99412 PREVENTIVE COUNSELING GROUP: CPT

## 2024-03-04 NOTE — PROGRESS NOTES
[facility]   Progress Note - 5 Week Program     Client Name: Chad Peoples    Treatment Site:   [x][facility]      [] [facility]                      [place] Level:  2 ppm    Length of Service: 60 minutes       Session Type:  [x] In Person [] Virtually - Provider Location [x][facility]      [] [facility]                    Patient Location    []Patient's Home    [] Other:       Session Provided: [] Individual   [x]Group             Client/Staff Ratio: 2/                                 Client’s target quit date:        Last date of tobacco use:     Client's actual quit date:       [] Client still smoking     Pharmacotherapy provided this session:  [x]  NRT: Patch  []21mg . / []14mg.  / [x]7mg.      [x]  NRT:  Gum [x]2mg. / []4mg.   x ( 1 )boxes  [x]  NRT: Lozenge [x]2mg.  / []4mg.  x ( 1 ) tubes      []  Varenicline []0.5 mg.  [] 1 mg.  Wks. (  )  []  Bupropion    Wks. (  )  []  Other                                                                Treatment Objectives addressed during the session:       [] Coping Skils [] Secondhand Smoke Risks   [] Relapse Prevention [] Promote Self Efficacy   [] Addiction [x] Enhance Self-Image   [x] Stress Management [] Use Self Affirmation   [] Health and Wellness [] Problem Solving Techniques   []  [] Conflict Resolution/Anger Management      Client’s Response to counseling:  [x] Active participant                        [] Passive listener        [] No behavior change, still participating                   [] Inappropriate:   [] Implemented other strategy/behavior changes:   [] Discussed Quit Plan that will be implemented  [] Re-set Quit Date:     Client’s Response to Pharmacotherapy:  [] Decreased cravings  [] Decrease in tobacco use:   [x] Maintaining abstinence  [] No change experienced by client  []        Risk/Benefit Meds education provided to client  []        Adverse reaction:   []       Other:     Plan of

## 2024-03-06 DIAGNOSIS — K21.9 GASTROESOPHAGEAL REFLUX DISEASE, UNSPECIFIED WHETHER ESOPHAGITIS PRESENT: ICD-10-CM

## 2024-03-07 RX ORDER — ESCITALOPRAM OXALATE 20 MG/1
20 TABLET ORAL DAILY
Qty: 30 TABLET | Refills: 0 | Status: SHIPPED | OUTPATIENT
Start: 2024-03-07

## 2024-03-07 NOTE — TELEPHONE ENCOUNTER
Last Appointment:  2/9/2024  Future Appointments   Date Time Provider Department Center   3/11/2024  2:00 PM SCHEDULE, SEYZ TOBACCO GROUP [facility] TOB TRT [facility]   3/18/2024  2:00 PM SCHEDULE, SEYZ TOBACCO GROUP [facility] TOB TRT [facility]   3/25/2024  2:00 PM SCHEDULE, SEYZ TOBACCO GROUP [facility] TOB TRT [facility]

## 2024-03-11 ENCOUNTER — TELEPHONE (OUTPATIENT)
Dept: FAMILY MEDICINE CLINIC | Age: 34
End: 2024-03-11

## 2024-03-11 ENCOUNTER — HOSPITAL ENCOUNTER (OUTPATIENT)
Dept: PSYCHIATRY | Age: 34
Discharge: HOME | End: 2024-03-11

## 2024-03-11 DIAGNOSIS — F17.210 CIGARETTE NICOTINE DEPENDENCE WITHOUT COMPLICATION: Primary | ICD-10-CM

## 2024-03-11 PROCEDURE — 99412 PREVENTIVE COUNSELING GROUP: CPT

## 2024-03-11 NOTE — TELEPHONE ENCOUNTER
----- Message from Jessica Ricks sent at 3/11/2024  3:13 PM EDT -----  Subject: Message to Provider    QUESTIONS  Information for Provider? Patient just came from smoke cessation class.   They told her to ask her provider if she could prescribe Nicorette gum and   lozenges 2 mg as well as the patches 7 mg Pharmacy is [facility]   [phone]  ---------------------------------------------------------------------------  --------------  CALL BACK INFO  [phone]; OK to leave message on voicemail  ---------------------------------------------------------------------------  --------------  SCRIPT ANSWERS  Relationship to Patient? Self

## 2024-03-11 NOTE — PROGRESS NOTES
Action:  [] Maintain abstinence  [x] Continue treatment;     [] Change pharmacotherapy:   [] Attend Nicotine Anonymous meeting   []        Assignments/Homework:   []        Triggers / Concerns to be addressed:   [] Graduated / received aftercare plan    Comments:   Topic: Health  Objective: Client will verbalize a minimum of 3 healthy changes related to tobacco cessation.  Notes: Client participated in a discussion about the way smoking negatively affects health.  Client was asked to share personal experiences on how smoking has changed how they feel and how they interact with the people around them.  Ctts provided education on some of the chemicals found in tobacco products and the cancers associated with tobacco use.  A video by the American Lung Association, comparing a healthy non-smoker's lung to the lung of a smoker was shown.  Client was able to see the differences between how the two lungs look as well as how they function and discuss their reactions.   Client stated the benefits to quitting smoking include improved circulation, lower risks for cancer and better quality of life. And set goals to improve overall health.          TAMRA: Signature, Date, Time: Electronically signed by Dian Taveras on 3/11/2024 at 3:18 PM          CO Level: 4

## 2024-03-12 DIAGNOSIS — Z76.0 MEDICATION REFILL: ICD-10-CM

## 2024-03-12 RX ORDER — POLYETHYLENE GLYCOL 3350 17 G
2 POWDER IN PACKET (EA) ORAL PRN
Qty: 30 EACH | Refills: 0 | Status: SHIPPED
Start: 2024-03-12 | End: 2024-03-26

## 2024-03-13 ENCOUNTER — TELEPHONE (OUTPATIENT)
Dept: FAMILY MEDICINE CLINIC | Age: 34
End: 2024-03-13

## 2024-03-13 DIAGNOSIS — F17.210 CIGARETTE NICOTINE DEPENDENCE WITHOUT COMPLICATION: Primary | ICD-10-CM

## 2024-03-13 RX ORDER — IBUPROFEN 800 MG/1
TABLET ORAL
Qty: 60 TABLET | Refills: 0 | Status: SHIPPED | OUTPATIENT
Start: 2024-03-13

## 2024-03-13 RX ORDER — ERGOCALCIFEROL 1.25 MG/1
CAPSULE ORAL
Qty: 24 CAPSULE | Refills: 0 | Status: SHIPPED | OUTPATIENT
Start: 2024-03-13

## 2024-03-13 RX ORDER — DICYCLOMINE HCL 20 MG
TABLET ORAL
Qty: 120 TABLET | Refills: 0 | Status: SHIPPED | OUTPATIENT
Start: 2024-03-13

## 2024-03-13 NOTE — TELEPHONE ENCOUNTER
Last Appointment:  2/9/2024  Future Appointments   Date Time Provider Department Center   3/18/2024  2:00 PM SCHEDULE, NICOLA TOBACCO GROUP SEYZ TOB TRT [facility]   3/25/2024  2:00 PM SCHEDULE, YZ TOBACCO GROUP SEYZ TOB TRT [facility]

## 2024-03-18 ENCOUNTER — HOSPITAL ENCOUNTER (OUTPATIENT)
Dept: PSYCHIATRY | Age: 34
Discharge: HOME | End: 2024-03-18

## 2024-03-18 PROCEDURE — 99412 PREVENTIVE COUNSELING GROUP: CPT

## 2024-03-18 NOTE — PROGRESS NOTES
[facility]   Progress Note - 5 Week Program     Client Name: Chad Peoples    Treatment Site:   [x][facility]      [] [facility]                      CO Level:  4 ppm    Length of Service: 60 minutes       Session Type:  [x] In Person [] Virtually - Provider Location [x][facility]      [] [facility]                    Patient Location    []Patient's Home    [] Other:       Session Provided: [] Individual   [x]Group             Client/Staff Ratio: 3/1                                Client’s target quit date:        Last date of tobacco use:     Client's actual quit date:       [] Client still smoking     Pharmacotherapy provided this session:  [x]  NRT: Patch  []21mg . / []14mg.  / [x]7mg.      [x]  NRT:  Gum [x]2mg. / []4mg.   x ( 1 )boxes  []  NRT: Lozenge []2mg.  / []4mg.  x (  ) tubes      []  Varenicline []0.5 mg.  [] 1 mg.  Wks. (  )  []  Bupropion    Wks. (  )  []  Other                                                                Treatment Objectives addressed during the session:       [x] Coping Skils [] Secondhand Smoke Risks   [] Relapse Prevention [x] Promote Self Efficacy   [] Addiction [] Enhance Self-Image   [] Stress Management [] Use Self Affirmation   [] Health and Wellness [] Problem Solving Techniques   []  [] Conflict Resolution/Anger Management      Client’s Response to counseling:  [x] Active participant                        [] Passive listener        [] No behavior change, still participating                   [] Inappropriate:   [] Implemented other strategy/behavior changes:   [] Discussed Quit Plan that will be implemented  [] Re-set Quit Date:     Client’s Response to Pharmacotherapy:  [] Decreased cravings  [] Decrease in tobacco use:   [x] Maintaining abstinence  [] No change experienced by client  []        Risk/Benefit Meds education provided to client  []        Adverse reaction:   []       Other:     Plan of

## 2024-03-20 DIAGNOSIS — F17.210 CIGARETTE NICOTINE DEPENDENCE WITHOUT COMPLICATION: ICD-10-CM

## 2024-03-20 NOTE — TELEPHONE ENCOUNTER
Pharmacy did not receive script for nicotine gum .  Patient breaking out from the nicotine  patches.  Can we please try to send the gum again.

## 2024-03-22 NOTE — TELEPHONE ENCOUNTER
Message left on patient's voice mail requesting return call, to discuss .  If she would like to try a different pharmacy.

## 2024-03-25 ENCOUNTER — HOSPITAL ENCOUNTER (OUTPATIENT)
Dept: PSYCHIATRY | Age: 34
Discharge: HOME | End: 2024-03-25

## 2024-03-25 PROCEDURE — 99412 PREVENTIVE COUNSELING GROUP: CPT

## 2024-03-25 NOTE — PROGRESS NOTES
[facility]   Progress Note - 5 Week Program     Client Name: Chad Peoples    Treatment Site:   [x][facility]      [] [facility]                      CO Level:  2 ppm    Length of Service: 60 minutes       Session Type:  [x] In Person [] Virtually - Provider Location [x][facility]      [] [facility]                    Patient Location    []Patient's Home    [] Other:       Session Provided: [] Individual   [x]Group             Client/Staff Ratio: 2/                                Client’s target quit date:        Last date of tobacco use:     Client's actual quit date:       [] Client still smoking     Pharmacotherapy provided this session:  []  NRT: Patch  []21mg . / []14mg.  / []7mg.      [x]  NRT:  Gum [x]2mg. / []4mg.   x ( 2 )boxes  [x]  NRT: Lozenge [x]2mg.  / []4mg.  x ( 2 ) tubes      []  Varenicline []0.5 mg.  [] 1 mg.  Wks. (  )  []  Bupropion    Wks. (  )  []  Other                                                                Treatment Objectives addressed during the session:       [] Coping Skils [] Secondhand Smoke Risks   [x] Relapse Prevention [] Promote Self Efficacy   [] Addiction [] Enhance Self-Image   [] Stress Management [] Use Self Affirmation   [] Health and Wellness [x] Problem Solving Techniques   []  [] Conflict Resolution/Anger Management      Client’s Response to counseling:  [x] Active participant                        [] Passive listener        [] No behavior change, still participating                   [] Inappropriate:   [] Implemented other strategy/behavior changes:   [] Discussed Quit Plan that will be implemented  [] Re-set Quit Date:     Client’s Response to Pharmacotherapy:  [] Decreased cravings  [] Decrease in tobacco use:   [x] Maintaining abstinence  [] No change experienced by client  []        Risk/Benefit Meds education provided to client  []        Adverse reaction:   []       Other:     Plan of

## 2024-03-25 NOTE — TELEPHONE ENCOUNTER
Patient called back would like to try [facility] on [address]  or [facility] on [address]

## 2024-03-26 DIAGNOSIS — F17.210 CIGARETTE NICOTINE DEPENDENCE WITHOUT COMPLICATION: ICD-10-CM

## 2024-03-27 DIAGNOSIS — F17.210 CIGARETTE NICOTINE DEPENDENCE WITHOUT COMPLICATION: ICD-10-CM

## 2024-03-27 DIAGNOSIS — J45.20 MILD INTERMITTENT ASTHMA WITHOUT COMPLICATION: ICD-10-CM

## 2024-03-27 DIAGNOSIS — K21.9 GASTROESOPHAGEAL REFLUX DISEASE, UNSPECIFIED WHETHER ESOPHAGITIS PRESENT: ICD-10-CM

## 2024-03-27 DIAGNOSIS — Z76.0 MEDICATION REFILL: ICD-10-CM

## 2024-03-27 DIAGNOSIS — M54.50 ACUTE BILATERAL LOW BACK PAIN WITHOUT SCIATICA: ICD-10-CM

## 2024-03-27 RX ORDER — POLYETHYLENE GLYCOL 3350 17 G
POWDER IN PACKET (EA) ORAL
Qty: 72 LOZENGE | OUTPATIENT
Start: 2024-03-27

## 2024-03-27 RX ORDER — DICYCLOMINE HCL 20 MG
TABLET ORAL
Qty: 120 TABLET | Refills: 0 | OUTPATIENT
Start: 2024-03-27

## 2024-03-27 RX ORDER — ALBUTEROL SULFATE 90 UG/1
AEROSOL, METERED RESPIRATORY (INHALATION)
Qty: 8.5 G | Refills: 0 | Status: SHIPPED | OUTPATIENT
Start: 2024-03-27

## 2024-04-03 DIAGNOSIS — F17.210 CIGARETTE NICOTINE DEPENDENCE WITHOUT COMPLICATION: ICD-10-CM

## 2024-04-03 DIAGNOSIS — K21.9 GASTROESOPHAGEAL REFLUX DISEASE, UNSPECIFIED WHETHER ESOPHAGITIS PRESENT: ICD-10-CM

## 2024-04-03 DIAGNOSIS — Z76.0 MEDICATION REFILL: ICD-10-CM

## 2024-04-03 RX ORDER — POLYETHYLENE GLYCOL 3350 17 G
POWDER IN PACKET (EA) ORAL
Qty: 72 LOZENGE | OUTPATIENT
Start: 2024-04-03

## 2024-04-03 RX ORDER — DICYCLOMINE HCL 20 MG
TABLET ORAL
Qty: 120 TABLET | Refills: 0 | OUTPATIENT
Start: 2024-04-03

## 2024-04-05 DIAGNOSIS — F17.210 CIGARETTE NICOTINE DEPENDENCE WITHOUT COMPLICATION: ICD-10-CM

## 2024-04-07 DIAGNOSIS — Z76.0 MEDICATION REFILL: ICD-10-CM

## 2024-04-08 RX ORDER — DICYCLOMINE HCL 20 MG
TABLET ORAL
Qty: 120 TABLET | Refills: 0 | OUTPATIENT
Start: 2024-04-08

## 2024-04-11 DIAGNOSIS — Z76.0 MEDICATION REFILL: ICD-10-CM

## 2024-04-11 DIAGNOSIS — K21.9 GASTROESOPHAGEAL REFLUX DISEASE, UNSPECIFIED WHETHER ESOPHAGITIS PRESENT: ICD-10-CM

## 2024-04-11 DIAGNOSIS — F17.210 CIGARETTE NICOTINE DEPENDENCE WITHOUT COMPLICATION: ICD-10-CM

## 2024-04-15 RX ORDER — DICYCLOMINE HCL 20 MG
TABLET ORAL
Qty: 120 TABLET | Refills: 1 | Status: SHIPPED
Start: 2024-04-15 | End: 2024-05-16

## 2024-04-15 RX ORDER — POLYETHYLENE GLYCOL 3350 17 G
2 POWDER IN PACKET (EA) ORAL PRN
Qty: 360 LOZENGE | Refills: 5 | Status: SHIPPED | OUTPATIENT
Start: 2024-04-15

## 2024-04-17 DIAGNOSIS — F32.A DEPRESSION, UNSPECIFIED DEPRESSION TYPE: ICD-10-CM

## 2024-04-17 DIAGNOSIS — K21.9 GASTROESOPHAGEAL REFLUX DISEASE, UNSPECIFIED WHETHER ESOPHAGITIS PRESENT: ICD-10-CM

## 2024-04-18 RX ORDER — ESCITALOPRAM OXALATE 20 MG/1
20 TABLET ORAL DAILY
Qty: 30 TABLET | Refills: 0 | Status: SHIPPED | OUTPATIENT
Start: 2024-04-18

## 2024-04-24 RX ORDER — IBUPROFEN 800 MG/1
TABLET ORAL
Qty: 60 TABLET | Refills: 0 | Status: SHIPPED | OUTPATIENT
Start: 2024-04-24

## 2024-04-25 DIAGNOSIS — J45.20 MILD INTERMITTENT ASTHMA WITHOUT COMPLICATION: ICD-10-CM

## 2024-04-25 RX ORDER — MONTELUKAST SODIUM 10 MG/1
10 TABLET ORAL NIGHTLY
Qty: 30 TABLET | Refills: 2 | Status: SHIPPED | OUTPATIENT
Start: 2024-04-25

## 2024-04-28 DIAGNOSIS — Z76.0 MEDICATION REFILL: ICD-10-CM

## 2024-04-29 RX ORDER — ERGOCALCIFEROL 1.25 MG/1
CAPSULE ORAL
Qty: 24 CAPSULE | Refills: 2 | Status: SHIPPED | OUTPATIENT
Start: 2024-04-29

## 2024-05-07 DIAGNOSIS — F32.A DEPRESSION, UNSPECIFIED DEPRESSION TYPE: ICD-10-CM

## 2024-05-08 RX ORDER — ESCITALOPRAM OXALATE 20 MG/1
20 TABLET ORAL DAILY
Qty: 30 TABLET | OUTPATIENT
Start: 2024-05-08

## 2024-05-14 DIAGNOSIS — F32.A DEPRESSION, UNSPECIFIED DEPRESSION TYPE: ICD-10-CM

## 2024-05-15 DIAGNOSIS — Z76.0 MEDICATION REFILL: ICD-10-CM

## 2024-05-16 RX ORDER — DICYCLOMINE HCL 20 MG
TABLET ORAL
Qty: 120 TABLET | Refills: 1 | Status: SHIPPED | OUTPATIENT
Start: 2024-05-16

## 2024-05-16 RX ORDER — ESCITALOPRAM OXALATE 20 MG/1
20 TABLET ORAL DAILY
Qty: 30 TABLET | Refills: 0 | Status: SHIPPED | OUTPATIENT
Start: 2024-05-16

## 2024-06-09 DIAGNOSIS — K21.9 GASTROESOPHAGEAL REFLUX DISEASE, UNSPECIFIED WHETHER ESOPHAGITIS PRESENT: ICD-10-CM

## 2024-06-11 DIAGNOSIS — F32.A DEPRESSION, UNSPECIFIED DEPRESSION TYPE: ICD-10-CM

## 2024-06-11 RX ORDER — ESCITALOPRAM OXALATE 20 MG/1
20 TABLET ORAL DAILY
Qty: 30 TABLET | Refills: 0 | OUTPATIENT
Start: 2024-06-11

## 2024-06-24 ENCOUNTER — NURSE ONLY (OUTPATIENT)
Dept: FAMILY MEDICINE CLINIC | Age: 34
End: 2024-06-24
Payer: COMMERCIAL

## 2024-06-24 DIAGNOSIS — E66.9 OBESITY, UNSPECIFIED CLASSIFICATION, UNSPECIFIED OBESITY TYPE, UNSPECIFIED WHETHER SERIOUS COMORBIDITY PRESENT: Primary | ICD-10-CM

## 2024-06-24 LAB
BUN SERPL-MCNC: 7 MG/DL (ref 6–20)
CREAT SERPL-MCNC: 0.8 MG/DL (ref 0.5–1)
GFR, ESTIMATED: >90 ML/MIN/1.73M2

## 2024-06-24 PROCEDURE — 36415 COLL VENOUS BLD VENIPUNCTURE: CPT | Performed by: FAMILY MEDICINE

## 2024-07-08 ENCOUNTER — HOSPITAL ENCOUNTER (OUTPATIENT)
Dept: CT IMAGING | Age: 34
Discharge: HOME | End: 2024-07-10
Payer: COMMERCIAL

## 2024-07-08 DIAGNOSIS — R31.0 GROSS HEMATURIA: ICD-10-CM

## 2024-07-08 PROCEDURE — 76376 3D RENDER W/INTRP POSTPROCES: CPT

## 2024-07-08 PROCEDURE — 74178 CT ABD&PLV WO CNTR FLWD CNTR: CPT | Performed by: NURSE PRACTITIONER

## 2024-07-08 PROCEDURE — 6360000004 HC RX CONTRAST MEDICATION: Performed by: RADIOLOGY

## 2024-07-08 RX ADMIN — IOPAMIDOL 90 ML: 755 INJECTION, SOLUTION INTRAVENOUS at 08:21

## 2024-07-18 ENCOUNTER — OFFICE VISIT (OUTPATIENT)
Dept: FAMILY MEDICINE CLINIC | Age: 34
End: 2024-07-18
Payer: COMMERCIAL

## 2024-07-18 VITALS
WEIGHT: 278 LBS | HEART RATE: 77 BPM | DIASTOLIC BLOOD PRESSURE: 59 MMHG | HEIGHT: 68 IN | BODY MASS INDEX: 42.13 KG/M2 | TEMPERATURE: 97.2 F | RESPIRATION RATE: 18 BRPM | OXYGEN SATURATION: 98 % | SYSTOLIC BLOOD PRESSURE: 105 MMHG

## 2024-07-18 DIAGNOSIS — H60.503 ACUTE OTITIS EXTERNA OF BOTH EARS, UNSPECIFIED TYPE: Primary | ICD-10-CM

## 2024-07-18 DIAGNOSIS — R21 SKIN RASH: ICD-10-CM

## 2024-07-18 DIAGNOSIS — G43.809 OTHER MIGRAINE WITHOUT STATUS MIGRAINOSUS, NOT INTRACTABLE: ICD-10-CM

## 2024-07-18 DIAGNOSIS — E07.9 THYROID DISEASE: ICD-10-CM

## 2024-07-18 LAB — TSH SERPL DL<=0.05 MIU/L-ACNC: 4.43 UIU/ML (ref 0.27–4.2)

## 2024-07-18 PROCEDURE — 99214 OFFICE O/P EST MOD 30 MIN: CPT

## 2024-07-18 RX ORDER — TAMSULOSIN HYDROCHLORIDE 0.4 MG/1
0.4 CAPSULE ORAL DAILY
COMMUNITY
Start: 2024-06-20

## 2024-07-18 NOTE — PROGRESS NOTES
Attending Physician Statement    S:   Chief Complaint   Patient presents with    Ear Drainage    Tinnitus    Eye Pain     burning    OTHER     White patches on arms and legs when in the sun painful  Patient needs refills on all her medications       PMH - depression, asthma, migraines, hypothyroidism   C/O ear pain, drainage and ringing bilaterally.  Also having migraine (has daily migraine)  Has photophobia and phonophobia.  Scars on hands worse in sun   Most recent TSH elevated slightly.  Does have some fatigue, dizziness, and nausea   Takes flomax for urinary retention  O: Blood pressure (!) 105/59, pulse 77, temperature 97.2 °F (36.2 °C), temperature source Temporal, resp. rate 18, height 1.727 m (5' 8\"), weight 126.1 kg (278 lb), SpO2 98 %, not currently breastfeeding.   Exam:   Heart - RRR   Lungs - clear   ENT- TM's clear.  Ear canals inflamed   Normal strength, ?decreased sensation left neck   Hypopigmented macules on forearm  A: Otitis externa  P:  Discussed timing of synthroid   TSH   Derm referral   Encouraged to f/u with psychiatrist   Follow-up as ordered    I have discussed the case, including pertinent history and exam findings with the resident.  I also have personally seen and examined the patient.  I agree with the documented assessment and plan.    Raffy King MD

## 2024-07-18 NOTE — PROGRESS NOTES
[facility] [place]  FAMILY MEDICINE RESIDENCY PROGRAM  DATE OF VISIT : 2024    Patient : Chad Peoples   Age : 34 y.o.    : 1990   MRN : [id number]   ______________________________________________________________________    Chief Complaint:   Chief Complaint   Patient presents with    Ear Drainage    Tinnitus    Eye Pain     burning    OTHER     White patches on arms and legs when in the sun painful  Patient needs refills on all her medications        HPI:   History obtained from the patient.   Chad Peoples is a 34 y.o. female who has a past medical history of ADHD, Arthritis, Asthma, Chronic midline low back pain without sciatica, COPD, Depression, GERD, Hypothyroidism, IBS, Migraines, and Seizures  presents to the clinic for ear drainage and migraine.    Ear drainage: Today, she complains of ear drainage, ear pain and tinnitus that started one week ago. Present bilaterally. Drainage is yellow. Denies URI prodrome. Denies trauma.  Denies fever or chills.     Migraine: She states that she has chronic migraine. This occurs daily. Pain is throbbing and aching. Pain occurs bilaterally but worse in the front of her head and around eyes. Acknowledges photosensitivity and phonosensitivity which headache. She uses tylenol and advil and are not helpful. She takes advil once daily. Has tried cold rags.    Thyroid disease: TSH in April was elevated at 6.1. She is on synthroid which she takes at night with the rest of her medications. She noticed increased fatigue for the past month, dizziness and nausea, diaphoresis. Denies, changes in urine, palpitation, chest pain. She does not smoking, drink alcohol or use recreational drugs.     Depression: She is on lexapro for depression. She is doing well. She is eating and sleeping well. She follows with psych care for depression but has not seen psychiatrist in a year.     Skin rash: Complains of chronic skin lesions on her right forearm that worsened with sun

## 2024-07-23 ENCOUNTER — TELEPHONE (OUTPATIENT)
Dept: FAMILY MEDICINE CLINIC | Age: 34
End: 2024-07-23

## 2024-07-23 NOTE — TELEPHONE ENCOUNTER
[facility] pharmacy called in and they are asking if they are able to switch the cortispoirin solution to suspension as the solution is on back order. Please send in new order to pharmacy.    Please advise    Thank you

## 2024-07-24 RX ORDER — NEOMYCIN SULFATE, POLYMYXIN B SULFATE AND HYDROCORTISONE 10; 3.5; 1 MG/ML; MG/ML; [USP'U]/ML
3 SUSPENSION/ DROPS AURICULAR (OTIC) 4 TIMES DAILY
Qty: 10 ML | Refills: 0 | Status: SHIPPED | OUTPATIENT
Start: 2024-07-24

## 2024-07-28 DIAGNOSIS — F32.A DEPRESSION, UNSPECIFIED DEPRESSION TYPE: ICD-10-CM

## 2024-07-28 DIAGNOSIS — Z76.0 MEDICATION REFILL: ICD-10-CM

## 2024-07-29 ENCOUNTER — TELEPHONE (OUTPATIENT)
Dept: FAMILY MEDICINE CLINIC | Age: 34
End: 2024-07-29

## 2024-07-29 NOTE — TELEPHONE ENCOUNTER
Last Appointment:  Visit date not found  Future Appointments   Date Time Provider Department Center   8/9/2024  3:20 PM Maria Isabel Toscano MD [facility]

## 2024-07-29 NOTE — TELEPHONE ENCOUNTER
Pt needs new referral to dermatologist.  Current dermatologist wont accept pt due to missed appt.  Pt missed appt due to transportation issues.

## 2024-07-30 DIAGNOSIS — L98.8 SKIN MACULE: Primary | ICD-10-CM

## 2024-07-30 RX ORDER — ESCITALOPRAM OXALATE 20 MG/1
20 TABLET ORAL DAILY
Qty: 30 TABLET | Refills: 0 | Status: SHIPPED | OUTPATIENT
Start: 2024-07-30

## 2024-07-30 RX ORDER — DICYCLOMINE HCL 20 MG
TABLET ORAL
Qty: 120 TABLET | Refills: 1 | Status: SHIPPED | OUTPATIENT
Start: 2024-07-30

## 2024-08-06 NOTE — TELEPHONE ENCOUNTER
Patient of Dr. Stewart called and left a message on the voicemail, asking for a refill of Medroxyprogesterone. Pharmacy on file.

## 2024-08-08 RX ORDER — MEDROXYPROGESTERONE ACETATE 10 MG/1
10 TABLET ORAL DAILY
Qty: 30 TABLET | Refills: 11 | Status: SHIPPED | OUTPATIENT
Start: 2024-08-08

## 2024-08-09 ENCOUNTER — OFFICE VISIT (OUTPATIENT)
Dept: FAMILY MEDICINE CLINIC | Age: 34
End: 2024-08-09
Payer: COMMERCIAL

## 2024-08-09 VITALS
HEART RATE: 88 BPM | TEMPERATURE: 97.9 F | SYSTOLIC BLOOD PRESSURE: 86 MMHG | BODY MASS INDEX: 41.98 KG/M2 | WEIGHT: 277 LBS | OXYGEN SATURATION: 95 % | RESPIRATION RATE: 18 BRPM | DIASTOLIC BLOOD PRESSURE: 58 MMHG | HEIGHT: 68 IN

## 2024-08-09 DIAGNOSIS — G43.809 OTHER MIGRAINE WITHOUT STATUS MIGRAINOSUS, NOT INTRACTABLE: ICD-10-CM

## 2024-08-09 DIAGNOSIS — R30.0 DYSURIA: ICD-10-CM

## 2024-08-09 DIAGNOSIS — Z00.00 HEALTH CARE MAINTENANCE: ICD-10-CM

## 2024-08-09 DIAGNOSIS — R42 DIZZINESS: ICD-10-CM

## 2024-08-09 DIAGNOSIS — R35.0 FREQUENCY OF URINATION: Primary | ICD-10-CM

## 2024-08-09 LAB
BASOPHILS ABSOLUTE: 0.03 K/UL (ref 0–0.2)
BASOPHILS RELATIVE PERCENT: 0 % (ref 0–2)
BILIRUBIN, POC: NORMAL
BILIRUBIN, URINE: NEGATIVE
BLOOD URINE, POC: NORMAL
CLARITY, POC: CLEAR
COLOR, POC: YELLOW
COLOR, UA: YELLOW
EOSINOPHILS ABSOLUTE: 0.04 K/UL (ref 0.05–0.5)
EOSINOPHILS RELATIVE PERCENT: 1 % (ref 0–6)
EPITHELIAL CELLS, UA: ABNORMAL /HPF
GLUCOSE URINE, POC: NORMAL
GLUCOSE URINE: NEGATIVE MG/DL
HCT VFR BLD CALC: 46.7 % (ref 34–48)
HEMOGLOBIN: 14.9 G/DL (ref 11.5–15.5)
IMMATURE GRANULOCYTES %: 1 % (ref 0–5)
IMMATURE GRANULOCYTES ABSOLUTE: 0.04 K/UL (ref 0–0.58)
KETONES, POC: NORMAL
KETONES, URINE: NEGATIVE MG/DL
LEUKOCYTE EST, POC: NORMAL
LEUKOCYTE ESTERASE, URINE: ABNORMAL
LYMPHOCYTES ABSOLUTE: 2.59 K/UL (ref 1.5–4)
LYMPHOCYTES RELATIVE PERCENT: 33 % (ref 20–42)
MCH RBC QN AUTO: 29.3 PG (ref 26–35)
MCHC RBC AUTO-ENTMCNC: 31.9 G/DL (ref 32–34.5)
MCV RBC AUTO: 91.9 FL (ref 80–99.9)
MONOCYTES ABSOLUTE: 0.65 K/UL (ref 0.1–0.95)
MONOCYTES RELATIVE PERCENT: 8 % (ref 2–12)
NEUTROPHILS ABSOLUTE: 4.61 K/UL (ref 1.8–7.3)
NEUTROPHILS RELATIVE PERCENT: 58 % (ref 43–80)
NITRITE, POC: NORMAL
NITRITE, URINE: NEGATIVE
PDW BLD-RTO: 13.9 % (ref 11.5–15)
PH, POC: 5.5
PH, URINE: 5.5 (ref 5–9)
PLATELET, FLUORESCENCE: 182 K/UL (ref 130–450)
PMV BLD AUTO: 13.3 FL (ref 7–12)
PROTEIN UA: NEGATIVE MG/DL
PROTEIN, POC: NORMAL
RBC # BLD: 5.08 M/UL (ref 3.5–5.5)
RBC UA: ABNORMAL /HPF
SPECIFIC GRAVITY UA: 1.02 (ref 1–1.03)
SPECIFIC GRAVITY, POC: 1.02
TURBIDITY: CLEAR
URINE HGB: NEGATIVE
UROBILINOGEN, POC: 0.2
UROBILINOGEN, URINE: 0.2 EU/DL (ref 0–1)
WBC # BLD: 8 K/UL (ref 4.5–11.5)
WBC UA: ABNORMAL /HPF

## 2024-08-09 PROCEDURE — 99213 OFFICE O/P EST LOW 20 MIN: CPT

## 2024-08-09 PROCEDURE — G2211 COMPLEX E/M VISIT ADD ON: HCPCS

## 2024-08-09 PROCEDURE — 81002 URINALYSIS NONAUTO W/O SCOPE: CPT

## 2024-08-09 RX ORDER — TRIAMCINOLONE ACETONIDE 1 MG/G
CREAM TOPICAL
COMMUNITY
Start: 2024-08-07

## 2024-08-09 SDOH — ECONOMIC STABILITY: FOOD INSECURITY: WITHIN THE PAST 12 MONTHS, YOU WORRIED THAT YOUR FOOD WOULD RUN OUT BEFORE YOU GOT MONEY TO BUY MORE.: NEVER TRUE

## 2024-08-09 SDOH — ECONOMIC STABILITY: FOOD INSECURITY: WITHIN THE PAST 12 MONTHS, THE FOOD YOU BOUGHT JUST DIDN'T LAST AND YOU DIDN'T HAVE MONEY TO GET MORE.: NEVER TRUE

## 2024-08-09 SDOH — ECONOMIC STABILITY: INCOME INSECURITY: HOW HARD IS IT FOR YOU TO PAY FOR THE VERY BASICS LIKE FOOD, HOUSING, MEDICAL CARE, AND HEATING?: NOT HARD AT ALL

## 2024-08-09 NOTE — PROGRESS NOTES
S: 34 y.o. female here for follow up for multiple health concerns.   Migraines-magnesium helped   OE-resolved after cortisporin  Dysuria and suprapubic pain, not sexually active  Dizziness and lightheadedness for 1 year-drinking a lot of water, occurs daily, worse with standing  Denies alcohol or drugs    O: VS: BP 95/71 (Site: Left Upper Arm, Position: Sitting, Cuff Size: Large Adult)   Pulse 85   Temp 97.9 °F (36.6 °C) (Temporal)   Resp 18   Ht 1.727 m (5' 8\")   Wt 125.6 kg (277 lb)   SpO2 95%   BMI 42.12 kg/m²    General: NAD   CV:  RRR, no gallops, rubs, or murmurs   Resp: CTAB no R/R/W   Abd:  Soft, nontender, no masses    Ext:  no C/C/E  Impression/Plan:   1. Migraines-improved with magnesium.   2. Dysuria-ua and cx. Ua resolved  3. Lightheadness-check orthostatics, had 19 mg drop with standing down to 86, increase fluid intake to 64 oz per day, discuss neuro exam in follow up    Rto in 4 weeks    Attending Physician Statement  I have discussed the case, including pertinent history and exam findings with the resident.  I agree with the documented assessment and plan.        Brianna Ricks MD        
1 tablet by mouth daily 30 tablet 2    cetirizine (ZYRTEC) 10 MG tablet take 1 tablet by mouth once daily 30 tablet 0    chlorhexidine (PERIDEX) 0.12 % solution Swish and spit 15 mLs 2 times daily      budesonide-formoterol (SYMBICORT) 80-4.5 MCG/ACT AERO inhale 2 puffs by mouth and INTO THE LUNGS once daily 10.2 g 1     No current facility-administered medications for this visit.        Review of Systems:  Review of Systems   Constitutional: Negative.    Respiratory: Negative.     Cardiovascular: Negative.    Gastrointestinal:  Positive for abdominal pain.   Genitourinary:  Positive for dysuria.   Skin:  Positive for color change and rash.   Neurological:  Positive for dizziness, light-headedness and headaches.     ______________________________________________________________________    Physical Exam:    Vitals: BP (!) 86/58 (Site: Left Upper Arm, Position: Standing, Cuff Size: Large Adult)   Pulse 88   Temp 97.9 °F (36.6 °C) (Temporal)   Resp 18   Ht 1.727 m (5' 8\")   Wt 125.6 kg (277 lb)   SpO2 95%   BMI 42.12 kg/m²   Physical Exam  HENT:      Mouth/Throat:      Mouth: Mucous membranes are moist.   Eyes:      Extraocular Movements: Extraocular movements intact.      Pupils: Pupils are equal, round, and reactive to light.   Cardiovascular:      Rate and Rhythm: Normal rate and regular rhythm.      Pulses: Normal pulses.      Heart sounds: Normal heart sounds.   Pulmonary:      Effort: Pulmonary effort is normal.      Breath sounds: Normal breath sounds.   Abdominal:      General: Bowel sounds are normal.   Musculoskeletal:         General: Normal range of motion.   Skin:     Findings: Lesion present.      Comments: 4-6 Hypopigmented macules on right forearm    Neurological:      Mental Status: She is alert and oriented to person, place, and time.   Psychiatric:      Comments: Flat affect        ______________________________________________________________________    Assessment & Plan:  1. Dizziness  -Rule

## 2024-08-11 LAB
CULTURE: NORMAL
CULTURE: NORMAL
SPECIMEN DESCRIPTION: NORMAL

## 2024-08-12 LAB — HEPATITIS C ANTIBODY: NONREACTIVE

## 2024-08-12 ASSESSMENT — ENCOUNTER SYMPTOMS
COLOR CHANGE: 1
ABDOMINAL PAIN: 1
RESPIRATORY NEGATIVE: 1

## 2024-08-13 ENCOUNTER — TELEPHONE (OUTPATIENT)
Dept: FAMILY MEDICINE CLINIC | Age: 34
End: 2024-08-13

## 2024-08-13 DIAGNOSIS — E03.9 HYPOTHYROIDISM, UNSPECIFIED TYPE: ICD-10-CM

## 2024-08-13 DIAGNOSIS — J45.20 MILD INTERMITTENT ASTHMA WITHOUT COMPLICATION: ICD-10-CM

## 2024-08-13 RX ORDER — LEVOTHYROXINE SODIUM 0.07 MG/1
75 TABLET ORAL DAILY
Qty: 30 TABLET | Refills: 2 | Status: SHIPPED | OUTPATIENT
Start: 2024-08-13

## 2024-08-13 RX ORDER — MONTELUKAST SODIUM 10 MG/1
10 TABLET ORAL NIGHTLY
Qty: 30 TABLET | Refills: 2 | Status: SHIPPED | OUTPATIENT
Start: 2024-08-13

## 2024-08-13 NOTE — TELEPHONE ENCOUNTER
Patient called in and Spoke with Everardo   who  reviewed  labs from 8/9/24 with the patient who told everardo that she was unable to see them on her my chart,

## 2024-08-19 DIAGNOSIS — M54.50 ACUTE BILATERAL LOW BACK PAIN WITHOUT SCIATICA: ICD-10-CM

## 2024-08-19 DIAGNOSIS — K21.9 GASTROESOPHAGEAL REFLUX DISEASE, UNSPECIFIED WHETHER ESOPHAGITIS PRESENT: ICD-10-CM

## 2024-08-19 DIAGNOSIS — J45.20 MILD INTERMITTENT ASTHMA WITHOUT COMPLICATION: ICD-10-CM

## 2024-08-19 DIAGNOSIS — F32.A DEPRESSION, UNSPECIFIED DEPRESSION TYPE: ICD-10-CM
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
## 2024-08-19 DIAGNOSIS — Z76.0 MEDICATION REFILL: ICD-10-CM

## 2024-08-19 RX ORDER — DICYCLOMINE HCL 20 MG
TABLET ORAL
Qty: 120 TABLET | Refills: 1 | Status: CANCELLED | OUTPATIENT
Start: 2024-08-19

## 2024-08-19 RX ORDER — CHLORHEXIDINE GLUCONATE ORAL RINSE 1.2 MG/ML
15 SOLUTION DENTAL 2 TIMES DAILY
OUTPATIENT
Start: 2024-08-19

## 2024-08-19 RX ORDER — IBUPROFEN 800 MG/1
800 TABLET ORAL 2 TIMES DAILY
Qty: 60 TABLET | Refills: 0 | OUTPATIENT
Start: 2024-08-19

## 2024-08-19 RX ORDER — BUDESONIDE AND FORMOTEROL FUMARATE DIHYDRATE 80; 4.5 UG/1; UG/1
AEROSOL RESPIRATORY (INHALATION)
Qty: 10.2 G | Refills: 1 | Status: SHIPPED | OUTPATIENT
Start: 2024-08-19

## 2024-08-19 RX ORDER — CETIRIZINE HYDROCHLORIDE 10 MG/1
10 TABLET ORAL DAILY
Qty: 30 TABLET | Refills: 0 | Status: SHIPPED | OUTPATIENT
Start: 2024-08-19

## 2024-08-19 RX ORDER — ERGOCALCIFEROL 1.25 MG/1
CAPSULE ORAL
Qty: 24 CAPSULE | Refills: 2 | OUTPATIENT
Start: 2024-08-19

## 2024-08-19 RX ORDER — ESCITALOPRAM OXALATE 20 MG/1
20 TABLET ORAL DAILY
Qty: 30 TABLET | Refills: 0 | Status: SHIPPED | OUTPATIENT
Start: 2024-08-19

## 2024-08-19 RX ORDER — ALBUTEROL SULFATE 90 UG/1
2 AEROSOL, METERED RESPIRATORY (INHALATION) EVERY 6 HOURS PRN
Qty: 8.5 G | Refills: 0 | Status: SHIPPED | OUTPATIENT
Start: 2024-08-19

## 2024-08-19 NOTE — TELEPHONE ENCOUNTER
Last Appointment:  8/9/2024  Future Appointments   Date Time Provider Department Center   9/16/2024  3:20 PM Maria Isabel Toscano MD [place] [place] [facility] DEP

## 2024-09-14 DIAGNOSIS — K21.9 GASTROESOPHAGEAL REFLUX DISEASE, UNSPECIFIED WHETHER ESOPHAGITIS PRESENT: ICD-10-CM

## 2024-09-14 DIAGNOSIS — Z76.0 MEDICATION REFILL: ICD-10-CM

## 2024-09-16 ENCOUNTER — OFFICE VISIT (OUTPATIENT)
Dept: FAMILY MEDICINE CLINIC | Age: 34
End: 2024-09-16

## 2024-09-16 VITALS
OXYGEN SATURATION: 95 % | SYSTOLIC BLOOD PRESSURE: 120 MMHG | BODY MASS INDEX: 43.04 KG/M2 | TEMPERATURE: 97.3 F | RESPIRATION RATE: 19 BRPM | WEIGHT: 284 LBS | HEIGHT: 68 IN | DIASTOLIC BLOOD PRESSURE: 75 MMHG

## 2024-09-16 DIAGNOSIS — G43.909 MIGRAINE WITHOUT STATUS MIGRAINOSUS, NOT INTRACTABLE, UNSPECIFIED MIGRAINE TYPE: ICD-10-CM

## 2024-09-16 DIAGNOSIS — E03.9 HYPOTHYROIDISM, UNSPECIFIED TYPE: ICD-10-CM

## 2024-09-16 DIAGNOSIS — Z23 NEED FOR VACCINATION: Primary | ICD-10-CM

## 2024-09-16 DIAGNOSIS — J02.9 ACUTE VIRAL PHARYNGITIS: ICD-10-CM

## 2024-09-16 DIAGNOSIS — E55.9 VITAMIN D DEFICIENCY: ICD-10-CM

## 2024-09-16 LAB
TSH SERPL DL<=0.05 MIU/L-ACNC: 4.45 UIU/ML (ref 0.27–4.2)
VITAMIN D 25-HYDROXY: 55.2 NG/ML (ref 30–100)

## 2024-09-16 RX ORDER — FOLIC ACID 0.8 MG
500 TABLET ORAL DAILY
Qty: 60 CAPSULE | Refills: 1 | Status: SHIPPED | OUTPATIENT
Start: 2024-09-16

## 2024-09-16 RX ORDER — RIBOFLAVIN (VITAMIN B2) 100 MG
100 TABLET ORAL DAILY
Qty: 60 TABLET | Refills: 1 | Status: SHIPPED | OUTPATIENT
Start: 2024-09-16

## 2024-09-16 RX ORDER — CALCIUM CARBONATE 300MG(750)
1 TABLET,CHEWABLE ORAL DAILY
COMMUNITY
Start: 2024-08-23 | End: 2024-09-16

## 2024-09-16 ASSESSMENT — ENCOUNTER SYMPTOMS
GASTROINTESTINAL NEGATIVE: 1
SORE THROAT: 1
WHEEZING: 0
CHEST TIGHTNESS: 0
SHORTNESS OF BREATH: 1
RHINORRHEA: 1
COUGH: 1

## 2024-09-17 DIAGNOSIS — G43.809 OTHER MIGRAINE WITHOUT STATUS MIGRAINOSUS, NOT INTRACTABLE: ICD-10-CM

## 2024-09-17 NOTE — TELEPHONE ENCOUNTER
Last Appointment:  9/16/2024  Future Appointments   Date Time Provider Department Center   12/13/2024  3:20 PM Maria Isabel Toscano MD [facility] [place] [facility] DEP       Dr. Li  it looks like you discontinued this medication , please advise.

## 2024-09-19 RX ORDER — CALCIUM CARBONATE 300MG(750)
1 TABLET,CHEWABLE ORAL DAILY
Qty: 30 TABLET | Refills: 0 | OUTPATIENT
Start: 2024-09-19

## 2024-09-24 RX ORDER — DICYCLOMINE HCL 20 MG
TABLET ORAL
Qty: 120 TABLET | Refills: 0 | Status: SHIPPED | OUTPATIENT
Start: 2024-09-24

## 2024-09-25 NOTE — BH NOTE
Patient in day area majority of the bright, talking on phone, playing games with peers, and attending group. Patient denies si/hi/avh. No complaints of discomfort. Takes medications. Went to treatment team, discussed events leading to admission and future goals. [de-identified] : LOCATION:  LEFT CLAVICLE FRACTURE  DOMINANT HAND DURATION: PAIN STARTED 9/22/2024- CYCLING INJURY   QUALITY: SHARP  LOCALIZED  CONSTANT  PAIN LEVEL: 7/10  TREATMENTS: PATIENT HAS TRIED RESTICE, MOTRIN                                                  AGGRAVATING FACTORS: PAIN WORSENS WITH OVER HEAD LIFTING, REACHING BEHIND LIMITED ROM  ASSOCIATED SYMPTOMS: NUMBNESS / TINGLING                                                                                                     PREVIOUS LEFT CLAVICLE FRACTURE 2014 TREATED WITH SLING

## 2024-09-26 DIAGNOSIS — F32.A DEPRESSION, UNSPECIFIED DEPRESSION TYPE: ICD-10-CM

## 2024-09-27 RX ORDER — ESCITALOPRAM OXALATE 20 MG/1
20 TABLET ORAL DAILY
Qty: 60 TABLET | Refills: 2 | Status: SHIPPED | OUTPATIENT
Start: 2024-09-27

## 2024-10-11 ENCOUNTER — OFFICE VISIT (OUTPATIENT)
Dept: PRIMARY CARE CLINIC | Age: 34
End: 2024-10-11
Payer: COMMERCIAL

## 2024-10-11 VITALS
BODY MASS INDEX: 28.88 KG/M2 | WEIGHT: 184 LBS | DIASTOLIC BLOOD PRESSURE: 71 MMHG | SYSTOLIC BLOOD PRESSURE: 113 MMHG | TEMPERATURE: 98.6 F | HEART RATE: 84 BPM | HEIGHT: 67 IN | OXYGEN SATURATION: 97 % | RESPIRATION RATE: 16 BRPM

## 2024-10-11 DIAGNOSIS — R05.9 COUGH, UNSPECIFIED TYPE: ICD-10-CM

## 2024-10-11 DIAGNOSIS — J45.21 MILD INTERMITTENT ASTHMA WITH ACUTE EXACERBATION: Primary | ICD-10-CM

## 2024-10-11 LAB — S PYO AG THROAT QL: NORMAL

## 2024-10-11 PROCEDURE — G8417 CALC BMI ABV UP PARAM F/U: HCPCS

## 2024-10-11 PROCEDURE — 99214 OFFICE O/P EST MOD 30 MIN: CPT

## 2024-10-11 PROCEDURE — 4004F PT TOBACCO SCREEN RCVD TLK: CPT

## 2024-10-11 PROCEDURE — G8427 DOCREV CUR MEDS BY ELIG CLIN: HCPCS

## 2024-10-11 PROCEDURE — 87880 STREP A ASSAY W/OPTIC: CPT

## 2024-10-11 PROCEDURE — G8484 FLU IMMUNIZE NO ADMIN: HCPCS

## 2024-10-11 RX ORDER — BENZONATATE 100 MG/1
100 CAPSULE ORAL 3 TIMES DAILY PRN
Qty: 30 CAPSULE | Refills: 0 | Status: SHIPPED | OUTPATIENT
Start: 2024-10-11 | End: 2024-10-21

## 2024-10-11 RX ORDER — DOXYCYCLINE HYCLATE 100 MG
100 TABLET ORAL 2 TIMES DAILY
Qty: 14 TABLET | Refills: 0 | Status: SHIPPED | OUTPATIENT
Start: 2024-10-11 | End: 2024-10-18

## 2024-10-11 RX ORDER — PREDNISONE 20 MG/1
20 TABLET ORAL 2 TIMES DAILY
Qty: 10 TABLET | Refills: 0 | Status: SHIPPED | OUTPATIENT
Start: 2024-10-11 | End: 2024-10-16

## 2024-10-12 DIAGNOSIS — J45.20 MILD INTERMITTENT ASTHMA WITHOUT COMPLICATION: ICD-10-CM

## 2024-10-14 DIAGNOSIS — Z76.0 MEDICATION REFILL: ICD-10-CM

## 2024-10-14 DIAGNOSIS — G56.00 CARPAL TUNNEL SYNDROME, UNSPECIFIED LATERALITY: ICD-10-CM

## 2024-10-14 RX ORDER — DICYCLOMINE HCL 20 MG
TABLET ORAL
Qty: 120 TABLET | Refills: 0 | OUTPATIENT
Start: 2024-10-14

## 2024-10-14 RX ORDER — ALBUTEROL SULFATE 90 UG/1
2 INHALANT RESPIRATORY (INHALATION) EVERY 6 HOURS PRN
Qty: 9 G | Refills: 3 | Status: SHIPPED | OUTPATIENT
Start: 2024-10-14

## 2024-10-14 RX ORDER — DICYCLOMINE HCL 20 MG
TABLET ORAL
Qty: 120 TABLET | Refills: 3 | Status: SHIPPED | OUTPATIENT
Start: 2024-10-14

## 2024-10-14 NOTE — TELEPHONE ENCOUNTER
Please refill :)    Last Appointment:  9/16/2024  Future Appointments   Date Time Provider Department Center   12/13/2024  3:20 PM Maria Isabel Toscano MD [place] [place] [facility] DEP

## 2024-10-14 NOTE — TELEPHONE ENCOUNTER
Please refill :)    Last Appointment:  Visit date not found  Future Appointments   Date Time Provider Department Center   12/13/2024  3:20 PM Maria Isabel Toscano MD [facility] [place] [facility] DEP

## 2024-10-14 NOTE — TELEPHONE ENCOUNTER
Please refill :)    Last Appointment:  9/16/2024  Future Appointments   Date Time Provider Department Center   12/13/2024  3:20 PM Maria Isabel Toscano MD [facility] [place] [facility] DE[facility]

## 2024-10-14 NOTE — TELEPHONE ENCOUNTER
Last seen 10/19/2023  Next appt Visit date not found    Requested Prescriptions     Pending Prescriptions Disp Refills    gabapentin (NEURONTIN) 300 MG capsule 540 capsule 0     Sig: Take 2 capsules by mouth 3 times daily for 360 days.            Plan:      -Continue gabapentin 600 mg TID  -Continue Dantrium 25 mg BID and 50 mg HS  -Continue baclofen 20 mg AM, 20 mg PM, 40 mg HS  -F/u with pain mgmt  -Offered PT but patient declined  -Weight management and increasing exercise reviewed     Return to office 1 year or sooner JOSSE Villa - CNP  2:49 PM  10/19/2023

## 2024-10-16 RX ORDER — GABAPENTIN 300 MG/1
600 CAPSULE ORAL 3 TIMES DAILY
Qty: 540 CAPSULE | Refills: 0 | OUTPATIENT
Start: 2024-10-16 | End: 2025-10-11

## 2024-11-02 DIAGNOSIS — J45.20 MILD INTERMITTENT ASTHMA WITHOUT COMPLICATION: ICD-10-CM

## 2024-11-02 DIAGNOSIS — E03.9 HYPOTHYROIDISM, UNSPECIFIED TYPE: ICD-10-CM

## 2024-11-04 RX ORDER — MONTELUKAST SODIUM 10 MG/1
10 TABLET ORAL NIGHTLY
Qty: 30 TABLET | Refills: 0 | Status: SHIPPED | OUTPATIENT
Start: 2024-11-04

## 2024-11-04 RX ORDER — LEVOTHYROXINE SODIUM 75 UG/1
75 TABLET ORAL DAILY
Qty: 30 TABLET | Refills: 0 | Status: SHIPPED | OUTPATIENT
Start: 2024-11-04

## 2024-11-04 NOTE — TELEPHONE ENCOUNTER
Please refill :)    Last Appointment:  9/16/2024  Future Appointments   Date Time Provider Department Center   12/13/2024  3:20 PM Maria Isabel Toscano MD [place] [place] [facility] DEP

## 2024-11-12 DIAGNOSIS — J45.20 MILD INTERMITTENT ASTHMA WITHOUT COMPLICATION: ICD-10-CM

## 2024-11-13 ENCOUNTER — HOSPITAL ENCOUNTER (EMERGENCY)
Age: 34
Discharge: HOME | End: 2024-11-14
Attending: EMERGENCY MEDICINE
Payer: COMMERCIAL

## 2024-11-13 DIAGNOSIS — T74.21XA SEXUAL ASSAULT OF ADULT, INITIAL ENCOUNTER: Primary | ICD-10-CM

## 2024-11-13 PROCEDURE — 86592 SYPHILIS TEST NON-TREP QUAL: CPT

## 2024-11-13 PROCEDURE — 87210 SMEAR WET MOUNT SALINE/INK: CPT

## 2024-11-13 PROCEDURE — 87491 CHLMYD TRACH DNA AMP PROBE: CPT

## 2024-11-13 PROCEDURE — 99284 EMERGENCY DEPT VISIT MOD MDM: CPT

## 2024-11-13 PROCEDURE — 87389 HIV-1 AG W/HIV-1&-2 AB AG IA: CPT

## 2024-11-13 PROCEDURE — 87340 HEPATITIS B SURFACE AG IA: CPT

## 2024-11-13 PROCEDURE — 87591 N.GONORRHOEAE DNA AMP PROB: CPT

## 2024-11-13 PROCEDURE — 85025 COMPLETE CBC W/AUTO DIFF WBC: CPT

## 2024-11-13 PROCEDURE — 82248 BILIRUBIN DIRECT: CPT

## 2024-11-13 PROCEDURE — 86803 HEPATITIS C AB TEST: CPT

## 2024-11-13 PROCEDURE — 80053 COMPREHEN METABOLIC PANEL: CPT

## 2024-11-13 PROCEDURE — 86317 IMMUNOASSAY INFECTIOUS AGENT: CPT

## 2024-11-13 RX ORDER — METRONIDAZOLE 500 MG/1
500 TABLET ORAL ONCE
Status: COMPLETED | OUTPATIENT
Start: 2024-11-13 | End: 2024-11-14

## 2024-11-13 RX ORDER — DOXYCYCLINE HYCLATE 100 MG
100 TABLET ORAL 2 TIMES DAILY
Qty: 14 TABLET | Refills: 0 | Status: SHIPPED | OUTPATIENT
Start: 2024-11-13 | End: 2024-11-20

## 2024-11-13 RX ORDER — DOXYCYCLINE 100 MG/1
100 CAPSULE ORAL ONCE
Status: COMPLETED | OUTPATIENT
Start: 2024-11-13 | End: 2024-11-14

## 2024-11-13 RX ORDER — EMTRICITABINE AND TENOFOVIR DISOPROXIL FUMARATE 200; 300 MG/1; MG/1
1 TABLET, FILM COATED ORAL DAILY
Qty: 28 TABLET | Refills: 0 | Status: SHIPPED | OUTPATIENT
Start: 2024-11-13 | End: 2024-12-11

## 2024-11-13 RX ORDER — FLUCONAZOLE 100 MG/1
150 TABLET ORAL ONCE
Status: COMPLETED | OUTPATIENT
Start: 2024-11-13 | End: 2024-11-14

## 2024-11-13 RX ORDER — METRONIDAZOLE 500 MG/1
500 TABLET ORAL 2 TIMES DAILY
Qty: 14 TABLET | Refills: 0 | Status: SHIPPED | OUTPATIENT
Start: 2024-11-13 | End: 2024-11-20

## 2024-11-13 RX ORDER — EMTRICITABINE AND TENOFOVIR DISOPROXIL FUMARATE 200; 300 MG/1; MG/1
1 TABLET, FILM COATED ORAL ONCE
Status: COMPLETED | OUTPATIENT
Start: 2024-11-13 | End: 2024-11-14

## 2024-11-13 RX ORDER — LEVONORGESTREL 1.5 MG/1
1.5 TABLET ORAL ONCE
Status: COMPLETED | OUTPATIENT
Start: 2024-11-13 | End: 2024-11-14

## 2024-11-13 ASSESSMENT — LIFESTYLE VARIABLES
HOW MANY STANDARD DRINKS CONTAINING ALCOHOL DO YOU HAVE ON A TYPICAL DAY: 1 OR 2
HOW OFTEN DO YOU HAVE A DRINK CONTAINING ALCOHOL: MONTHLY OR LESS

## 2024-11-13 NOTE — TELEPHONE ENCOUNTER
Name of Medication(s) Requested:  Requested Prescriptions     Pending Prescriptions Disp Refills    montelukast (SINGULAIR) 10 MG tablet [Pharmacy Med Name: Montelukast Sodium 10 MG Oral Tablet] 30 tablet 0     Sig: Take 1 tablet by mouth nightly       Medication is on current medication list Yes    Dosage and directions were verified? Yes    Quantity verified: 30 day supply     Pharmacy Verified?  Yes    Last Appointment:  9/16/2024    Future appts:  Future Appointments   Date Time Provider Department Center   12/13/2024  3:20 PM Maria Isabel Toscano MD [facility] [place] [facility] DEP        (If no appt send self scheduling link. .REFILLAPPT)  Scheduling request sent?     [] Yes  [x] No    Does patient need updated?  [] Yes  [x] No

## 2024-11-13 NOTE — TELEPHONE ENCOUNTER
Name of Medication(s) Requested:  Requested Prescriptions     Pending Prescriptions Disp Refills    budesonide-formoterol (BREYNA) 80-4.5 MCG/ACT AERO [Pharmacy Med Name: Breyna 80-4.5 MCG/ACT Inhalation Aerosol] 11 g 0     Sig: INHALE 2 PUFFS BY MOUTH ONCE DAILY       Medication is on current medication list Yes    Dosage and directions were verified? Yes    Quantity verified: 90 day supply     Pharmacy Verified?  Yes    Last Appointment:  Visit date not found    Future appts:  Future Appointments   Date Time Provider Department Center   12/13/2024  3:20 PM Maria Isabel Toscano MD [facility] [place] [facility] DEP        (If no appt send self scheduling link. .REFILLAPPT)  Scheduling request sent?     [] Yes  [x] No    Does patient need updated?  [] Yes  [x] No

## 2024-11-14 VITALS
HEART RATE: 90 BPM | OXYGEN SATURATION: 98 % | SYSTOLIC BLOOD PRESSURE: 132 MMHG | DIASTOLIC BLOOD PRESSURE: 80 MMHG | TEMPERATURE: 98.8 F | RESPIRATION RATE: 18 BRPM

## 2024-11-14 LAB
ALBUMIN SERPL-MCNC: 4.1 G/DL (ref 3.5–5.2)
ALP SERPL-CCNC: 75 U/L (ref 35–104)
ALT SERPL-CCNC: 10 U/L (ref 0–32)
ANION GAP SERPL CALCULATED.3IONS-SCNC: 12 MMOL/L (ref 7–16)
AST SERPL-CCNC: 14 U/L (ref 0–31)
BASOPHILS # BLD: 0.03 K/UL (ref 0–0.2)
BASOPHILS NFR BLD: 0 % (ref 0–2)
BILIRUB DIRECT SERPL-MCNC: <0.2 MG/DL (ref 0–0.3)
BILIRUB INDIRECT SERPL-MCNC: NORMAL MG/DL (ref 0–1)
BILIRUB SERPL-MCNC: <0.2 MG/DL (ref 0–1.2)
BILIRUB UR QL STRIP: NEGATIVE
BUN SERPL-MCNC: 10 MG/DL (ref 6–20)
CALCIUM SERPL-MCNC: 8.7 MG/DL (ref 8.6–10.2)
CHLORIDE SERPL-SCNC: 108 MMOL/L (ref 98–107)
CLARITY UR: CLEAR
CLUE CELLS VAG QL WET PREP: NORMAL
CO2 SERPL-SCNC: 19 MMOL/L (ref 22–29)
COLOR UR: YELLOW
CREAT SERPL-MCNC: 0.8 MG/DL (ref 0.5–1)
EOSINOPHIL # BLD: 0.04 K/UL (ref 0.05–0.5)
EOSINOPHILS RELATIVE PERCENT: 1 % (ref 0–6)
ERYTHROCYTE [DISTWIDTH] IN BLOOD BY AUTOMATED COUNT: 14.2 % (ref 11.5–15)
GFR, ESTIMATED: >90 ML/MIN/1.73M2
GLUCOSE SERPL-MCNC: 82 MG/DL (ref 74–99)
GLUCOSE UR STRIP-MCNC: NEGATIVE MG/DL
HBV SURFACE AB SERPL IA-ACNC: 27.9 MIU/ML (ref 0–9.99)
HBV SURFACE AG SERPL QL IA: NONREACTIVE
HCG, URINE, POC: NEGATIVE
HCT VFR BLD AUTO: 46 % (ref 34–48)
HCV AB SERPL QL IA: NONREACTIVE
HGB BLD-MCNC: 14.6 G/DL (ref 11.5–15.5)
HGB UR QL STRIP.AUTO: NEGATIVE
HIV 1+2 AB+HIV1 P24 AG SERPL QL IA: NONREACTIVE
IMM GRANULOCYTES # BLD AUTO: 0.06 K/UL (ref 0–0.58)
IMM GRANULOCYTES NFR BLD: 1 % (ref 0–5)
KETONES UR STRIP-MCNC: NEGATIVE MG/DL
LEUKOCYTE ESTERASE UR QL STRIP: NEGATIVE
LYMPHOCYTES NFR BLD: 2.11 K/UL (ref 1.5–4)
LYMPHOCYTES RELATIVE PERCENT: 27 % (ref 20–42)
Lab: NORMAL
MCH RBC QN AUTO: 29.1 PG (ref 26–35)
MCHC RBC AUTO-ENTMCNC: 31.7 G/DL (ref 32–34.5)
MCV RBC AUTO: 91.8 FL (ref 80–99.9)
MONOCYTES NFR BLD: 0.66 K/UL (ref 0.1–0.95)
MONOCYTES NFR BLD: 8 % (ref 2–12)
NEGATIVE QC PASS/FAIL: NORMAL
NEUTROPHILS NFR BLD: 63 % (ref 43–80)
NEUTS SEG NFR BLD: 4.95 K/UL (ref 1.8–7.3)
NITRITE UR QL STRIP: NEGATIVE
PH UR STRIP: 6 [PH] (ref 5–9)
PLATELET # BLD AUTO: 217 K/UL (ref 130–450)
PMV BLD AUTO: 12.1 FL (ref 7–12)
POSITIVE QC PASS/FAIL: NORMAL
POTASSIUM SERPL-SCNC: 4.1 MMOL/L (ref 3.5–5)
PROT SERPL-MCNC: 7.5 G/DL (ref 6.4–8.3)
PROT UR STRIP-MCNC: NEGATIVE MG/DL
RBC # BLD AUTO: 5.01 M/UL (ref 3.5–5.5)
RBC #/AREA URNS HPF: NORMAL /HPF
RPR SER QL: NONREACTIVE
SODIUM SERPL-SCNC: 139 MMOL/L (ref 132–146)
SP GR UR STRIP: 1.01 (ref 1–1.03)
T VAGINALIS VAG QL WET PREP: NORMAL
UROBILINOGEN UR STRIP-ACNC: 0.2 EU/DL (ref 0–1)
WBC #/AREA URNS HPF: NORMAL /HPF
WBC OTHER # BLD: 7.9 K/UL (ref 4.5–11.5)
YEAST WET PREP: NORMAL

## 2024-11-14 PROCEDURE — 96374 THER/PROPH/DIAG INJ IV PUSH: CPT

## 2024-11-14 PROCEDURE — 90471 IMMUNIZATION ADMIN: CPT | Performed by: EMERGENCY MEDICINE

## 2024-11-14 PROCEDURE — 81001 URINALYSIS AUTO W/SCOPE: CPT

## 2024-11-14 PROCEDURE — 6360000002 HC RX W HCPCS: Performed by: EMERGENCY MEDICINE

## 2024-11-14 PROCEDURE — 6370000000 HC RX 637 (ALT 250 FOR IP): Performed by: EMERGENCY MEDICINE

## 2024-11-14 PROCEDURE — 2580000003 HC RX 258: Performed by: EMERGENCY MEDICINE

## 2024-11-14 PROCEDURE — 87491 CHLMYD TRACH DNA AMP PROBE: CPT

## 2024-11-14 PROCEDURE — 90714 TD VACC NO PRESV 7 YRS+ IM: CPT | Performed by: EMERGENCY MEDICINE

## 2024-11-14 PROCEDURE — 87591 N.GONORRHOEAE DNA AMP PROB: CPT

## 2024-11-14 RX ORDER — MONTELUKAST SODIUM 10 MG/1
10 TABLET ORAL NIGHTLY
Qty: 30 TABLET | Refills: 0 | OUTPATIENT
Start: 2024-11-14

## 2024-11-14 RX ORDER — BUDESONIDE AND FORMOTEROL FUMARATE DIHYDRATE 80; 4.5 UG/1; UG/1
AEROSOL RESPIRATORY (INHALATION)
Qty: 11 G | Refills: 3 | Status: SHIPPED | OUTPATIENT
Start: 2024-11-14

## 2024-11-14 RX ADMIN — EMTRICITABINE AND TENOFOVIR DISOPROXIL FUMARATE 1 TABLET: 200; 300 TABLET, FILM COATED ORAL at 00:32

## 2024-11-14 RX ADMIN — METRONIDAZOLE 500 MG: 500 TABLET ORAL at 00:31

## 2024-11-14 RX ADMIN — FLUCONAZOLE 150 MG: 100 TABLET ORAL at 00:31

## 2024-11-14 RX ADMIN — CEFTRIAXONE SODIUM 1000 MG: 1 INJECTION, POWDER, FOR SOLUTION INTRAMUSCULAR; INTRAVENOUS at 00:30

## 2024-11-14 RX ADMIN — DOXYCYCLINE HYCLATE 100 MG: 100 CAPSULE ORAL at 00:31

## 2024-11-14 RX ADMIN — RALTEGRAVIR 400 MG: 400 TABLET, FILM COATED ORAL at 00:32

## 2024-11-14 RX ADMIN — LEVONORGESTREL 1.5 MG: 1.5 TABLET ORAL at 00:31

## 2024-11-14 RX ADMIN — CLOSTRIDIUM TETANI TOXOID ANTIGEN (FORMALDEHYDE INACTIVATED) AND CORYNEBACTERIUM DIPHTHERIAE TOXOID ANTIGEN (FORMALDEHYDE INACTIVATED) 0.5 ML: 5; 2 INJECTION, SUSPENSION INTRAMUSCULAR at 00:31

## 2024-11-14 NOTE — ED NOTES
SANE kit handed off to officer Regis garcía # [id number] with [facility] Department. Three documented clothing bags, 1 examination kit, and 1 Copy of the chain of custody handed to officer at this time.

## 2024-11-14 NOTE — ED NOTES
Patient received SANE kit collection. [id number] serial number and in [facility] tracking service.     Kit started at 2158 11/13/2024    Paula LOAIZA performed pelvic exam and This RN conducted the rest of the exam    Pt tolerate the exam well. Frequent breaks provided for emotional support. Frequent reassurance provided to patient.    Exam ended at 9003 11/13/2024

## 2024-11-14 NOTE — DISCHARGE INSTR - COC
Risk of Unplanned Readmission:  0           Discharging to Facility/ Agency   Name:   Address:  Phone:  Fax:    Dialysis Facility (if applicable)   Name:  Address:  Dialysis Schedule:  Phone:  Fax:    / signature: {Esignature:737048086}    PHYSICIAN SECTION    Prognosis: {Prognosis:7489076111}    Condition at Discharge: { Patient Condition:105646554}    Rehab Potential (if transferring to Rehab): {Prognosis:1185813313}    Recommended Labs or Other Treatments After Discharge: ***    Physician Certification: I certify the above information and transfer of Chad Peoples  is necessary for the continuing treatment of the diagnosis listed and that she requires {Admit to Appropriate Level of Care:22097} for {GREATER/LESS:350159684} 30 days.     Update Admission H&P: {CHP DME Changes in HandP:019023325}    PHYSICIAN SIGNATURE:  {Esignature:480749024}

## 2024-11-14 NOTE — ED PROVIDER NOTES
pulsatile masses.  : Performed by Paula Parra NP, chaperoned by RN: No excoriations no vaginal bleeding no lacerations.  No external signs of trauma.  Musculoskeletal: Moves all extremities x 4. Warm and well perfused, no clubbing, no cyanosis, no edema. Capillary refill <3 seconds  Integument: skin warm and dry. No rashes.   Neurologic: GCS 15, no focal deficits, symmetric strength 5/5 in the upper and lower extremities bilaterally  Psychiatric: Normal Affect            DIAGNOSTIC RESULTS   LABS:    Labs Reviewed   CBC WITH AUTO DIFFERENTIAL - Abnormal; Notable for the following components:       Result Value    MCHC 31.7 (*)     MPV 12.1 (*)     Eosinophils Absolute 0.04 (*)     All other components within normal limits   BASIC METABOLIC PANEL - Abnormal; Notable for the following components:    Chloride 108 (*)     CO2 19 (*)     All other components within normal limits   POC PREGNANCY UR-QUAL - Normal   WET PREP, GENITAL   C.TRACHOMATIS N.GONORRHOEAE DNA   C.TRACHOMATIS N.GONORRHOEAE DNA, URINE   HEPATIC FUNCTION PANEL   URINALYSIS WITH MICROSCOPIC   RPR   HEPATITIS B SURFACE ANTIBODY   HEPATITIS B SURFACE ANTIGEN   HEPATITIS C ANTIBODY   HIV SCREEN       As interpreted by me, the above displayed labs are abnormal. All other labs obtained during this visit were within normal range or not returned as of this dictation.      EKG Interpretation  Interpreted by emergency department physician, Case Jewell DO              RADIOLOGY:   Non-plain film images such as CT, Ultrasound and MRI are read by the radiologist. Plain radiographic images are visualized and preliminarily interpreted by the ED Provider with the below findings:        Interpretation per the Radiologist below, if available at the time of this note:    No orders to display     No results found.    No results found.    PROCEDURES   Unless otherwise noted below, none          CRITICAL CARE TIME (.cct)       PAST MEDICAL HISTORY/Chronic

## 2024-11-15 LAB
C TRACH DNA SPEC QL PROBE+SIG AMP: NEGATIVE
CHLAMYDIA DNA UR QL NAA+PROBE: NEGATIVE
N GONORRHOEA DNA SPEC QL PROBE+SIG AMP: NEGATIVE
N GONORRHOEA DNA UR QL NAA+PROBE: NEGATIVE
SPECIMEN DESCRIPTION: NORMAL
SPECIMEN DESCRIPTION: NORMAL

## 2024-11-23 DIAGNOSIS — E03.9 HYPOTHYROIDISM, UNSPECIFIED TYPE: ICD-10-CM

## 2024-11-23 DIAGNOSIS — J45.20 MILD INTERMITTENT ASTHMA WITHOUT COMPLICATION: ICD-10-CM

## 2024-11-26 NOTE — TELEPHONE ENCOUNTER
Name of Medication(s) Requested:  Requested Prescriptions     Pending Prescriptions Disp Refills    levothyroxine (SYNTHROID) 75 MCG tablet [Pharmacy Med Name: Levothyroxine Sodium 75 MCG Oral Tablet] 30 tablet 0     Sig: Take 1 tablet by mouth once daily       Medication is on current medication list Yes    Dosage and directions were verified? Yes    Quantity verified: 30 day supply     Pharmacy Verified?  Yes    Last Appointment:  9/16/2024    Future appts:  Future Appointments   Date Time Provider Department Center   12/13/2024  3:20 PM Maria Isabel Toscano MD [place] [place] [facility] DE[facility]        (If no appt send self scheduling link. .REFILLAPPT)  Scheduling request sent?     [] Yes  [x] No    Does patient need updated?  [] Yes  [x] No   SUBJECTIVE:     Monique Granger is a 4 week old female, here for a routine health maintenance visit.    Patient was roomed by: Hyacinth Arteaga [facility]      Well Child     Social History  Patient accompanied by:  Mother  Forms to complete? No  Child lives with::  Mother, father and sisters  Who takes care of your child?:  Home with family member, father and mother  Languages spoken in the home:  English  Recent family changes/ special stressors?:  Recent birth of a baby    Safety / Health Risk  Is your child around anyone who smokes?  No    TB Exposure:     No TB exposure    Car seat < 6 years old, in  back seat, rear-facing, 5-point restraint? Yes    Home Safety Survey:      Firearms in the home?: No      Hearing / Vision  Hearing or vision concerns?  No concerns, hearing and vision subjectively normal    Daily Activities    Water source:  City water  Nutrition:  Breastmilk and pumped breastmilk by bottle  Breastfeeding concerns?  None, breastfeeding going well; no concerns  Vitamins & Supplements:  No    Elimination       Urinary frequency:more than 6 times per 24 hours     Stool frequency: 4-6 times per 24 hours     Stool consistency: soft     Elimination problems:  None    Sleep      Sleep arrangement:bassinet and crib    Sleep position:  On back    Sleep pattern: 1-2 wake periods daily and wakes at night for feedings      [place]  Depression Scale (EPDS) Risk Assessment: Completed    BIRTH HISTORY  [place] metabolic screening: All components normal    DEVELOPMENT  No screening tool used  Milestones (by observation/ exam/ report) 75-90% ile  PERSONAL/ SOCIAL/COGNITIVE:    Regards face    Smiles responsively  LANGUAGE:    Vocalizes    Responds to sound  GROSS MOTOR:    Lift head when prone    Kicks / equal movements  FINE MOTOR/ ADAPTIVE:    Eyes follow past midline    Reflexive grasp    PROBLEM LIST  Patient Active Problem List   Diagnosis     Normal  (single liveborn)     Asymptomatic   "w/confirmed group B Strep maternal carriage     MEDICATIONS  No current outpatient medications on file.      ALLERGY  No Known Allergies    IMMUNIZATIONS  Immunization History   Administered Date(s) Administered     Hep B, Peds or Adolescent 2020       HEALTH HISTORY SINCE LAST VISIT  No surgery, major illness or injury since last physical exam    ROS  Constitutional, eye, ENT, skin, respiratory, cardiac, GI, MSK, neuro, and allergy are normal except as otherwise noted.    OBJECTIVE:   EXAM  Pulse 154   Temp 97.8  F (36.6  C) (Temporal)   Resp 22   Ht 0.514 m (1' 8.25\")   Wt 3.742 kg (8 lb 4 oz)   HC 36.2 cm (14.25\")   BMI 14.15 kg/m    37 %ile (Z= -0.33) based on WHO (Girls, 0-2 years) head circumference-for-age based on Head Circumference recorded on 2020.  20 %ile (Z= -0.84) based on WHO (Girls, 0-2 years) weight-for-age data using vitals from 2020.  12 %ile (Z= -1.18) based on WHO (Girls, 0-2 years) Length-for-age data based on Length recorded on 2020.  60 %ile (Z= 0.24) based on WHO (Girls, 0-2 years) weight-for-recumbent length data based on body measurements available as of 2020.  GENERAL: Active, alert,  no  distress.  SKIN: Erythematous lesion over lower back centrally consistent with a hemangioma. No other significant rash, abnormal pigmentation or lesions.  HEAD: Normocephalic. Normal fontanels and sutures.  EYES: Conjunctivae and cornea normal. Red reflexes present bilaterally.  EARS: normal: no effusions, no erythema, normal landmarks  NOSE: Normal without discharge.  MOUTH/THROAT: Clear. No oral lesions.  NECK: Supple, no masses.  LYMPH NODES: No adenopathy  LUNGS: Clear. No rales, rhonchi, wheezing or retractions  HEART: Regular rate and rhythm. Normal S1/S2. No murmurs. Normal femoral pulses.  ABDOMEN: Soft, non-tender, not distended, no masses or hepatosplenomegaly. Normal umbilicus and bowel sounds.   GENITALIA: Normal female external genitalia. [place] stage I,  No " inguinal herniae are present.  EXTREMITIES: Hips normal with negative Ortolani and Chaney. Symmetric creases and  no deformities  NEUROLOGIC: Normal tone throughout. Normal reflexes for age    ASSESSMENT/PLAN:   Monique was seen today for well child.    Diagnoses and all orders for this visit:    WCC (well child check),  8-28 days old  -     Maternal Health Risk Assessment (99049) -EPDS    Infantile colic        -   Discussed natural course of colic        -   Supportive cares at home    Hemangioma of spine        -   Over lower back centrally        -   Continue to monitor at future visits    Anticipatory Guidance  The following topics were discussed:  SOCIAL/ FAMILY    return to work    crying/ fussiness    talk or sing to baby/ music  NUTRITION:    pumping/ introducing bottle    vit D if breastfeeding  HEALTH/ SAFETY:    sleep patterns    car seat    safe crib    Preventive Care Plan  Immunizations     Reviewed, up to date  Referrals/Ongoing Specialty care: No   See other orders in EpicCare    Resources:  [place] Child and Teen Checkups (C&TC) Schedule of Age-Related Screening Standards    FOLLOW-UP:      2 month Preventive Care visit    Kamar Jose MD  [facility]

## 2024-11-26 NOTE — TELEPHONE ENCOUNTER
Name of Medication(s) Requested:  Requested Prescriptions     Pending Prescriptions Disp Refills    budesonide-formoterol (BREYNA) 80-4.5 MCG/ACT AERO [Pharmacy Med Name: Breyna 80-4.5 MCG/ACT Inhalation Aerosol] 11 g 0     Sig: INHALE 2 PUFFS BY MOUTH ONCE DAILY       Medication is on current medication list Yes    Dosage and directions were verified? Yes    Quantity verified: 90 day supply     Pharmacy Verified?  Yes    Last Appointment:  Visit date not found    Future appts:  Future Appointments   Date Time Provider Department Center   12/13/2024  3:20 PM Maria Isabel Toscano MD [facility] [place] [facility] DEP        (If no appt send self scheduling link. .REFILLAPPT)  Scheduling request sent?     [] Yes  [x] No    Does patient need updated?  [] Yes  [x] No

## 2024-11-27 RX ORDER — LEVOTHYROXINE SODIUM 75 UG/1
75 TABLET ORAL DAILY
Qty: 60 TABLET | Refills: 3 | Status: SHIPPED | OUTPATIENT
Start: 2024-11-27

## 2024-11-27 RX ORDER — BUDESONIDE AND FORMOTEROL FUMARATE DIHYDRATE 80; 4.5 UG/1; UG/1
AEROSOL RESPIRATORY (INHALATION)
Qty: 11 G | Refills: 0 | OUTPATIENT
Start: 2024-11-27

## 2024-11-30 DIAGNOSIS — J45.20 MILD INTERMITTENT ASTHMA WITHOUT COMPLICATION: ICD-10-CM

## 2024-12-02 NOTE — TELEPHONE ENCOUNTER
Name of Medication(s) Requested:  Requested Prescriptions     Pending Prescriptions Disp Refills    montelukast (SINGULAIR) 10 MG tablet [Pharmacy Med Name: Montelukast Sodium 10 MG Oral Tablet] 30 tablet 0     Sig: Take 1 tablet by mouth nightly       Medication is on current medication list Yes    Dosage and directions were verified? Yes    Quantity verified: 30 day supply     Pharmacy Verified?  Yes    Last Appointment:  9/16/2024    Future appts:  Future Appointments   Date Time Provider Department Center   12/13/2024  3:20 PM Maria Isabel Toscano MD [facility] [place] [facility] E[facility] DEP        (If no appt send self scheduling link. .REFILLAPPT)  Scheduling request sent?     [] Yes  [x] No    Does patient need updated?  [] Yes  [x] No

## 2024-12-03 RX ORDER — MONTELUKAST SODIUM 10 MG/1
10 TABLET ORAL NIGHTLY
Qty: 30 TABLET | Refills: 3 | Status: SHIPPED | OUTPATIENT
Start: 2024-12-03
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
## 2024-12-09 RX ORDER — CETIRIZINE HYDROCHLORIDE 10 MG/1
10 TABLET ORAL DAILY
Qty: 30 TABLET | Refills: 0 | Status: SHIPPED | OUTPATIENT
Start: 2024-12-09

## 2024-12-09 NOTE — TELEPHONE ENCOUNTER
Name of Medication(s) Requested:  Requested Prescriptions     Pending Prescriptions Disp Refills    cetirizine (ZYRTEC) 10 MG tablet [Pharmacy Med Name: Cetirizine HCl 10 MG Oral Tablet] 30 tablet 0     Sig: Take 1 tablet by mouth once daily       Medication is on current medication list Yes    Dosage and directions were verified? Yes    Quantity verified: 30 day supply     Pharmacy Verified?  Yes    Last Appointment:  Visit date not found    Future appts:  Future Appointments   Date Time Provider Department Center   12/13/2024  3:20 PM Maria Isabel Toscano MD [facility] [place] [facility] DEP        (If no appt send self scheduling link. .REFILLAPPT)  Scheduling request sent?     [] Yes  [x] No    Does patient need updated?  [] Yes  [x] No

## 2024-12-13 ENCOUNTER — OFFICE VISIT (OUTPATIENT)
Dept: FAMILY MEDICINE CLINIC | Age: 34
End: 2024-12-13
Payer: COMMERCIAL

## 2024-12-13 VITALS
SYSTOLIC BLOOD PRESSURE: 129 MMHG | WEIGHT: 231 LBS | HEART RATE: 85 BPM | TEMPERATURE: 97.9 F | BODY MASS INDEX: 34.21 KG/M2 | OXYGEN SATURATION: 99 % | DIASTOLIC BLOOD PRESSURE: 70 MMHG | RESPIRATION RATE: 18 BRPM | HEIGHT: 69 IN

## 2024-12-13 DIAGNOSIS — Z23 NEED FOR VACCINATION: Primary | ICD-10-CM

## 2024-12-13 DIAGNOSIS — N39.0 LOWER URINARY TRACT INFECTION: ICD-10-CM

## 2024-12-13 LAB
BACTERIA: ABNORMAL
BILIRUBIN, URINE: NEGATIVE
COLOR, UA: YELLOW
EPITHELIAL CELLS, UA: ABNORMAL /HPF
GLUCOSE URINE: NEGATIVE MG/DL
KETONES, URINE: NEGATIVE MG/DL
LEUKOCYTE ESTERASE, URINE: ABNORMAL
NITRITE, URINE: NEGATIVE
PH, URINE: 6 (ref 5–9)
PROTEIN UA: NEGATIVE MG/DL
RBC UA: ABNORMAL /HPF
SPECIFIC GRAVITY UA: >1.03 (ref 1–1.03)
TURBIDITY: ABNORMAL
URINE HGB: ABNORMAL
UROBILINOGEN, URINE: 0.2 EU/DL (ref 0–1)
WBC UA: ABNORMAL /HPF

## 2024-12-13 PROCEDURE — 90739 HEPB VACC 2/4 DOSE ADULT IM: CPT | Performed by: FAMILY MEDICINE

## 2024-12-13 PROCEDURE — G0010 ADMIN HEPATITIS B VACCINE: HCPCS | Performed by: FAMILY MEDICINE

## 2024-12-13 RX ORDER — FLUOCINONIDE 0.5 MG/G
0.05 CREAM TOPICAL
COMMUNITY
Start: 2024-11-30
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
## 2024-12-15 LAB
CULTURE: NORMAL
SPECIMEN DESCRIPTION: NORMAL

## 2024-12-18 ENCOUNTER — TELEPHONE (OUTPATIENT)
Dept: FAMILY MEDICINE CLINIC | Age: 34
End: 2024-12-18

## 2024-12-18 DIAGNOSIS — R82.71 BACTERIURIA: Primary | ICD-10-CM

## 2024-12-18 RX ORDER — NITROFURANTOIN 25; 75 MG/1; MG/1
100 CAPSULE ORAL 2 TIMES DAILY
Qty: 10 CAPSULE | Refills: 0 | Status: SHIPPED | OUTPATIENT
Start: 2024-12-18 | End: 2024-12-23

## 2024-12-18 NOTE — TELEPHONE ENCOUNTER
Called patient with results. Mixed GP and GN organisms on culture. Bacteruria, moderate LE, +WBC on urinalysis. Patient states that she is still having dysuria, back pain and suprapubic pain after urinating. Denies fever, chills and systemic symptoms. Will be sending abx for management. Patient advised to call back if symptoms do not resolve with abx tx.     Maria Isabel Toscano MD
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
## 2025-01-06 DIAGNOSIS — J45.20 MILD INTERMITTENT ASTHMA WITHOUT COMPLICATION: ICD-10-CM

## 2025-01-06 DIAGNOSIS — K21.9 GASTROESOPHAGEAL REFLUX DISEASE, UNSPECIFIED WHETHER ESOPHAGITIS PRESENT: ICD-10-CM

## 2025-01-06 DIAGNOSIS — G43.909 MIGRAINE WITHOUT STATUS MIGRAINOSUS, NOT INTRACTABLE, UNSPECIFIED MIGRAINE TYPE: ICD-10-CM

## 2025-01-06 RX ORDER — CETIRIZINE HYDROCHLORIDE 10 MG/1
10 TABLET ORAL DAILY
Qty: 30 TABLET | Refills: 0 | Status: SHIPPED | OUTPATIENT
Start: 2025-01-06

## 2025-01-07 ENCOUNTER — HOSPITAL ENCOUNTER (OUTPATIENT)
Dept: GENERAL RADIOLOGY | Age: 35
Discharge: HOME | End: 2025-01-09
Payer: COMMERCIAL

## 2025-01-07 ENCOUNTER — HOSPITAL ENCOUNTER (OUTPATIENT)
Age: 35
Discharge: HOME | End: 2025-01-09
Payer: COMMERCIAL

## 2025-01-07 DIAGNOSIS — M25.551 RIGHT HIP PAIN: ICD-10-CM

## 2025-01-07 PROCEDURE — 73502 X-RAY EXAM HIP UNI 2-3 VIEWS: CPT

## 2025-01-07 RX ORDER — BACLOFEN 20 MG
1 TABLET ORAL DAILY
Qty: 60 TABLET | Refills: 2 | Status: SHIPPED | OUTPATIENT
Start: 2025-01-07

## 2025-01-07 RX ORDER — ALBUTEROL SULFATE 90 UG/1
2 INHALANT RESPIRATORY (INHALATION) EVERY 6 HOURS PRN
Qty: 9 G | Refills: 4 | Status: SHIPPED | OUTPATIENT
Start: 2025-01-07

## 2025-01-07 NOTE — TELEPHONE ENCOUNTER
Name of Medication(s) Requested:  Requested Prescriptions     Pending Prescriptions Disp Refills    albuterol sulfate HFA (PROVENTIL;VENTOLIN;PROAIR) 108 (90 Base) MCG/ACT inhaler [Pharmacy Med Name: Albuterol Sulfate  (90 Base) MCG/ACT Inhalation Aerosol Solution] 9 g 0     Sig: INHALE 2 PUFFS BY MOUTH EVERY 6 HOURS AS NEEDED FOR WHEEZING    esomeprazole (NEXIUM) 20 MG delayed release capsule [Pharmacy Med Name: Esomeprazole Magnesium 20 MG Oral Capsule Delayed Release] 90 capsule 0     Sig: Take 1 capsule by mouth once daily    magnesium Oxide 500 MG TABS [Pharmacy Med Name: Magnesium Oxide -Mg Supplement 500 MG Oral Tablet] 60 tablet 0     Sig: Take 1 tablet by mouth once daily       Medication is on current medication list Yes    Dosage and directions were verified? Yes    Quantity verified: 30 day supply     Pharmacy Verified?  Yes    Last Appointment:  12/13/2024    Future appts:  Future Appointments   Date Time Provider Department Center   1/7/2025  3:20 PM Arnol Cummins APRN - CNP [facility] NEURO Neurology -        (If no appt send self scheduling link. .REFILLAPPT)  Scheduling request sent?     [] Yes  [x] No    Does patient need updated?  [] Yes  [x] No

## 2025-01-08 DIAGNOSIS — G43.909 MIGRAINE WITHOUT STATUS MIGRAINOSUS, NOT INTRACTABLE, UNSPECIFIED MIGRAINE TYPE: ICD-10-CM

## 2025-01-09 RX ORDER — RIBOFLAVIN (VITAMIN B2) 100 MG
100 TABLET ORAL DAILY
Qty: 60 TABLET | Refills: 0 | Status: SHIPPED | OUTPATIENT
Start: 2025-01-09

## 2025-01-09 NOTE — TELEPHONE ENCOUNTER
Name of Medication(s) Requested:  Requested Prescriptions     Pending Prescriptions Disp Refills    vitamin B-2 (RIBOFLAVIN) 100 MG TABS tablet [Pharmacy Med Name: Vitamin B-2 100 MG Oral Tablet] 60 tablet 0     Sig: Take 1 tablet by mouth once daily       Medication is on current medication list Yes    Dosage and directions were verified? Yes    Quantity verified: 60 day supply     Pharmacy Verified?  Yes    Last Appointment:  12/13/2024    Future appts:  Future Appointments   Date Time Provider Department Center   1/23/2025  7:30 AM [facility] MRI [facility] 1 SE MRI [facility] Rad/Car   7/28/2025  2:40 PM Arnol Cummins, JOSSE - CNP [facility] NEURO Neurology -        (If no appt send self scheduling link. .REFILLAPPT)  Scheduling request sent?     [] Yes  [x] No    Does patient need updated?  [] Yes  [x] No

## 2025-01-21 ENCOUNTER — OFFICE VISIT (OUTPATIENT)
Dept: PRIMARY CARE CLINIC | Age: 35
End: 2025-01-21
Payer: COMMERCIAL

## 2025-01-21 VITALS
WEIGHT: 231 LBS | HEART RATE: 100 BPM | RESPIRATION RATE: 16 BRPM | BODY MASS INDEX: 34.11 KG/M2 | OXYGEN SATURATION: 97 % | DIASTOLIC BLOOD PRESSURE: 75 MMHG | SYSTOLIC BLOOD PRESSURE: 125 MMHG | TEMPERATURE: 97.6 F

## 2025-01-21 DIAGNOSIS — H69.93 DYSFUNCTION OF BOTH EUSTACHIAN TUBES: ICD-10-CM

## 2025-01-21 DIAGNOSIS — R05.9 COUGH, UNSPECIFIED TYPE: ICD-10-CM

## 2025-01-21 DIAGNOSIS — J45.21 MILD INTERMITTENT ASTHMA WITH ACUTE EXACERBATION: Primary | ICD-10-CM

## 2025-01-21 LAB
INFLUENZA A ANTIBODY: NORMAL
INFLUENZA B ANTIBODY: NORMAL
S PYO AG THROAT QL: NORMAL

## 2025-01-21 PROCEDURE — G8417 CALC BMI ABV UP PARAM F/U: HCPCS

## 2025-01-21 PROCEDURE — 99214 OFFICE O/P EST MOD 30 MIN: CPT

## 2025-01-21 PROCEDURE — 4004F PT TOBACCO SCREEN RCVD TLK: CPT

## 2025-01-21 PROCEDURE — 87880 STREP A ASSAY W/OPTIC: CPT

## 2025-01-21 PROCEDURE — 87804 INFLUENZA ASSAY W/OPTIC: CPT

## 2025-01-21 PROCEDURE — G8427 DOCREV CUR MEDS BY ELIG CLIN: HCPCS

## 2025-01-21 RX ORDER — BENZONATATE 100 MG/1
100 CAPSULE ORAL 3 TIMES DAILY PRN
Qty: 21 CAPSULE | Refills: 0 | Status: SHIPPED | OUTPATIENT
Start: 2025-01-21 | End: 2025-01-28

## 2025-01-21 RX ORDER — PREDNISONE 10 MG/1
TABLET ORAL
Qty: 18 TABLET | Refills: 0 | Status: SHIPPED | OUTPATIENT
Start: 2025-01-21 | End: 2025-01-29

## 2025-01-21 NOTE — PROGRESS NOTES
Chief Complaint       Sore Throat (All symptoms plus headache for the past couple days now. Didn't take anything )    History of Present Illness   Source of history provided by:  patient.    History of Present Illness  The patient presents for evaluation of upper respiratory infection.    She began experiencing symptoms indicative of an upper respiratory infection approximately one week ago. These symptoms include a severe sore throat, intense headache, generalized body heat, weakness, rhinorrhea, and a persistent cough. She also reports difficulty in breathing. She has not sought any pharmacological relief for these symptoms. She has been in close contact with an individual who was previously ill but is now showing signs of improvement. Additionally, she reports mild discomfort in her ears, with the left ear being more affected than the right. She has no known allergies to prednisone and has previously been treated with this medication.    She has a documented history of asthma and utilizes inhalers as part of her management plan.    ALLERGIES  The patient has no known allergies.    MEDICATIONS  Current: inhalers  Past: prednisone    All other ROS negative unless otherwise stated in HPI.      ROS    Unless otherwise stated in this report or unable to obtain because of the patient's clinical or mental status as evidenced by the medical record, this patients's positive and negative responses for Review of Systems, constitutional, psych, eyes, ENT, cardiovascular, respiratory, gastrointestinal, neurological, genitourinary, musculoskeletal, integument systems and systems related to the presenting problem are either stated in the preceding or were not pertinent or were negative for the symptoms and/or complaints related to the medical problem.      Physical Exam         VS:  /75   Pulse 100   Temp 97.6 °F (36.4 °C) (Oral)   Resp 16   Wt 104.8 kg (231 lb)   SpO2 97%   BMI 34.11 kg/m²    Oxygen Saturation
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
## 2025-02-03 NOTE — TELEPHONE ENCOUNTER
Name of Medication(s) Requested:  Requested Prescriptions     Pending Prescriptions Disp Refills    cetirizine (ZYRTEC) 10 MG tablet [Pharmacy Med Name: Cetirizine HCl 10 MG Oral Tablet] 30 tablet 0     Sig: Take 1 tablet by mouth once daily       Medication is on current medication list Yes    Dosage and directions were verified? Yes    Quantity verified: 30 day supply     Pharmacy Verified?  Yes    Last Appointment:  12/13/2024    Future appts:  Future Appointments   Date Time Provider Department Center   2/5/2025  6:00 PM [facility] [place] [place] [facility]Car   7/28/2025  2:40 PM Arnol Cummins APRN - CNP [facility] NEURO Neurology -        (If no appt send self scheduling link. .REFILLAPPT)  Scheduling request sent?     [] Yes  [x] No    Does patient need updated?  [] Yes  [x] No

## 2025-02-04 RX ORDER — CETIRIZINE HYDROCHLORIDE 10 MG/1
10 TABLET ORAL DAILY
Qty: 30 TABLET | Refills: 0 | Status: SHIPPED | OUTPATIENT
Start: 2025-02-04

## 2025-02-05 ENCOUNTER — HOSPITAL ENCOUNTER (OUTPATIENT)
Dept: MRI IMAGING | Age: 35
Discharge: HOME | End: 2025-02-07
Payer: COMMERCIAL

## 2025-02-05 DIAGNOSIS — M48.061 LUMBAR FORAMINAL STENOSIS: ICD-10-CM

## 2025-02-05 DIAGNOSIS — M54.17 L-S RADICULOPATHY: ICD-10-CM

## 2025-02-05 PROCEDURE — 72148 MRI LUMBAR SPINE W/O DYE: CPT

## 2025-02-06 ENCOUNTER — TELEPHONE (OUTPATIENT)
Dept: NEUROLOGY | Age: 35
End: 2025-02-06

## 2025-02-06 NOTE — TELEPHONE ENCOUNTER
----- Message from JOSSE Jackson - CNP sent at 2/6/2025  8:58 AM EST -----  Please let her know that the MRI of her lumbar spine showed no major narrowing in her low spine, but evidence of stress fractures on both sides of her L4 region which can cause pain. I recommend PT and re-establishing with pain management to discuss injections to help with the pain

## 2025-02-17 DIAGNOSIS — Z76.0 MEDICATION REFILL: ICD-10-CM

## 2025-02-18 RX ORDER — DICYCLOMINE HCL 20 MG
TABLET ORAL
Qty: 120 TABLET | Refills: 0 | Status: SHIPPED | OUTPATIENT
Start: 2025-02-18

## 2025-02-18 NOTE — TELEPHONE ENCOUNTER
Name of Medication(s) Requested:  Requested Prescriptions     Pending Prescriptions Disp Refills    dicyclomine (BENTYL) 20 MG tablet [Pharmacy Med Name: Dicyclomine HCl 20 MG Oral Tablet] 120 tablet 0     Sig: TAKE 2 TABLETS BY MOUTH 4 TIMES DAILY AS NEEDED       Medication is on current medication list Yes    Dosage and directions were verified? Yes    Quantity verified: 90 day supply     Pharmacy Verified?  Yes    Last Appointment:  12/13/2024    Future appts:  Future Appointments   Date Time Provider Department Center   7/28/2025  2:40 PM Arnol Cummins APRN - CNP [facility] NEURO Neurology -        (If no appt send self scheduling link. .REFILLAPPT)  Scheduling request sent?     [] Yes  [x] No    Does patient need updated?  [] Yes  [x] No

## 2025-02-19 ENCOUNTER — OFFICE VISIT (OUTPATIENT)
Dept: FAMILY MEDICINE CLINIC | Age: 35
End: 2025-02-19
Payer: COMMERCIAL

## 2025-02-19 VITALS
BODY MASS INDEX: 41.77 KG/M2 | DIASTOLIC BLOOD PRESSURE: 81 MMHG | RESPIRATION RATE: 18 BRPM | HEART RATE: 98 BPM | SYSTOLIC BLOOD PRESSURE: 123 MMHG | TEMPERATURE: 97 F | WEIGHT: 282 LBS | HEIGHT: 69 IN | OXYGEN SATURATION: 97 %

## 2025-02-19 DIAGNOSIS — B35.4 TINEA CORPORIS: ICD-10-CM

## 2025-02-19 DIAGNOSIS — R35.0 URINE FREQUENCY: Primary | ICD-10-CM

## 2025-02-19 DIAGNOSIS — L30.4 INTERTRIGO: ICD-10-CM

## 2025-02-19 LAB
BACTERIA: ABNORMAL
BILIRUBIN, POC: NEGATIVE
BILIRUBIN, URINE: NEGATIVE
BLOOD URINE, POC: NORMAL
CLARITY, POC: NORMAL
COLOR, POC: NORMAL
COLOR, UA: YELLOW
GLUCOSE URINE, POC: NEGATIVE MG/DL
GLUCOSE URINE: NEGATIVE MG/DL
KETONES, POC: NEGATIVE MG/DL
KETONES, URINE: NEGATIVE MG/DL
LEUKOCYTE EST, POC: NORMAL
LEUKOCYTE ESTERASE, URINE: NEGATIVE
NITRITE, POC: NEGATIVE
NITRITE, URINE: NEGATIVE
PH, POC: 5.5
PH, URINE: 6 (ref 5–8)
PROTEIN UA: NEGATIVE MG/DL
PROTEIN, POC: NEGATIVE MG/DL
RBC UA: ABNORMAL /HPF
SPECIFIC GRAVITY UA: >1.03 (ref 1–1.03)
SPECIFIC GRAVITY, POC: >=1.03
TURBIDITY: ABNORMAL
URINE HGB: NEGATIVE
UROBILINOGEN, POC: NORMAL MG/DL
UROBILINOGEN, URINE: 0.2 EU/DL (ref 0–1)
WBC UA: ABNORMAL /HPF

## 2025-02-19 PROCEDURE — G8417 CALC BMI ABV UP PARAM F/U: HCPCS

## 2025-02-19 PROCEDURE — 81002 URINALYSIS NONAUTO W/O SCOPE: CPT

## 2025-02-19 PROCEDURE — 99214 OFFICE O/P EST MOD 30 MIN: CPT

## 2025-02-19 PROCEDURE — G8428 CUR MEDS NOT DOCUMENT: HCPCS

## 2025-02-19 PROCEDURE — 4004F PT TOBACCO SCREEN RCVD TLK: CPT

## 2025-02-19 RX ORDER — KETOCONAZOLE 20 MG/G
CREAM TOPICAL
Qty: 30 G | Refills: 1 | Status: SHIPPED | OUTPATIENT
Start: 2025-02-19

## 2025-02-19 SDOH — ECONOMIC STABILITY: FOOD INSECURITY: WITHIN THE PAST 12 MONTHS, THE FOOD YOU BOUGHT JUST DIDN'T LAST AND YOU DIDN'T HAVE MONEY TO GET MORE.: NEVER TRUE

## 2025-02-19 SDOH — ECONOMIC STABILITY: FOOD INSECURITY: WITHIN THE PAST 12 MONTHS, YOU WORRIED THAT YOUR FOOD WOULD RUN OUT BEFORE YOU GOT MONEY TO BUY MORE.: NEVER TRUE

## 2025-02-19 ASSESSMENT — PATIENT HEALTH QUESTIONNAIRE - PHQ9
SUM OF ALL RESPONSES TO PHQ9 QUESTIONS 1 & 2: 0
2. FEELING DOWN, DEPRESSED OR HOPELESS: NOT AT ALL
SUM OF ALL RESPONSES TO PHQ QUESTIONS 1-9: 3
6. FEELING BAD ABOUT YOURSELF - OR THAT YOU ARE A FAILURE OR HAVE LET YOURSELF OR YOUR FAMILY DOWN: NOT AT ALL
7. TROUBLE CONCENTRATING ON THINGS, SUCH AS READING THE NEWSPAPER OR WATCHING TELEVISION: NOT AT ALL
10. IF YOU CHECKED OFF ANY PROBLEMS, HOW DIFFICULT HAVE THESE PROBLEMS MADE IT FOR YOU TO DO YOUR WORK, TAKE CARE OF THINGS AT HOME, OR GET ALONG WITH OTHER PEOPLE: NOT DIFFICULT AT ALL
SUM OF ALL RESPONSES TO PHQ QUESTIONS 1-9: 3
4. FEELING TIRED OR HAVING LITTLE ENERGY: NEARLY EVERY DAY
5. POOR APPETITE OR OVEREATING: NOT AT ALL
1. LITTLE INTEREST OR PLEASURE IN DOING THINGS: NOT AT ALL
SUM OF ALL RESPONSES TO PHQ QUESTIONS 1-9: 3
9. THOUGHTS THAT YOU WOULD BE BETTER OFF DEAD, OR OF HURTING YOURSELF: NOT AT ALL
3. TROUBLE FALLING OR STAYING ASLEEP: NOT AT ALL
8. MOVING OR SPEAKING SO SLOWLY THAT OTHER PEOPLE COULD HAVE NOTICED. OR THE OPPOSITE, BEING SO FIGETY OR RESTLESS THAT YOU HAVE BEEN MOVING AROUND A LOT MORE THAN USUAL: NOT AT ALL
SUM OF ALL RESPONSES TO PHQ QUESTIONS 1-9: 3

## 2025-02-19 NOTE — PROGRESS NOTES
S: 35 y.o. female presents today for:   Rash left breast. Concern for ringworm. Red, dry, pruritic,  No change in symmetry, borders, color or diameter. No history of skin cancer. +contact.   Low back pain- 1 week ago, concern for UTI due to dysuria and frequency. Not SA. Denies fever, chills, nausea or vomiting.    O: VS: /81 (Site: Right Upper Arm, Position: Sitting, Cuff Size: Large Adult)   Pulse 98   Temp 97 °F (36.1 °C) (Temporal)   Resp 18   Ht 1.753 m (5' 9\")   Wt 127.9 kg (282 lb)   SpO2 97%   BMI 41.64 kg/m²   AAO/NAD, appropriate affect for mood  CV:  RRR, no murmur  Resp: CTAB  Skin:     Impression/Plan:   1) intertrigo/tines corporis- ketoconazole  2) dysuria- culture     Attending Physician Statement  I have discussed the case, including pertinent history and exam findings with the resident. I also have seen the patient and performed key portions of the examination.  I agree with the documented assessment and plan.      Rosalva Mclain, DO
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
## 2025-02-20 LAB
CULTURE: NORMAL
SPECIMEN DESCRIPTION: NORMAL

## 2025-02-24 ASSESSMENT — ENCOUNTER SYMPTOMS
BACK PAIN: 1
EYES NEGATIVE: 1
RESPIRATORY NEGATIVE: 1

## 2025-03-17 DIAGNOSIS — F32.A DEPRESSION, UNSPECIFIED DEPRESSION TYPE: ICD-10-CM

## 2025-03-17 DIAGNOSIS — G43.909 MIGRAINE WITHOUT STATUS MIGRAINOSUS, NOT INTRACTABLE, UNSPECIFIED MIGRAINE TYPE: ICD-10-CM

## 2025-03-17 NOTE — TELEPHONE ENCOUNTER
Name of Medication(s) Requested:  Requested Prescriptions     Pending Prescriptions Disp Refills    escitalopram (LEXAPRO) 20 MG tablet [Pharmacy Med Name: Escitalopram Oxalate 20 MG Oral Tablet] 60 tablet 0     Sig: Take 1 tablet by mouth once daily    vitamin B-2 (RIBOFLAVIN) 100 MG TABS tablet [Pharmacy Med Name: Vitamin B-2 100 MG Oral Tablet] 60 tablet 0     Sig: Take 1 tablet by mouth once daily       Medication is on current medication list Yes    Dosage and directions were verified? Yes    Quantity verified: 30 day supply     Pharmacy Verified?  Yes    Last Appointment:  2/19/2025    Future appts:  Future Appointments   Date Time Provider Department Center   5/20/2025  1:20 PM Maria Isabel Toscano MD [facility] DEP   7/28/2025  2:40 PM Arnol Cummins APRN - CNP [facility] NEURO Neurology -        (If no appt send self scheduling link. .REFILLAPPT)  Scheduling request sent?     [] Yes  [x] No    Does patient need updated?  [] Yes  [x] No

## 2025-03-18 DIAGNOSIS — Z76.0 MEDICATION REFILL: ICD-10-CM

## 2025-03-19 RX ORDER — DICYCLOMINE HCL 20 MG
TABLET ORAL
Qty: 120 TABLET | Refills: 0 | Status: SHIPPED | OUTPATIENT
Start: 2025-03-19

## 2025-03-19 RX ORDER — ESCITALOPRAM OXALATE 20 MG/1
20 TABLET ORAL DAILY
Qty: 60 TABLET | Refills: 0 | Status: SHIPPED | OUTPATIENT
Start: 2025-03-19

## 2025-03-19 RX ORDER — RIBOFLAVIN (VITAMIN B2) 100 MG
100 TABLET ORAL DAILY
Qty: 60 TABLET | Refills: 0 | Status: SHIPPED | OUTPATIENT
Start: 2025-03-19

## 2025-03-19 NOTE — TELEPHONE ENCOUNTER
Name of Medication(s) Requested:  Requested Prescriptions     Pending Prescriptions Disp Refills    dicyclomine (BENTYL) 20 MG tablet [Pharmacy Med Name: Dicyclomine HCl 20 MG Oral Tablet] 120 tablet 0     Sig: TAKE 2 TABLETS BY MOUTH 4 TIMES DAILY AS NEEDED       Medication is on current medication list Yes    Dosage and directions were verified? Yes    Quantity verified: 30 day supply     Pharmacy Verified?  Yes    Last Appointment:  2/19/2025    Future appts:  Future Appointments   Date Time Provider Department Center   5/20/2025  1:20 PM Maria Isabel Toscano MD [facility]   7/28/2025  2:40 PM Arnol Cummins APRN - CNP [facility] NEURO Neurology -        (If no appt send self scheduling link. .REFILLAPPT)  Scheduling request sent?     [] Yes  [x] No    Does patient need updated?  [] Yes  [x] No

## 2025-03-31 DIAGNOSIS — J45.20 MILD INTERMITTENT ASTHMA WITHOUT COMPLICATION: ICD-10-CM

## 2025-03-31 RX ORDER — MONTELUKAST SODIUM 10 MG/1
10 TABLET ORAL NIGHTLY
Qty: 30 TABLET | Refills: 2 | Status: SHIPPED | OUTPATIENT
Start: 2025-03-31

## 2025-03-31 NOTE — TELEPHONE ENCOUNTER
Name of Medication(s) Requested:  Requested Prescriptions     Pending Prescriptions Disp Refills    montelukast (SINGULAIR) 10 MG tablet [Pharmacy Med Name: Montelukast Sodium 10 MG Oral Tablet] 30 tablet 0     Sig: Take 1 tablet by mouth nightly       Medication is on current medication list Yes    Dosage and directions were verified? Yes    Quantity verified: 30 day supply     Pharmacy Verified?  Yes    Last Appointment:  2/19/2025    Future appts:  Future Appointments   Date Time Provider Department Center   5/20/2025  1:20 PM Maria Isabel Toscano MD [facility]   7/28/2025  2:40 PM Arnol Cummins APRN - CNP [facility] NEURO Neurology -        (If no appt send self scheduling link. .REFILLAPPT)  Scheduling request sent?     [] Yes  [x] No    Does patient need updated?  [] Yes  [x] No

## 2025-04-13 DIAGNOSIS — L30.4 INTERTRIGO: ICD-10-CM

## 2025-04-13 DIAGNOSIS — B35.4 TINEA CORPORIS: ICD-10-CM

## 2025-04-14 NOTE — TELEPHONE ENCOUNTER
Name of Medication(s) Requested:  Requested Prescriptions     Pending Prescriptions Disp Refills    ketoconazole (NIZORAL) 2 % cream [Pharmacy Med Name: Ketoconazole 2 % External Cream] 30 g 0     Sig: APPLY  CREAM TOPICALLY ONCE DAILY       Medication is on current medication list Yes    Dosage and directions were verified? Yes    Quantity verified: 30 day supply     Pharmacy Verified?  Yes    Last Appointment:  2/19/2025    Future appts:  Future Appointments   Date Time Provider Department Center   5/20/2025  1:20 PM Maria Isabel Toscano MD [facility]   7/28/2025  2:40 PM Arnol Cummins APRN - CNP [facility] NEURO Neurology -        (If no appt send self scheduling link. .REFILLAPPT)  Scheduling request sent?     [] Yes  [x] No    Does patient need updated?  [] Yes  [x] No

## 2025-04-15 DIAGNOSIS — Z76.0 MEDICATION REFILL: ICD-10-CM

## 2025-04-15 DIAGNOSIS — G43.909 MIGRAINE WITHOUT STATUS MIGRAINOSUS, NOT INTRACTABLE, UNSPECIFIED MIGRAINE TYPE: ICD-10-CM
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
## 2025-04-15 DIAGNOSIS — F32.A DEPRESSION, UNSPECIFIED DEPRESSION TYPE: ICD-10-CM

## 2025-04-15 RX ORDER — KETOCONAZOLE 20 MG/G
CREAM TOPICAL
Qty: 30 G | Refills: 0 | Status: SHIPPED | OUTPATIENT
Start: 2025-04-15

## 2025-04-16 RX ORDER — RIBOFLAVIN (VITAMIN B2) 100 MG
100 TABLET ORAL DAILY
Qty: 60 TABLET | Refills: 0 | OUTPATIENT
Start: 2025-04-16

## 2025-04-16 RX ORDER — DICYCLOMINE HCL 20 MG
TABLET ORAL
Qty: 120 TABLET | Refills: 0 | OUTPATIENT
Start: 2025-04-16

## 2025-04-16 RX ORDER — ESCITALOPRAM OXALATE 20 MG/1
20 TABLET ORAL DAILY
Qty: 60 TABLET | Refills: 0 | OUTPATIENT
Start: 2025-04-16

## 2025-04-16 RX ORDER — DICYCLOMINE HCL 20 MG
TABLET ORAL
Qty: 120 TABLET | Refills: 0 | Status: SHIPPED | OUTPATIENT
Start: 2025-04-16

## 2025-04-16 RX ORDER — CETIRIZINE HYDROCHLORIDE 10 MG/1
10 TABLET ORAL DAILY
Qty: 30 TABLET | Refills: 0 | Status: SHIPPED | OUTPATIENT
Start: 2025-04-16

## 2025-04-16 NOTE — TELEPHONE ENCOUNTER
Name of Medication(s) Requested:  Requested Prescriptions     Pending Prescriptions Disp Refills    cetirizine (ZYRTEC) 10 MG tablet [Pharmacy Med Name: Cetirizine HCl 10 MG Oral Tablet] 30 tablet 0     Sig: Take 1 tablet by mouth once daily    dicyclomine (BENTYL) 20 MG tablet [Pharmacy Med Name: Dicyclomine HCl 20 MG Oral Tablet] 120 tablet 0     Sig: TAKE 2 TABLETS BY MOUTH 4 TIMES DAILY AS NEEDED       Medication is on current medication list Yes    Dosage and directions were verified? Yes    Quantity verified: 30 day supply     Pharmacy Verified?  Yes    Last Appointment:  2/19/2025    Future appts:  Future Appointments   Date Time Provider Department Center   5/20/2025  1:20 PM Maria Isabel Toscano MD [facility]   7/28/2025  2:40 PM Arnol Cummins APRN - CNP [place] NEURO Neurology -        (If no appt send self scheduling link. .REFILLAPPT)  Scheduling request sent?     [] Yes  [x] No    Does patient need updated?  [] Yes  [x] No    
PRINCIPAL DISCHARGE DIAGNOSIS  Diagnosis: Rapid atrial fibrillation  Assessment and Plan of Treatment: You were admitted to the hospital for palpitations and elevated heart rate noted on your Apple Watch. You were found to have new onset atrial fibrillation with rapid ventricular response. It is a bit unclear as to why you began to have this irregular rhythm. It could have been from urinary tract infection (see below) or less likely, your Brukinsa (as there are reports of causing afib in ~1% of patients. Your Brukinsa was held. You were placed on medication to slow your heart rate to a more normal pace. You underwent an echocardiogram that showed normal heart squeezing function and no significant heart valve problems. No sign of myocardial ischemia. Normal thyroid function. You underwent a transesophageal echocardiogram with cardioversion which resulted in return to normal sinus rhythm, rate normal. Continued on low-dose metoprolol (in place of your lisinopril-HCTZ). Continue on Eliquis for stroke risk-reduction. Stable for discharge home. Please follow up with your primary care provider Dr. Melo Huizar and a cardiologist in [place].      SECONDARY DISCHARGE DIAGNOSES  Diagnosis: Abnormal urinalysis  Assessment and Plan of Treatment: You had an abnormal urinalysis that could suggest infection but was not definitive. A urine culture remains in-process. You have been receiving an intravenous antibiotic empirically. For completeness, given the isolated episode of hematuria (blood in the urine) prior to admission, we sent off urine cytology and obtained a renal bladder US. Cytology is pending and the sonogram was unremarkable. Continue Ceftin 500mg BID for 5 more days and follow up with your urologist in [place], Dr. Rick Hawkins    Diagnosis: NHL (non-Hodgkin's lymphoma)  Assessment and Plan of Treatment: Holding off on the Brukinsa until you follow up with your oncologist given the possibility albeit remote that the medication contributed to his atrial fibrillation.

## 2025-05-11 DIAGNOSIS — L30.4 INTERTRIGO: ICD-10-CM

## 2025-05-11 DIAGNOSIS — B35.4 TINEA CORPORIS: ICD-10-CM

## 2025-05-12 DIAGNOSIS — J45.20 MILD INTERMITTENT ASTHMA WITHOUT COMPLICATION: ICD-10-CM

## 2025-05-12 NOTE — TELEPHONE ENCOUNTER
Name of Medication(s) Requested:  Requested Prescriptions     Pending Prescriptions Disp Refills    ketoconazole (NIZORAL) 2 % cream [Pharmacy Med Name: Ketoconazole 2 % External Cream] 30 g 0     Sig: APPLY  CREAM TOPICALLY ONCE DAILY       Medication is on current medication list Yes    Dosage and directions were verified? Yes    Quantity verified: 90 day supply     Pharmacy Verified?  Yes    Last Appointment:  2/19/2025    Future appts:  Future Appointments   Date Time Provider Department Center   5/20/2025  1:20 PM Maria Isabel Toscano MD [facility]   7/28/2025  2:40 PM Arnol Cummins APRN - CNP [facility] NEURO Neurology -        (If no appt send self scheduling link. .REFILLAPPT)  Scheduling request sent?     [] Yes  [x] No    Does patient need updated?  [] Yes  [x] No

## 2025-05-12 NOTE — TELEPHONE ENCOUNTER
Name of Medication(s) Requested:  Requested Prescriptions     Pending Prescriptions Disp Refills    albuterol sulfate HFA (PROVENTIL;VENTOLIN;PROAIR) 108 (90 Base) MCG/ACT inhaler [Pharmacy Med Name: Albuterol Sulfate  (90 Base) MCG/ACT Inhalation Aerosol Solution] 9 g 0     Sig: INHALE 2 PUFFS BY MOUTH EVERY 6 HOURS AS NEEDED FOR WHEEZING       Medication is on current medication list Yes    Dosage and directions were verified? Yes    Quantity verified: 30 day supply     Pharmacy Verified?  Yes    Last Appointment:  2/19/2025    Future appts:  Future Appointments   Date Time Provider Department Center   5/20/2025  1:20 PM Maria Isabel Toscano MD [facility]   7/28/2025  2:40 PM Arnol Cummins APRN - CNP [facility] NEURO Neurology -        (If no appt send self scheduling link. .REFILLAPPT)  Scheduling request sent?     [] Yes  [x] No    Does patient need updated?  [] Yes  [x] No

## 2025-05-13 DIAGNOSIS — F32.A DEPRESSION, UNSPECIFIED DEPRESSION TYPE: ICD-10-CM

## 2025-05-13 DIAGNOSIS — G43.909 MIGRAINE WITHOUT STATUS MIGRAINOSUS, NOT INTRACTABLE, UNSPECIFIED MIGRAINE TYPE: ICD-10-CM

## 2025-05-13 RX ORDER — RIBOFLAVIN (VITAMIN B2) 100 MG
100 TABLET ORAL DAILY
Qty: 60 TABLET | Refills: 2 | Status: SHIPPED | OUTPATIENT
Start: 2025-05-13

## 2025-05-13 RX ORDER — ESCITALOPRAM OXALATE 20 MG/1
20 TABLET ORAL DAILY
Qty: 60 TABLET | Refills: 2 | Status: SHIPPED | OUTPATIENT
Start: 2025-05-13

## 2025-05-13 RX ORDER — KETOCONAZOLE 20 MG/G
CREAM TOPICAL
Qty: 30 G | Refills: 0 | Status: SHIPPED | OUTPATIENT
Start: 2025-05-13

## 2025-05-13 RX ORDER — ALBUTEROL SULFATE 90 UG/1
2 INHALANT RESPIRATORY (INHALATION) EVERY 6 HOURS PRN
Qty: 9 G | Refills: 2 | Status: SHIPPED | OUTPATIENT
Start: 2025-05-13

## 2025-05-13 NOTE — TELEPHONE ENCOUNTER
Name of Medication(s) Requested:  Requested Prescriptions     Pending Prescriptions Disp Refills    escitalopram (LEXAPRO) 20 MG tablet [Pharmacy Med Name: Escitalopram Oxalate 20 MG Oral Tablet] 60 tablet 0     Sig: Take 1 tablet by mouth once daily    vitamin B-2 (RIBOFLAVIN) 100 MG TABS tablet [Pharmacy Med Name: Vitamin B-2 100 MG Oral Tablet] 60 tablet 0     Sig: Take 1 tablet by mouth once daily       Medication is on current medication list Yes    Dosage and directions were verified? Yes    Quantity verified: 30 day supply     Pharmacy Verified?  Yes    Last Appointment:  2/19/2025    Future appts:  Future Appointments   Date Time Provider Department Center   5/20/2025  1:20 PM Maria Isabel Toscano MD [facility] DEP   7/28/2025  2:40 PM Arnol Cummins APRN - CNP [facility] NEURO Neurology -        (If no appt send self scheduling link. .REFILLAPPT)  Scheduling request sent?     [] Yes  [x] No    Does patient need updated?  [] Yes  [x] No

## 2025-05-19 DIAGNOSIS — J45.20 MILD INTERMITTENT ASTHMA WITHOUT COMPLICATION: ICD-10-CM

## 2025-05-19 DIAGNOSIS — G43.909 MIGRAINE WITHOUT STATUS MIGRAINOSUS, NOT INTRACTABLE, UNSPECIFIED MIGRAINE TYPE: ICD-10-CM

## 2025-05-19 DIAGNOSIS — Z76.0 MEDICATION REFILL: ICD-10-CM

## 2025-05-19 NOTE — TELEPHONE ENCOUNTER
Name of Medication(s) Requested:  Requested Prescriptions     Pending Prescriptions Disp Refills    dicyclomine (BENTYL) 20 MG tablet 120 tablet 0     Sig: TAKE 2 TABLETS BY MOUTH 4 TIMES DAILY AS NEEDED       Medication is on current medication list Yes    Dosage and directions were verified? Yes    Quantity verified: 30 day supply     Pharmacy Verified?  Yes    Last Appointment:  2/19/2025    Future appts:  Future Appointments   Date Time Provider Department Center   7/25/2025  3:15 PM Maria Isabel Toscano MD [facility]   7/28/2025  3:20 PM Arnol Cummins APRN - CNP [facility] NEURO Neurology -        (If no appt send self scheduling link. .REFILLAPPT)  Scheduling request sent?     [] Yes  [x] No    Does patient need updated?  [] Yes  [x] No

## 2025-05-19 NOTE — TELEPHONE ENCOUNTER
Name of Medication(s) Requested:  Requested Prescriptions     Pending Prescriptions Disp Refills    montelukast (SINGULAIR) 10 MG tablet 30 tablet 2     Sig: Take 1 tablet by mouth nightly       Medication is on current medication list Yes    Dosage and directions were verified? Yes    Quantity verified: 30 day supply     Pharmacy Verified?  No    Last Appointment:  2/19/2025    Future appts:  Future Appointments   Date Time Provider Department Center   7/25/2025  3:15 PM Maria Isabel Toscano MD [facility]   7/28/2025  3:20 PM Arnol Cummins APRN - CNP [facility] NEURO Neurology -        (If no appt send self scheduling link. .REFILLAPPT)  Scheduling request sent?     [] Yes  [x] No    Does patient need updated?  [] Yes  [x] No

## 2025-05-19 NOTE — TELEPHONE ENCOUNTER
Name of Medication(s) Requested:  Requested Prescriptions     Pending Prescriptions Disp Refills    dicyclomine (BENTYL) 20 MG tablet [Pharmacy Med Name: Dicyclomine HCl 20 MG Oral Tablet] 120 tablet 0     Sig: TAKE 2 TABLETS BY MOUTH 4 TIMES DAILY AS NEEDED       Medication is on current medication list Yes    Dosage and directions were verified? Yes    Quantity verified: 30 day supply     Pharmacy Verified?  Yes    Last Appointment:  2/19/2025    Future appts:  Future Appointments   Date Time Provider Department Center   7/25/2025  3:15 PM Maria Isabel Toscano MD [facility]   7/28/2025  3:20 PM Arnol Cummins APRN - CNP [facility] NEURO Neurology -        (If no appt send self scheduling link. .REFILLAPPT)  Scheduling request sent?     [] Yes  [x] No    Does patient need updated?  [] Yes  [x] No

## 2025-05-20 RX ORDER — DICYCLOMINE HCL 20 MG
TABLET ORAL
Qty: 120 TABLET | Refills: 0 | OUTPATIENT
Start: 2025-05-20

## 2025-05-20 RX ORDER — MONTELUKAST SODIUM 10 MG/1
10 TABLET ORAL NIGHTLY
Qty: 60 TABLET | Refills: 2 | Status: SHIPPED | OUTPATIENT
Start: 2025-05-20

## 2025-05-20 RX ORDER — DICYCLOMINE HCL 20 MG
TABLET ORAL
Qty: 120 TABLET | Refills: 0 | Status: SHIPPED | OUTPATIENT
Start: 2025-05-20

## 2025-05-20 RX ORDER — RIBOFLAVIN (VITAMIN B2) 100 MG
100 TABLET ORAL DAILY
Qty: 60 TABLET | Refills: 2 | OUTPATIENT
Start: 2025-05-20

## 2025-05-22 DIAGNOSIS — K21.9 GASTROESOPHAGEAL REFLUX DISEASE, UNSPECIFIED WHETHER ESOPHAGITIS PRESENT: ICD-10-CM

## 2025-05-22 DIAGNOSIS — G43.909 MIGRAINE WITHOUT STATUS MIGRAINOSUS, NOT INTRACTABLE, UNSPECIFIED MIGRAINE TYPE: ICD-10-CM

## 2025-05-23 RX ORDER — RIBOFLAVIN (VITAMIN B2) 100 MG
100 TABLET ORAL DAILY
Qty: 60 TABLET | Refills: 2 | OUTPATIENT
Start: 2025-05-23

## 2025-06-18 ENCOUNTER — OFFICE VISIT (OUTPATIENT)
Dept: OBGYN | Age: 35
End: 2025-06-18
Payer: COMMERCIAL

## 2025-06-18 VITALS
DIASTOLIC BLOOD PRESSURE: 64 MMHG | BODY MASS INDEX: 40.58 KG/M2 | SYSTOLIC BLOOD PRESSURE: 129 MMHG | HEART RATE: 84 BPM | WEIGHT: 274 LBS | HEIGHT: 69 IN

## 2025-06-18 DIAGNOSIS — B37.2 YEAST DERMATITIS: Primary | ICD-10-CM

## 2025-06-18 PROCEDURE — 99213 OFFICE O/P EST LOW 20 MIN: CPT | Performed by: OBSTETRICS & GYNECOLOGY

## 2025-06-18 PROCEDURE — 4004F PT TOBACCO SCREEN RCVD TLK: CPT | Performed by: OBSTETRICS & GYNECOLOGY

## 2025-06-18 PROCEDURE — G8427 DOCREV CUR MEDS BY ELIG CLIN: HCPCS | Performed by: OBSTETRICS & GYNECOLOGY

## 2025-06-18 PROCEDURE — G8417 CALC BMI ABV UP PARAM F/U: HCPCS | Performed by: OBSTETRICS & GYNECOLOGY

## 2025-06-18 RX ORDER — NYSTATIN AND TRIAMCINOLONE ACETONIDE 100000; 1 [USP'U]/G; MG/G
CREAM TOPICAL
Qty: 30 G | Refills: 1 | Status: SHIPPED | OUTPATIENT
Start: 2025-06-18

## 2025-06-18 RX ORDER — NYSTATIN 100000 [USP'U]/G
POWDER TOPICAL
Qty: 60 G | Refills: 2 | Status: SHIPPED | OUTPATIENT
Start: 2025-06-18

## 2025-06-18 NOTE — PROGRESS NOTES
Patient here today for concerns about breast rash and irritation   Discharge instructions have been discussed with the patient. Patient advised to call our office with any questions or concerns.   Voiced understanding.   
(NEURONTIN) 300 MG capsule, Take 2 capsules by mouth in the morning, at noon, and at bedtime for 360 days., Disp: 540 capsule, Rfl: 3    magnesium Oxide 500 MG TABS, Take 1 tablet by mouth once daily, Disp: 60 tablet, Rfl: 2    fluocinonide (LIDEX) 0.05 % cream, Apply 0.05 % topically, Disp: , Rfl:     levothyroxine (SYNTHROID) 75 MCG tablet, Take 1 tablet by mouth once daily, Disp: 60 tablet, Rfl: 3    budesonide-formoterol (BREYNA) 80-4.5 MCG/ACT AERO, INHALE 2 PUFFS BY MOUTH ONCE DAILY, Disp: 11 g, Rfl: 3    medroxyPROGESTERone (PROVERA) 10 MG tablet, Take 1 tablet by mouth daily, Disp: 30 tablet, Rfl: 11    tamsulosin (FLOMAX) 0.4 MG capsule, Take 1 capsule by mouth daily, Disp: , Rfl:     diclofenac sodium (VOLTAREN) 1 % GEL, apply 2 grams to affected area four times a day AS NEEDED FOR PAIN, Disp: 100 g, Rfl: 2    chlorhexidine (PERIDEX) 0.12 % solution, Swish and spit 15 mLs 2 times daily, Disp: , Rfl:     dantrolene (DANTRIUM) 25 MG capsule, TAKE 1 CAPSULE BY MOUTH IN THE MORNING, 1 WITH LUNCH, AND 2 NIGHTLY, Disp: 360 capsule, Rfl: 0    triamcinolone (KENALOG) 0.1 % cream, Apply topically, Disp: , Rfl:     ibuprofen (ADVIL;MOTRIN) 800 MG tablet, take 1 tablet by mouth twice a day if needed for pain (Patient not taking: Reported on 6/18/2025), Disp: 60 tablet, Rfl: 0     Allergies   Allergen Reactions    Latex Rash    Aspirin-Acetaminophen-Caffeine Shortness Of Breath    Dye [Iodides] Shortness Of Breath    Penicillins Anaphylaxis    Topamax [Topiramate] Nausea And Vomiting, Other (See Comments) and Shortness Of Breath    Tylenol With Codeine #3 [Acetaminophen-Codeine] Shortness Of Breath    Lactose Other (See Comments)     Usually just causes stomach pain, diarrhea if pt consumes too much of it.     Blueberry Flavoring Agent (Non-Screening)      ALLERGIC TO BLUEBERRY    Fish-Derived Products      SEAFOOD, ESPECIALLY SHRIMP    Iodine      Seafood allergy    Lavender Oil     Lidocaine Diarrhea, Itching and

## 2025-07-01 DIAGNOSIS — G43.909 MIGRAINE WITHOUT STATUS MIGRAINOSUS, NOT INTRACTABLE, UNSPECIFIED MIGRAINE TYPE: ICD-10-CM

## 2025-07-01 RX ORDER — BACLOFEN 20 MG
1 TABLET ORAL DAILY
Qty: 60 TABLET | Refills: 0 | Status: SHIPPED | OUTPATIENT
Start: 2025-07-01

## 2025-07-01 NOTE — TELEPHONE ENCOUNTER
Name of Medication(s) Requested:  Requested Prescriptions     Pending Prescriptions Disp Refills    magnesium Oxide 500 MG TABS [Pharmacy Med Name: Magnesium Oxide -Mg Supplement 500 MG Oral Tablet] 60 tablet 0     Sig: Take 1 tablet by mouth once daily       Medication is on current medication list Yes    Dosage and directions were verified? Yes    Quantity verified: 30 day supply     Pharmacy Verified?  Yes    Last Appointment:  2/19/2025    Future appts:  Future Appointments   Date Time Provider Department Center   7/25/2025  3:15 PM Maria Isabel Toscano MD [facility]   7/28/2025  3:20 PM Arnol Cummins APRN - CNP [facility] NEURO Neurology -        (If no appt send self scheduling link. .REFILLAPPT)  Scheduling request sent?     [] Yes  [x] No    Does patient need updated?  [] Yes  [x] No

## 2025-07-03 DIAGNOSIS — J45.20 MILD INTERMITTENT ASTHMA WITHOUT COMPLICATION: ICD-10-CM

## 2025-07-03 DIAGNOSIS — Z76.0 MEDICATION REFILL: ICD-10-CM

## 2025-07-03 DIAGNOSIS — E03.9 HYPOTHYROIDISM, UNSPECIFIED TYPE: ICD-10-CM

## 2025-07-03 RX ORDER — BUDESONIDE AND FORMOTEROL FUMARATE DIHYDRATE 80; 4.5 UG/1; UG/1
AEROSOL RESPIRATORY (INHALATION)
Qty: 11 G | Refills: 3 | Status: SHIPPED | OUTPATIENT
Start: 2025-07-03

## 2025-07-03 RX ORDER — DICYCLOMINE HCL 20 MG
TABLET ORAL
Qty: 120 TABLET | Refills: 0 | Status: SHIPPED | OUTPATIENT
Start: 2025-07-03

## 2025-07-03 RX ORDER — LEVOTHYROXINE SODIUM 75 UG/1
75 TABLET ORAL DAILY
Qty: 60 TABLET | Refills: 3 | Status: SHIPPED | OUTPATIENT
Start: 2025-07-03

## 2025-07-03 RX ORDER — ALBUTEROL SULFATE 90 UG/1
2 INHALANT RESPIRATORY (INHALATION) EVERY 6 HOURS PRN
Qty: 9 G | Refills: 2 | Status: SHIPPED | OUTPATIENT
Start: 2025-07-03

## 2025-07-03 NOTE — TELEPHONE ENCOUNTER
Name of Medication(s) Requested:  Requested Prescriptions     Pending Prescriptions Disp Refills    levothyroxine (SYNTHROID) 75 MCG tablet 60 tablet 3     Sig: Take 1 tablet by mouth daily    albuterol sulfate HFA (PROVENTIL;VENTOLIN;PROAIR) 108 (90 Base) MCG/ACT inhaler 9 g 2     Si puffs every 6 hours as needed for Wheezing    dicyclomine (BENTYL) 20 MG tablet 120 tablet 0     Sig: TAKE 2 TABLETS BY MOUTH 4 TIMES DAILY AS NEEDED       Medication is on current medication list Yes    Dosage and directions were verified? Yes    Quantity verified: 30 day supply     Pharmacy Verified?  Yes    Last Appointment:  2025    Future appts:  Future Appointments   Date Time Provider Department Center   2025  3:15 PM Maria Isabel Toscano MD [facility]   2025  3:20 PM Arnol Cummins APRN - CNP [facility] NEURO Neurology -        (If no appt send self scheduling link. .REFILLAPPT)  Scheduling request sent?     [] Yes  [x] No    Does patient need updated?  [] Yes  [x] No

## 2025-07-03 NOTE — TELEPHONE ENCOUNTER
Name of Medication(s) Requested:  Requested Prescriptions     Pending Prescriptions Disp Refills    budesonide-formoterol (BREYNA) 80-4.5 MCG/ACT AERO 11 g 3     Sig: INHALE 2 PUFFS BY MOUTH ONCE DAILY       Medication is on current medication list Yes    Dosage and directions were verified? Yes    Quantity verified: 30 day supply     Pharmacy Verified?  Yes    Last Appointment:  Visit date not found    Future appts:  Future Appointments   Date Time Provider Department Center   7/25/2025  3:15 PM Maria Isabel Toscano MD [facility]   7/28/2025  3:20 PM Arnol Cummins APRN - CNP [facility] NEURO Neurology -        (If no appt send self scheduling link. .REFILLAPPT)  Scheduling request sent?     [] Yes  [x] No    Does patient need updated?  [] Yes  [x] No

## 2025-07-17 DIAGNOSIS — Z76.0 MEDICATION REFILL: ICD-10-CM

## 2025-07-17 RX ORDER — DICYCLOMINE HCL 20 MG
TABLET ORAL
Qty: 120 TABLET | Refills: 3 | Status: SHIPPED | OUTPATIENT
Start: 2025-07-17

## 2025-07-17 NOTE — TELEPHONE ENCOUNTER
Name of Medication(s) Requested:  Requested Prescriptions     Pending Prescriptions Disp Refills    dicyclomine (BENTYL) 20 MG tablet [Pharmacy Med Name: DICYCLOMINE 20MG TABLETS] 120 tablet 0     Sig: TAKE 2 TABLETS BY MOUTH FOUR TIMES DAILY AS NEEDED       Medication is on current medication list Yes    Dosage and directions were verified? Yes    Quantity verified: 30 day supply     Pharmacy Verified?  Yes    Last Appointment:  2/19/2025    Future appts:  Future Appointments   Date Time Provider Department Center   7/25/2025  3:15 PM Maria Isabel Toscano MD [facility]   7/28/2025  3:20 PM Arnol Cummins APRN - CNP [facility] NEURO Neurology -        (If no appt send self scheduling link. .REFILLAPPT)  Scheduling request sent?     [] Yes  [x] No    Does patient need updated?  [] Yes  [x] No

## 2025-07-22 DIAGNOSIS — G43.909 MIGRAINE WITHOUT STATUS MIGRAINOSUS, NOT INTRACTABLE, UNSPECIFIED MIGRAINE TYPE: ICD-10-CM

## 2025-07-22 DIAGNOSIS — G56.00 CARPAL TUNNEL SYNDROME, UNSPECIFIED LATERALITY: ICD-10-CM

## 2025-07-22 DIAGNOSIS — G80.0 CONGENITAL SPASTIC QUADRIPARESIS (HCC): ICD-10-CM

## 2025-07-22 RX ORDER — RIBOFLAVIN (VITAMIN B2) 100 MG
100 TABLET ORAL DAILY
Qty: 60 TABLET | Refills: 2 | Status: SHIPPED | OUTPATIENT
Start: 2025-07-22

## 2025-07-22 NOTE — TELEPHONE ENCOUNTER
Name of Medication(s) Requested:  Requested Prescriptions     Pending Prescriptions Disp Refills    vitamin B-2 (RIBOFLAVIN) 100 MG TABS tablet 60 tablet 2     Sig: Take 1 tablet by mouth daily       Medication is on current medication list Yes    Dosage and directions were verified? Yes    Quantity verified: 30 day supply     Pharmacy Verified?  Yes    Last Appointment:  2/19/2025    Future appts:  Future Appointments   Date Time Provider Department Center   7/25/2025  3:15 PM Maria Isabel Toscano MD [facility]   7/28/2025  3:20 PM Arnol Cummins APRN - CNP [facility] NEURO Neurology -        (If no appt send self scheduling link. .REFILLAPPT)  Scheduling request sent?     [] Yes  [x] No    Does patient need updated?  [] Yes  [x] No

## 2025-07-23 RX ORDER — GABAPENTIN 300 MG/1
600 CAPSULE ORAL 3 TIMES DAILY
Qty: 540 CAPSULE | Refills: 3 | Status: SHIPPED | OUTPATIENT
Start: 2025-07-23 | End: 2026-07-18

## 2025-07-23 RX ORDER — DANTROLENE SODIUM 25 MG/1
CAPSULE ORAL
Qty: 360 CAPSULE | Refills: 3 | Status: SHIPPED | OUTPATIENT
Start: 2025-07-23

## 2025-07-23 RX ORDER — BACLOFEN 20 MG/1
TABLET ORAL
Qty: 360 TABLET | Refills: 0 | Status: SHIPPED | OUTPATIENT
Start: 2025-07-23 | End: 2028-07-07

## 2025-07-25 ENCOUNTER — OFFICE VISIT (OUTPATIENT)
Dept: FAMILY MEDICINE CLINIC | Age: 35
End: 2025-07-25
Payer: COMMERCIAL

## 2025-07-25 VITALS
BODY MASS INDEX: 41.68 KG/M2 | WEIGHT: 275 LBS | HEIGHT: 68 IN | RESPIRATION RATE: 16 BRPM | SYSTOLIC BLOOD PRESSURE: 100 MMHG | TEMPERATURE: 97.5 F | OXYGEN SATURATION: 98 % | HEART RATE: 79 BPM | DIASTOLIC BLOOD PRESSURE: 64 MMHG

## 2025-07-25 DIAGNOSIS — J02.9 SORE THROAT: ICD-10-CM

## 2025-07-25 DIAGNOSIS — R30.0 DYSURIA: Primary | ICD-10-CM

## 2025-07-25 DIAGNOSIS — E03.9 HYPOTHYROIDISM, UNSPECIFIED TYPE: ICD-10-CM

## 2025-07-25 DIAGNOSIS — M54.50 CHRONIC BILATERAL LOW BACK PAIN WITHOUT SCIATICA: ICD-10-CM

## 2025-07-25 DIAGNOSIS — L70.9 ACNE, UNSPECIFIED ACNE TYPE: ICD-10-CM

## 2025-07-25 DIAGNOSIS — G89.29 CHRONIC BILATERAL LOW BACK PAIN WITHOUT SCIATICA: ICD-10-CM

## 2025-07-25 PROCEDURE — 99213 OFFICE O/P EST LOW 20 MIN: CPT

## 2025-07-25 PROCEDURE — G8428 CUR MEDS NOT DOCUMENT: HCPCS

## 2025-07-25 PROCEDURE — 4004F PT TOBACCO SCREEN RCVD TLK: CPT

## 2025-07-25 PROCEDURE — G8417 CALC BMI ABV UP PARAM F/U: HCPCS

## 2025-07-25 RX ORDER — FLUTICASONE PROPIONATE 50 MCG
1 SPRAY, SUSPENSION (ML) NASAL DAILY
Qty: 32 G | Refills: 1 | Status: SHIPPED | OUTPATIENT
Start: 2025-07-25

## 2025-07-25 RX ORDER — NITROFURANTOIN 25; 75 MG/1; MG/1
100 CAPSULE ORAL 2 TIMES DAILY
Qty: 20 CAPSULE | Refills: 0 | Status: SHIPPED | OUTPATIENT
Start: 2025-07-25 | End: 2025-08-04

## 2025-07-25 RX ORDER — BENZOYL PEROXIDE 50 MG/ML
LIQUID TOPICAL
Qty: 118 ML | Refills: 0 | Status: SHIPPED | OUTPATIENT
Start: 2025-07-25

## 2025-07-25 RX ORDER — CLINDAMYCIN PHOSPHATE 10 UG/ML
LOTION TOPICAL
Qty: 60 G | Refills: 2 | Status: SHIPPED | OUTPATIENT
Start: 2025-07-25 | End: 2025-08-24

## 2025-07-25 ASSESSMENT — PATIENT HEALTH QUESTIONNAIRE - PHQ9
5. POOR APPETITE OR OVEREATING: NOT AT ALL
1. LITTLE INTEREST OR PLEASURE IN DOING THINGS: MORE THAN HALF THE DAYS
10. IF YOU CHECKED OFF ANY PROBLEMS, HOW DIFFICULT HAVE THESE PROBLEMS MADE IT FOR YOU TO DO YOUR WORK, TAKE CARE OF THINGS AT HOME, OR GET ALONG WITH OTHER PEOPLE: NOT DIFFICULT AT ALL
9. THOUGHTS THAT YOU WOULD BE BETTER OFF DEAD, OR OF HURTING YOURSELF: NOT AT ALL
SUM OF ALL RESPONSES TO PHQ QUESTIONS 1-9: 9
6. FEELING BAD ABOUT YOURSELF - OR THAT YOU ARE A FAILURE OR HAVE LET YOURSELF OR YOUR FAMILY DOWN: NOT AT ALL
4. FEELING TIRED OR HAVING LITTLE ENERGY: NEARLY EVERY DAY
SUM OF ALL RESPONSES TO PHQ QUESTIONS 1-9: 9
7. TROUBLE CONCENTRATING ON THINGS, SUCH AS READING THE NEWSPAPER OR WATCHING TELEVISION: SEVERAL DAYS
3. TROUBLE FALLING OR STAYING ASLEEP: NEARLY EVERY DAY
2. FEELING DOWN, DEPRESSED OR HOPELESS: NOT AT ALL
8. MOVING OR SPEAKING SO SLOWLY THAT OTHER PEOPLE COULD HAVE NOTICED. OR THE OPPOSITE, BEING SO FIGETY OR RESTLESS THAT YOU HAVE BEEN MOVING AROUND A LOT MORE THAN USUAL: NOT AT ALL

## 2025-07-25 ASSESSMENT — ENCOUNTER SYMPTOMS
SORE THROAT: 1
RESPIRATORY NEGATIVE: 1
GASTROINTESTINAL NEGATIVE: 1
EYES NEGATIVE: 1

## 2025-07-25 NOTE — PROGRESS NOTES
S: 35 y.o. female here for dysuria with urgency and chills and abdominal pain. No fever, but some sweats.  Hypothyroidism.    1 week of back pain but she it intermittently comes and goes.     Sore throat for 1 week. + sick contacts.     Acne. Used to be on meds    O: VS: /64 (BP Site: Left Upper Arm, Patient Position: Sitting, BP Cuff Size: Large Adult)   Pulse 79   Temp 97.5 °F (36.4 °C) (Temporal)   Resp 16   Ht 1.727 m (5' 8\")   Wt 124.7 kg (275 lb)   LMP  (LMP Unknown)   SpO2 98%   BMI 41.81 kg/m²    General: NAD   CV:  RRR, no gallops, rubs, or murmurs   Resp: CTAB no R/R/W   Abd:  Soft, bilateral L>.r ttp in the lower quadrants, no masses    Ext:  no C/C/E   + cva ttp b/l mild   Skin: pustules, open comedones on chin and cheeks   HEENT-no lad, no pharynx, normal normal nares  Impression/Plan:   1. Dysuria-ua and cx, empiric macrobid for uti  2. Congestion-suspect viral illness versus allergic rhinitis  3. Acne-cleocin, benzoyl peroxide  4. Hypothyroidism-check labs today    Rto in 2 weeks  Needs amwv      Attending Physician Statement  I have discussed the case, including pertinent history and exam findings with the resident.  I agree with the documented assessment and plan.        Brianna Ricks MD
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
## 2025-07-28 ENCOUNTER — OFFICE VISIT (OUTPATIENT)
Age: 35
End: 2025-07-28
Payer: COMMERCIAL

## 2025-07-28 VITALS
TEMPERATURE: 97.7 F | HEART RATE: 92 BPM | WEIGHT: 275 LBS | HEIGHT: 68 IN | BODY MASS INDEX: 41.68 KG/M2 | DIASTOLIC BLOOD PRESSURE: 72 MMHG | OXYGEN SATURATION: 96 % | SYSTOLIC BLOOD PRESSURE: 115 MMHG

## 2025-07-28 DIAGNOSIS — M47.816 OSTEOARTHRITIS OF LUMBAR SPINE, UNSPECIFIED SPINAL OSTEOARTHRITIS COMPLICATION STATUS: ICD-10-CM

## 2025-07-28 DIAGNOSIS — G80.0 CONGENITAL SPASTIC QUADRIPARESIS (HCC): Primary | ICD-10-CM

## 2025-07-28 DIAGNOSIS — M25.551 BILATERAL HIP PAIN: ICD-10-CM

## 2025-07-28 DIAGNOSIS — M25.552 BILATERAL HIP PAIN: ICD-10-CM

## 2025-07-28 DIAGNOSIS — E03.9 HYPOTHYROIDISM, UNSPECIFIED TYPE: ICD-10-CM

## 2025-07-28 LAB
ALBUMIN: 4.1 G/DL (ref 3.5–5.2)
ALP BLD-CCNC: 52 U/L (ref 35–104)
ALT SERPL-CCNC: 11 U/L (ref 0–35)
ANION GAP SERPL CALCULATED.3IONS-SCNC: 12 MMOL/L (ref 7–16)
AST SERPL-CCNC: 20 U/L (ref 0–35)
BACTERIA: ABNORMAL
BASOPHILS ABSOLUTE: 0.03 K/UL (ref 0–0.2)
BASOPHILS RELATIVE PERCENT: 0 % (ref 0–2)
BILIRUB SERPL-MCNC: 0.3 MG/DL (ref 0–1.2)
BILIRUBIN, URINE: NEGATIVE
BUN BLDV-MCNC: 14 MG/DL (ref 6–20)
CALCIUM SERPL-MCNC: 9.3 MG/DL (ref 8.6–10)
CHLORIDE BLD-SCNC: 110 MMOL/L (ref 98–107)
CO2: 20 MMOL/L (ref 22–29)
COLOR, UA: YELLOW
CREAT SERPL-MCNC: 1 MG/DL (ref 0.5–1)
CRYSTALS, UA: ABNORMAL /HPF
EOSINOPHILS ABSOLUTE: 0.05 K/UL (ref 0.05–0.5)
EOSINOPHILS RELATIVE PERCENT: 1 % (ref 0–6)
GFR, ESTIMATED: 73 ML/MIN/1.73M2
GLUCOSE BLD-MCNC: 95 MG/DL (ref 74–99)
GLUCOSE URINE: NEGATIVE MG/DL
HCT VFR BLD CALC: 41.9 % (ref 34–48)
HEMOGLOBIN: 13.4 G/DL (ref 11.5–15.5)
IMMATURE GRANULOCYTES %: 1 % (ref 0–5)
IMMATURE GRANULOCYTES ABSOLUTE: 0.04 K/UL (ref 0–0.58)
KETONES, URINE: NEGATIVE MG/DL
LEUKOCYTE ESTERASE, URINE: NEGATIVE
LYMPHOCYTES ABSOLUTE: 2.17 K/UL (ref 1.5–4)
LYMPHOCYTES RELATIVE PERCENT: 28 % (ref 20–42)
MCH RBC QN AUTO: 29.5 PG (ref 26–35)
MCHC RBC AUTO-ENTMCNC: 32 G/DL (ref 32–34.5)
MCV RBC AUTO: 92.3 FL (ref 80–99.9)
MONOCYTES ABSOLUTE: 0.57 K/UL (ref 0.1–0.95)
MONOCYTES RELATIVE PERCENT: 7 % (ref 2–12)
NEUTROPHILS ABSOLUTE: 5 K/UL (ref 1.8–7.3)
NEUTROPHILS RELATIVE PERCENT: 64 % (ref 43–80)
NITRITE, URINE: NEGATIVE
PDW BLD-RTO: 13.9 % (ref 11.5–15)
PH, URINE: 6 (ref 5–8)
PLATELET, FLUORESCENCE: 154 K/UL (ref 130–450)
PMV BLD AUTO: 14.4 FL (ref 7–12)
POTASSIUM SERPL-SCNC: 4.2 MMOL/L (ref 3.5–5.1)
PROTEIN UA: NEGATIVE MG/DL
RBC # BLD: 4.54 M/UL (ref 3.5–5.5)
RBC UA: ABNORMAL /HPF
SODIUM BLD-SCNC: 142 MMOL/L (ref 136–145)
SPECIFIC GRAVITY UA: 1.02 (ref 1–1.03)
T4 FREE: 1.1 NG/DL (ref 0.9–1.7)
TOTAL PROTEIN: 6.8 G/DL (ref 6.4–8.3)
TSH SERPL DL<=0.05 MIU/L-ACNC: 4.56 UIU/ML (ref 0.27–4.2)
TURBIDITY: CLEAR
URINE HGB: ABNORMAL
UROBILINOGEN, URINE: 0.2 EU/DL (ref 0–1)
WBC # BLD: 7.9 K/UL (ref 4.5–11.5)
WBC UA: ABNORMAL /HPF

## 2025-07-28 PROCEDURE — G8417 CALC BMI ABV UP PARAM F/U: HCPCS | Performed by: NURSE PRACTITIONER

## 2025-07-28 PROCEDURE — 4004F PT TOBACCO SCREEN RCVD TLK: CPT | Performed by: NURSE PRACTITIONER

## 2025-07-28 PROCEDURE — G8428 CUR MEDS NOT DOCUMENT: HCPCS | Performed by: NURSE PRACTITIONER

## 2025-07-28 PROCEDURE — 99214 OFFICE O/P EST MOD 30 MIN: CPT | Performed by: NURSE PRACTITIONER

## 2025-07-28 NOTE — PROGRESS NOTES
[facility] NEUROLOGY   Arnol Cummins MSN, APRN-CNP, [facility]     [address] [place] [address]      [phone]                                    Office Follow Up     Chad Peoples is a 35 y.o. right handed female     We are following her for possible congenital spastic quadriparesis and back pain    She presents alone and remains a good historian.     She is interested in referral back to pain management for her low back and bilateral hip pains.  No falls.  Spastic quadriparetic symptoms are stable on her current medication regimen.  Mental health has improved.  No other new neuro issues    No chest pain or palpitations  No SOB  No vertigo, lightheadedness or loss of consciousness  No itching or bruising appreciated  No focal limb weakness  No speech or swallowing troubles    ROS otherwise negative      Current Outpatient Medications   Medication Sig Dispense Refill    nitrofurantoin, macrocrystal-monohydrate, (MACROBID) 100 MG capsule Take 1 capsule by mouth 2 times daily for 10 days 20 capsule 0    clindamycin (CLEOCIN-T) 1 % lotion Apply topically 2 times daily. 60 g 2    fluticasone (FLONASE) 50 MCG/ACT nasal spray 1 spray by Each Nostril route daily 32 g 1    gabapentin (NEURONTIN) 300 MG capsule Take 2 capsules by mouth in the morning, at noon, and at bedtime for 360 days. 540 capsule 3    dantrolene (DANTRIUM) 25 MG capsule Take 1 capsule by mouth every morning AND 1 capsule Daily with lunch AND 2 capsules at bedtime. 360 capsule 3    vitamin B-2 (RIBOFLAVIN) 100 MG TABS tablet Take 1 tablet by mouth daily 60 tablet 2    dicyclomine (BENTYL) 20 MG tablet TAKE 2 TABLETS BY MOUTH FOUR TIMES DAILY AS NEEDED 120 tablet 3    levothyroxine (SYNTHROID) 75 MCG tablet Take 1 tablet by mouth daily 60 tablet 3    albuterol sulfate HFA (PROVENTIL;VENTOLIN;PROAIR) 108 (90 Base) MCG/ACT inhaler Inhale 2 puffs into the lungs every 6 hours as needed for Wheezing 9 g 2    budesonide-formoterol (BREYNA)

## 2025-07-29 LAB
CULTURE: NO GROWTH
SPECIMEN DESCRIPTION: NORMAL

## 2025-07-30 ENCOUNTER — TELEPHONE (OUTPATIENT)
Dept: FAMILY MEDICINE CLINIC | Age: 35
End: 2025-07-30

## 2025-08-08 ENCOUNTER — TELEPHONE (OUTPATIENT)
Dept: OBGYN | Age: 35
End: 2025-08-08

## 2025-08-09 DIAGNOSIS — E03.9 HYPOTHYROIDISM, UNSPECIFIED TYPE: ICD-10-CM

## 2025-08-11 RX ORDER — LEVOTHYROXINE SODIUM 88 UG/1
88 TABLET ORAL DAILY
Qty: 30 TABLET | Refills: 3 | Status: SHIPPED | OUTPATIENT
Start: 2025-08-11

## 2025-08-12 RX ORDER — MEDROXYPROGESTERONE ACETATE 10 MG
10 TABLET ORAL DAILY
Qty: 30 TABLET | Refills: 11 | Status: SHIPPED | OUTPATIENT
Start: 2025-08-12

## 2025-08-25 ENCOUNTER — OFFICE VISIT (OUTPATIENT)
Dept: FAMILY MEDICINE CLINIC | Age: 35
End: 2025-08-25
Payer: COMMERCIAL

## 2025-08-25 VITALS
RESPIRATION RATE: 18 BRPM | BODY MASS INDEX: 40.43 KG/M2 | WEIGHT: 273 LBS | SYSTOLIC BLOOD PRESSURE: 136 MMHG | HEART RATE: 89 BPM | DIASTOLIC BLOOD PRESSURE: 80 MMHG | TEMPERATURE: 97 F | OXYGEN SATURATION: 97 % | HEIGHT: 69 IN

## 2025-08-25 DIAGNOSIS — R30.0 DYSURIA: ICD-10-CM

## 2025-08-25 DIAGNOSIS — G89.29 CHRONIC BILATERAL LOW BACK PAIN WITHOUT SCIATICA: Primary | ICD-10-CM

## 2025-08-25 DIAGNOSIS — M54.50 CHRONIC BILATERAL LOW BACK PAIN WITHOUT SCIATICA: Primary | ICD-10-CM

## 2025-08-25 DIAGNOSIS — E03.9 HYPOTHYROIDISM, UNSPECIFIED TYPE: ICD-10-CM

## 2025-08-25 PROCEDURE — 99213 OFFICE O/P EST LOW 20 MIN: CPT

## 2025-08-25 PROCEDURE — 4004F PT TOBACCO SCREEN RCVD TLK: CPT

## 2025-08-25 PROCEDURE — G8417 CALC BMI ABV UP PARAM F/U: HCPCS

## 2025-08-25 PROCEDURE — G8428 CUR MEDS NOT DOCUMENT: HCPCS

## 2025-08-25 RX ORDER — LIDOCAINE 4 G/G
1 PATCH TOPICAL DAILY
Qty: 30 PATCH | Refills: 0 | Status: SHIPPED | OUTPATIENT
Start: 2025-08-25 | End: 2025-09-24

## 2025-08-25 ASSESSMENT — ENCOUNTER SYMPTOMS
GASTROINTESTINAL NEGATIVE: 1
BACK PAIN: 1
RESPIRATORY NEGATIVE: 1

## 2025-08-26 DIAGNOSIS — R82.998: ICD-10-CM

## 2025-08-26 LAB — URIC ACID: 5.7 MG/DL (ref 2.4–5.7)

## 2025-08-27 DIAGNOSIS — G43.909 MIGRAINE WITHOUT STATUS MIGRAINOSUS, NOT INTRACTABLE, UNSPECIFIED MIGRAINE TYPE: ICD-10-CM

## 2025-08-27 RX ORDER — BACLOFEN 20 MG
1 TABLET ORAL DAILY
Qty: 60 TABLET | Refills: 0 | Status: SHIPPED | OUTPATIENT
Start: 2025-08-27

## (undated) DEVICE — BLADE ES ELASTOMERIC COAT INSUL DURABLE BEND UPTO 90DEG

## (undated) DEVICE — GLOVE ORANGE PI 7 1/2   MSG9075

## (undated) DEVICE — 4-PORT MANIFOLD: Brand: NEPTUNE 2

## (undated) DEVICE — NEEDLE HYPO 18GA L1.5IN PNK POLYPR HUB S STL THN WALL FILL

## (undated) DEVICE — NON-DEHP CATHETER EXTENSION SET, MALE LUER LOCK ADAPTER

## (undated) DEVICE — SYRINGE, LUER LOCK, 5ML: Brand: MEDLINE

## (undated) DEVICE — ANTI-FOG SOLUTION WITH FOAM PAD: Brand: DEVON

## (undated) DEVICE — SURGICAL PROCEDURE PACK EENT CUST

## (undated) DEVICE — SUTURE VIC + 4-0 27 IN SH VIO VCP315H

## (undated) DEVICE — MEDI-VAC YANKAUER SUCTION HANDLE W/BULBOUS TIP: Brand: CARDINAL HEALTH

## (undated) DEVICE — CANNULA NSL ORAL AD FOR CAPNOFLEX CO2 O2 AIRLFE

## (undated) DEVICE — GAUZE,SPONGE,4"X4",12PLY,STERILE,LF,2'S: Brand: MEDLINE

## (undated) DEVICE — 6 X 9  1.75MIL 4-WALL LABGUARD: Brand: MINIGRIP COMMERCIAL LLC

## (undated) DEVICE — 6 ML SYRINGE LUER-LOCK TIP: Brand: MONOJECT

## (undated) DEVICE — APPLICATOR MEDICATED 50MG L6IN 75% SIL NITRT 25% K NITRT

## (undated) DEVICE — 3M™ RED DOT™ MONITORING ELECTRODE WITH FOAM TAPE AND STICKY GEL 2560, 50/BAG, 20/CASE, 72/PLT: Brand: RED DOT™

## (undated) DEVICE — SPONGE,TONSIL,DBL STRNG,XRAY,MED,1",STRL: Brand: MEDLINE INDUSTRIES, INC.

## (undated) DEVICE — TOWEL,OR,DSP,ST,BLUE,STD,6/PK,12PK/CS: Brand: MEDLINE

## (undated) DEVICE — SOLUTION IV IRRIG WATER 1000ML POUR BRL 2F7114

## (undated) DEVICE — SPONGE GZ 4IN 4IN 4 PLY N WVN AVANT

## (undated) DEVICE — NEEDLE HYPO 25GA L1.5IN BLU POLYPR HUB S STL REG BVL STR

## (undated) DEVICE — SPONGE LAP W3/8XL1.5IN COT CYL CNTR STRUNG N RADPQ STRL

## (undated) DEVICE — CAESAR GRASPING FORCEPS: Brand: CAESAR

## (undated) DEVICE — CLEANER,CAUTERY TIP,2X2",STERILE: Brand: MEDLINE

## (undated) DEVICE — DUAL LUMEN STOMACH TUBE: Brand: SALEM SUMP

## (undated) DEVICE — CONTAINER SPEC COLL 960ML POLYPR TRIANG GRAD INTAKE/OUTPUT

## (undated) DEVICE — 12 ML SYRINGE,LUER-LOCK TIP: Brand: MONOJECT

## (undated) DEVICE — MARKER,SKIN,WI/RULER AND LABELS: Brand: MEDLINE

## (undated) DEVICE — SET T AND A POTESTA

## (undated) DEVICE — BANDAGE ADH W1XL3IN NAT FAB WVN FLX DURABLE N ADH PD SEAL

## (undated) DEVICE — BLOCK BITE 60FR CAREGUARD

## (undated) DEVICE — Z DISCONTINUED APPLICATOR SURG PREP 0.35OZ 2% CHG 70% ISO ALC W/ HI LT

## (undated) DEVICE — PENCIL ES L3M BTTN SWCH HOLSTER W/ BLDE ELECTRD EDGE

## (undated) DEVICE — Device: Brand: PORTEX

## (undated) DEVICE — Device

## (undated) DEVICE — SYRINGE,EAR/ULCER, 2 OZ, STERILE: Brand: MEDLINE

## (undated) DEVICE — COUNTER NDL 30 COUNT DBL MAG

## (undated) DEVICE — GOWN,SIRUS,FABRNF,L,20/CS: Brand: MEDLINE

## (undated) DEVICE — KIT BEDSIDE REVITAL OX 500ML

## (undated) DEVICE — MEDI-VAC NON-CONDUCTIVE SUCTION TUBING: Brand: CARDINAL HEALTH

## (undated) DEVICE — ELECTRODE PT RET AD L9FT HI MOIST COND ADH HYDRGEL CORDED

## (undated) DEVICE — VALVE SUCTION AIR H2O HYDR H2O JET CONN STRL ORCA POD + DISP

## (undated) DEVICE — LUBRICANT SURG JELLY ST BACTER TUBE 4.25OZ

## (undated) DEVICE — CONTAINER SPEC 480ML CLR POLYSTYR 10% NEUT BUFF FRMLN ZN

## (undated) DEVICE — SOLUTION IV IRRIG POUR BRL 0.9% SODIUM CHL 2F7124

## (undated) DEVICE — BASIC SINGLE BASIN 1-LF: Brand: MEDLINE INDUSTRIES, INC.

## (undated) DEVICE — MASK,FACE,MAXFLUIDPROTECT,SHIELD/ERLPS: Brand: MEDLINE

## (undated) DEVICE — GOWN ISOLATN REG YEL M WT MULTIPLY SIDETIE LEV 2

## (undated) DEVICE — KENDALL 450 SERIES MONITORING FOAM ELECTRODE - RECTANGULAR SHAPE ( 3/PK): Brand: KENDALL

## (undated) DEVICE — COAGULATOR SUCT 10FR LAIN FTSWCH ACTIVATION DISP VALLEYLAB

## (undated) DEVICE — FORCEPS BX L240CM JAW DIA2.8MM L CAP W/ NDL MIC MESH TOOTH